# Patient Record
Sex: FEMALE | Race: OTHER | Employment: FULL TIME | URBAN - METROPOLITAN AREA
[De-identification: names, ages, dates, MRNs, and addresses within clinical notes are randomized per-mention and may not be internally consistent; named-entity substitution may affect disease eponyms.]

---

## 2021-05-26 ENCOUNTER — HOSPITAL ENCOUNTER (EMERGENCY)
Facility: HOSPITAL | Age: 31
Discharge: HOME | End: 2021-05-27
Attending: EMERGENCY MEDICINE
Payer: COMMERCIAL

## 2021-05-26 DIAGNOSIS — F41.9 ANXIETY: ICD-10-CM

## 2021-05-26 DIAGNOSIS — D64.9 ANEMIA, UNSPECIFIED TYPE: Primary | ICD-10-CM

## 2021-05-26 PROCEDURE — 93005 ELECTROCARDIOGRAM TRACING: CPT

## 2021-05-26 PROCEDURE — 99284 EMERGENCY DEPT VISIT MOD MDM: CPT

## 2021-05-26 PROCEDURE — 93010 ELECTROCARDIOGRAM REPORT: CPT | Performed by: EMERGENCY MEDICINE

## 2021-05-26 PROCEDURE — 36415 COLL VENOUS BLD VENIPUNCTURE: CPT

## 2021-05-26 NOTE — ED PROVIDER NOTES
Patient Seen in: [facility]      History   Patient presents with:   Anxiety/Panic attack    Stated Complaint: chest pain/dizzy/shaky/arm and leg tingling    HPI/Subjective:   HPI    33 yo female with PMH of bronchial tumor here for evaluation Pulmonary effort is normal.      Breath sounds: No wheezing. Abdominal:      General: Abdomen is flat. Musculoskeletal:         General: Normal range of motion. Cervical back: Normal range of motion. Skin:     General: Skin is warm.    Neurologic total) by mouth daily.   Qty: 30 tablet Refills: 0

## 2021-05-26 NOTE — ED INITIAL ASSESSMENT (HPI)
Felt like her heart was racing and was having chest pain while at work this afternoon. Hx of anxiety. Has not been taking her medications.

## 2021-05-27 ENCOUNTER — APPOINTMENT (OUTPATIENT)
Dept: GENERAL RADIOLOGY | Facility: HOSPITAL | Age: 31
End: 2021-05-27
Attending: EMERGENCY MEDICINE
Payer: COMMERCIAL

## 2021-05-27 VITALS
DIASTOLIC BLOOD PRESSURE: 75 MMHG | RESPIRATION RATE: 21 BRPM | HEART RATE: 94 BPM | OXYGEN SATURATION: 99 % | WEIGHT: 275 LBS | TEMPERATURE: 99 F | BODY MASS INDEX: 47 KG/M2 | SYSTOLIC BLOOD PRESSURE: 111 MMHG

## 2021-05-27 PROCEDURE — 85379 FIBRIN DEGRADATION QUANT: CPT | Performed by: EMERGENCY MEDICINE

## 2021-05-27 PROCEDURE — 85025 COMPLETE CBC W/AUTO DIFF WBC: CPT | Performed by: EMERGENCY MEDICINE

## 2021-05-27 PROCEDURE — 71045 X-RAY EXAM CHEST 1 VIEW: CPT | Performed by: EMERGENCY MEDICINE

## 2021-05-27 PROCEDURE — 80048 BASIC METABOLIC PNL TOTAL CA: CPT | Performed by: EMERGENCY MEDICINE

## 2021-05-27 PROCEDURE — 84484 ASSAY OF TROPONIN QUANT: CPT | Performed by: EMERGENCY MEDICINE

## 2021-05-27 PROCEDURE — 81025 URINE PREGNANCY TEST: CPT

## 2021-05-27 PROCEDURE — 84443 ASSAY THYROID STIM HORMONE: CPT | Performed by: EMERGENCY MEDICINE

## 2021-05-27 RX ORDER — LORAZEPAM 1 MG/1
0.5 TABLET ORAL ONCE
Status: COMPLETED | OUTPATIENT
Start: 2021-05-27 | End: 2021-05-27

## 2021-05-27 RX ORDER — LORAZEPAM 0.5 MG/1
0.5 TABLET ORAL 2 TIMES DAILY PRN
Qty: 7 TABLET | Refills: 0 | Status: SHIPPED | OUTPATIENT
Start: 2021-05-27 | End: 2021-06-03

## 2021-05-27 RX ORDER — MELATONIN
325
Qty: 30 TABLET | Refills: 0 | Status: SHIPPED | OUTPATIENT
Start: 2021-05-27 | End: 2021-06-26

## 2021-05-27 NOTE — ED PROVIDER NOTES
Patient Seen in: [facility] AND [facility]      History   Patient presents with:  Chest Pain Angina  Anxiety/Panic attack    Stated Complaint:     HPI/Subjective:   HPI  Patient is a 77-year-old female history of anxiety presenting with ches moist.   Eyes:      Extraocular Movements: Extraocular movements intact. Conjunctiva/sclera: Conjunctivae normal.      Pupils: Pupils are equal, round, and reactive to light. Cardiovascular:      Rate and Rhythm: Regular rhythm. Tachycardia present. results for these tests on the individual orders. RAINBOW DRAW LAVENDER   RAINBOW DRAW LIGHT GREEN   RAINBOW DRAW GOLD       AP chest x-ray at 0255    IMPRESSION:    No acute pulmonary process. Asymmetric elevation of the right hemidiaphragm.         EKG Tab EC  Take 1 tablet (325 mg total) by mouth daily with breakfast.  Qty: 30 tablet Refills: 0    LORazepam 0.5 MG Oral Tab  Take 1 tablet (0.5 mg total) by mouth 2 (two) times daily as needed for Anxiety.   Qty: 7 tablet Refills: 0

## 2021-05-27 NOTE — ED INITIAL ASSESSMENT (HPI)
Pt states she was seen at [address] [place] [address], she thought she was having a heart attack, was told it was a panic attack. States she had the same feeling again tonight, she felt chest tightness, shortness of breath, her fingers were tingling, and her heart was racing.  Hx o

## 2023-04-26 ENCOUNTER — APPOINTMENT (OUTPATIENT)
Dept: GENERAL RADIOLOGY | Age: 33
End: 2023-04-26
Attending: NURSE PRACTITIONER
Payer: COMMERCIAL

## 2023-04-26 ENCOUNTER — HOSPITAL ENCOUNTER (OUTPATIENT)
Age: 33
Discharge: HOME | End: 2023-04-26
Payer: COMMERCIAL

## 2023-04-26 VITALS
OXYGEN SATURATION: 98 % | SYSTOLIC BLOOD PRESSURE: 101 MMHG | HEART RATE: 116 BPM | TEMPERATURE: 97 F | RESPIRATION RATE: 18 BRPM | DIASTOLIC BLOOD PRESSURE: 89 MMHG

## 2023-04-26 DIAGNOSIS — J18.9 COMMUNITY ACQUIRED PNEUMONIA OF RIGHT UPPER LOBE OF LUNG: ICD-10-CM

## 2023-04-26 DIAGNOSIS — R05.1 ACUTE COUGH: Primary | ICD-10-CM

## 2023-04-26 DIAGNOSIS — D63.8 ANEMIA, CHRONIC DISEASE: ICD-10-CM

## 2023-04-26 LAB
#MXD IC: 0.5 X10ˆ3/UL (ref 0.1–1)
ATRIAL RATE: 113 BPM
BUN BLD-MCNC: <5 MG/DL (ref 7–18)
CHLORIDE BLD-SCNC: 100 MMOL/L (ref 98–112)
CO2 BLD-SCNC: 23 MMOL/L (ref 21–32)
CREAT BLD-MCNC: 0.5 MG/DL
DDIMER WHOLE BLOOD: <200 NG/ML DDU (ref ?–400)
GFR SERPLBLD BASED ON 1.73 SQ M-ARVRAT: 128 ML/MIN/1.73M2 (ref 60–?)
GLUCOSE BLD-MCNC: 169 MG/DL (ref 70–99)
HCT VFR BLD AUTO: 35.8 %
HCT VFR BLD CALC: 39 %
HGB BLD-MCNC: 10.5 G/DL
ISTAT IONIZED CALCIUM FOR CHEM 8: 1.13 MMOL/L (ref 1.12–1.32)
LYMPHOCYTES # BLD AUTO: 1.8 X10ˆ3/UL (ref 1–4)
LYMPHOCYTES NFR BLD AUTO: 43 %
MCH RBC QN AUTO: 23.6 PG (ref 26–34)
MCHC RBC AUTO-ENTMCNC: 29.3 G/DL (ref 31–37)
MCV RBC AUTO: 80.4 FL (ref 80–100)
MIXED CELL %: 12.4 %
NEUTROPHILS # BLD AUTO: 1.9 X10ˆ3/UL (ref 1.5–7.7)
NEUTROPHILS NFR BLD AUTO: 44.6 %
P AXIS: 20 DEGREES
P-R INTERVAL: 168 MS
PLATELET # BLD AUTO: 153 X10ˆ3/UL (ref 150–450)
POTASSIUM BLD-SCNC: 4 MMOL/L (ref 3.6–5.1)
Q-T INTERVAL: 334 MS
QRS DURATION: 68 MS
QTC CALCULATION (BEZET): 458 MS
R AXIS: 18 DEGREES
RBC # BLD AUTO: 4.45 X10ˆ6/UL
SARS-COV-2 RNA RESP QL NAA+PROBE: NOT DETECTED
SODIUM BLD-SCNC: 140 MMOL/L (ref 136–145)
T AXIS: 51 DEGREES
TROPONIN I BLD-MCNC: <0.02 NG/ML
VENTRICULAR RATE: 113 BPM
WBC # BLD AUTO: 4.2 X10ˆ3/UL (ref 4–11)

## 2023-04-26 PROCEDURE — 85025 COMPLETE CBC W/AUTO DIFF WBC: CPT | Performed by: NURSE PRACTITIONER

## 2023-04-26 PROCEDURE — 99214 OFFICE O/P EST MOD 30 MIN: CPT | Performed by: NURSE PRACTITIONER

## 2023-04-26 PROCEDURE — 94640 AIRWAY INHALATION TREATMENT: CPT | Performed by: NURSE PRACTITIONER

## 2023-04-26 PROCEDURE — 36415 COLL VENOUS BLD VENIPUNCTURE: CPT | Performed by: NURSE PRACTITIONER

## 2023-04-26 PROCEDURE — 71046 X-RAY EXAM CHEST 2 VIEWS: CPT | Performed by: NURSE PRACTITIONER

## 2023-04-26 PROCEDURE — 93000 ELECTROCARDIOGRAM COMPLETE: CPT | Performed by: NURSE PRACTITIONER

## 2023-04-26 PROCEDURE — 80047 BASIC METABLC PNL IONIZED CA: CPT | Performed by: NURSE PRACTITIONER

## 2023-04-26 PROCEDURE — 84484 ASSAY OF TROPONIN QUANT: CPT | Performed by: NURSE PRACTITIONER

## 2023-04-26 PROCEDURE — U0002 COVID-19 LAB TEST NON-CDC: HCPCS | Performed by: NURSE PRACTITIONER

## 2023-04-26 RX ORDER — AZITHROMYCIN 250 MG/1
TABLET, FILM COATED ORAL
Qty: 6 TABLET | Refills: 0 | Status: SHIPPED | OUTPATIENT
Start: 2023-04-26 | End: 2023-05-01

## 2023-04-26 RX ORDER — AMOXICILLIN 875 MG/1
875 TABLET, COATED ORAL 2 TIMES DAILY
Qty: 20 TABLET | Refills: 0 | Status: SHIPPED | OUTPATIENT
Start: 2023-04-26 | End: 2023-05-06

## 2023-04-26 RX ORDER — IPRATROPIUM BROMIDE AND ALBUTEROL SULFATE 2.5; .5 MG/3ML; MG/3ML
3 SOLUTION RESPIRATORY (INHALATION) ONCE
Status: COMPLETED | OUTPATIENT
Start: 2023-04-26 | End: 2023-04-26

## 2023-04-26 RX ORDER — ALBUTEROL SULFATE 90 UG/1
2 AEROSOL, METERED RESPIRATORY (INHALATION) EVERY 4 HOURS PRN
Qty: 1 EACH | Refills: 0 | Status: SHIPPED | OUTPATIENT
Start: 2023-04-26 | End: 2023-05-26

## 2023-04-26 RX ORDER — INHALER, ASSIST DEVICES
SPACER (EA) MISCELLANEOUS
Qty: 1 EACH | Refills: 0 | Status: SHIPPED | OUTPATIENT
Start: 2023-04-26

## 2023-04-26 NOTE — DISCHARGE INSTRUCTIONS
X-ray showing a right-sided pneumonia. Blood work is showing a chronic anemia. Take both antibiotics as directed. Use the inhaler as needed for your wheezing. Make sure to use right before bed. Call your primary doctor for follow-up later this week or early next week.   Go to the emergency room if worsening symptoms

## 2023-06-18 ENCOUNTER — APPOINTMENT (OUTPATIENT)
Dept: CT IMAGING | Facility: HOSPITAL | Age: 33
End: 2023-06-18
Attending: EMERGENCY MEDICINE
Payer: COMMERCIAL

## 2023-06-18 ENCOUNTER — HOSPITAL ENCOUNTER (EMERGENCY)
Facility: HOSPITAL | Age: 33
Discharge: HOME | End: 2023-06-18
Attending: EMERGENCY MEDICINE
Payer: COMMERCIAL

## 2023-06-18 VITALS
DIASTOLIC BLOOD PRESSURE: 53 MMHG | OXYGEN SATURATION: 99 % | BODY MASS INDEX: 42.68 KG/M2 | WEIGHT: 250 LBS | HEART RATE: 100 BPM | TEMPERATURE: 97 F | HEIGHT: 64 IN | SYSTOLIC BLOOD PRESSURE: 108 MMHG | RESPIRATION RATE: 18 BRPM

## 2023-06-18 DIAGNOSIS — K57.92 ACUTE DIVERTICULITIS: Primary | ICD-10-CM

## 2023-06-18 LAB
ALBUMIN SERPL-MCNC: 3 G/DL (ref 3.4–5)
ALP LIVER SERPL-CCNC: 93 U/L
ALT SERPL-CCNC: 16 U/L
ANION GAP SERPL CALC-SCNC: 9 MMOL/L (ref 0–18)
AST SERPL-CCNC: 31 U/L (ref 15–37)
B-HCG UR QL: NEGATIVE
BASOPHILS # BLD AUTO: 0.03 X10(3) UL (ref 0–0.2)
BASOPHILS NFR BLD AUTO: 0.3 %
BILIRUB DIRECT SERPL-MCNC: 0.6 MG/DL (ref 0–0.2)
BILIRUB SERPL-MCNC: 1.7 MG/DL (ref 0.1–2)
BILIRUB UR QL: NEGATIVE
BUN BLD-MCNC: 6 MG/DL (ref 7–18)
BUN/CREAT SERPL: 8.5 (ref 10–20)
CALCIUM BLD-MCNC: 8.2 MG/DL (ref 8.5–10.1)
CHLORIDE SERPL-SCNC: 104 MMOL/L (ref 98–112)
CLARITY UR: CLEAR
CO2 SERPL-SCNC: 25 MMOL/L (ref 21–32)
CREAT BLD-MCNC: 0.71 MG/DL
DEPRECATED RDW RBC AUTO: 46.7 FL (ref 35.1–46.3)
EOSINOPHIL # BLD AUTO: 0.02 X10(3) UL (ref 0–0.7)
EOSINOPHIL NFR BLD AUTO: 0.2 %
ERYTHROCYTE [DISTWIDTH] IN BLOOD BY AUTOMATED COUNT: 16.9 % (ref 11–15)
GFR SERPLBLD BASED ON 1.73 SQ M-ARVRAT: 115 ML/MIN/1.73M2 (ref 60–?)
GLUCOSE BLD-MCNC: 159 MG/DL (ref 70–99)
GLUCOSE UR-MCNC: NORMAL MG/DL
HCT VFR BLD AUTO: 29.8 %
HGB BLD-MCNC: 8.6 G/DL
HGB UR QL STRIP.AUTO: NEGATIVE
IMM GRANULOCYTES # BLD AUTO: 0.08 X10(3) UL (ref 0–1)
IMM GRANULOCYTES NFR BLD: 0.8 %
KETONES UR-MCNC: NEGATIVE MG/DL
LEUKOCYTE ESTERASE UR QL STRIP.AUTO: NEGATIVE
LIPASE SERPL-CCNC: 33 U/L (ref 13–75)
LYMPHOCYTES # BLD AUTO: 1.33 X10(3) UL (ref 1–4)
LYMPHOCYTES NFR BLD AUTO: 13.1 %
MCH RBC QN AUTO: 21.9 PG (ref 26–34)
MCHC RBC AUTO-ENTMCNC: 28.9 G/DL (ref 31–37)
MCV RBC AUTO: 75.8 FL
MONOCYTES # BLD AUTO: 0.63 X10(3) UL (ref 0.1–1)
MONOCYTES NFR BLD AUTO: 6.2 %
NEUTROPHILS # BLD AUTO: 8.05 X10 (3) UL (ref 1.5–7.7)
NEUTROPHILS # BLD AUTO: 8.05 X10(3) UL (ref 1.5–7.7)
NEUTROPHILS NFR BLD AUTO: 79.4 %
NITRITE UR QL STRIP.AUTO: NEGATIVE
OSMOLALITY SERPL CALC.SUM OF ELEC: 287 MOSM/KG (ref 275–295)
PH UR: 7 [PH] (ref 5–8)
PLATELET # BLD AUTO: 179 10(3)UL (ref 150–450)
POTASSIUM SERPL-SCNC: 4 MMOL/L (ref 3.5–5.1)
PROT SERPL-MCNC: 7.1 G/DL (ref 6.4–8.2)
PROT UR-MCNC: 20 MG/DL
RBC # BLD AUTO: 3.93 X10(6)UL
SODIUM SERPL-SCNC: 138 MMOL/L (ref 136–145)
SP GR UR STRIP: >1.03 (ref 1–1.03)
UROBILINOGEN UR STRIP-ACNC: NORMAL
WBC # BLD AUTO: 10.1 X10(3) UL (ref 4–11)

## 2023-06-18 PROCEDURE — 96376 TX/PRO/DX INJ SAME DRUG ADON: CPT

## 2023-06-18 PROCEDURE — 96375 TX/PRO/DX INJ NEW DRUG ADDON: CPT

## 2023-06-18 PROCEDURE — 96361 HYDRATE IV INFUSION ADD-ON: CPT

## 2023-06-18 PROCEDURE — 99285 EMERGENCY DEPT VISIT HI MDM: CPT

## 2023-06-18 PROCEDURE — 80076 HEPATIC FUNCTION PANEL: CPT | Performed by: EMERGENCY MEDICINE

## 2023-06-18 PROCEDURE — 80048 BASIC METABOLIC PNL TOTAL CA: CPT | Performed by: EMERGENCY MEDICINE

## 2023-06-18 PROCEDURE — 83690 ASSAY OF LIPASE: CPT | Performed by: EMERGENCY MEDICINE

## 2023-06-18 PROCEDURE — 85025 COMPLETE CBC W/AUTO DIFF WBC: CPT | Performed by: EMERGENCY MEDICINE

## 2023-06-18 PROCEDURE — 81025 URINE PREGNANCY TEST: CPT

## 2023-06-18 PROCEDURE — 74177 CT ABD & PELVIS W/CONTRAST: CPT | Performed by: EMERGENCY MEDICINE

## 2023-06-18 PROCEDURE — 96374 THER/PROPH/DIAG INJ IV PUSH: CPT

## 2023-06-18 PROCEDURE — 81001 URINALYSIS AUTO W/SCOPE: CPT | Performed by: EMERGENCY MEDICINE

## 2023-06-18 RX ORDER — ONDANSETRON 4 MG/1
4 TABLET, ORALLY DISINTEGRATING ORAL EVERY 4 HOURS PRN
Qty: 10 TABLET | Refills: 0 | Status: SHIPPED | OUTPATIENT
Start: 2023-06-18 | End: 2023-06-27

## 2023-06-18 RX ORDER — LORAZEPAM 2 MG/ML
0.5 INJECTION INTRAMUSCULAR ONCE
Status: COMPLETED | OUTPATIENT
Start: 2023-06-18 | End: 2023-06-18

## 2023-06-18 RX ORDER — MORPHINE SULFATE 4 MG/ML
4 INJECTION, SOLUTION INTRAMUSCULAR; INTRAVENOUS ONCE
Status: COMPLETED | OUTPATIENT
Start: 2023-06-18 | End: 2023-06-18

## 2023-06-18 RX ORDER — CIPROFLOXACIN 500 MG/1
500 TABLET, FILM COATED ORAL 2 TIMES DAILY
Qty: 20 TABLET | Refills: 0 | Status: SHIPPED | OUTPATIENT
Start: 2023-06-18 | End: 2023-06-27

## 2023-06-18 RX ORDER — HYDROCODONE BITARTRATE AND ACETAMINOPHEN 5; 325 MG/1; MG/1
1 TABLET ORAL EVERY 6 HOURS PRN
Qty: 10 TABLET | Refills: 0 | Status: SHIPPED | OUTPATIENT
Start: 2023-06-18 | End: 2023-06-27

## 2023-06-18 RX ORDER — METRONIDAZOLE 500 MG/1
500 TABLET ORAL 3 TIMES DAILY
Qty: 30 TABLET | Refills: 0 | Status: SHIPPED | OUTPATIENT
Start: 2023-06-18 | End: 2023-06-27

## 2023-06-18 NOTE — ED INITIAL ASSESSMENT (HPI)
PT c/o lower abd pain, fever, vomiting for the last day. Last ibuprofen at 0000. PT reports mild dysuria and decreased output.

## 2023-06-19 ENCOUNTER — HOSPITAL ENCOUNTER (INPATIENT)
Facility: HOSPITAL | Age: 33
LOS: 7 days | Discharge: HOME | End: 2023-06-27
Attending: EMERGENCY MEDICINE | Admitting: HOSPITALIST
Payer: COMMERCIAL

## 2023-06-19 DIAGNOSIS — E66.01 MORBID OBESITY (HCC): ICD-10-CM

## 2023-06-19 DIAGNOSIS — R10.9 INTRACTABLE ABDOMINAL PAIN: Primary | ICD-10-CM

## 2023-06-19 DIAGNOSIS — K57.92 ACUTE DIVERTICULITIS: ICD-10-CM

## 2023-06-19 LAB
ALBUMIN SERPL-MCNC: 2.8 G/DL (ref 3.4–5)
ALBUMIN SERPL-MCNC: 2.9 G/DL (ref 3.4–5)
ALBUMIN/GLOB SERPL: 0.7 {RATIO} (ref 1–2)
ALP LIVER SERPL-CCNC: 78 U/L
ALP LIVER SERPL-CCNC: 91 U/L
ALT SERPL-CCNC: 15 U/L
ALT SERPL-CCNC: 15 U/L
ANION GAP SERPL CALC-SCNC: 3 MMOL/L (ref 0–18)
AST SERPL-CCNC: 27 U/L (ref 15–37)
AST SERPL-CCNC: 27 U/L (ref 15–37)
BASOPHILS # BLD AUTO: 0.04 X10(3) UL (ref 0–0.2)
BASOPHILS NFR BLD AUTO: 0.4 %
BILIRUB DIRECT SERPL-MCNC: 0.8 MG/DL (ref 0–0.2)
BILIRUB SERPL-MCNC: 2.1 MG/DL (ref 0.1–2)
BILIRUB SERPL-MCNC: 2.1 MG/DL (ref 0.1–2)
BILIRUB UR QL CFM: NEGATIVE
BUN BLD-MCNC: 6 MG/DL (ref 7–18)
BUN/CREAT SERPL: 9.2 (ref 10–20)
CALCIUM BLD-MCNC: 8.6 MG/DL (ref 8.5–10.1)
CHLORIDE SERPL-SCNC: 107 MMOL/L (ref 98–112)
CO2 SERPL-SCNC: 29 MMOL/L (ref 21–32)
CREAT BLD-MCNC: 0.65 MG/DL
DEPRECATED RDW RBC AUTO: 49.6 FL (ref 35.1–46.3)
EOSINOPHIL # BLD AUTO: 0.08 X10(3) UL (ref 0–0.7)
EOSINOPHIL NFR BLD AUTO: 0.8 %
ERYTHROCYTE [DISTWIDTH] IN BLOOD BY AUTOMATED COUNT: 17.4 % (ref 11–15)
GFR SERPLBLD BASED ON 1.73 SQ M-ARVRAT: 119 ML/MIN/1.73M2 (ref 60–?)
GLOBULIN PLAS-MCNC: 4.3 G/DL (ref 2.8–4.4)
GLUCOSE BLD-MCNC: 122 MG/DL (ref 70–99)
GLUCOSE UR-MCNC: NORMAL MG/DL
HCT VFR BLD AUTO: 30.1 %
HGB BLD-MCNC: 8.3 G/DL
HGB UR QL STRIP.AUTO: NEGATIVE
IMM GRANULOCYTES # BLD AUTO: 0.07 X10(3) UL (ref 0–1)
IMM GRANULOCYTES NFR BLD: 0.7 %
KETONES UR-MCNC: 20 MG/DL
LACTATE SERPL-SCNC: 1.1 MMOL/L (ref 0.4–2)
LACTATE SERPL-SCNC: 2.3 MMOL/L (ref 0.4–2)
LEUKOCYTE ESTERASE UR QL STRIP.AUTO: 75
LYMPHOCYTES # BLD AUTO: 1.71 X10(3) UL (ref 1–4)
LYMPHOCYTES NFR BLD AUTO: 17 %
MCH RBC QN AUTO: 21.9 PG (ref 26–34)
MCHC RBC AUTO-ENTMCNC: 27.6 G/DL (ref 31–37)
MCV RBC AUTO: 79.4 FL
MONOCYTES # BLD AUTO: 0.55 X10(3) UL (ref 0.1–1)
MONOCYTES NFR BLD AUTO: 5.5 %
NEUTROPHILS # BLD AUTO: 7.63 X10 (3) UL (ref 1.5–7.7)
NEUTROPHILS # BLD AUTO: 7.63 X10(3) UL (ref 1.5–7.7)
NEUTROPHILS NFR BLD AUTO: 75.6 %
NITRITE UR QL STRIP.AUTO: NEGATIVE
OSMOLALITY SERPL CALC.SUM OF ELEC: 287 MOSM/KG (ref 275–295)
PH UR: 6.5 [PH] (ref 5–8)
PLATELET # BLD AUTO: 181 10(3)UL (ref 150–450)
POTASSIUM SERPL-SCNC: 4.4 MMOL/L (ref 3.5–5.1)
PROCALCITONIN SERPL-MCNC: 0.81 NG/ML (ref ?–0.16)
PROT SERPL-MCNC: 7.1 G/DL (ref 6.4–8.2)
PROT SERPL-MCNC: 7.1 G/DL (ref 6.4–8.2)
PROT UR-MCNC: 50 MG/DL
RBC # BLD AUTO: 3.79 X10(6)UL
SODIUM SERPL-SCNC: 139 MMOL/L (ref 136–145)
SP GR UR STRIP: >1.03 (ref 1–1.03)
UROBILINOGEN UR STRIP-ACNC: 4
WBC # BLD AUTO: 10.1 X10(3) UL (ref 4–11)

## 2023-06-19 PROCEDURE — 99245 OFF/OP CONSLTJ NEW/EST HI 55: CPT | Performed by: INTERNAL MEDICINE

## 2023-06-19 PROCEDURE — 99223 1ST HOSP IP/OBS HIGH 75: CPT | Performed by: HOSPITALIST

## 2023-06-19 RX ORDER — MORPHINE SULFATE 2 MG/ML
2 INJECTION, SOLUTION INTRAMUSCULAR; INTRAVENOUS EVERY 2 HOUR PRN
Status: DISCONTINUED | OUTPATIENT
Start: 2023-06-19 | End: 2023-06-27

## 2023-06-19 RX ORDER — ONDANSETRON 2 MG/ML
4 INJECTION INTRAMUSCULAR; INTRAVENOUS EVERY 6 HOURS PRN
Status: DISCONTINUED | OUTPATIENT
Start: 2023-06-19 | End: 2023-06-27

## 2023-06-19 RX ORDER — ALPRAZOLAM 0.5 MG/1
0.5 TABLET ORAL 3 TIMES DAILY PRN
COMMUNITY

## 2023-06-19 RX ORDER — SODIUM CHLORIDE 9 MG/ML
INJECTION, SOLUTION INTRAVENOUS CONTINUOUS
Status: ACTIVE | OUTPATIENT
Start: 2023-06-19 | End: 2023-06-19

## 2023-06-19 RX ORDER — MORPHINE SULFATE 4 MG/ML
4 INJECTION, SOLUTION INTRAMUSCULAR; INTRAVENOUS EVERY 2 HOUR PRN
Status: DISCONTINUED | OUTPATIENT
Start: 2023-06-19 | End: 2023-06-27

## 2023-06-19 RX ORDER — HEPARIN SODIUM 5000 [USP'U]/ML
5000 INJECTION, SOLUTION INTRAVENOUS; SUBCUTANEOUS EVERY 12 HOURS SCHEDULED
Status: DISCONTINUED | OUTPATIENT
Start: 2023-06-19 | End: 2023-06-21

## 2023-06-19 RX ORDER — HYDROCODONE BITARTRATE AND ACETAMINOPHEN 5; 325 MG/1; MG/1
1 TABLET ORAL EVERY 6 HOURS PRN
Status: DISCONTINUED | OUTPATIENT
Start: 2023-06-19 | End: 2023-06-24

## 2023-06-19 RX ORDER — SODIUM CHLORIDE 9 MG/ML
INJECTION, SOLUTION INTRAVENOUS CONTINUOUS
Status: DISCONTINUED | OUTPATIENT
Start: 2023-06-19 | End: 2023-06-19

## 2023-06-19 RX ORDER — PANTOPRAZOLE SODIUM 40 MG/1
40 TABLET, DELAYED RELEASE ORAL
Status: DISCONTINUED | OUTPATIENT
Start: 2023-06-19 | End: 2023-06-27

## 2023-06-19 RX ORDER — MAGNESIUM HYDROXIDE/ALUMINUM HYDROXICE/SIMETHICONE 120; 1200; 1200 MG/30ML; MG/30ML; MG/30ML
30 SUSPENSION ORAL 4 TIMES DAILY PRN
Status: DISCONTINUED | OUTPATIENT
Start: 2023-06-19 | End: 2023-06-27

## 2023-06-19 RX ORDER — LORAZEPAM 2 MG/ML
1 INJECTION INTRAMUSCULAR EVERY 6 HOURS PRN
Status: DISCONTINUED | OUTPATIENT
Start: 2023-06-19 | End: 2023-06-27

## 2023-06-19 RX ORDER — SODIUM CHLORIDE 9 MG/ML
INJECTION, SOLUTION INTRAVENOUS CONTINUOUS
Status: DISCONTINUED | OUTPATIENT
Start: 2023-06-19 | End: 2023-06-27

## 2023-06-19 RX ORDER — ZOLPIDEM TARTRATE 5 MG/1
5 TABLET ORAL NIGHTLY PRN
Status: DISCONTINUED | OUTPATIENT
Start: 2023-06-19 | End: 2023-06-22

## 2023-06-19 RX ORDER — ALPRAZOLAM 0.5 MG/1
0.5 TABLET ORAL 3 TIMES DAILY PRN
Status: DISCONTINUED | OUTPATIENT
Start: 2023-06-19 | End: 2023-06-27

## 2023-06-19 RX ORDER — ACETAMINOPHEN 325 MG/1
650 TABLET ORAL EVERY 6 HOURS PRN
Status: DISCONTINUED | OUTPATIENT
Start: 2023-06-19 | End: 2023-06-27

## 2023-06-20 LAB
ALBUMIN SERPL-MCNC: 2.3 G/DL (ref 3.4–5)
ALBUMIN/GLOB SERPL: 0.6 {RATIO} (ref 1–2)
ALP LIVER SERPL-CCNC: 74 U/L
ALT SERPL-CCNC: 12 U/L
ANION GAP SERPL CALC-SCNC: 4 MMOL/L (ref 0–18)
AST SERPL-CCNC: 23 U/L (ref 15–37)
BASOPHILS # BLD AUTO: 0.03 X10(3) UL (ref 0–0.2)
BASOPHILS NFR BLD AUTO: 0.3 %
BILIRUB SERPL-MCNC: 2.2 MG/DL (ref 0.1–2)
BUN BLD-MCNC: 3 MG/DL (ref 7–18)
BUN/CREAT SERPL: 5 (ref 10–20)
CALCIUM BLD-MCNC: 7.8 MG/DL (ref 8.5–10.1)
CHLORIDE SERPL-SCNC: 109 MMOL/L (ref 98–112)
CO2 SERPL-SCNC: 25 MMOL/L (ref 21–32)
CREAT BLD-MCNC: 0.6 MG/DL
DEPRECATED RDW RBC AUTO: 50.7 FL (ref 35.1–46.3)
EOSINOPHIL # BLD AUTO: 0.09 X10(3) UL (ref 0–0.7)
EOSINOPHIL NFR BLD AUTO: 0.9 %
ERYTHROCYTE [DISTWIDTH] IN BLOOD BY AUTOMATED COUNT: 17.8 % (ref 11–15)
GFR SERPLBLD BASED ON 1.73 SQ M-ARVRAT: 121 ML/MIN/1.73M2 (ref 60–?)
GLOBULIN PLAS-MCNC: 3.9 G/DL (ref 2.8–4.4)
GLUCOSE BLD-MCNC: 107 MG/DL (ref 70–99)
HCT VFR BLD AUTO: 26.7 %
HGB BLD-MCNC: 7.3 G/DL
IMM GRANULOCYTES # BLD AUTO: 0.11 X10(3) UL (ref 0–1)
IMM GRANULOCYTES NFR BLD: 1.1 %
LYMPHOCYTES # BLD AUTO: 1.25 X10(3) UL (ref 1–4)
LYMPHOCYTES NFR BLD AUTO: 12.7 %
MCH RBC QN AUTO: 21.9 PG (ref 26–34)
MCHC RBC AUTO-ENTMCNC: 27.3 G/DL (ref 31–37)
MCV RBC AUTO: 80.2 FL
MONOCYTES # BLD AUTO: 0.6 X10(3) UL (ref 0.1–1)
MONOCYTES NFR BLD AUTO: 6.1 %
NEUTROPHILS # BLD AUTO: 7.78 X10 (3) UL (ref 1.5–7.7)
NEUTROPHILS # BLD AUTO: 7.78 X10(3) UL (ref 1.5–7.7)
NEUTROPHILS NFR BLD AUTO: 78.9 %
OSMOLALITY SERPL CALC.SUM OF ELEC: 283 MOSM/KG (ref 275–295)
PLATELET # BLD AUTO: 174 10(3)UL (ref 150–450)
POTASSIUM SERPL-SCNC: 3.7 MMOL/L (ref 3.5–5.1)
PROT SERPL-MCNC: 6.2 G/DL (ref 6.4–8.2)
RBC # BLD AUTO: 3.33 X10(6)UL
SODIUM SERPL-SCNC: 138 MMOL/L (ref 136–145)
WBC # BLD AUTO: 9.9 X10(3) UL (ref 4–11)

## 2023-06-20 PROCEDURE — 99233 SBSQ HOSP IP/OBS HIGH 50: CPT | Performed by: INTERNAL MEDICINE

## 2023-06-20 PROCEDURE — 99232 SBSQ HOSP IP/OBS MODERATE 35: CPT | Performed by: REGISTERED NURSE

## 2023-06-21 ENCOUNTER — APPOINTMENT (OUTPATIENT)
Dept: GENERAL RADIOLOGY | Facility: HOSPITAL | Age: 33
End: 2023-06-21
Attending: HOSPITALIST
Payer: COMMERCIAL

## 2023-06-21 LAB
ALBUMIN SERPL-MCNC: 2.3 G/DL (ref 3.4–5)
ALBUMIN/GLOB SERPL: 0.6 {RATIO} (ref 1–2)
ALP LIVER SERPL-CCNC: 70 U/L
ALT SERPL-CCNC: 11 U/L
ANION GAP SERPL CALC-SCNC: 6 MMOL/L (ref 0–18)
AST SERPL-CCNC: 21 U/L (ref 15–37)
BASOPHILS # BLD AUTO: 0.03 X10(3) UL (ref 0–0.2)
BASOPHILS NFR BLD AUTO: 0.4 %
BILIRUB SERPL-MCNC: 1.4 MG/DL (ref 0.1–2)
BILIRUB UR QL CFM: POSITIVE
BUN BLD-MCNC: 2 MG/DL (ref 7–18)
BUN/CREAT SERPL: 4.1 (ref 10–20)
CALCIUM BLD-MCNC: 7.7 MG/DL (ref 8.5–10.1)
CHLORIDE SERPL-SCNC: 110 MMOL/L (ref 98–112)
CO2 SERPL-SCNC: 24 MMOL/L (ref 21–32)
COLOR UR: YELLOW
CREAT BLD-MCNC: 0.49 MG/DL
DEPRECATED RDW RBC AUTO: 51 FL (ref 35.1–46.3)
EOSINOPHIL # BLD AUTO: 0.14 X10(3) UL (ref 0–0.7)
EOSINOPHIL NFR BLD AUTO: 1.8 %
ERYTHROCYTE [DISTWIDTH] IN BLOOD BY AUTOMATED COUNT: 17.9 % (ref 11–15)
GFR SERPLBLD BASED ON 1.73 SQ M-ARVRAT: 128 ML/MIN/1.73M2 (ref 60–?)
GLOBULIN PLAS-MCNC: 3.7 G/DL (ref 2.8–4.4)
GLUCOSE BLD-MCNC: 110 MG/DL (ref 70–99)
GLUCOSE UR-MCNC: NORMAL MG/DL
HCT VFR BLD AUTO: 26.2 %
HGB BLD-MCNC: 7.2 G/DL
IMM GRANULOCYTES # BLD AUTO: 0.21 X10(3) UL (ref 0–1)
IMM GRANULOCYTES NFR BLD: 2.7 %
KETONES UR-MCNC: 40 MG/DL
LEUKOCYTE ESTERASE UR QL STRIP.AUTO: 25
LYMPHOCYTES # BLD AUTO: 1.17 X10(3) UL (ref 1–4)
LYMPHOCYTES NFR BLD AUTO: 15.3 %
MCH RBC QN AUTO: 22.3 PG (ref 26–34)
MCHC RBC AUTO-ENTMCNC: 27.5 G/DL (ref 31–37)
MCV RBC AUTO: 81.1 FL
MONOCYTES # BLD AUTO: 0.47 X10(3) UL (ref 0.1–1)
MONOCYTES NFR BLD AUTO: 6.1 %
NEUTROPHILS # BLD AUTO: 5.65 X10 (3) UL (ref 1.5–7.7)
NEUTROPHILS # BLD AUTO: 5.65 X10(3) UL (ref 1.5–7.7)
NEUTROPHILS NFR BLD AUTO: 73.7 %
NITRITE UR QL STRIP.AUTO: NEGATIVE
NT-PROBNP SERPL-MCNC: 387 PG/ML (ref ?–125)
OSMOLALITY SERPL CALC.SUM OF ELEC: 287 MOSM/KG (ref 275–295)
PH UR: 6 [PH] (ref 5–8)
PLATELET # BLD AUTO: 161 10(3)UL (ref 150–450)
POTASSIUM SERPL-SCNC: 3.2 MMOL/L (ref 3.5–5.1)
PROT SERPL-MCNC: 6 G/DL (ref 6.4–8.2)
PROT UR-MCNC: 30 MG/DL
RBC # BLD AUTO: 3.23 X10(6)UL
RBC #/AREA URNS AUTO: >10 /HPF
SODIUM SERPL-SCNC: 140 MMOL/L (ref 136–145)
SP GR UR STRIP: >1.03 (ref 1–1.03)
UROBILINOGEN UR STRIP-ACNC: 3
WBC # BLD AUTO: 7.7 X10(3) UL (ref 4–11)

## 2023-06-21 PROCEDURE — 99232 SBSQ HOSP IP/OBS MODERATE 35: CPT | Performed by: REGISTERED NURSE

## 2023-06-21 PROCEDURE — 71045 X-RAY EXAM CHEST 1 VIEW: CPT | Performed by: HOSPITALIST

## 2023-06-21 PROCEDURE — 99233 SBSQ HOSP IP/OBS HIGH 50: CPT | Performed by: HOSPITALIST

## 2023-06-21 RX ORDER — IPRATROPIUM BROMIDE AND ALBUTEROL SULFATE 2.5; .5 MG/3ML; MG/3ML
3 SOLUTION RESPIRATORY (INHALATION) EVERY 6 HOURS PRN
Status: DISCONTINUED | OUTPATIENT
Start: 2023-06-21 | End: 2023-06-27

## 2023-06-21 RX ORDER — HEPARIN SODIUM 5000 [USP'U]/ML
7500 INJECTION, SOLUTION INTRAVENOUS; SUBCUTANEOUS EVERY 12 HOURS SCHEDULED
Status: DISCONTINUED | OUTPATIENT
Start: 2023-06-21 | End: 2023-06-27

## 2023-06-21 RX ORDER — FUROSEMIDE 10 MG/ML
40 INJECTION INTRAMUSCULAR; INTRAVENOUS ONCE
Status: COMPLETED | OUTPATIENT
Start: 2023-06-21 | End: 2023-06-21

## 2023-06-21 RX ORDER — POTASSIUM CHLORIDE 20 MEQ/1
40 TABLET, EXTENDED RELEASE ORAL ONCE
Status: COMPLETED | OUTPATIENT
Start: 2023-06-21 | End: 2023-06-21

## 2023-06-22 ENCOUNTER — APPOINTMENT (OUTPATIENT)
Dept: CV DIAGNOSTICS | Facility: HOSPITAL | Age: 33
End: 2023-06-22
Attending: HOSPITALIST
Payer: COMMERCIAL

## 2023-06-22 ENCOUNTER — APPOINTMENT (OUTPATIENT)
Dept: CT IMAGING | Facility: HOSPITAL | Age: 33
End: 2023-06-22
Attending: NURSE PRACTITIONER
Payer: COMMERCIAL

## 2023-06-22 LAB
ANION GAP SERPL CALC-SCNC: 6 MMOL/L (ref 0–18)
BASOPHILS # BLD: 0.14 X10(3) UL (ref 0–0.2)
BASOPHILS NFR BLD: 2 %
BUN BLD-MCNC: 1 MG/DL (ref 7–18)
BUN/CREAT SERPL: 2.2 (ref 10–20)
CALCIUM BLD-MCNC: 7.8 MG/DL (ref 8.5–10.1)
CHLORIDE SERPL-SCNC: 107 MMOL/L (ref 98–112)
CO2 SERPL-SCNC: 28 MMOL/L (ref 21–32)
CREAT BLD-MCNC: 0.46 MG/DL
DEPRECATED RDW RBC AUTO: 50.8 FL (ref 35.1–46.3)
EOSINOPHIL # BLD: 0.21 X10(3) UL (ref 0–0.7)
EOSINOPHIL NFR BLD: 3 %
ERYTHROCYTE [DISTWIDTH] IN BLOOD BY AUTOMATED COUNT: 19 % (ref 11–15)
GFR SERPLBLD BASED ON 1.73 SQ M-ARVRAT: 130 ML/MIN/1.73M2 (ref 60–?)
GLUCOSE BLD-MCNC: 98 MG/DL (ref 70–99)
HCT VFR BLD AUTO: 26.4 %
HGB BLD-MCNC: 7.3 G/DL
LYMPHOCYTES NFR BLD: 1.61 X10(3) UL (ref 1–4)
LYMPHOCYTES NFR BLD: 23 %
MAGNESIUM SERPL-MCNC: 1.7 MG/DL (ref 1.6–2.6)
MCH RBC QN AUTO: 22.6 PG (ref 26–34)
MCHC RBC AUTO-ENTMCNC: 27.7 G/DL (ref 31–37)
MCV RBC AUTO: 81.7 FL
MONOCYTES # BLD: 0.49 X10(3) UL (ref 0.1–1)
MONOCYTES NFR BLD: 7 %
NEUTROPHILS # BLD AUTO: 4.34 X10 (3) UL (ref 1.5–7.7)
NEUTROPHILS NFR BLD: 64 %
NEUTS BAND NFR BLD: 1 %
NEUTS HYPERSEG # BLD: 4.55 X10(3) UL (ref 1.5–7.7)
NRBC BLD MANUAL-RTO: 2 %
NT-PROBNP SERPL-MCNC: 424 PG/ML (ref ?–125)
OSMOLALITY SERPL CALC.SUM OF ELEC: 288 MOSM/KG (ref 275–295)
PHOSPHATE SERPL-MCNC: 0.9 MG/DL (ref 2.5–4.9)
PLATELET # BLD AUTO: 186 10(3)UL (ref 150–450)
PLATELET MORPHOLOGY: NORMAL
POTASSIUM SERPL-SCNC: 3.2 MMOL/L (ref 3.5–5.1)
RBC # BLD AUTO: 3.23 X10(6)UL
SODIUM SERPL-SCNC: 141 MMOL/L (ref 136–145)
TOTAL CELLS COUNTED BLD: 100
WBC # BLD AUTO: 7 X10(3) UL (ref 4–11)

## 2023-06-22 PROCEDURE — 74176 CT ABD & PELVIS W/O CONTRAST: CPT | Performed by: NURSE PRACTITIONER

## 2023-06-22 PROCEDURE — 99233 SBSQ HOSP IP/OBS HIGH 50: CPT | Performed by: HOSPITALIST

## 2023-06-22 PROCEDURE — 99233 SBSQ HOSP IP/OBS HIGH 50: CPT | Performed by: NURSE PRACTITIONER

## 2023-06-22 PROCEDURE — 93306 TTE W/DOPPLER COMPLETE: CPT | Performed by: HOSPITALIST

## 2023-06-22 RX ORDER — MAGNESIUM OXIDE 400 MG/1
400 TABLET ORAL ONCE
Status: COMPLETED | OUTPATIENT
Start: 2023-06-22 | End: 2023-06-22

## 2023-06-22 RX ORDER — FUROSEMIDE 10 MG/ML
20 INJECTION INTRAMUSCULAR; INTRAVENOUS ONCE
Status: DISCONTINUED | OUTPATIENT
Start: 2023-06-22 | End: 2023-06-27

## 2023-06-22 RX ORDER — ZOLPIDEM TARTRATE 5 MG/1
10 TABLET ORAL NIGHTLY PRN
Status: DISCONTINUED | OUTPATIENT
Start: 2023-06-22 | End: 2023-06-27

## 2023-06-23 ENCOUNTER — APPOINTMENT (OUTPATIENT)
Dept: CT IMAGING | Facility: HOSPITAL | Age: 33
End: 2023-06-23
Attending: NURSE PRACTITIONER
Payer: COMMERCIAL

## 2023-06-23 ENCOUNTER — TELEPHONE (OUTPATIENT)
Facility: CLINIC | Age: 33
End: 2023-06-23

## 2023-06-23 LAB
ANION GAP SERPL CALC-SCNC: 7 MMOL/L (ref 0–18)
BASOPHILS # BLD: 0.09 X10(3) UL (ref 0–0.2)
BASOPHILS NFR BLD: 1 %
BUN BLD-MCNC: 1 MG/DL (ref 7–18)
BUN/CREAT SERPL: 2.2 (ref 10–20)
CALCIUM BLD-MCNC: 7.9 MG/DL (ref 8.5–10.1)
CHLORIDE SERPL-SCNC: 107 MMOL/L (ref 98–112)
CO2 SERPL-SCNC: 26 MMOL/L (ref 21–32)
CREAT BLD-MCNC: 0.45 MG/DL
DEPRECATED RDW RBC AUTO: 50.3 FL (ref 35.1–46.3)
EOSINOPHIL # BLD: 0.18 X10(3) UL (ref 0–0.7)
EOSINOPHIL NFR BLD: 2 %
ERYTHROCYTE [DISTWIDTH] IN BLOOD BY AUTOMATED COUNT: 20.7 % (ref 11–15)
GFR SERPLBLD BASED ON 1.73 SQ M-ARVRAT: 130 ML/MIN/1.73M2 (ref 60–?)
GLUCOSE BLD-MCNC: 95 MG/DL (ref 70–99)
HCT VFR BLD AUTO: 28 %
HGB BLD-MCNC: 8 G/DL
LYMPHOCYTES NFR BLD: 1.1 X10(3) UL (ref 1–4)
LYMPHOCYTES NFR BLD: 12 %
MAGNESIUM SERPL-MCNC: 1.7 MG/DL (ref 1.6–2.6)
MCH RBC QN AUTO: 23.5 PG (ref 26–34)
MCHC RBC AUTO-ENTMCNC: 28.6 G/DL (ref 31–37)
MCV RBC AUTO: 82.1 FL
MONOCYTES # BLD: 0.37 X10(3) UL (ref 0.1–1)
MONOCYTES NFR BLD: 4 %
NEUTROPHILS # BLD AUTO: 6.46 X10 (3) UL (ref 1.5–7.7)
NEUTROPHILS NFR BLD: 77 %
NEUTS BAND NFR BLD: 4 %
NEUTS HYPERSEG # BLD: 7.45 X10(3) UL (ref 1.5–7.7)
NRBC BLD MANUAL-RTO: 1 %
NT-PROBNP SERPL-MCNC: 515 PG/ML (ref ?–125)
OSMOLALITY SERPL CALC.SUM OF ELEC: 286 MOSM/KG (ref 275–295)
PHOSPHATE SERPL-MCNC: 1.6 MG/DL (ref 2.5–4.9)
PLATELET # BLD AUTO: 199 10(3)UL (ref 150–450)
PLATELET MORPHOLOGY: NORMAL
POTASSIUM SERPL-SCNC: 3.7 MMOL/L (ref 3.5–5.1)
POTASSIUM SERPL-SCNC: 3.7 MMOL/L (ref 3.5–5.1)
RBC # BLD AUTO: 3.41 X10(6)UL
SODIUM SERPL-SCNC: 140 MMOL/L (ref 136–145)
TOTAL CELLS COUNTED BLD: 100
WBC # BLD AUTO: 9.2 X10(3) UL (ref 4–11)

## 2023-06-23 PROCEDURE — 99152 MOD SED SAME PHYS/QHP 5/>YRS: CPT | Performed by: NURSE PRACTITIONER

## 2023-06-23 PROCEDURE — 99254 IP/OBS CNSLTJ NEW/EST MOD 60: CPT | Performed by: SURGERY

## 2023-06-23 PROCEDURE — 99233 SBSQ HOSP IP/OBS HIGH 50: CPT | Performed by: NURSE PRACTITIONER

## 2023-06-23 PROCEDURE — 0W9G3ZZ DRAINAGE OF PERITONEAL CAVITY, PERCUTANEOUS APPROACH: ICD-10-PCS | Performed by: RADIOLOGY

## 2023-06-23 PROCEDURE — 77012 CT SCAN FOR NEEDLE BIOPSY: CPT | Performed by: NURSE PRACTITIONER

## 2023-06-23 PROCEDURE — 10160 PNXR ASPIR ABSC HMTMA BULLA: CPT | Performed by: NURSE PRACTITIONER

## 2023-06-23 PROCEDURE — 99233 SBSQ HOSP IP/OBS HIGH 50: CPT | Performed by: HOSPITALIST

## 2023-06-23 RX ORDER — MIDAZOLAM HYDROCHLORIDE 1 MG/ML
1 INJECTION INTRAMUSCULAR; INTRAVENOUS EVERY 5 MIN PRN
Status: DISCONTINUED | OUTPATIENT
Start: 2023-06-23 | End: 2023-06-27

## 2023-06-23 RX ORDER — FLUMAZENIL 0.1 MG/ML
0.2 INJECTION INTRAVENOUS AS NEEDED
Status: DISCONTINUED | OUTPATIENT
Start: 2023-06-23 | End: 2023-06-27

## 2023-06-23 RX ORDER — NALOXONE HYDROCHLORIDE 0.4 MG/ML
80 INJECTION, SOLUTION INTRAMUSCULAR; INTRAVENOUS; SUBCUTANEOUS AS NEEDED
Status: DISCONTINUED | OUTPATIENT
Start: 2023-06-23 | End: 2023-06-27

## 2023-06-23 RX ORDER — MAGNESIUM SULFATE HEPTAHYDRATE 40 MG/ML
2 INJECTION, SOLUTION INTRAVENOUS ONCE
Status: COMPLETED | OUTPATIENT
Start: 2023-06-23 | End: 2023-06-23

## 2023-06-23 RX ORDER — SODIUM CHLORIDE 9 MG/ML
INJECTION, SOLUTION INTRAVENOUS CONTINUOUS
Status: DISCONTINUED | OUTPATIENT
Start: 2023-06-23 | End: 2023-06-27

## 2023-06-23 RX ORDER — FLUCONAZOLE 2 MG/ML
200 INJECTION, SOLUTION INTRAVENOUS EVERY 24 HOURS
Status: DISCONTINUED | OUTPATIENT
Start: 2023-06-23 | End: 2023-06-26

## 2023-06-23 RX ORDER — MIDAZOLAM HYDROCHLORIDE 1 MG/ML
INJECTION INTRAMUSCULAR; INTRAVENOUS
Status: COMPLETED
Start: 2023-06-23 | End: 2023-06-23

## 2023-06-24 LAB
ANION GAP SERPL CALC-SCNC: 5 MMOL/L (ref 0–18)
BASOPHILS # BLD AUTO: 0.03 X10(3) UL (ref 0–0.2)
BASOPHILS NFR BLD AUTO: 0.4 %
BUN BLD-MCNC: 2 MG/DL (ref 7–18)
BUN/CREAT SERPL: 4.9 (ref 10–20)
CALCIUM BLD-MCNC: 7.9 MG/DL (ref 8.5–10.1)
CHLORIDE SERPL-SCNC: 108 MMOL/L (ref 98–112)
CO2 SERPL-SCNC: 28 MMOL/L (ref 21–32)
CREAT BLD-MCNC: 0.41 MG/DL
DEPRECATED RDW RBC AUTO: 52.4 FL (ref 35.1–46.3)
EOSINOPHIL # BLD AUTO: 0.11 X10(3) UL (ref 0–0.7)
EOSINOPHIL NFR BLD AUTO: 1.5 %
ERYTHROCYTE [DISTWIDTH] IN BLOOD BY AUTOMATED COUNT: 21.8 % (ref 11–15)
GFR SERPLBLD BASED ON 1.73 SQ M-ARVRAT: 133 ML/MIN/1.73M2 (ref 60–?)
GLUCOSE BLD-MCNC: 89 MG/DL (ref 70–99)
HCT VFR BLD AUTO: 27.3 %
HGB BLD-MCNC: 7.5 G/DL
IMM GRANULOCYTES # BLD AUTO: 0.36 X10(3) UL (ref 0–1)
IMM GRANULOCYTES NFR BLD: 5 %
LYMPHOCYTES # BLD AUTO: 1.52 X10(3) UL (ref 1–4)
LYMPHOCYTES NFR BLD AUTO: 20.9 %
MAGNESIUM SERPL-MCNC: 2.3 MG/DL (ref 1.6–2.6)
MCH RBC QN AUTO: 22.9 PG (ref 26–34)
MCHC RBC AUTO-ENTMCNC: 27.5 G/DL (ref 31–37)
MCV RBC AUTO: 83.2 FL
MONOCYTES # BLD AUTO: 0.37 X10(3) UL (ref 0.1–1)
MONOCYTES NFR BLD AUTO: 5.1 %
NEUTROPHILS # BLD AUTO: 4.88 X10 (3) UL (ref 1.5–7.7)
NEUTROPHILS # BLD AUTO: 4.88 X10(3) UL (ref 1.5–7.7)
NEUTROPHILS NFR BLD AUTO: 67.1 %
NT-PROBNP SERPL-MCNC: 353 PG/ML (ref ?–125)
OSMOLALITY SERPL CALC.SUM OF ELEC: 288 MOSM/KG (ref 275–295)
PHOSPHATE SERPL-MCNC: 1.9 MG/DL (ref 2.5–4.9)
PLATELET # BLD AUTO: 192 10(3)UL (ref 150–450)
POTASSIUM SERPL-SCNC: 3.6 MMOL/L (ref 3.5–5.1)
RBC # BLD AUTO: 3.28 X10(6)UL
SODIUM SERPL-SCNC: 141 MMOL/L (ref 136–145)
WBC # BLD AUTO: 7.3 X10(3) UL (ref 4–11)

## 2023-06-24 PROCEDURE — 99232 SBSQ HOSP IP/OBS MODERATE 35: CPT | Performed by: SURGERY

## 2023-06-24 PROCEDURE — 99233 SBSQ HOSP IP/OBS HIGH 50: CPT | Performed by: HOSPITALIST

## 2023-06-24 RX ORDER — HYDROCODONE BITARTRATE AND ACETAMINOPHEN 5; 325 MG/1; MG/1
2 TABLET ORAL EVERY 4 HOURS PRN
Status: DISCONTINUED | OUTPATIENT
Start: 2023-06-24 | End: 2023-06-27

## 2023-06-25 LAB
BASOPHILS # BLD AUTO: 0.02 X10(3) UL (ref 0–0.2)
BASOPHILS NFR BLD AUTO: 0.4 %
DEPRECATED RDW RBC AUTO: 53.8 FL (ref 35.1–46.3)
EOSINOPHIL # BLD AUTO: 0.17 X10(3) UL (ref 0–0.7)
EOSINOPHIL NFR BLD AUTO: 3.1 %
ERYTHROCYTE [DISTWIDTH] IN BLOOD BY AUTOMATED COUNT: 22.9 % (ref 11–15)
HCT VFR BLD AUTO: 27.9 %
HGB BLD-MCNC: 7.6 G/DL
IMM GRANULOCYTES # BLD AUTO: 0.25 X10(3) UL (ref 0–1)
IMM GRANULOCYTES NFR BLD: 4.6 %
LYMPHOCYTES # BLD AUTO: 1.36 X10(3) UL (ref 1–4)
LYMPHOCYTES NFR BLD AUTO: 25.1 %
MAGNESIUM SERPL-MCNC: 2.1 MG/DL (ref 1.6–2.6)
MCH RBC QN AUTO: 22.9 PG (ref 26–34)
MCHC RBC AUTO-ENTMCNC: 27.2 G/DL (ref 31–37)
MCV RBC AUTO: 84 FL
MONOCYTES # BLD AUTO: 0.23 X10(3) UL (ref 0.1–1)
MONOCYTES NFR BLD AUTO: 4.2 %
NEUTROPHILS # BLD AUTO: 3.39 X10 (3) UL (ref 1.5–7.7)
NEUTROPHILS # BLD AUTO: 3.39 X10(3) UL (ref 1.5–7.7)
NEUTROPHILS NFR BLD AUTO: 62.6 %
PHOSPHATE SERPL-MCNC: 2.1 MG/DL (ref 2.5–4.9)
PHOSPHATE SERPL-MCNC: 2.1 MG/DL (ref 2.5–4.9)
PLATELET # BLD AUTO: 226 10(3)UL (ref 150–450)
RBC # BLD AUTO: 3.32 X10(6)UL
WBC # BLD AUTO: 5.4 X10(3) UL (ref 4–11)

## 2023-06-25 PROCEDURE — 99233 SBSQ HOSP IP/OBS HIGH 50: CPT | Performed by: HOSPITALIST

## 2023-06-25 PROCEDURE — 99232 SBSQ HOSP IP/OBS MODERATE 35: CPT | Performed by: SURGERY

## 2023-06-26 ENCOUNTER — APPOINTMENT (OUTPATIENT)
Dept: PICC SERVICES | Facility: HOSPITAL | Age: 33
End: 2023-06-26
Attending: PHYSICIAN ASSISTANT
Payer: COMMERCIAL

## 2023-06-26 LAB
ANION GAP SERPL CALC-SCNC: 4 MMOL/L (ref 0–18)
BASOPHILS # BLD AUTO: 0.02 X10(3) UL (ref 0–0.2)
BASOPHILS NFR BLD AUTO: 0.5 %
BUN BLD-MCNC: 3 MG/DL (ref 7–18)
BUN/CREAT SERPL: 7.9 (ref 10–20)
CALCIUM BLD-MCNC: 8 MG/DL (ref 8.5–10.1)
CHLORIDE SERPL-SCNC: 105 MMOL/L (ref 98–112)
CO2 SERPL-SCNC: 31 MMOL/L (ref 21–32)
CREAT BLD-MCNC: 0.38 MG/DL
DEPRECATED RDW RBC AUTO: 56 FL (ref 35.1–46.3)
EOSINOPHIL # BLD AUTO: 0.15 X10(3) UL (ref 0–0.7)
EOSINOPHIL NFR BLD AUTO: 3.6 %
ERYTHROCYTE [DISTWIDTH] IN BLOOD BY AUTOMATED COUNT: 23.6 % (ref 11–15)
GFR SERPLBLD BASED ON 1.73 SQ M-ARVRAT: 136 ML/MIN/1.73M2 (ref 60–?)
GLUCOSE BLD-MCNC: 84 MG/DL (ref 70–99)
HCT VFR BLD AUTO: 27.1 %
HGB BLD-MCNC: 7.5 G/DL
IMM GRANULOCYTES # BLD AUTO: 0.07 X10(3) UL (ref 0–1)
IMM GRANULOCYTES NFR BLD: 1.7 %
LYMPHOCYTES # BLD AUTO: 1.36 X10(3) UL (ref 1–4)
LYMPHOCYTES NFR BLD AUTO: 32.9 %
MAGNESIUM SERPL-MCNC: 1.9 MG/DL (ref 1.6–2.6)
MCH RBC QN AUTO: 22.9 PG (ref 26–34)
MCHC RBC AUTO-ENTMCNC: 27.7 G/DL (ref 31–37)
MCV RBC AUTO: 82.9 FL
MONOCYTES # BLD AUTO: 0.19 X10(3) UL (ref 0.1–1)
MONOCYTES NFR BLD AUTO: 4.6 %
NEUTROPHILS # BLD AUTO: 2.34 X10 (3) UL (ref 1.5–7.7)
NEUTROPHILS # BLD AUTO: 2.34 X10(3) UL (ref 1.5–7.7)
NEUTROPHILS NFR BLD AUTO: 56.7 %
OSMOLALITY SERPL CALC.SUM OF ELEC: 286 MOSM/KG (ref 275–295)
PHOSPHATE SERPL-MCNC: 2.5 MG/DL (ref 2.5–4.9)
PLATELET # BLD AUTO: 231 10(3)UL (ref 150–450)
POTASSIUM SERPL-SCNC: 3.2 MMOL/L (ref 3.5–5.1)
RBC # BLD AUTO: 3.27 X10(6)UL
SODIUM SERPL-SCNC: 140 MMOL/L (ref 136–145)
WBC # BLD AUTO: 4.1 X10(3) UL (ref 4–11)

## 2023-06-26 PROCEDURE — 99233 SBSQ HOSP IP/OBS HIGH 50: CPT | Performed by: HOSPITALIST

## 2023-06-26 PROCEDURE — 02HV33Z INSERTION OF INFUSION DEVICE INTO SUPERIOR VENA CAVA, PERCUTANEOUS APPROACH: ICD-10-PCS | Performed by: INTERNAL MEDICINE

## 2023-06-26 RX ORDER — LIDOCAINE HYDROCHLORIDE 10 MG/ML
5 INJECTION, SOLUTION EPIDURAL; INFILTRATION; INTRACAUDAL; PERINEURAL
Status: COMPLETED | OUTPATIENT
Start: 2023-06-26 | End: 2023-06-26

## 2023-06-27 VITALS
HEIGHT: 64 IN | BODY MASS INDEX: 50.02 KG/M2 | WEIGHT: 293 LBS | TEMPERATURE: 97 F | SYSTOLIC BLOOD PRESSURE: 107 MMHG | DIASTOLIC BLOOD PRESSURE: 85 MMHG | OXYGEN SATURATION: 93 % | RESPIRATION RATE: 18 BRPM | HEART RATE: 74 BPM

## 2023-06-27 LAB
ANION GAP SERPL CALC-SCNC: 6 MMOL/L (ref 0–18)
BASOPHILS # BLD AUTO: 0.02 X10(3) UL (ref 0–0.2)
BASOPHILS NFR BLD AUTO: 0.5 %
BUN BLD-MCNC: 2 MG/DL (ref 7–18)
BUN/CREAT SERPL: 5.6 (ref 10–20)
CALCIUM BLD-MCNC: 7.9 MG/DL (ref 8.5–10.1)
CHLORIDE SERPL-SCNC: 109 MMOL/L (ref 98–112)
CO2 SERPL-SCNC: 29 MMOL/L (ref 21–32)
CREAT BLD-MCNC: 0.36 MG/DL
DEPRECATED RDW RBC AUTO: 61.7 FL (ref 35.1–46.3)
EOSINOPHIL # BLD AUTO: 0.14 X10(3) UL (ref 0–0.7)
EOSINOPHIL NFR BLD AUTO: 3.7 %
ERYTHROCYTE [DISTWIDTH] IN BLOOD BY AUTOMATED COUNT: 24.1 % (ref 11–15)
GFR SERPLBLD BASED ON 1.73 SQ M-ARVRAT: 137 ML/MIN/1.73M2 (ref 60–?)
GLUCOSE BLD-MCNC: 88 MG/DL (ref 70–99)
HCT VFR BLD AUTO: 26.2 %
HGB BLD-MCNC: 7.4 G/DL
IMM GRANULOCYTES # BLD AUTO: 0.04 X10(3) UL (ref 0–1)
IMM GRANULOCYTES NFR BLD: 1.1 %
LYMPHOCYTES # BLD AUTO: 1.25 X10(3) UL (ref 1–4)
LYMPHOCYTES NFR BLD AUTO: 32.9 %
MAGNESIUM SERPL-MCNC: 1.8 MG/DL (ref 1.6–2.6)
MCH RBC QN AUTO: 23.3 PG (ref 26–34)
MCHC RBC AUTO-ENTMCNC: 28.2 G/DL (ref 31–37)
MCV RBC AUTO: 82.4 FL
MONOCYTES # BLD AUTO: 0.16 X10(3) UL (ref 0.1–1)
MONOCYTES NFR BLD AUTO: 4.2 %
NEUTROPHILS # BLD AUTO: 2.19 X10 (3) UL (ref 1.5–7.7)
NEUTROPHILS # BLD AUTO: 2.19 X10(3) UL (ref 1.5–7.7)
NEUTROPHILS NFR BLD AUTO: 57.6 %
OSMOLALITY SERPL CALC.SUM OF ELEC: 294 MOSM/KG (ref 275–295)
PHOSPHATE SERPL-MCNC: 2.5 MG/DL (ref 2.5–4.9)
PLATELET # BLD AUTO: 210 10(3)UL (ref 150–450)
POTASSIUM SERPL-SCNC: 3.1 MMOL/L (ref 3.5–5.1)
RBC # BLD AUTO: 3.18 X10(6)UL
SODIUM SERPL-SCNC: 144 MMOL/L (ref 136–145)
WBC # BLD AUTO: 3.8 X10(3) UL (ref 4–11)

## 2023-06-27 PROCEDURE — 99239 HOSP IP/OBS DSCHRG MGMT >30: CPT | Performed by: HOSPITALIST

## 2023-06-27 RX ORDER — HYDROCODONE BITARTRATE AND ACETAMINOPHEN 5; 325 MG/1; MG/1
2 TABLET ORAL EVERY 4 HOURS PRN
Qty: 24 TABLET | Refills: 0 | Status: SHIPPED | OUTPATIENT
Start: 2023-06-27

## 2023-06-27 RX ORDER — ALBUTEROL SULFATE 90 UG/1
2 AEROSOL, METERED RESPIRATORY (INHALATION) EVERY 4 HOURS PRN
Qty: 1 EACH | Refills: 0 | Status: SHIPPED | OUTPATIENT
Start: 2023-06-27 | End: 2023-07-27

## 2023-07-05 ENCOUNTER — PATIENT OUTREACH (OUTPATIENT)
Dept: CASE MANAGEMENT | Age: 33
End: 2023-07-05

## 2023-07-05 NOTE — PROGRESS NOTES
VM received; pt requesting assistance w/scheduling apt & CT scan (dc 06/27) - CT Scan can go through [facility] [phone]    [facility]  [address] [place] [address]  [phone]  Follow up 2 weeks

## 2023-07-05 NOTE — PROGRESS NOTES
Responding to patient's voicemail. (Dc 6/27)    Dr [facility]  [address] [place] [address]  [phone]  Follow up 2 weeks    No answer, left a voicemail with callback information. Closing encounter.

## 2023-07-10 ENCOUNTER — TELEPHONE (OUTPATIENT)
Dept: SURGERY | Facility: CLINIC | Age: 33
End: 2023-07-10

## 2023-07-10 NOTE — TELEPHONE ENCOUNTER
Per Sindy He from [facility] pt needs hospital f/u appt the week of July 24, [facility] pt was seen at hospital by Dr. Marimar Garza, will be having CT done on 7/21, did not see openings. Please call pt thank you.

## 2023-07-10 NOTE — PROGRESS NOTES
Responding to patient's voicemail. (Dc 6/27)     Stan Kathleen M.D. General Surgery  [address] [place] [address] [phone]  Office to call    Confirmed with patient's spouse. Closing encounter.

## 2023-07-13 ENCOUNTER — HOSPITAL ENCOUNTER (INPATIENT)
Facility: HOSPITAL | Age: 33
LOS: 11 days | Discharge: HOME | DRG: 356 | End: 2023-07-25
Attending: EMERGENCY MEDICINE | Admitting: HOSPITALIST
Payer: COMMERCIAL

## 2023-07-13 ENCOUNTER — APPOINTMENT (OUTPATIENT)
Dept: CT IMAGING | Facility: HOSPITAL | Age: 33
DRG: 356 | End: 2023-07-13
Attending: EMERGENCY MEDICINE
Payer: COMMERCIAL

## 2023-07-13 DIAGNOSIS — K57.92 ACUTE DIVERTICULITIS: Primary | ICD-10-CM

## 2023-07-13 LAB
ALBUMIN SERPL-MCNC: 3.2 G/DL (ref 3.4–5)
ALP LIVER SERPL-CCNC: 68 U/L
ALT SERPL-CCNC: 12 U/L
ANION GAP SERPL CALC-SCNC: 8 MMOL/L (ref 0–18)
AST SERPL-CCNC: 21 U/L (ref 15–37)
BASOPHILS # BLD AUTO: 0.03 X10(3) UL (ref 0–0.2)
BASOPHILS NFR BLD AUTO: 0.4 %
BILIRUB DIRECT SERPL-MCNC: 0.4 MG/DL (ref 0–0.2)
BILIRUB SERPL-MCNC: 0.9 MG/DL (ref 0.1–2)
BUN BLD-MCNC: 6 MG/DL (ref 7–18)
BUN/CREAT SERPL: 8.2 (ref 10–20)
CALCIUM BLD-MCNC: 9.1 MG/DL (ref 8.5–10.1)
CHLORIDE SERPL-SCNC: 105 MMOL/L (ref 98–112)
CO2 SERPL-SCNC: 24 MMOL/L (ref 21–32)
CREAT BLD-MCNC: 0.73 MG/DL
DEPRECATED RDW RBC AUTO: 57.5 FL (ref 35.1–46.3)
EOSINOPHIL # BLD AUTO: 0.08 X10(3) UL (ref 0–0.7)
EOSINOPHIL NFR BLD AUTO: 1 %
ERYTHROCYTE [DISTWIDTH] IN BLOOD BY AUTOMATED COUNT: 19.4 % (ref 11–15)
GFR SERPLBLD BASED ON 1.73 SQ M-ARVRAT: 111 ML/MIN/1.73M2 (ref 60–?)
GLUCOSE BLD-MCNC: 134 MG/DL (ref 70–99)
HCT VFR BLD AUTO: 35.4 %
HGB BLD-MCNC: 10.2 G/DL
IMM GRANULOCYTES # BLD AUTO: 0.03 X10(3) UL (ref 0–1)
IMM GRANULOCYTES NFR BLD: 0.4 %
LYMPHOCYTES # BLD AUTO: 0.95 X10(3) UL (ref 1–4)
LYMPHOCYTES NFR BLD AUTO: 12 %
MCH RBC QN AUTO: 23.5 PG (ref 26–34)
MCHC RBC AUTO-ENTMCNC: 28.8 G/DL (ref 31–37)
MCV RBC AUTO: 81.6 FL
MONOCYTES # BLD AUTO: 0.45 X10(3) UL (ref 0.1–1)
MONOCYTES NFR BLD AUTO: 5.7 %
NEUTROPHILS # BLD AUTO: 6.35 X10 (3) UL (ref 1.5–7.7)
NEUTROPHILS # BLD AUTO: 6.35 X10(3) UL (ref 1.5–7.7)
NEUTROPHILS NFR BLD AUTO: 80.5 %
OSMOLALITY SERPL CALC.SUM OF ELEC: 284 MOSM/KG (ref 275–295)
PLATELET # BLD AUTO: 212 10(3)UL (ref 150–450)
POTASSIUM SERPL-SCNC: 3.9 MMOL/L (ref 3.5–5.1)
PROT SERPL-MCNC: 8.1 G/DL (ref 6.4–8.2)
RBC # BLD AUTO: 4.34 X10(6)UL
SODIUM SERPL-SCNC: 137 MMOL/L (ref 136–145)
WBC # BLD AUTO: 7.9 X10(3) UL (ref 4–11)

## 2023-07-13 PROCEDURE — 74177 CT ABD & PELVIS W/CONTRAST: CPT | Performed by: EMERGENCY MEDICINE

## 2023-07-13 RX ORDER — LORAZEPAM 2 MG/ML
1 INJECTION INTRAMUSCULAR ONCE
Status: COMPLETED | OUTPATIENT
Start: 2023-07-13 | End: 2023-07-13

## 2023-07-13 RX ORDER — MORPHINE SULFATE 4 MG/ML
4 INJECTION, SOLUTION INTRAMUSCULAR; INTRAVENOUS ONCE
Status: COMPLETED | OUTPATIENT
Start: 2023-07-13 | End: 2023-07-13

## 2023-07-13 RX ORDER — ONDANSETRON 2 MG/ML
4 INJECTION INTRAMUSCULAR; INTRAVENOUS ONCE
Status: COMPLETED | OUTPATIENT
Start: 2023-07-13 | End: 2023-07-13

## 2023-07-13 NOTE — ED INITIAL ASSESSMENT (HPI)
Patient was dx with diverticulitis receiving IV abx at home, PICC in place, was told to come to ER because of her increased pain and fevers.

## 2023-07-14 ENCOUNTER — APPOINTMENT (OUTPATIENT)
Dept: CT IMAGING | Facility: HOSPITAL | Age: 33
DRG: 356 | End: 2023-07-14
Attending: STUDENT IN AN ORGANIZED HEALTH CARE EDUCATION/TRAINING PROGRAM
Payer: COMMERCIAL

## 2023-07-14 LAB
EST. AVERAGE GLUCOSE BLD GHB EST-MCNC: 88 MG/DL (ref 68–126)
HBA1C MFR BLD: 4.7 % (ref ?–5.7)

## 2023-07-14 PROCEDURE — 99254 IP/OBS CNSLTJ NEW/EST MOD 60: CPT | Performed by: SURGERY

## 2023-07-14 PROCEDURE — 99152 MOD SED SAME PHYS/QHP 5/>YRS: CPT | Performed by: STUDENT IN AN ORGANIZED HEALTH CARE EDUCATION/TRAINING PROGRAM

## 2023-07-14 PROCEDURE — 99222 1ST HOSP IP/OBS MODERATE 55: CPT | Performed by: HOSPITALIST

## 2023-07-14 PROCEDURE — 49406 IMAGE CATH FLUID PERI/RETRO: CPT | Performed by: STUDENT IN AN ORGANIZED HEALTH CARE EDUCATION/TRAINING PROGRAM

## 2023-07-14 PROCEDURE — 0W9J30Z DRAINAGE OF PELVIC CAVITY WITH DRAINAGE DEVICE, PERCUTANEOUS APPROACH: ICD-10-PCS | Performed by: RADIOLOGY

## 2023-07-14 RX ORDER — MIDAZOLAM HYDROCHLORIDE 1 MG/ML
INJECTION INTRAMUSCULAR; INTRAVENOUS
Status: COMPLETED
Start: 2023-07-14 | End: 2023-07-14

## 2023-07-14 RX ORDER — MIDAZOLAM HYDROCHLORIDE 1 MG/ML
1 INJECTION INTRAMUSCULAR; INTRAVENOUS EVERY 5 MIN PRN
Status: DISCONTINUED | OUTPATIENT
Start: 2023-07-14 | End: 2023-07-14

## 2023-07-14 RX ORDER — ONDANSETRON 2 MG/ML
4 INJECTION INTRAMUSCULAR; INTRAVENOUS EVERY 6 HOURS PRN
Status: DISCONTINUED | OUTPATIENT
Start: 2023-07-14 | End: 2023-07-25

## 2023-07-14 RX ORDER — FLUCONAZOLE 2 MG/ML
200 INJECTION, SOLUTION INTRAVENOUS EVERY 24 HOURS
Status: DISCONTINUED | OUTPATIENT
Start: 2023-07-14 | End: 2023-07-15

## 2023-07-14 RX ORDER — ZOLPIDEM TARTRATE 10 MG/1
10 TABLET ORAL NIGHTLY PRN
COMMUNITY

## 2023-07-14 RX ORDER — PROCHLORPERAZINE EDISYLATE 5 MG/ML
5 INJECTION INTRAMUSCULAR; INTRAVENOUS EVERY 8 HOURS PRN
Status: DISCONTINUED | OUTPATIENT
Start: 2023-07-14 | End: 2023-07-25

## 2023-07-14 RX ORDER — ZOLPIDEM TARTRATE 5 MG/1
10 TABLET ORAL NIGHTLY PRN
Status: DISCONTINUED | OUTPATIENT
Start: 2023-07-14 | End: 2023-07-25

## 2023-07-14 RX ORDER — ALPRAZOLAM 0.5 MG/1
0.5 TABLET ORAL 3 TIMES DAILY PRN
Status: DISCONTINUED | OUTPATIENT
Start: 2023-07-14 | End: 2023-07-25

## 2023-07-14 RX ORDER — PANTOPRAZOLE SODIUM 20 MG/1
20 TABLET, DELAYED RELEASE ORAL
Status: DISCONTINUED | OUTPATIENT
Start: 2023-07-14 | End: 2023-07-25

## 2023-07-14 RX ORDER — SODIUM CHLORIDE 9 MG/ML
INJECTION, SOLUTION INTRAVENOUS CONTINUOUS
Status: DISCONTINUED | OUTPATIENT
Start: 2023-07-14 | End: 2023-07-14

## 2023-07-14 RX ORDER — FLUMAZENIL 0.1 MG/ML
0.2 INJECTION INTRAVENOUS AS NEEDED
Status: DISCONTINUED | OUTPATIENT
Start: 2023-07-14 | End: 2023-07-14

## 2023-07-14 RX ORDER — SODIUM CHLORIDE 9 MG/ML
INJECTION, SOLUTION INTRAVENOUS CONTINUOUS
Status: DISCONTINUED | OUTPATIENT
Start: 2023-07-14 | End: 2023-07-19

## 2023-07-14 RX ORDER — HYDROMORPHONE HYDROCHLORIDE 1 MG/ML
0.3 INJECTION, SOLUTION INTRAMUSCULAR; INTRAVENOUS; SUBCUTANEOUS EVERY 4 HOURS PRN
Status: DISCONTINUED | OUTPATIENT
Start: 2023-07-14 | End: 2023-07-22

## 2023-07-14 RX ORDER — LORAZEPAM 1 MG/1
1 TABLET ORAL AS NEEDED
COMMUNITY
End: 2023-07-25

## 2023-07-14 RX ORDER — ALBUTEROL SULFATE 90 UG/1
2 AEROSOL, METERED RESPIRATORY (INHALATION) EVERY 4 HOURS PRN
Status: DISCONTINUED | OUTPATIENT
Start: 2023-07-14 | End: 2023-07-25

## 2023-07-14 RX ORDER — HYDROMORPHONE HYDROCHLORIDE 1 MG/ML
0.5 INJECTION, SOLUTION INTRAMUSCULAR; INTRAVENOUS; SUBCUTANEOUS EVERY 4 HOURS PRN
Status: DISCONTINUED | OUTPATIENT
Start: 2023-07-14 | End: 2023-07-18

## 2023-07-14 RX ORDER — ACETAMINOPHEN 325 MG/1
650 TABLET ORAL EVERY 6 HOURS PRN
Status: DISCONTINUED | OUTPATIENT
Start: 2023-07-14 | End: 2023-07-25

## 2023-07-14 RX ORDER — NALOXONE HYDROCHLORIDE 0.4 MG/ML
80 INJECTION, SOLUTION INTRAMUSCULAR; INTRAVENOUS; SUBCUTANEOUS AS NEEDED
Status: DISCONTINUED | OUTPATIENT
Start: 2023-07-14 | End: 2023-07-14

## 2023-07-14 RX ORDER — HYDROCODONE BITARTRATE AND ACETAMINOPHEN 7.5; 325 MG/1; MG/1
1 TABLET ORAL EVERY 6 HOURS PRN
Status: ON HOLD | COMMUNITY
End: 2023-07-25

## 2023-07-14 NOTE — PLAN OF CARE
Patient new admit from ED, lives at home with ,  at bedside overnight. Dilaudid given for LLQ abd pain. Temp 102.9, MD notified, Tylenol given. On IV fluconazole and Zosyn. Patient has hx of anxiety with panic attacks. Xanax given for anxiety. On IV fluids, NPO except sips w/meds. General sx on consult. Continue to monitor. Problem: Patient Centered Care  Goal: Patient preferences are identified and integrated in the patient's plan of care  Description: Interventions:  - What would you like us to know as we care for you?   - Provide timely, complete, and accurate information to patient/family  - Incorporate patient and family knowledge, values, beliefs, and cultural backgrounds into the planning and delivery of care  - Encourage patient/family to participate in care and decision-making at the level they choose  - Honor patient and family perspectives and choices  Outcome: Progressing     Problem: SAFETY ADULT - FALL  Goal: Free from fall injury  Description: INTERVENTIONS:  - Assess pt frequently for physical needs  - Identify cognitive and physical deficits and behaviors that affect risk of falls.   - [place] fall precautions as indicated by assessment.  - Educate pt/family on patient safety including physical limitations  - Instruct pt to call for assistance with activity based on assessment  - Modify environment to reduce risk of injury  - Provide assistive devices as appropriate  - Consider OT/PT consult to assist with strengthening/mobility  - Encourage toileting schedule  Outcome: Progressing     Problem: METABOLIC/FLUID AND ELECTROLYTES - ADULT  Goal: Electrolytes maintained within normal limits  Description: INTERVENTIONS:  - Monitor labs and rhythm and assess patient for signs and symptoms of electrolyte imbalances  - Administer electrolyte replacement as ordered  - Monitor response to electrolyte replacements, including rhythm and repeat lab results as appropriate  - Fluid restriction as ordered  - Instruct patient on fluid and nutrition restrictions as appropriate  Outcome: Progressing     Problem: SKIN/TISSUE INTEGRITY - ADULT  Goal: Skin integrity remains intact  Description: INTERVENTIONS  - Assess and document risk factors for pressure ulcer development  - Assess and document skin integrity  - Monitor for areas of redness and/or skin breakdown  - Initiate interventions, skin care algorithm/standards of care as needed  Outcome: Progressing     Problem: PAIN - ADULT  Goal: Verbalizes/displays adequate comfort level or patient's stated pain goal  Description: INTERVENTIONS:  - Encourage pt to monitor pain and request assistance  - Assess pain using appropriate pain scale  - Administer analgesics based on type and severity of pain and evaluate response  - Implement non-pharmacological measures as appropriate and evaluate response  - Consider cultural and social influences on pain and pain management  - Manage/alleviate anxiety  - Utilize distraction and/or relaxation techniques  - Monitor for opioid side effects  - Notify MD/LIP if interventions unsuccessful or patient reports new pain  - Anticipate increased pain with activity and pre-medicate as appropriate  Outcome: Not Progressing     Problem: RISK FOR INFECTION - ADULT  Goal: Absence of fever/infection during anticipated neutropenic period  Description: INTERVENTIONS  - Monitor WBC  - Administer growth factors as ordered  - Implement neutropenic guidelines  Outcome: Not Progressing     Problem: GASTROINTESTINAL - ADULT  Goal: Minimal or absence of nausea and vomiting  Description: INTERVENTIONS:  - Maintain adequate hydration with IV or PO as ordered and tolerated  - Nasogastric tube to low intermittent suction as ordered  - Evaluate effectiveness of ordered antiemetic medications  - Provide nonpharmacologic comfort measures as appropriate  - Advance diet as tolerated, if ordered  - Obtain nutritional consult as needed  - Evaluate fluid balance  Outcome: Not Progressing  Goal: Maintains or returns to baseline bowel function  Description: INTERVENTIONS:  - Assess bowel function  - Maintain adequate hydration with IV or PO as ordered and tolerated  - Evaluate effectiveness of GI medications  - Encourage mobilization and activity  - Obtain nutritional consult as needed  - Establish a toileting routine/schedule  - Consider collaborating with pharmacy to review patient's medication profile  Outcome: Not Progressing     Problem: ANXIETY  Goal: Will report anxiety at manageable levels  Description: INTERVENTIONS:  - Administer medication as ordered  - Teach and rehearse alternative coping skills  - Provide emotional support with 1:1 interaction with staff  Outcome: Not Progressing     Problem: COPING  Goal: Pt/Family able to verbalize concerns and demonstrate effective coping strategies  Description: INTERVENTIONS:  - Assist patient/family to identify coping skills, available support systems and cultural and spiritual values  - Provide emotional support, including active listening and acknowledgement of concerns of patient and caregivers  - Reduce environmental stimuli, as able  - Instruct patient/family in relaxation techniques, as appropriate  - Assess for spiritual and psychosocial needs and initiate Spiritual Care or Behavioral Health consult as needed  Outcome: Not Progressing

## 2023-07-14 NOTE — PROGRESS NOTES
General surgery note    Chart reviewed, full consult to follow    Acute on chronic diverticulitis now with 3 cm abscess  -IR to see and drain, IV antibiotics, okay for liquid diet

## 2023-07-14 NOTE — ED QUICK NOTES
Orders for admission, patient is aware of plan and ready to go upstairs. Any questions, please call ED MARY Sky at extension [phone].      Patient Covid vaccination status: Unvaccinated     COVID Test Ordered in ED: None    COVID Suspicion at Admission: N/A    Running Infusions:  None    Mental Status/LOC at time of transport: A&Ox4    Other pertinent information:   CIWA score: N/A   NIH score:  N/A

## 2023-07-14 NOTE — PLAN OF CARE
Problem: Patient Centered Care  Goal: Patient preferences are identified and integrated in the patient's plan of care  Description: Interventions:  - What would you like us to know as we care for you?   - Provide timely, complete, and accurate information to patient/family  - Incorporate patient and family knowledge, values, beliefs, and cultural backgrounds into the planning and delivery of care  - Encourage patient/family to participate in care and decision-making at the level they choose  - Honor patient and family perspectives and choices  Outcome: Progressing     Problem: PAIN - ADULT  Goal: Verbalizes/displays adequate comfort level or patient's stated pain goal  Description: INTERVENTIONS:  - Encourage pt to monitor pain and request assistance  - Assess pain using appropriate pain scale  - Administer analgesics based on type and severity of pain and evaluate response  - Implement non-pharmacological measures as appropriate and evaluate response  - Consider cultural and social influences on pain and pain management  - Manage/alleviate anxiety  - Utilize distraction and/or relaxation techniques  - Monitor for opioid side effects  - Notify MD/LIP if interventions unsuccessful or patient reports new pain  - Anticipate increased pain with activity and pre-medicate as appropriate  Outcome: Progressing     Problem: RISK FOR INFECTION - ADULT  Goal: Absence of fever/infection during anticipated neutropenic period  Description: INTERVENTIONS  - Monitor WBC  - Administer growth factors as ordered  - Implement neutropenic guidelines  Outcome: Progressing     Problem: SAFETY ADULT - FALL  Goal: Free from fall injury  Description: INTERVENTIONS:  - Assess pt frequently for physical needs  - Identify cognitive and physical deficits and behaviors that affect risk of falls.   - [place] fall precautions as indicated by assessment.  - Educate pt/family on patient safety including physical limitations  - Instruct pt to call for assistance with activity based on assessment  - Modify environment to reduce risk of injury  - Provide assistive devices as appropriate  - Consider OT/PT consult to assist with strengthening/mobility  - Encourage toileting schedule  Outcome: Progressing     Problem: GASTROINTESTINAL - ADULT  Goal: Minimal or absence of nausea and vomiting  Description: INTERVENTIONS:  - Maintain adequate hydration with IV or PO as ordered and tolerated  - Nasogastric tube to low intermittent suction as ordered  - Evaluate effectiveness of ordered antiemetic medications  - Provide nonpharmacologic comfort measures as appropriate  - Advance diet as tolerated, if ordered  - Obtain nutritional consult as needed  - Evaluate fluid balance  Outcome: Progressing  Goal: Maintains or returns to baseline bowel function  Description: INTERVENTIONS:  - Assess bowel function  - Maintain adequate hydration with IV or PO as ordered and tolerated  - Evaluate effectiveness of GI medications  - Encourage mobilization and activity  - Obtain nutritional consult as needed  - Establish a toileting routine/schedule  - Consider collaborating with pharmacy to review patient's medication profile  Outcome: Progressing     Problem: METABOLIC/FLUID AND ELECTROLYTES - ADULT  Goal: Electrolytes maintained within normal limits  Description: INTERVENTIONS:  - Monitor labs and rhythm and assess patient for signs and symptoms of electrolyte imbalances  - Administer electrolyte replacement as ordered  - Monitor response to electrolyte replacements, including rhythm and repeat lab results as appropriate  - Fluid restriction as ordered  - Instruct patient on fluid and nutrition restrictions as appropriate  Outcome: Progressing     Problem: SKIN/TISSUE INTEGRITY - ADULT  Goal: Skin integrity remains intact  Description: INTERVENTIONS  - Assess and document risk factors for pressure ulcer development  - Assess and document skin integrity  - Monitor for areas of redness and/or skin breakdown  - Initiate interventions, skin care algorithm/standards of care as needed  Outcome: Progressing     Problem: ANXIETY  Goal: Will report anxiety at manageable levels  Description: INTERVENTIONS:  - Administer medication as ordered  - Teach and rehearse alternative coping skills  - Provide emotional support with 1:1 interaction with staff  Outcome: Progressing     Problem: COPING  Goal: Pt/Family able to verbalize concerns and demonstrate effective coping strategies  Description: INTERVENTIONS:  - Assist patient/family to identify coping skills, available support systems and cultural and spiritual values  - Provide emotional support, including active listening and acknowledgement of concerns of patient and caregivers  - Reduce environmental stimuli, as able  - Instruct patient/family in relaxation techniques, as appropriate  - Assess for spiritual and psychosocial needs and initiate Spiritual Care or Behavioral Health consult as needed  Outcome: Progressing     Vital signs are stable. PRN dilaudid given for c/o LLQ pain. On room air. JUAN drain placed to LLQ by IR. PICC line in place with good blood return. Remains NPO. Safety precautions in place.

## 2023-07-14 NOTE — PROGRESS NOTES
ADMISSION NOTE    35year old female  with recent admission for diverticulitis with perforation managed by IR drainage and IV antibiotics. Pt DC on 6/27. Since then has had persisent abdominal pain which has gotten progressively worse and nightly elevate temps between 100 and 101.8   She presents because the pain became intractable. CT shows worsening of her diverticulitis with increasing phelgman vs abscess. Available medical records partially reviewed. Dictation to follow.     Jada Wylie M.D.  7/14/2023

## 2023-07-14 NOTE — PROGRESS NOTES
Postmidnight admission patient examined and evaluated independently. Patient is hemodynamically stable at this time family is in room. All questions and concerns were addressed. Her questions and concerns were mainly around anesthesia and her high tolerance to conscious sedation during prior attempt for drain placement.  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 I have relayed these concerns to surgical and interventional radiology services. -ID has also been consulted since they have been following her prior. Charity Woo MD

## 2023-07-14 NOTE — H&P
Christiano Bowman    PATIENT'S NAME: Miroslava Sotelo   ATTENDING PHYSICIAN: Shelley Zamarripa MD   PATIENT ACCOUNT#:   [id number]    LOCATION:  [address] [place] [address]  MEDICAL RECORD #:   [id number]       YOB: 1990  ADMISSION DATE:       07/13/2023    HISTORY AND PHYSICAL EXAMINATION    DATE OF EXAMINATION:  07/14/2023    CHIEF COMPLAINT:  Abdominal pain, fevers. HISTORY OF PRESENT ILLNESS:  This is an unfortunate 77-year-old woman who was admitted to our institution from June 19 through June 27 of this year. At that time she was admitted with intractable abdominal pain and was found to have acute sigmoid diverticulitis with phlegmon and sepsis. She had a drainage procedure performed and was placed on broad-spectrum IV antibiotics. She subsequently was discharged home on June 27 on IV ertapenem through a left PICC line. She says that since her day of discharge she has had elevated temperatures at night ranging between 100 to 101.8. She said that initially after discharge her abdominal pain was very tolerable, but within the past 2 weeks it has gradually increased. She feels it between the shoulder blades and the back, and initially in the suprapubic area, but now mostly in the left lower quadrant of the abdomen. She said that she was eating very little. She reported that the pain in her abdomen is worse at night, she rates it as a 12/10, and states that it becomes so severe that she cries out at night and screams so that other family members have even been awakened and come to check on her. Her appetite has been very poor and she has lost 30 pounds. She states the pain is cramping in nature. She said she was having to take Norco 5/325 every 6 hours for the pain, but it did not relieve it. She also has noted some burning with urination over the last couple of days.   For these reasons, she came to the emergency room for evaluation, where a repeat CT scan of the abdomen and pelvis was performed that revealed perforated sigmoid diverticulitis with an approximately 3.7 cm abscess extending into the left adnexa and a poorly-defined more anterior extraluminal gas collection with trace fluid and surrounding inflammatory changes. Her white blood cell count was 7.9 with a hemoglobin of 10.2 and a platelet count of 750,269. The patient's hemoglobin on discharge  was 7.4. There were 80% neutrophils. Glucose was 134, sodium 137, potassium 3.9, chloride 105, CO2 of 24, BUN of 6 with a creatinine of 0.73, calcium 9.1. Anion gap of 8. AST was 21, ALT was 12. Total bilirubin 0.9, direct 0.4. The patient received IV Zosyn in the emergency room and General Surgery, Dr. Kiran Arredondo, was consulted. Patient was admitted for further evaluation and treatment. PAST MEDICAL HISTORY:  Significant for sigmoid diverticulitis with phlegmon and sepsis in , for which the patient underwent percutaneous drainage and IV antibiotics, she was receiving ertapenem at home; hepatic steatosis; chronic microcytic anemia secondary to iron deficiency from chronic menstrual losses; generalized anxiety disorder with occasional severe panic attacks; morbid obesity; probable obstructive sleep apnea based on body habitus; sepsis during last admission. MEDICATIONS:  Prior to admission:  Hydrocodone 7.5 mg every 6 hours as needed for pain; lorazepam 1 mg daily as needed for anxiety; zolpidem 10 mg nightly as needed for sleep; ertapenem 1 g twice a day; subcutaneous heparin for DVT prophylaxis; albuterol inhaler; sertraline 50 mg daily; alprazolam 0.5 mg 3 times a day as needed for anxiety; esomeprazole 20 mg every morning before breakfast.    ALLERGIES:  Lactose intolerance. FAMILY HISTORY:  The patient's mother  at 48 of pulmonary fibrosis; she had rheumatoid arthritis as well. Her father is still living; he has diabetes, has had a kidney transplant and is a colon cancer survivor.     SOCIAL HISTORY:  Patient denies any tobacco use. Drinks alcohol occasionally. No history of drug use except for edibles occasionally. REVIEW OF SYSTEMS:  Twelve systems were reviewed. The patient has had some pain with urination over the last couple of days and has been feeling very fatigued as well. There are otherwise no additional pertinent positives or negatives on the 12-point review of systems except as listed in the History of Present Illness. PHYSICAL EXAMINATION:    GENERAL:  She was an extremely anxious but pleasant woman who was in no significant distress. VITAL SIGNS:  Temperature 102.9, pulse 97, respiratory rate 18, blood pressure 124/70, O2 saturation 94% on room air. HEENT:  Normocephalic, atraumatic. Pupils equal, reactive. Sclerae anicteric. There was no sinus tenderness. NECK:  Supple. LUNGS:  Decreased breath sounds bilaterally, but easy respiratory excursions. HEART:  Regular rate and rhythm. Normal S1 and S2.  ABDOMEN:  Soft, mildly distended. Bowel sounds were hypoactive. There was tenderness to palpation in the lower abdomen suprapubically, but also to the left lower quadrant, without guarding or rebound. EXTREMITIES:  No clubbing, cyanosis, calf tenderness, palpable cords, or edema. SKIN:  Warm and dry without any significant rashes. NEUROLOGIC:  The patient was awake, alert, oriented x3 with no focal motor or sensory deficits. PSYCHIATRIC:  The patient was obviously very anxious, but pleasant and cooperative with the interview and exam.    BACK:  There was no costovertebral angle tenderness noted. LABORATORY DATA:  Previously mentioned. ASSESSMENT AND PLAN:    1. Sigmoid diverticulitis with abscess. Await evaluation by General Surgery. Perhaps, placement of an actual drainage catheter at this time may be the answer, along with continued IV Zosyn and Diflucan. Follow temperature curve. Continue IV hydration. Maintain n.p.o. status except for ice chips at this point.   2. Obesity with probable obstructive sleep apnea. Will place on continuous pulse oximetry monitoring and be judicious with narcotics. 3.   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 4.   Anxiety/panic attacks. Continue home medications. 5.   Nonfasting hyperglycemia, likely stress-related. Will check A1c level. 6.   Chronic microcytic anemia. Hemoglobin actually improved over previous. May be related to dehydration. Will hydrate and recheck in the morning. 7. DVT prophylaxis. SCDs, subcutaneous heparin. 8.   The patient's current clinical status and proposed treatment plan were discussed with her. All of her questions were answered and she agreed with the plan of care as outlined above.      Dictated By Aiyana Cueto MD  d: 07/14/2023 08:00:41  t: 07/14/2023 08:26:55  [facility] [id number]  [facility]/

## 2023-07-14 NOTE — CM/SW NOTE
07/14/23 1000   CM/CRISTIANO Referral Data   Referral Source    Reason for Referral Discharge planning;Readmission   Informant Patient;Spouse/Significant Other   Readmission Assessment   Factors that patient feels contributed to this readmission Acute/Chronic Clinical Presentation   Pt's living situation prior to admission? Home with family   Pt's level of independence at discharge? No assist/independent (minimal)   Pt. received education on diagnoses at time of discharge? Yes   Did you know who and how to call someone if you felt worse? Yes   Did any new symptoms or issues develop after you were discharged? Yes   ----Post D/C symptoms: Symptoms/issue related to previous hospitalization Yes   Did you understand your discharge instructions? Yes   Were medications taken as indicated on discharge instructions? Yes   Did you have a follow-up appointment scheduled at time of discharge? Yes   ----F/U appt: Did you go to that appointment? Yes   ---- F/U appt: How many days after discharge was follow-up appointment? 0-14 days   Was full assessment completed by CRISTIANO/MAMIE on prior admission? Yes   Was the recommended discharge plan achieved? Yes   Was pt. discharged w/out services? No   Medical Hx   Does patient have an established PCP? Yes  Ricardo Nash   Patient Info   Patient's Current Mental Status at Time of Assessment Alert;Oriented   Patient's [address]   Patient lives with Spouse/Significant other   Patient Status Prior to Admission   Independent with ADLs and Mobility Yes   Discharge Needs   Anticipated D/C needs Infusion care     Pt discussed during nursing rounds. Dx acute diverticulitis. From home w/spouse, independent w/o device at baseline. Current w/[facility] for home infusion services. Pt has weekly visits to [facility] office for dressing changes and labs. Final ID recommendations needed for dc planning.     Plan: Home w/spouse with [facility] for home infusion services pending final ID recommendation and medical clearance. / to remain available for support and/or discharge planning.      MADI VallesN    [phone]

## 2023-07-14 NOTE — BRIEF PROCEDURE NOTE
[facility]  Procedure Note    [address] Patient Status:  Inpatient    1990 MRN [id number]   Location [facility]5W Attending Sugey Powers MD   Hosp Day # 0 PCP Stanley Thomas     Procedure: CT-guided percutaneous drainage of pelvic diverticular abscess    Pre-Procedure Diagnosis: Pelvic diverticular abscess    Post-Procedure Diagnosis: Pelvic diverticular abscess    Anesthesia:  Sedation    Findings: 8 [place] drain placed in left lower quadrant abscess, 2 cc serosanguineous fluid aspirated    Specimens: Fluid for CNS    Blood Loss:  0      Complications:  None    Drains:  8F    Plan:   Recommend follow-up CT at the  Imaging in 3 to 5 days    Keith Pitt MD  2023

## 2023-07-15 LAB
ALBUMIN SERPL-MCNC: 2.5 G/DL (ref 3.4–5)
ALBUMIN/GLOB SERPL: 0.6 {RATIO} (ref 1–2)
ALP LIVER SERPL-CCNC: 55 U/L
ALT SERPL-CCNC: 9 U/L
ANION GAP SERPL CALC-SCNC: 5 MMOL/L (ref 0–18)
AST SERPL-CCNC: 18 U/L (ref 15–37)
BASOPHILS # BLD AUTO: 0.03 X10(3) UL (ref 0–0.2)
BASOPHILS NFR BLD AUTO: 0.8 %
BILIRUB SERPL-MCNC: 0.9 MG/DL (ref 0.1–2)
BUN BLD-MCNC: 4 MG/DL (ref 7–18)
BUN/CREAT SERPL: 6.8 (ref 10–20)
CALCIUM BLD-MCNC: 8.5 MG/DL (ref 8.5–10.1)
CHLORIDE SERPL-SCNC: 108 MMOL/L (ref 98–112)
CO2 SERPL-SCNC: 27 MMOL/L (ref 21–32)
CREAT BLD-MCNC: 0.59 MG/DL
DEPRECATED RDW RBC AUTO: 56.7 FL (ref 35.1–46.3)
EOSINOPHIL # BLD AUTO: 0.14 X10(3) UL (ref 0–0.7)
EOSINOPHIL NFR BLD AUTO: 3.8 %
ERYTHROCYTE [DISTWIDTH] IN BLOOD BY AUTOMATED COUNT: 19.3 % (ref 11–15)
GFR SERPLBLD BASED ON 1.73 SQ M-ARVRAT: 122 ML/MIN/1.73M2 (ref 60–?)
GLOBULIN PLAS-MCNC: 4.1 G/DL (ref 2.8–4.4)
GLUCOSE BLD-MCNC: 90 MG/DL (ref 70–99)
HCT VFR BLD AUTO: 32.5 %
HGB BLD-MCNC: 9.3 G/DL
IMM GRANULOCYTES # BLD AUTO: 0.02 X10(3) UL (ref 0–1)
IMM GRANULOCYTES NFR BLD: 0.5 %
LYMPHOCYTES # BLD AUTO: 1.11 X10(3) UL (ref 1–4)
LYMPHOCYTES NFR BLD AUTO: 30.4 %
MAGNESIUM SERPL-MCNC: 1.7 MG/DL (ref 1.6–2.6)
MCH RBC QN AUTO: 23.1 PG (ref 26–34)
MCHC RBC AUTO-ENTMCNC: 28.6 G/DL (ref 31–37)
MCV RBC AUTO: 80.8 FL
MONOCYTES # BLD AUTO: 0.3 X10(3) UL (ref 0.1–1)
MONOCYTES NFR BLD AUTO: 8.2 %
NEUTROPHILS # BLD AUTO: 2.05 X10 (3) UL (ref 1.5–7.7)
NEUTROPHILS # BLD AUTO: 2.05 X10(3) UL (ref 1.5–7.7)
NEUTROPHILS NFR BLD AUTO: 56.3 %
OSMOLALITY SERPL CALC.SUM OF ELEC: 286 MOSM/KG (ref 275–295)
PHOSPHATE SERPL-MCNC: 2.9 MG/DL (ref 2.5–4.9)
PLATELET # BLD AUTO: 108 10(3)UL (ref 150–450)
POTASSIUM SERPL-SCNC: 3.9 MMOL/L (ref 3.5–5.1)
PROT SERPL-MCNC: 6.6 G/DL (ref 6.4–8.2)
RBC # BLD AUTO: 4.02 X10(6)UL
SODIUM SERPL-SCNC: 140 MMOL/L (ref 136–145)
WBC # BLD AUTO: 3.7 X10(3) UL (ref 4–11)

## 2023-07-15 PROCEDURE — 99233 SBSQ HOSP IP/OBS HIGH 50: CPT | Performed by: INTERNAL MEDICINE

## 2023-07-15 RX ORDER — FLUCONAZOLE 200 MG/1
200 TABLET ORAL EVERY 24 HOURS
Status: DISCONTINUED | OUTPATIENT
Start: 2023-07-15 | End: 2023-07-17

## 2023-07-15 RX ORDER — MAGNESIUM SULFATE HEPTAHYDRATE 40 MG/ML
2 INJECTION, SOLUTION INTRAVENOUS ONCE
Status: DISCONTINUED | OUTPATIENT
Start: 2023-07-15 | End: 2023-07-25

## 2023-07-15 RX ORDER — DIAZEPAM 5 MG/ML
5 INJECTION, SOLUTION INTRAMUSCULAR; INTRAVENOUS EVERY 6 HOURS PRN
Status: DISCONTINUED | OUTPATIENT
Start: 2023-07-15 | End: 2023-07-17

## 2023-07-15 NOTE — PLAN OF CARE
Patient is alert and oriented x 4 and on room air. Vital signs stable. No acute changes noted throughout shift. Pain and sleeping medication provided as needed. Fall precautions maintained- bed alarm on, bed locked in lowest position, call light and personal belongings within reach, non-skid socks in place to bilateral feet. Frequent rounding by nursing staff.      Problem: Patient Centered Care  Goal: Patient preferences are identified and integrated in the patient's plan of care  Description: Interventions:  - What would you like us to know as we care for you?   - Provide timely, complete, and accurate information to patient/family  - Incorporate patient and family knowledge, values, beliefs, and cultural backgrounds into the planning and delivery of care  - Encourage patient/family to participate in care and decision-making at the level they choose  - Honor patient and family perspectives and choices  Outcome: Progressing     Problem: PAIN - ADULT  Goal: Verbalizes/displays adequate comfort level or patient's stated pain goal  Description: INTERVENTIONS:  - Encourage pt to monitor pain and request assistance  - Assess pain using appropriate pain scale  - Administer analgesics based on type and severity of pain and evaluate response  - Implement non-pharmacological measures as appropriate and evaluate response  - Consider cultural and social influences on pain and pain management  - Manage/alleviate anxiety  - Utilize distraction and/or relaxation techniques  - Monitor for opioid side effects  - Notify MD/LIP if interventions unsuccessful or patient reports new pain  - Anticipate increased pain with activity and pre-medicate as appropriate  Outcome: Progressing     Problem: RISK FOR INFECTION - ADULT  Goal: Absence of fever/infection during anticipated neutropenic period  Description: INTERVENTIONS  - Monitor WBC  - Administer growth factors as ordered  - Implement neutropenic guidelines  Outcome: Progressing Problem: SAFETY ADULT - FALL  Goal: Free from fall injury  Description: INTERVENTIONS:  - Assess pt frequently for physical needs  - Identify cognitive and physical deficits and behaviors that affect risk of falls.   - [place] fall precautions as indicated by assessment.  - Educate pt/family on patient safety including physical limitations  - Instruct pt to call for assistance with activity based on assessment  - Modify environment to reduce risk of injury  - Provide assistive devices as appropriate  - Consider OT/PT consult to assist with strengthening/mobility  - Encourage toileting schedule  Outcome: Progressing     Problem: GASTROINTESTINAL - ADULT  Goal: Minimal or absence of nausea and vomiting  Description: INTERVENTIONS:  - Maintain adequate hydration with IV or PO as ordered and tolerated  - Nasogastric tube to low intermittent suction as ordered  - Evaluate effectiveness of ordered antiemetic medications  - Provide nonpharmacologic comfort measures as appropriate  - Advance diet as tolerated, if ordered  - Obtain nutritional consult as needed  - Evaluate fluid balance  Outcome: Progressing  Goal: Maintains or returns to baseline bowel function  Description: INTERVENTIONS:  - Assess bowel function  - Maintain adequate hydration with IV or PO as ordered and tolerated  - Evaluate effectiveness of GI medications  - Encourage mobilization and activity  - Obtain nutritional consult as needed  - Establish a toileting routine/schedule  - Consider collaborating with pharmacy to review patient's medication profile  Outcome: Progressing     Problem: METABOLIC/FLUID AND ELECTROLYTES - ADULT  Goal: Electrolytes maintained within normal limits  Description: INTERVENTIONS:  - Monitor labs and rhythm and assess patient for signs and symptoms of electrolyte imbalances  - Administer electrolyte replacement as ordered  - Monitor response to electrolyte replacements, including rhythm and repeat lab results as appropriate  - Fluid restriction as ordered  - Instruct patient on fluid and nutrition restrictions as appropriate  Outcome: Progressing     Problem: SKIN/TISSUE INTEGRITY - ADULT  Goal: Skin integrity remains intact  Description: INTERVENTIONS  - Assess and document risk factors for pressure ulcer development  - Assess and document skin integrity  - Monitor for areas of redness and/or skin breakdown  - Initiate interventions, skin care algorithm/standards of care as needed  Outcome: Progressing     Problem: ANXIETY  Goal: Will report anxiety at manageable levels  Description: INTERVENTIONS:  - Administer medication as ordered  - Teach and rehearse alternative coping skills  - Provide emotional support with 1:1 interaction with staff  Outcome: Progressing     Problem: COPING  Goal: Pt/Family able to verbalize concerns and demonstrate effective coping strategies  Description: INTERVENTIONS:  - Assist patient/family to identify coping skills, available support systems and cultural and spiritual values  - Provide emotional support, including active listening and acknowledgement of concerns of patient and caregivers  - Reduce environmental stimuli, as able  - Instruct patient/family in relaxation techniques, as appropriate  - Assess for spiritual and psychosocial needs and initiate Spiritual Care or Behavioral Health consult as needed  Outcome: Progressing

## 2023-07-15 NOTE — PLAN OF CARE
Problem: Patient Centered Care  Goal: Patient preferences are identified and integrated in the patient's plan of care  Description: Interventions:  - What would you like us to know as we care for you?   - Provide timely, complete, and accurate information to patient/family  - Incorporate patient and family knowledge, values, beliefs, and cultural backgrounds into the planning and delivery of care  - Encourage patient/family to participate in care and decision-making at the level they choose  - Honor patient and family perspectives and choices  Outcome: Progressing     Problem: PAIN - ADULT  Goal: Verbalizes/displays adequate comfort level or patient's stated pain goal  Description: INTERVENTIONS:  - Encourage pt to monitor pain and request assistance  - Assess pain using appropriate pain scale  - Administer analgesics based on type and severity of pain and evaluate response  - Implement non-pharmacological measures as appropriate and evaluate response  - Consider cultural and social influences on pain and pain management  - Manage/alleviate anxiety  - Utilize distraction and/or relaxation techniques  - Monitor for opioid side effects  - Notify MD/LIP if interventions unsuccessful or patient reports new pain  - Anticipate increased pain with activity and pre-medicate as appropriate  Outcome: Progressing     Problem: RISK FOR INFECTION - ADULT  Goal: Absence of fever/infection during anticipated neutropenic period  Description: INTERVENTIONS  - Monitor WBC  - Administer growth factors as ordered  - Implement neutropenic guidelines  Outcome: Progressing     Problem: SAFETY ADULT - FALL  Goal: Free from fall injury  Description: INTERVENTIONS:  - Assess pt frequently for physical needs  - Identify cognitive and physical deficits and behaviors that affect risk of falls.   - [place] fall precautions as indicated by assessment.  - Educate pt/family on patient safety including physical limitations  - Instruct pt to call for assistance with activity based on assessment  - Modify environment to reduce risk of injury  - Provide assistive devices as appropriate  - Consider OT/PT consult to assist with strengthening/mobility  - Encourage toileting schedule  Outcome: Progressing     Problem: GASTROINTESTINAL - ADULT  Goal: Minimal or absence of nausea and vomiting  Description: INTERVENTIONS:  - Maintain adequate hydration with IV or PO as ordered and tolerated  - Nasogastric tube to low intermittent suction as ordered  - Evaluate effectiveness of ordered antiemetic medications  - Provide nonpharmacologic comfort measures as appropriate  - Advance diet as tolerated, if ordered  - Obtain nutritional consult as needed  - Evaluate fluid balance  Outcome: Progressing  Goal: Maintains or returns to baseline bowel function  Description: INTERVENTIONS:  - Assess bowel function  - Maintain adequate hydration with IV or PO as ordered and tolerated  - Evaluate effectiveness of GI medications  - Encourage mobilization and activity  - Obtain nutritional consult as needed  - Establish a toileting routine/schedule  - Consider collaborating with pharmacy to review patient's medication profile  Outcome: Progressing     Problem: METABOLIC/FLUID AND ELECTROLYTES - ADULT  Goal: Electrolytes maintained within normal limits  Description: INTERVENTIONS:  - Monitor labs and rhythm and assess patient for signs and symptoms of electrolyte imbalances  - Administer electrolyte replacement as ordered  - Monitor response to electrolyte replacements, including rhythm and repeat lab results as appropriate  - Fluid restriction as ordered  - Instruct patient on fluid and nutrition restrictions as appropriate  Outcome: Progressing     Problem: SKIN/TISSUE INTEGRITY - ADULT  Goal: Skin integrity remains intact  Description: INTERVENTIONS  - Assess and document risk factors for pressure ulcer development  - Assess and document skin integrity  - Monitor for areas of redness and/or skin breakdown  - Initiate interventions, skin care algorithm/standards of care as needed  Outcome: Progressing     Problem: ANXIETY  Goal: Will report anxiety at manageable levels  Description: INTERVENTIONS:  - Administer medication as ordered  - Teach and rehearse alternative coping skills  - Provide emotional support with 1:1 interaction with staff  Outcome: Progressing     Problem: COPING  Goal: Pt/Family able to verbalize concerns and demonstrate effective coping strategies  Description: INTERVENTIONS:  - Assist patient/family to identify coping skills, available support systems and cultural and spiritual values  - Provide emotional support, including active listening and acknowledgement of concerns of patient and caregivers  - Reduce environmental stimuli, as able  - Instruct patient/family in relaxation techniques, as appropriate  - Assess for spiritual and psychosocial needs and initiate Spiritual Care or Behavioral Health consult as needed  Outcome: Progressing

## 2023-07-16 LAB
BASOPHILS # BLD AUTO: 0.02 X10(3) UL (ref 0–0.2)
BASOPHILS NFR BLD AUTO: 0.5 %
DEPRECATED RDW RBC AUTO: 55.1 FL (ref 35.1–46.3)
EOSINOPHIL # BLD AUTO: 0.18 X10(3) UL (ref 0–0.7)
EOSINOPHIL NFR BLD AUTO: 4.6 %
ERYTHROCYTE [DISTWIDTH] IN BLOOD BY AUTOMATED COUNT: 19.1 % (ref 11–15)
HCT VFR BLD AUTO: 30.1 %
HGB BLD-MCNC: 8.9 G/DL
IMM GRANULOCYTES # BLD AUTO: 0.01 X10(3) UL (ref 0–1)
IMM GRANULOCYTES NFR BLD: 0.3 %
LYMPHOCYTES # BLD AUTO: 1.33 X10(3) UL (ref 1–4)
LYMPHOCYTES NFR BLD AUTO: 34.2 %
MAGNESIUM SERPL-MCNC: 1.8 MG/DL (ref 1.6–2.6)
MAGNESIUM SERPL-MCNC: 1.8 MG/DL (ref 1.6–2.6)
MCH RBC QN AUTO: 23.5 PG (ref 26–34)
MCHC RBC AUTO-ENTMCNC: 29.6 G/DL (ref 31–37)
MCV RBC AUTO: 79.4 FL
MONOCYTES # BLD AUTO: 0.22 X10(3) UL (ref 0.1–1)
MONOCYTES NFR BLD AUTO: 5.7 %
NEUTROPHILS # BLD AUTO: 2.13 X10 (3) UL (ref 1.5–7.7)
NEUTROPHILS # BLD AUTO: 2.13 X10(3) UL (ref 1.5–7.7)
NEUTROPHILS NFR BLD AUTO: 54.7 %
PLATELET # BLD AUTO: 132 10(3)UL (ref 150–450)
RBC # BLD AUTO: 3.79 X10(6)UL
WBC # BLD AUTO: 3.9 X10(3) UL (ref 4–11)

## 2023-07-16 PROCEDURE — 99233 SBSQ HOSP IP/OBS HIGH 50: CPT | Performed by: INTERNAL MEDICINE

## 2023-07-16 NOTE — PLAN OF CARE
Patient resting overnight. Prn dilaudid given for pain management. Drain irrigated per order. Maintaining clear liquid diet. Safety precautions in place.   Problem: Patient Centered Care  Goal: Patient preferences are identified and integrated in the patient's plan of care  Description: Interventions:  - What would you like us to know as we care for you?   - Provide timely, complete, and accurate information to patient/family  - Incorporate patient and family knowledge, values, beliefs, and cultural backgrounds into the planning and delivery of care  - Encourage patient/family to participate in care and decision-making at the level they choose  - Honor patient and family perspectives and choices  Outcome: Progressing     Problem: PAIN - ADULT  Goal: Verbalizes/displays adequate comfort level or patient's stated pain goal  Description: INTERVENTIONS:  - Encourage pt to monitor pain and request assistance  - Assess pain using appropriate pain scale  - Administer analgesics based on type and severity of pain and evaluate response  - Implement non-pharmacological measures as appropriate and evaluate response  - Consider cultural and social influences on pain and pain management  - Manage/alleviate anxiety  - Utilize distraction and/or relaxation techniques  - Monitor for opioid side effects  - Notify MD/LIP if interventions unsuccessful or patient reports new pain  - Anticipate increased pain with activity and pre-medicate as appropriate  Outcome: Progressing     Problem: RISK FOR INFECTION - ADULT  Goal: Absence of fever/infection during anticipated neutropenic period  Description: INTERVENTIONS  - Monitor WBC  - Administer growth factors as ordered  - Implement neutropenic guidelines  Outcome: Progressing     Problem: SAFETY ADULT - FALL  Goal: Free from fall injury  Description: INTERVENTIONS:  - Assess pt frequently for physical needs  - Identify cognitive and physical deficits and behaviors that affect risk of falls.  - [place] fall precautions as indicated by assessment.  - Educate pt/family on patient safety including physical limitations  - Instruct pt to call for assistance with activity based on assessment  - Modify environment to reduce risk of injury  - Provide assistive devices as appropriate  - Consider OT/PT consult to assist with strengthening/mobility  - Encourage toileting schedule  Outcome: Progressing     Problem: GASTROINTESTINAL - ADULT  Goal: Minimal or absence of nausea and vomiting  Description: INTERVENTIONS:  - Maintain adequate hydration with IV or PO as ordered and tolerated  - Nasogastric tube to low intermittent suction as ordered  - Evaluate effectiveness of ordered antiemetic medications  - Provide nonpharmacologic comfort measures as appropriate  - Advance diet as tolerated, if ordered  - Obtain nutritional consult as needed  - Evaluate fluid balance  Outcome: Progressing  Goal: Maintains or returns to baseline bowel function  Description: INTERVENTIONS:  - Assess bowel function  - Maintain adequate hydration with IV or PO as ordered and tolerated  - Evaluate effectiveness of GI medications  - Encourage mobilization and activity  - Obtain nutritional consult as needed  - Establish a toileting routine/schedule  - Consider collaborating with pharmacy to review patient's medication profile  Outcome: Progressing     Problem: METABOLIC/FLUID AND ELECTROLYTES - ADULT  Goal: Electrolytes maintained within normal limits  Description: INTERVENTIONS:  - Monitor labs and rhythm and assess patient for signs and symptoms of electrolyte imbalances  - Administer electrolyte replacement as ordered  - Monitor response to electrolyte replacements, including rhythm and repeat lab results as appropriate  - Fluid restriction as ordered  - Instruct patient on fluid and nutrition restrictions as appropriate  Outcome: Progressing     Problem: SKIN/TISSUE INTEGRITY - ADULT  Goal: Skin integrity remains intact  Description: INTERVENTIONS  - Assess and document risk factors for pressure ulcer development  - Assess and document skin integrity  - Monitor for areas of redness and/or skin breakdown  - Initiate interventions, skin care algorithm/standards of care as needed  Outcome: Progressing     Problem: ANXIETY  Goal: Will report anxiety at manageable levels  Description: INTERVENTIONS:  - Administer medication as ordered  - Teach and rehearse alternative coping skills  - Provide emotional support with 1:1 interaction with staff  Outcome: Progressing     Problem: COPING  Goal: Pt/Family able to verbalize concerns and demonstrate effective coping strategies  Description: INTERVENTIONS:  - Assist patient/family to identify coping skills, available support systems and cultural and spiritual values  - Provide emotional support, including active listening and acknowledgement of concerns of patient and caregivers  - Reduce environmental stimuli, as able  - Instruct patient/family in relaxation techniques, as appropriate  - Assess for spiritual and psychosocial needs and initiate Spiritual Care or Behavioral Health consult as needed  Outcome: Progressing

## 2023-07-17 ENCOUNTER — APPOINTMENT (OUTPATIENT)
Dept: CT IMAGING | Facility: HOSPITAL | Age: 33
DRG: 356 | End: 2023-07-17
Attending: SPECIALIST
Payer: COMMERCIAL

## 2023-07-17 LAB
ALBUMIN SERPL-MCNC: 2.6 G/DL (ref 3.4–5)
ALBUMIN/GLOB SERPL: 0.6 {RATIO} (ref 1–2)
ALP LIVER SERPL-CCNC: 51 U/L
ALT SERPL-CCNC: 8 U/L
ANION GAP SERPL CALC-SCNC: 5 MMOL/L (ref 0–18)
AST SERPL-CCNC: 15 U/L (ref 15–37)
B-HCG UR QL: NEGATIVE
BASOPHILS # BLD AUTO: 0.02 X10(3) UL (ref 0–0.2)
BASOPHILS NFR BLD AUTO: 0.4 %
BILIRUB SERPL-MCNC: 0.8 MG/DL (ref 0.1–2)
BUN BLD-MCNC: 2 MG/DL (ref 7–18)
BUN/CREAT SERPL: 3.2 (ref 10–20)
CALCIUM BLD-MCNC: 8.4 MG/DL (ref 8.5–10.1)
CHLORIDE SERPL-SCNC: 108 MMOL/L (ref 98–112)
CO2 SERPL-SCNC: 28 MMOL/L (ref 21–32)
CREAT BLD-MCNC: 0.62 MG/DL
DEPRECATED RDW RBC AUTO: 55.3 FL (ref 35.1–46.3)
EOSINOPHIL # BLD AUTO: 0.12 X10(3) UL (ref 0–0.7)
EOSINOPHIL NFR BLD AUTO: 2.6 %
ERYTHROCYTE [DISTWIDTH] IN BLOOD BY AUTOMATED COUNT: 19.2 % (ref 11–15)
GFR SERPLBLD BASED ON 1.73 SQ M-ARVRAT: 121 ML/MIN/1.73M2 (ref 60–?)
GLOBULIN PLAS-MCNC: 4.1 G/DL (ref 2.8–4.4)
GLUCOSE BLD-MCNC: 95 MG/DL (ref 70–99)
HCT VFR BLD AUTO: 30.3 %
HGB BLD-MCNC: 9 G/DL
IMM GRANULOCYTES # BLD AUTO: 0.01 X10(3) UL (ref 0–1)
IMM GRANULOCYTES NFR BLD: 0.2 %
LYMPHOCYTES # BLD AUTO: 1.49 X10(3) UL (ref 1–4)
LYMPHOCYTES NFR BLD AUTO: 31.8 %
MAGNESIUM SERPL-MCNC: 1.7 MG/DL (ref 1.6–2.6)
MCH RBC QN AUTO: 23.9 PG (ref 26–34)
MCHC RBC AUTO-ENTMCNC: 29.7 G/DL (ref 31–37)
MCV RBC AUTO: 80.6 FL
MONOCYTES # BLD AUTO: 0.24 X10(3) UL (ref 0.1–1)
MONOCYTES NFR BLD AUTO: 5.1 %
NEUTROPHILS # BLD AUTO: 2.8 X10 (3) UL (ref 1.5–7.7)
NEUTROPHILS # BLD AUTO: 2.8 X10(3) UL (ref 1.5–7.7)
NEUTROPHILS NFR BLD AUTO: 59.9 %
OSMOLALITY SERPL CALC.SUM OF ELEC: 288 MOSM/KG (ref 275–295)
PLATELET # BLD AUTO: 151 10(3)UL (ref 150–450)
POTASSIUM SERPL-SCNC: 3.5 MMOL/L (ref 3.5–5.1)
PROT SERPL-MCNC: 6.7 G/DL (ref 6.4–8.2)
RBC # BLD AUTO: 3.76 X10(6)UL
SODIUM SERPL-SCNC: 141 MMOL/L (ref 136–145)
WBC # BLD AUTO: 4.7 X10(3) UL (ref 4–11)

## 2023-07-17 PROCEDURE — 99233 SBSQ HOSP IP/OBS HIGH 50: CPT | Performed by: SURGERY

## 2023-07-17 PROCEDURE — 99233 SBSQ HOSP IP/OBS HIGH 50: CPT | Performed by: INTERNAL MEDICINE

## 2023-07-17 PROCEDURE — 74177 CT ABD & PELVIS W/CONTRAST: CPT | Performed by: SPECIALIST

## 2023-07-17 RX ORDER — HYDROCODONE BITARTRATE AND ACETAMINOPHEN 10; 325 MG/1; MG/1
1 TABLET ORAL EVERY 4 HOURS PRN
Status: DISCONTINUED | OUTPATIENT
Start: 2023-07-17 | End: 2023-07-19

## 2023-07-17 RX ORDER — LOPERAMIDE HYDROCHLORIDE 2 MG/1
2 CAPSULE ORAL 4 TIMES DAILY PRN
Status: DISCONTINUED | OUTPATIENT
Start: 2023-07-17 | End: 2023-07-25

## 2023-07-17 RX ORDER — DIAPER,BRIEF,INFANT-TODD,DISP
EACH MISCELLANEOUS 3 TIMES DAILY
Status: DISCONTINUED | OUTPATIENT
Start: 2023-07-17 | End: 2023-07-25

## 2023-07-17 RX ORDER — DIAZEPAM 10 MG/1
5 TABLET ORAL EVERY 6 HOURS PRN
Status: DISCONTINUED | OUTPATIENT
Start: 2023-07-17 | End: 2023-07-19

## 2023-07-17 NOTE — PLAN OF CARE
Patient resting overnight. Prn dilaudid given for pain management. JUAN drain was irrigated and aspirated per MD order. Safety precautions in place. Problem: Patient Centered Care  Goal: Patient preferences are identified and integrated in the patient's plan of care  Description: Interventions:  - What would you like us to know as we care for you?   - Provide timely, complete, and accurate information to patient/family  - Incorporate patient and family knowledge, values, beliefs, and cultural backgrounds into the planning and delivery of care  - Encourage patient/family to participate in care and decision-making at the level they choose  - Honor patient and family perspectives and choices  Outcome: Progressing     Problem: PAIN - ADULT  Goal: Verbalizes/displays adequate comfort level or patient's stated pain goal  Description: INTERVENTIONS:  - Encourage pt to monitor pain and request assistance  - Assess pain using appropriate pain scale  - Administer analgesics based on type and severity of pain and evaluate response  - Implement non-pharmacological measures as appropriate and evaluate response  - Consider cultural and social influences on pain and pain management  - Manage/alleviate anxiety  - Utilize distraction and/or relaxation techniques  - Monitor for opioid side effects  - Notify MD/LIP if interventions unsuccessful or patient reports new pain  - Anticipate increased pain with activity and pre-medicate as appropriate  Outcome: Progressing     Problem: RISK FOR INFECTION - ADULT  Goal: Absence of fever/infection during anticipated neutropenic period  Description: INTERVENTIONS  - Monitor WBC  - Administer growth factors as ordered  - Implement neutropenic guidelines  Outcome: Progressing     Problem: SAFETY ADULT - FALL  Goal: Free from fall injury  Description: INTERVENTIONS:  - Assess pt frequently for physical needs  - Identify cognitive and physical deficits and behaviors that affect risk of falls.   - [place] fall precautions as indicated by assessment.  - Educate pt/family on patient safety including physical limitations  - Instruct pt to call for assistance with activity based on assessment  - Modify environment to reduce risk of injury  - Provide assistive devices as appropriate  - Consider OT/PT consult to assist with strengthening/mobility  - Encourage toileting schedule  Outcome: Progressing     Problem: GASTROINTESTINAL - ADULT  Goal: Minimal or absence of nausea and vomiting  Description: INTERVENTIONS:  - Maintain adequate hydration with IV or PO as ordered and tolerated  - Nasogastric tube to low intermittent suction as ordered  - Evaluate effectiveness of ordered antiemetic medications  - Provide nonpharmacologic comfort measures as appropriate  - Advance diet as tolerated, if ordered  - Obtain nutritional consult as needed  - Evaluate fluid balance  Outcome: Progressing  Goal: Maintains or returns to baseline bowel function  Description: INTERVENTIONS:  - Assess bowel function  - Maintain adequate hydration with IV or PO as ordered and tolerated  - Evaluate effectiveness of GI medications  - Encourage mobilization and activity  - Obtain nutritional consult as needed  - Establish a toileting routine/schedule  - Consider collaborating with pharmacy to review patient's medication profile  Outcome: Progressing     Problem: METABOLIC/FLUID AND ELECTROLYTES - ADULT  Goal: Electrolytes maintained within normal limits  Description: INTERVENTIONS:  - Monitor labs and rhythm and assess patient for signs and symptoms of electrolyte imbalances  - Administer electrolyte replacement as ordered  - Monitor response to electrolyte replacements, including rhythm and repeat lab results as appropriate  - Fluid restriction as ordered  - Instruct patient on fluid and nutrition restrictions as appropriate  Outcome: Progressing     Problem: SKIN/TISSUE INTEGRITY - ADULT  Goal: Skin integrity remains intact  Description: INTERVENTIONS  - Assess and document risk factors for pressure ulcer development  - Assess and document skin integrity  - Monitor for areas of redness and/or skin breakdown  - Initiate interventions, skin care algorithm/standards of care as needed  Outcome: Progressing     Problem: ANXIETY  Goal: Will report anxiety at manageable levels  Description: INTERVENTIONS:  - Administer medication as ordered  - Teach and rehearse alternative coping skills  - Provide emotional support with 1:1 interaction with staff  Outcome: Progressing     Problem: COPING  Goal: Pt/Family able to verbalize concerns and demonstrate effective coping strategies  Description: INTERVENTIONS:  - Assist patient/family to identify coping skills, available support systems and cultural and spiritual values  - Provide emotional support, including active listening and acknowledgement of concerns of patient and caregivers  - Reduce environmental stimuli, as able  - Instruct patient/family in relaxation techniques, as appropriate  - Assess for spiritual and psychosocial needs and initiate Spiritual Care or Behavioral Health consult as needed  Outcome: Progressing

## 2023-07-18 ENCOUNTER — APPOINTMENT (OUTPATIENT)
Dept: INTERVENTIONAL RADIOLOGY/VASCULAR | Facility: HOSPITAL | Age: 33
DRG: 356 | End: 2023-07-18
Attending: NURSE PRACTITIONER
Payer: COMMERCIAL

## 2023-07-18 PROBLEM — F41.1 GENERALIZED ANXIETY DISORDER: Status: ACTIVE | Noted: 2023-07-18

## 2023-07-18 LAB
ALBUMIN SERPL-MCNC: 2.4 G/DL (ref 3.4–5)
ALBUMIN/GLOB SERPL: 0.6 {RATIO} (ref 1–2)
ALP LIVER SERPL-CCNC: 58 U/L
ALT SERPL-CCNC: 8 U/L
ANION GAP SERPL CALC-SCNC: 5 MMOL/L (ref 0–18)
AST SERPL-CCNC: 15 U/L (ref 15–37)
BASOPHILS # BLD AUTO: 0.03 X10(3) UL (ref 0–0.2)
BASOPHILS NFR BLD AUTO: 0.8 %
BILIRUB SERPL-MCNC: 0.7 MG/DL (ref 0.1–2)
BUN BLD-MCNC: 2 MG/DL (ref 7–18)
BUN/CREAT SERPL: 3.5 (ref 10–20)
CALCIUM BLD-MCNC: 8.4 MG/DL (ref 8.5–10.1)
CHLORIDE SERPL-SCNC: 111 MMOL/L (ref 98–112)
CO2 SERPL-SCNC: 28 MMOL/L (ref 21–32)
CREAT BLD-MCNC: 0.57 MG/DL
DEPRECATED HBV CORE AB SER IA-ACNC: 16.3 NG/ML
DEPRECATED RDW RBC AUTO: 57.6 FL (ref 35.1–46.3)
EOSINOPHIL # BLD AUTO: 0.12 X10(3) UL (ref 0–0.7)
EOSINOPHIL NFR BLD AUTO: 3.2 %
ERYTHROCYTE [DISTWIDTH] IN BLOOD BY AUTOMATED COUNT: 19.3 % (ref 11–15)
GFR SERPLBLD BASED ON 1.73 SQ M-ARVRAT: 123 ML/MIN/1.73M2 (ref 60–?)
GLOBULIN PLAS-MCNC: 4.1 G/DL (ref 2.8–4.4)
GLUCOSE BLD-MCNC: 93 MG/DL (ref 70–99)
HCT VFR BLD AUTO: 29.8 %
HGB BLD-MCNC: 8.7 G/DL
IMM GRANULOCYTES # BLD AUTO: 0.01 X10(3) UL (ref 0–1)
IMM GRANULOCYTES NFR BLD: 0.3 %
IRON SATN MFR SERPL: 7 %
IRON SERPL-MCNC: 23 UG/DL
LYMPHOCYTES # BLD AUTO: 1.43 X10(3) UL (ref 1–4)
LYMPHOCYTES NFR BLD AUTO: 38 %
MAGNESIUM SERPL-MCNC: 1.8 MG/DL (ref 1.6–2.6)
MCH RBC QN AUTO: 23.5 PG (ref 26–34)
MCHC RBC AUTO-ENTMCNC: 29.2 G/DL (ref 31–37)
MCV RBC AUTO: 80.5 FL
MONOCYTES # BLD AUTO: 0.26 X10(3) UL (ref 0.1–1)
MONOCYTES NFR BLD AUTO: 6.9 %
NEUTROPHILS # BLD AUTO: 1.91 X10 (3) UL (ref 1.5–7.7)
NEUTROPHILS # BLD AUTO: 1.91 X10(3) UL (ref 1.5–7.7)
NEUTROPHILS NFR BLD AUTO: 50.8 %
OSMOLALITY SERPL CALC.SUM OF ELEC: 294 MOSM/KG (ref 275–295)
PLATELET # BLD AUTO: 155 10(3)UL (ref 150–450)
POTASSIUM SERPL-SCNC: 3.4 MMOL/L (ref 3.5–5.1)
PROT SERPL-MCNC: 6.5 G/DL (ref 6.4–8.2)
RBC # BLD AUTO: 3.7 X10(6)UL
SODIUM SERPL-SCNC: 144 MMOL/L (ref 136–145)
TIBC SERPL-MCNC: 334 UG/DL (ref 240–450)
TRANSFERRIN SERPL-MCNC: 224 MG/DL (ref 200–360)
WBC # BLD AUTO: 3.8 X10(3) UL (ref 4–11)

## 2023-07-18 PROCEDURE — 0W9J30Z DRAINAGE OF PELVIC CAVITY WITH DRAINAGE DEVICE, PERCUTANEOUS APPROACH: ICD-10-PCS | Performed by: STUDENT IN AN ORGANIZED HEALTH CARE EDUCATION/TRAINING PROGRAM

## 2023-07-18 PROCEDURE — 0WWJ30Z REVISION OF DRAINAGE DEVICE IN PELVIC CAVITY, PERCUTANEOUS APPROACH: ICD-10-PCS | Performed by: STUDENT IN AN ORGANIZED HEALTH CARE EDUCATION/TRAINING PROGRAM

## 2023-07-18 PROCEDURE — 90792 PSYCH DIAG EVAL W/MED SRVCS: CPT | Performed by: OTHER

## 2023-07-18 RX ORDER — POTASSIUM CHLORIDE 20 MEQ/1
40 TABLET, EXTENDED RELEASE ORAL ONCE
Status: COMPLETED | OUTPATIENT
Start: 2023-07-18 | End: 2023-07-18

## 2023-07-18 RX ORDER — OLANZAPINE 2.5 MG/1
2.5 TABLET ORAL NIGHTLY
Status: DISCONTINUED | OUTPATIENT
Start: 2023-07-18 | End: 2023-07-19

## 2023-07-18 RX ORDER — HYDROMORPHONE HYDROCHLORIDE 1 MG/ML
0.5 INJECTION, SOLUTION INTRAMUSCULAR; INTRAVENOUS; SUBCUTANEOUS EVERY 2 HOUR PRN
Status: DISCONTINUED | OUTPATIENT
Start: 2023-07-18 | End: 2023-07-22

## 2023-07-18 RX ORDER — LIDOCAINE HYDROCHLORIDE 20 MG/ML
INJECTION, SOLUTION INFILTRATION; PERINEURAL
Status: COMPLETED
Start: 2023-07-18 | End: 2023-07-18

## 2023-07-18 RX ORDER — HYDROMORPHONE HYDROCHLORIDE 1 MG/ML
1 INJECTION, SOLUTION INTRAMUSCULAR; INTRAVENOUS; SUBCUTANEOUS EVERY 2 HOUR PRN
Status: DISCONTINUED | OUTPATIENT
Start: 2023-07-18 | End: 2023-07-22

## 2023-07-18 RX ORDER — MIDAZOLAM HYDROCHLORIDE 1 MG/ML
INJECTION INTRAMUSCULAR; INTRAVENOUS
Status: COMPLETED
Start: 2023-07-18 | End: 2023-07-18

## 2023-07-18 NOTE — PLAN OF CARE
Problem: Patient Centered Care  Goal: Patient preferences are identified and integrated in the patient's plan of care  Description: Interventions:  - What would you like us to know as we care for you?   - Provide timely, complete, and accurate information to patient/family  - Incorporate patient and family knowledge, values, beliefs, and cultural backgrounds into the planning and delivery of care  - Encourage patient/family to participate in care and decision-making at the level they choose  - Honor patient and family perspectives and choices  Outcome: Progressing     Problem: PAIN - ADULT  Goal: Verbalizes/displays adequate comfort level or patient's stated pain goal  Description: INTERVENTIONS:  - Encourage pt to monitor pain and request assistance  - Assess pain using appropriate pain scale  - Administer analgesics based on type and severity of pain and evaluate response  - Implement non-pharmacological measures as appropriate and evaluate response  - Consider cultural and social influences on pain and pain management  - Manage/alleviate anxiety  - Utilize distraction and/or relaxation techniques  - Monitor for opioid side effects  - Notify MD/LIP if interventions unsuccessful or patient reports new pain  - Anticipate increased pain with activity and pre-medicate as appropriate  Outcome: Progressing     Problem: RISK FOR INFECTION - ADULT  Goal: Absence of fever/infection during anticipated neutropenic period  Description: INTERVENTIONS  - Monitor WBC  - Administer growth factors as ordered  - Implement neutropenic guidelines  Outcome: Progressing     Problem: SAFETY ADULT - FALL  Goal: Free from fall injury  Description: INTERVENTIONS:  - Assess pt frequently for physical needs  - Identify cognitive and physical deficits and behaviors that affect risk of falls.   - [place] fall precautions as indicated by assessment.  - Educate pt/family on patient safety including physical limitations  - Instruct pt to call for assistance with activity based on assessment  - Modify environment to reduce risk of injury  - Provide assistive devices as appropriate  - Consider OT/PT consult to assist with strengthening/mobility  - Encourage toileting schedule  Outcome: Progressing     Problem: GASTROINTESTINAL - ADULT  Goal: Minimal or absence of nausea and vomiting  Description: INTERVENTIONS:  - Maintain adequate hydration with IV or PO as ordered and tolerated  - Nasogastric tube to low intermittent suction as ordered  - Evaluate effectiveness of ordered antiemetic medications  - Provide nonpharmacologic comfort measures as appropriate  - Advance diet as tolerated, if ordered  - Obtain nutritional consult as needed  - Evaluate fluid balance  Outcome: Progressing  Goal: Maintains or returns to baseline bowel function  Description: INTERVENTIONS:  - Assess bowel function  - Maintain adequate hydration with IV or PO as ordered and tolerated  - Evaluate effectiveness of GI medications  - Encourage mobilization and activity  - Obtain nutritional consult as needed  - Establish a toileting routine/schedule  - Consider collaborating with pharmacy to review patient's medication profile  Outcome: Progressing     Problem: METABOLIC/FLUID AND ELECTROLYTES - ADULT  Goal: Electrolytes maintained within normal limits  Description: INTERVENTIONS:  - Monitor labs and rhythm and assess patient for signs and symptoms of electrolyte imbalances  - Administer electrolyte replacement as ordered  - Monitor response to electrolyte replacements, including rhythm and repeat lab results as appropriate  - Fluid restriction as ordered  - Instruct patient on fluid and nutrition restrictions as appropriate  Outcome: Progressing     Problem: SKIN/TISSUE INTEGRITY - ADULT  Goal: Skin integrity remains intact  Description: INTERVENTIONS  - Assess and document risk factors for pressure ulcer development  - Assess and document skin integrity  - Monitor for areas of redness and/or skin breakdown  - Initiate interventions, skin care algorithm/standards of care as needed  Outcome: Progressing     Problem: ANXIETY  Goal: Will report anxiety at manageable levels  Description: INTERVENTIONS:  - Administer medication as ordered  - Teach and rehearse alternative coping skills  - Provide emotional support with 1:1 interaction with staff  Outcome: Progressing     Problem: COPING  Goal: Pt/Family able to verbalize concerns and demonstrate effective coping strategies  Description: INTERVENTIONS:  - Assist patient/family to identify coping skills, available support systems and cultural and spiritual values  - Provide emotional support, including active listening and acknowledgement of concerns of patient and caregivers  - Reduce environmental stimuli, as able  - Instruct patient/family in relaxation techniques, as appropriate  - Assess for spiritual and psychosocial needs and initiate Spiritual Care or Behavioral Health consult as needed  Outcome: Progressing   Pt c/o lower abdominal pain and pain when flushing JUAN drain. Medicated with norco. No output from JUAN drain. Pt is scheduled for drain check tomorrow in IR. NPO after midnight. IV abx continued. Updated pt and family about plan of care.

## 2023-07-18 NOTE — PRE-SEDATION ASSESSMENT
Interventional Radiology  Pre-Procedure Sedation Assessment    History of snoring or sleep or apnea:  Yes    History of previous problems with anesthesia or sedation:  Yes    Physical exam:  Neck: normal range of motion  Cardiovascular: well-perfused  Pulmonary: normal respiratory rate/effort    Mallampati score:  II (hard and soft palate, upper portion of tonsils anduvula visible)    ASA classification:   2. Patient with mild systemic disease    Plan:   Moderate sedation    Heather Peoples MD  7/18/2023  [address] [place] [address]

## 2023-07-18 NOTE — CM/SW NOTE
Patient discussed during nursing round. Patient will be going for drain check this afternoon. CM updated Any with [facility]. Final antibiotic plan pending. Plan: Return home with [facility] pending final ID recommendation and medical clearance.      Pam Bowen RN, BSN

## 2023-07-18 NOTE — PROCEDURES
Interventional Radiology  Brief Post-Procedure Note    Procedure(s):   Drain exchange/repositioning  Drain placement    Indication: diverticulitis    Anesthesia: Sedation    Findings:    -existing left lateral drain adjacent to ovary repositioned more medially, exchanged for 10 [place], attached to suction bulb  -placement of 8 [place] drain into anterior diverticular collection yielding cloudy bloody fluid, attached to suction bulb    Blood loss: <5 mL      Complications: None    Plan:   -flush drains with 10 mL saline daily  -continue to trend outputs  -IR will follow while inpatient    Lilly Casas MD  7/18/2023  Interventional Radiology  [facility]

## 2023-07-18 NOTE — PLAN OF CARE
Patient resting overnight. Prn norco given for pain management. Drain irrigated per order. Maintaining NPO status since midnight. Safety precautions in place.   Problem: Patient Centered Care  Goal: Patient preferences are identified and integrated in the patient's plan of care  Description: Interventions:  - What would you like us to know as we care for you?   - Provide timely, complete, and accurate information to patient/family  - Incorporate patient and family knowledge, values, beliefs, and cultural backgrounds into the planning and delivery of care  - Encourage patient/family to participate in care and decision-making at the level they choose  - Honor patient and family perspectives and choices  Outcome: Progressing     Problem: PAIN - ADULT  Goal: Verbalizes/displays adequate comfort level or patient's stated pain goal  Description: INTERVENTIONS:  - Encourage pt to monitor pain and request assistance  - Assess pain using appropriate pain scale  - Administer analgesics based on type and severity of pain and evaluate response  - Implement non-pharmacological measures as appropriate and evaluate response  - Consider cultural and social influences on pain and pain management  - Manage/alleviate anxiety  - Utilize distraction and/or relaxation techniques  - Monitor for opioid side effects  - Notify MD/LIP if interventions unsuccessful or patient reports new pain  - Anticipate increased pain with activity and pre-medicate as appropriate  Outcome: Progressing     Problem: RISK FOR INFECTION - ADULT  Goal: Absence of fever/infection during anticipated neutropenic period  Description: INTERVENTIONS  - Monitor WBC  - Administer growth factors as ordered  - Implement neutropenic guidelines  Outcome: Progressing     Problem: SAFETY ADULT - FALL  Goal: Free from fall injury  Description: INTERVENTIONS:  - Assess pt frequently for physical needs  - Identify cognitive and physical deficits and behaviors that affect risk of falls.  - [place] fall precautions as indicated by assessment.  - Educate pt/family on patient safety including physical limitations  - Instruct pt to call for assistance with activity based on assessment  - Modify environment to reduce risk of injury  - Provide assistive devices as appropriate  - Consider OT/PT consult to assist with strengthening/mobility  - Encourage toileting schedule  Outcome: Progressing     Problem: GASTROINTESTINAL - ADULT  Goal: Minimal or absence of nausea and vomiting  Description: INTERVENTIONS:  - Maintain adequate hydration with IV or PO as ordered and tolerated  - Nasogastric tube to low intermittent suction as ordered  - Evaluate effectiveness of ordered antiemetic medications  - Provide nonpharmacologic comfort measures as appropriate  - Advance diet as tolerated, if ordered  - Obtain nutritional consult as needed  - Evaluate fluid balance  Outcome: Progressing  Goal: Maintains or returns to baseline bowel function  Description: INTERVENTIONS:  - Assess bowel function  - Maintain adequate hydration with IV or PO as ordered and tolerated  - Evaluate effectiveness of GI medications  - Encourage mobilization and activity  - Obtain nutritional consult as needed  - Establish a toileting routine/schedule  - Consider collaborating with pharmacy to review patient's medication profile  Outcome: Progressing     Problem: METABOLIC/FLUID AND ELECTROLYTES - ADULT  Goal: Electrolytes maintained within normal limits  Description: INTERVENTIONS:  - Monitor labs and rhythm and assess patient for signs and symptoms of electrolyte imbalances  - Administer electrolyte replacement as ordered  - Monitor response to electrolyte replacements, including rhythm and repeat lab results as appropriate  - Fluid restriction as ordered  - Instruct patient on fluid and nutrition restrictions as appropriate  Outcome: Progressing     Problem: SKIN/TISSUE INTEGRITY - ADULT  Goal: Skin integrity remains intact  Description: INTERVENTIONS  - Assess and document risk factors for pressure ulcer development  - Assess and document skin integrity  - Monitor for areas of redness and/or skin breakdown  - Initiate interventions, skin care algorithm/standards of care as needed  Outcome: Progressing     Problem: ANXIETY  Goal: Will report anxiety at manageable levels  Description: INTERVENTIONS:  - Administer medication as ordered  - Teach and rehearse alternative coping skills  - Provide emotional support with 1:1 interaction with staff  Outcome: Progressing     Problem: COPING  Goal: Pt/Family able to verbalize concerns and demonstrate effective coping strategies  Description: INTERVENTIONS:  - Assist patient/family to identify coping skills, available support systems and cultural and spiritual values  - Provide emotional support, including active listening and acknowledgement of concerns of patient and caregivers  - Reduce environmental stimuli, as able  - Instruct patient/family in relaxation techniques, as appropriate  - Assess for spiritual and psychosocial needs and initiate Spiritual Care or Behavioral Health consult as needed  Outcome: Progressing

## 2023-07-18 NOTE — PLAN OF CARE
Patient stable, went for 2nd JUAN drain placement today. 2nd drain in mid lower groin area, with bloody drainage. Patient very adamant about wanting to wear underwear and pajama pants, this RN attempted to educate patient on how easily the drain can become dislodged with clothing, offered an alternative which was hospital mesh underwear, patient refused. Education reinforced again later in the evening, but patient declined wanting to wear mesh underwear. Pain relief provided. Needs met. Problem: Patient Centered Care  Goal: Patient preferences are identified and integrated in the patient's plan of care  Description: Interventions:  - What would you like us to know as we care for you? Problem: PAIN - ADULT  Goal: Verbalizes/displays adequate comfort level or patient's stated pain goal  Description: INTERVENTIONS:  - Encourage pt to monitor pain and request assistance  - Assess pain using appropriate pain scale  - Administer analgesics based on type and severity of pain and evaluate response  - Implement non-pharmacological measures as appropriate and evaluate response  - Consider cultural and social influences on pain and pain management  - Manage/alleviate anxiety  - Utilize distraction and/or relaxation techniques  - Monitor for opioid side effects  - Notify MD/LIP if interventions unsuccessful or patient reports new pain  - Anticipate increased pain with activity and pre-medicate as appropriate  Outcome: Progressing     Problem: RISK FOR INFECTION - ADULT  Goal: Absence of fever/infection during anticipated neutropenic period  Description: INTERVENTIONS  - Monitor WBC  - Administer growth factors as ordered  - Implement neutropenic guidelines  Outcome: Progressing     Problem: SAFETY ADULT - FALL  Goal: Free from fall injury  Description: INTERVENTIONS:  - Assess pt frequently for physical needs  - Identify cognitive and physical deficits and behaviors that affect risk of falls.   - [place] fall precautions as indicated by assessment.  - Educate pt/family on patient safety including physical limitations  - Instruct pt to call for assistance with activity based on assessment  - Modify environment to reduce risk of injury  - Provide assistive devices as appropriate  - Consider OT/PT consult to assist with strengthening/mobility  - Encourage toileting schedule  Outcome: Progressing     Problem: GASTROINTESTINAL - ADULT  Goal: Minimal or absence of nausea and vomiting  Description: INTERVENTIONS:  - Maintain adequate hydration with IV or PO as ordered and tolerated  - Nasogastric tube to low intermittent suction as ordered  - Evaluate effectiveness of ordered antiemetic medications  - Provide nonpharmacologic comfort measures as appropriate  - Advance diet as tolerated, if ordered  - Obtain nutritional consult as needed  - Evaluate fluid balance  Outcome: Progressing  Goal: Maintains or returns to baseline bowel function  Description: INTERVENTIONS:  - Assess bowel function  - Maintain adequate hydration with IV or PO as ordered and tolerated  - Evaluate effectiveness of GI medications  - Encourage mobilization and activity  - Obtain nutritional consult as needed  - Establish a toileting routine/schedule  - Consider collaborating with pharmacy to review patient's medication profile  Outcome: Progressing     Problem: METABOLIC/FLUID AND ELECTROLYTES - ADULT  Goal: Electrolytes maintained within normal limits  Description: INTERVENTIONS:  - Monitor labs and rhythm and assess patient for signs and symptoms of electrolyte imbalances  - Administer electrolyte replacement as ordered  - Monitor response to electrolyte replacements, including rhythm and repeat lab results as appropriate  - Fluid restriction as ordered  - Instruct patient on fluid and nutrition restrictions as appropriate  Outcome: Progressing     Problem: SKIN/TISSUE INTEGRITY - ADULT  Goal: Skin integrity remains intact  Description: INTERVENTIONS  - Assess and document risk factors for pressure ulcer development  - Assess and document skin integrity  - Monitor for areas of redness and/or skin breakdown  - Initiate interventions, skin care algorithm/standards of care as needed  Outcome: Progressing     Problem: ANXIETY  Goal: Will report anxiety at manageable levels  Description: INTERVENTIONS:  - Administer medication as ordered  - Teach and rehearse alternative coping skills  - Provide emotional support with 1:1 interaction with staff  Outcome: Progressing     Problem: COPING  Goal: Pt/Family able to verbalize concerns and demonstrate effective coping strategies  Description: INTERVENTIONS:  - Assist patient/family to identify coping skills, available support systems and cultural and spiritual values  - Provide emotional support, including active listening and acknowledgement of concerns of patient and caregivers  - Reduce environmental stimuli, as able  - Instruct patient/family in relaxation techniques, as appropriate  - Assess for spiritual and psychosocial needs and initiate Spiritual Care or Behavioral Health consult as needed  Outcome: Progressing     - Provide timely, complete, and accurate information to patient/family  - Incorporate patient and family knowledge, values, beliefs, and cultural backgrounds into the planning and delivery of care  - Encourage patient/family to participate in care and decision-making at the level they choose  - Honor patient and family perspectives and choices  Outcome: Progressing

## 2023-07-19 LAB
ALBUMIN SERPL-MCNC: 2.4 G/DL (ref 3.4–5)
ALBUMIN/GLOB SERPL: 0.6 {RATIO} (ref 1–2)
ALP LIVER SERPL-CCNC: 53 U/L
ALT SERPL-CCNC: 8 U/L
ANION GAP SERPL CALC-SCNC: 5 MMOL/L (ref 0–18)
AST SERPL-CCNC: 15 U/L (ref 15–37)
BASOPHILS # BLD AUTO: 0.02 X10(3) UL (ref 0–0.2)
BASOPHILS NFR BLD AUTO: 0.6 %
BILIRUB SERPL-MCNC: 0.6 MG/DL (ref 0.1–2)
BUN BLD-MCNC: 2 MG/DL (ref 7–18)
BUN/CREAT SERPL: 3.6 (ref 10–20)
CALCIUM BLD-MCNC: 8.6 MG/DL (ref 8.5–10.1)
CHLORIDE SERPL-SCNC: 111 MMOL/L (ref 98–112)
CO2 SERPL-SCNC: 28 MMOL/L (ref 21–32)
CREAT BLD-MCNC: 0.55 MG/DL
DEPRECATED RDW RBC AUTO: 55.6 FL (ref 35.1–46.3)
EOSINOPHIL # BLD AUTO: 0.18 X10(3) UL (ref 0–0.7)
EOSINOPHIL NFR BLD AUTO: 5.5 %
ERYTHROCYTE [DISTWIDTH] IN BLOOD BY AUTOMATED COUNT: 19 % (ref 11–15)
GFR SERPLBLD BASED ON 1.73 SQ M-ARVRAT: 124 ML/MIN/1.73M2 (ref 60–?)
GLOBULIN PLAS-MCNC: 4.1 G/DL (ref 2.8–4.4)
GLUCOSE BLD-MCNC: 86 MG/DL (ref 70–99)
HCT VFR BLD AUTO: 29.9 %
HGB BLD-MCNC: 8.8 G/DL
IMM GRANULOCYTES # BLD AUTO: 0.01 X10(3) UL (ref 0–1)
IMM GRANULOCYTES NFR BLD: 0.3 %
LYMPHOCYTES # BLD AUTO: 1.25 X10(3) UL (ref 1–4)
LYMPHOCYTES NFR BLD AUTO: 38.3 %
MAGNESIUM SERPL-MCNC: 1.8 MG/DL (ref 1.6–2.6)
MCH RBC QN AUTO: 23.6 PG (ref 26–34)
MCHC RBC AUTO-ENTMCNC: 29.4 G/DL (ref 31–37)
MCV RBC AUTO: 80.2 FL
MONOCYTES # BLD AUTO: 0.21 X10(3) UL (ref 0.1–1)
MONOCYTES NFR BLD AUTO: 6.4 %
NEUTROPHILS # BLD AUTO: 1.59 X10 (3) UL (ref 1.5–7.7)
NEUTROPHILS # BLD AUTO: 1.59 X10(3) UL (ref 1.5–7.7)
NEUTROPHILS NFR BLD AUTO: 48.9 %
OSMOLALITY SERPL CALC.SUM OF ELEC: 293 MOSM/KG (ref 275–295)
PLATELET # BLD AUTO: 138 10(3)UL (ref 150–450)
POTASSIUM SERPL-SCNC: 3.9 MMOL/L (ref 3.5–5.1)
POTASSIUM SERPL-SCNC: 3.9 MMOL/L (ref 3.5–5.1)
PROT SERPL-MCNC: 6.5 G/DL (ref 6.4–8.2)
RBC # BLD AUTO: 3.73 X10(6)UL
SODIUM SERPL-SCNC: 144 MMOL/L (ref 136–145)
WBC # BLD AUTO: 3.3 X10(3) UL (ref 4–11)

## 2023-07-19 PROCEDURE — 99233 SBSQ HOSP IP/OBS HIGH 50: CPT | Performed by: HOSPITALIST

## 2023-07-19 PROCEDURE — 99233 SBSQ HOSP IP/OBS HIGH 50: CPT | Performed by: SURGERY

## 2023-07-19 PROCEDURE — 99233 SBSQ HOSP IP/OBS HIGH 50: CPT | Performed by: NURSE PRACTITIONER

## 2023-07-19 RX ORDER — SERTRALINE HYDROCHLORIDE 25 MG/1
25 TABLET, FILM COATED ORAL NIGHTLY
Status: DISCONTINUED | OUTPATIENT
Start: 2023-07-19 | End: 2023-07-23

## 2023-07-19 RX ORDER — KETOROLAC TROMETHAMINE 30 MG/ML
30 INJECTION, SOLUTION INTRAMUSCULAR; INTRAVENOUS EVERY 6 HOURS PRN
Status: ACTIVE | OUTPATIENT
Start: 2023-07-19 | End: 2023-07-21

## 2023-07-19 RX ORDER — OLANZAPINE 5 MG/1
5 TABLET ORAL NIGHTLY
Status: DISCONTINUED | OUTPATIENT
Start: 2023-07-19 | End: 2023-07-23

## 2023-07-19 RX ORDER — HYDROCODONE BITARTRATE AND ACETAMINOPHEN 10; 325 MG/1; MG/1
2 TABLET ORAL EVERY 4 HOURS PRN
Status: DISCONTINUED | OUTPATIENT
Start: 2023-07-19 | End: 2023-07-21

## 2023-07-19 RX ORDER — CLONAZEPAM 0.5 MG/1
0.5 TABLET ORAL NIGHTLY
Status: DISCONTINUED | OUTPATIENT
Start: 2023-07-19 | End: 2023-07-25

## 2023-07-19 NOTE — PLAN OF CARE
Patient with severe pain at second drain sight when up to the bathroom overnight, MD notified. Prn dilaudid and norco given for pain management. Drains irrigated per order. Receiving IV fluids and abx. Safety precautions in place.   Problem: Patient Centered Care  Goal: Patient preferences are identified and integrated in the patient's plan of care  Description: Interventions:  - What would you like us to know as we care for you?   - Provide timely, complete, and accurate information to patient/family  - Incorporate patient and family knowledge, values, beliefs, and cultural backgrounds into the planning and delivery of care  - Encourage patient/family to participate in care and decision-making at the level they choose  - Honor patient and family perspectives and choices  Outcome: Progressing     Problem: PAIN - ADULT  Goal: Verbalizes/displays adequate comfort level or patient's stated pain goal  Description: INTERVENTIONS:  - Encourage pt to monitor pain and request assistance  - Assess pain using appropriate pain scale  - Administer analgesics based on type and severity of pain and evaluate response  - Implement non-pharmacological measures as appropriate and evaluate response  - Consider cultural and social influences on pain and pain management  - Manage/alleviate anxiety  - Utilize distraction and/or relaxation techniques  - Monitor for opioid side effects  - Notify MD/LIP if interventions unsuccessful or patient reports new pain  - Anticipate increased pain with activity and pre-medicate as appropriate  Outcome: Progressing     Problem: RISK FOR INFECTION - ADULT  Goal: Absence of fever/infection during anticipated neutropenic period  Description: INTERVENTIONS  - Monitor WBC  - Administer growth factors as ordered  - Implement neutropenic guidelines  Outcome: Progressing     Problem: SAFETY ADULT - FALL  Goal: Free from fall injury  Description: INTERVENTIONS:  - Assess pt frequently for physical needs  - Identify cognitive and physical deficits and behaviors that affect risk of falls.   - [place] fall precautions as indicated by assessment.  - Educate pt/family on patient safety including physical limitations  - Instruct pt to call for assistance with activity based on assessment  - Modify environment to reduce risk of injury  - Provide assistive devices as appropriate  - Consider OT/PT consult to assist with strengthening/mobility  - Encourage toileting schedule  Outcome: Progressing     Problem: GASTROINTESTINAL - ADULT  Goal: Minimal or absence of nausea and vomiting  Description: INTERVENTIONS:  - Maintain adequate hydration with IV or PO as ordered and tolerated  - Nasogastric tube to low intermittent suction as ordered  - Evaluate effectiveness of ordered antiemetic medications  - Provide nonpharmacologic comfort measures as appropriate  - Advance diet as tolerated, if ordered  - Obtain nutritional consult as needed  - Evaluate fluid balance  Outcome: Progressing  Goal: Maintains or returns to baseline bowel function  Description: INTERVENTIONS:  - Assess bowel function  - Maintain adequate hydration with IV or PO as ordered and tolerated  - Evaluate effectiveness of GI medications  - Encourage mobilization and activity  - Obtain nutritional consult as needed  - Establish a toileting routine/schedule  - Consider collaborating with pharmacy to review patient's medication profile  Outcome: Progressing     Problem: METABOLIC/FLUID AND ELECTROLYTES - ADULT  Goal: Electrolytes maintained within normal limits  Description: INTERVENTIONS:  - Monitor labs and rhythm and assess patient for signs and symptoms of electrolyte imbalances  - Administer electrolyte replacement as ordered  - Monitor response to electrolyte replacements, including rhythm and repeat lab results as appropriate  - Fluid restriction as ordered  - Instruct patient on fluid and nutrition restrictions as appropriate  Outcome: Progressing Problem: SKIN/TISSUE INTEGRITY - ADULT  Goal: Skin integrity remains intact  Description: INTERVENTIONS  - Assess and document risk factors for pressure ulcer development  - Assess and document skin integrity  - Monitor for areas of redness and/or skin breakdown  - Initiate interventions, skin care algorithm/standards of care as needed  Outcome: Progressing     Problem: ANXIETY  Goal: Will report anxiety at manageable levels  Description: INTERVENTIONS:  - Administer medication as ordered  - Teach and rehearse alternative coping skills  - Provide emotional support with 1:1 interaction with staff  Outcome: Progressing     Problem: COPING  Goal: Pt/Family able to verbalize concerns and demonstrate effective coping strategies  Description: INTERVENTIONS:  - Assist patient/family to identify coping skills, available support systems and cultural and spiritual values  - Provide emotional support, including active listening and acknowledgement of concerns of patient and caregivers  - Reduce environmental stimuli, as able  - Instruct patient/family in relaxation techniques, as appropriate  - Assess for spiritual and psychosocial needs and initiate Spiritual Care or Behavioral Health consult as needed  Outcome: Progressing

## 2023-07-19 NOTE — PLAN OF CARE
Patient alert and oriented. Drains irrigated, patient still complaining of pain. Vital signs stable. No acute changes. Call light within reach. All safety precautions in place. Frequent rounding by staff.      Problem: Patient Centered Care  Goal: Patient preferences are identified and integrated in the patient's plan of care  Description: Interventions:  - Provide timely, complete, and accurate information to patient/family  - Incorporate patient and family knowledge, values, beliefs, and cultural backgrounds into the planning and delivery of care  - Encourage patient/family to participate in care and decision-making at the level they choose  - Honor patient and family perspectives and choices  Outcome: Progressing     Problem: PAIN - ADULT  Goal: Verbalizes/displays adequate comfort level or patient's stated pain goal  Description: INTERVENTIONS:  - Encourage pt to monitor pain and request assistance  - Assess pain using appropriate pain scale  - Administer analgesics based on type and severity of pain and evaluate response  - Implement non-pharmacological measures as appropriate and evaluate response  - Consider cultural and social influences on pain and pain management  - Manage/alleviate anxiety  - Utilize distraction and/or relaxation techniques  - Monitor for opioid side effects  - Notify MD/LIP if interventions unsuccessful or patient reports new pain  - Anticipate increased pain with activity and pre-medicate as appropriate  Outcome: Progressing     Problem: RISK FOR INFECTION - ADULT  Goal: Absence of fever/infection during anticipated neutropenic period  Description: INTERVENTIONS  - Monitor WBC  - Administer growth factors as ordered  - Implement neutropenic guidelines  Outcome: Progressing     Problem: SAFETY ADULT - FALL  Goal: Free from fall injury  Description: INTERVENTIONS:  - Assess pt frequently for physical needs  - Identify cognitive and physical deficits and behaviors that affect risk of falls.  - [place] fall precautions as indicated by assessment.  - Educate pt/family on patient safety including physical limitations  - Instruct pt to call for assistance with activity based on assessment  - Modify environment to reduce risk of injury  - Provide assistive devices as appropriate  - Consider OT/PT consult to assist with strengthening/mobility  - Encourage toileting schedule  Outcome: Progressing     Problem: GASTROINTESTINAL - ADULT  Goal: Minimal or absence of nausea and vomiting  Description: INTERVENTIONS:  - Maintain adequate hydration with IV or PO as ordered and tolerated  - Nasogastric tube to low intermittent suction as ordered  - Evaluate effectiveness of ordered antiemetic medications  - Provide nonpharmacologic comfort measures as appropriate  - Advance diet as tolerated, if ordered  - Obtain nutritional consult as needed  - Evaluate fluid balance  Outcome: Progressing  Goal: Maintains or returns to baseline bowel function  Description: INTERVENTIONS:  - Assess bowel function  - Maintain adequate hydration with IV or PO as ordered and tolerated  - Evaluate effectiveness of GI medications  - Encourage mobilization and activity  - Obtain nutritional consult as needed  - Establish a toileting routine/schedule  - Consider collaborating with pharmacy to review patient's medication profile  Outcome: Progressing     Problem: METABOLIC/FLUID AND ELECTROLYTES - ADULT  Goal: Electrolytes maintained within normal limits  Description: INTERVENTIONS:  - Monitor labs and rhythm and assess patient for signs and symptoms of electrolyte imbalances  - Administer electrolyte replacement as ordered  - Monitor response to electrolyte replacements, including rhythm and repeat lab results as appropriate  - Fluid restriction as ordered  - Instruct patient on fluid and nutrition restrictions as appropriate  Outcome: Progressing     Problem: SKIN/TISSUE INTEGRITY - ADULT  Goal: Skin integrity remains intact  Description: INTERVENTIONS  - Assess and document risk factors for pressure ulcer development  - Assess and document skin integrity  - Monitor for areas of redness and/or skin breakdown  - Initiate interventions, skin care algorithm/standards of care as needed  Outcome: Progressing     Problem: ANXIETY  Goal: Will report anxiety at manageable levels  Description: INTERVENTIONS:  - Administer medication as ordered  - Teach and rehearse alternative coping skills  - Provide emotional support with 1:1 interaction with staff  Outcome: Progressing     Problem: COPING  Goal: Pt/Family able to verbalize concerns and demonstrate effective coping strategies  Description: INTERVENTIONS:  - Assist patient/family to identify coping skills, available support systems and cultural and spiritual values  - Provide emotional support, including active listening and acknowledgement of concerns of patient and caregivers  - Reduce environmental stimuli, as able  - Instruct patient/family in relaxation techniques, as appropriate  - Assess for spiritual and psychosocial needs and initiate Spiritual Care or Behavioral Health consult as needed  Outcome: Progressing

## 2023-07-20 LAB
ALBUMIN SERPL-MCNC: 2.5 G/DL (ref 3.4–5)
ALBUMIN/GLOB SERPL: 0.6 {RATIO} (ref 1–2)
ALP LIVER SERPL-CCNC: 53 U/L
ALT SERPL-CCNC: 8 U/L
ANION GAP SERPL CALC-SCNC: 6 MMOL/L (ref 0–18)
AST SERPL-CCNC: 19 U/L (ref 15–37)
BILIRUB SERPL-MCNC: 0.7 MG/DL (ref 0.1–2)
BUN BLD-MCNC: 2 MG/DL (ref 7–18)
BUN/CREAT SERPL: 3.4 (ref 10–20)
CALCIUM BLD-MCNC: 9 MG/DL (ref 8.5–10.1)
CHLORIDE SERPL-SCNC: 106 MMOL/L (ref 98–112)
CO2 SERPL-SCNC: 29 MMOL/L (ref 21–32)
CREAT BLD-MCNC: 0.58 MG/DL
GFR SERPLBLD BASED ON 1.73 SQ M-ARVRAT: 122 ML/MIN/1.73M2 (ref 60–?)
GLOBULIN PLAS-MCNC: 4.3 G/DL (ref 2.8–4.4)
GLUCOSE BLD-MCNC: 92 MG/DL (ref 70–99)
HEMOCCULT STL QL: NEGATIVE
OSMOLALITY SERPL CALC.SUM OF ELEC: 288 MOSM/KG (ref 275–295)
POTASSIUM SERPL-SCNC: 3.4 MMOL/L (ref 3.5–5.1)
PROT SERPL-MCNC: 6.8 G/DL (ref 6.4–8.2)
SODIUM SERPL-SCNC: 141 MMOL/L (ref 136–145)

## 2023-07-20 PROCEDURE — 99233 SBSQ HOSP IP/OBS HIGH 50: CPT | Performed by: SURGERY

## 2023-07-20 PROCEDURE — 99233 SBSQ HOSP IP/OBS HIGH 50: CPT | Performed by: HOSPITALIST

## 2023-07-20 PROCEDURE — 99233 SBSQ HOSP IP/OBS HIGH 50: CPT | Performed by: NURSE PRACTITIONER

## 2023-07-20 RX ORDER — POTASSIUM CHLORIDE 20 MEQ/1
40 TABLET, EXTENDED RELEASE ORAL ONCE
Status: COMPLETED | OUTPATIENT
Start: 2023-07-20 | End: 2023-07-20

## 2023-07-20 RX ORDER — VANCOMYCIN HYDROCHLORIDE 50 MG/ML
125 KIT ORAL DAILY
Status: DISCONTINUED | OUTPATIENT
Start: 2023-07-20 | End: 2023-07-25

## 2023-07-20 NOTE — PLAN OF CARE
Problem: PAIN - ADULT  Goal: Verbalizes/displays adequate comfort level or patient's stated pain goal  Description: INTERVENTIONS:  - Encourage pt to monitor pain and request assistance  - Assess pain using appropriate pain scale  - Administer analgesics based on type and severity of pain and evaluate response  - Implement non-pharmacological measures as appropriate and evaluate response  - Consider cultural and social influences on pain and pain management  - Manage/alleviate anxiety  - Utilize distraction and/or relaxation techniques  - Monitor for opioid side effects  - Notify MD/LIP if interventions unsuccessful or patient reports new pain  - Anticipate increased pain with activity and pre-medicate as appropriate  Outcome: Progressing     Problem: RISK FOR INFECTION - ADULT  Goal: Absence of fever/infection during anticipated neutropenic period  Description: INTERVENTIONS  - Monitor WBC  - Administer growth factors as ordered  - Implement neutropenic guidelines  Outcome: Progressing     Problem: SAFETY ADULT - FALL  Goal: Free from fall injury  Description: INTERVENTIONS:  - Assess pt frequently for physical needs  - Identify cognitive and physical deficits and behaviors that affect risk of falls.   - [place] fall precautions as indicated by assessment.  - Educate pt/family on patient safety including physical limitations  - Instruct pt to call for assistance with activity based on assessment  - Modify environment to reduce risk of injury  - Provide assistive devices as appropriate  - Consider OT/PT consult to assist with strengthening/mobility  - Encourage toileting schedule  Outcome: Progressing     Problem: GASTROINTESTINAL - ADULT  Goal: Minimal or absence of nausea and vomiting  Description: INTERVENTIONS:  - Maintain adequate hydration with IV or PO as ordered and tolerated  - Nasogastric tube to low intermittent suction as ordered  - Evaluate effectiveness of ordered antiemetic medications  - Provide nonpharmacologic comfort measures as appropriate  - Advance diet as tolerated, if ordered  - Obtain nutritional consult as needed  - Evaluate fluid balance  Outcome: Progressing     Problem: METABOLIC/FLUID AND ELECTROLYTES - ADULT  Goal: Electrolytes maintained within normal limits  Description: INTERVENTIONS:  - Monitor labs and rhythm and assess patient for signs and symptoms of electrolyte imbalances  - Administer electrolyte replacement as ordered  - Monitor response to electrolyte replacements, including rhythm and repeat lab results as appropriate  - Fluid restriction as ordered  - Instruct patient on fluid and nutrition restrictions as appropriate  Outcome: Progressing     Problem: ANXIETY  Goal: Will report anxiety at manageable levels  Description: INTERVENTIONS:  - Administer medication as ordered  - Teach and rehearse alternative coping skills  - Provide emotional support with 1:1 interaction with staff  Outcome: Progressing     Problem: COPING  Goal: Pt/Family able to verbalize concerns and demonstrate effective coping strategies  Description: INTERVENTIONS:  - Assist patient/family to identify coping skills, available support systems and cultural and spiritual values  - Provide emotional support, including active listening and acknowledgement of concerns of patient and caregivers  - Reduce environmental stimuli, as able  - Instruct patient/family in relaxation techniques, as appropriate  - Assess for spiritual and psychosocial needs and initiate Spiritual Care or Behavioral Health consult as needed  Outcome: Progressing     Vital signs stable on room air at this time. PRN medications given per [facility] for abdominal pain around drain sites; heat packs applied as needed for increased comfort. Tolerating soft diet without nausea. Able to ambulate with stand by assist.   Drains flushed and aspirated per orders, patient tolerated well.    Safety precautions in place, frequent nursing rounding completed, call light within reach. All questions answered and needs met.

## 2023-07-20 NOTE — PLAN OF CARE
A/O x4, PRN pain meds given as ordered - see MAR. No acute changes. No calls from tele. Drains irrigated and aspirated per MD orders. Call light within reach, frequent rounding done.     Problem: Patient Centered Care  Goal: Patient preferences are identified and integrated in the patient's plan of care  Description: Interventions:  - What would you like us to know as we care for you?   - Provide timely, complete, and accurate information to patient/family  - Incorporate patient and family knowledge, values, beliefs, and cultural backgrounds into the planning and delivery of care  - Encourage patient/family to participate in care and decision-making at the level they choose  - Honor patient and family perspectives and choices  Outcome: Progressing     Problem: PAIN - ADULT  Goal: Verbalizes/displays adequate comfort level or patient's stated pain goal  Description: INTERVENTIONS:  - Encourage pt to monitor pain and request assistance  - Assess pain using appropriate pain scale  - Administer analgesics based on type and severity of pain and evaluate response  - Implement non-pharmacological measures as appropriate and evaluate response  - Consider cultural and social influences on pain and pain management  - Manage/alleviate anxiety  - Utilize distraction and/or relaxation techniques  - Monitor for opioid side effects  - Notify MD/LIP if interventions unsuccessful or patient reports new pain  - Anticipate increased pain with activity and pre-medicate as appropriate  Outcome: Progressing     Problem: RISK FOR INFECTION - ADULT  Goal: Absence of fever/infection during anticipated neutropenic period  Description: INTERVENTIONS  - Monitor WBC  - Administer growth factors as ordered  - Implement neutropenic guidelines  Outcome: Progressing     Problem: SAFETY ADULT - FALL  Goal: Free from fall injury  Description: INTERVENTIONS:  - Assess pt frequently for physical needs  - Identify cognitive and physical deficits and behaviors that affect risk of falls.   - [place] fall precautions as indicated by assessment.  - Educate pt/family on patient safety including physical limitations  - Instruct pt to call for assistance with activity based on assessment  - Modify environment to reduce risk of injury  - Provide assistive devices as appropriate  - Consider OT/PT consult to assist with strengthening/mobility  - Encourage toileting schedule  Outcome: Progressing     Problem: GASTROINTESTINAL - ADULT  Goal: Minimal or absence of nausea and vomiting  Description: INTERVENTIONS:  - Maintain adequate hydration with IV or PO as ordered and tolerated  - Nasogastric tube to low intermittent suction as ordered  - Evaluate effectiveness of ordered antiemetic medications  - Provide nonpharmacologic comfort measures as appropriate  - Advance diet as tolerated, if ordered  - Obtain nutritional consult as needed  - Evaluate fluid balance  Outcome: Progressing  Goal: Maintains or returns to baseline bowel function  Description: INTERVENTIONS:  - Assess bowel function  - Maintain adequate hydration with IV or PO as ordered and tolerated  - Evaluate effectiveness of GI medications  - Encourage mobilization and activity  - Obtain nutritional consult as needed  - Establish a toileting routine/schedule  - Consider collaborating with pharmacy to review patient's medication profile  Outcome: Progressing     Problem: METABOLIC/FLUID AND ELECTROLYTES - ADULT  Goal: Electrolytes maintained within normal limits  Description: INTERVENTIONS:  - Monitor labs and rhythm and assess patient for signs and symptoms of electrolyte imbalances  - Administer electrolyte replacement as ordered  - Monitor response to electrolyte replacements, including rhythm and repeat lab results as appropriate  - Fluid restriction as ordered  - Instruct patient on fluid and nutrition restrictions as appropriate  Outcome: Progressing     Problem: SKIN/TISSUE INTEGRITY - ADULT  Goal: Skin integrity remains intact  Description: INTERVENTIONS  - Assess and document risk factors for pressure ulcer development  - Assess and document skin integrity  - Monitor for areas of redness and/or skin breakdown  - Initiate interventions, skin care algorithm/standards of care as needed  Outcome: Progressing     Problem: ANXIETY  Goal: Will report anxiety at manageable levels  Description: INTERVENTIONS:  - Administer medication as ordered  - Teach and rehearse alternative coping skills  - Provide emotional support with 1:1 interaction with staff  Outcome: Progressing     Problem: COPING  Goal: Pt/Family able to verbalize concerns and demonstrate effective coping strategies  Description: INTERVENTIONS:  - Assist patient/family to identify coping skills, available support systems and cultural and spiritual values  - Provide emotional support, including active listening and acknowledgement of concerns of patient and caregivers  - Reduce environmental stimuli, as able  - Instruct patient/family in relaxation techniques, as appropriate  - Assess for spiritual and psychosocial needs and initiate Spiritual Care or Behavioral Health consult as needed  Outcome: Progressing

## 2023-07-21 LAB
ALBUMIN SERPL-MCNC: 2.7 G/DL (ref 3.4–5)
ALBUMIN/GLOB SERPL: 0.6 {RATIO} (ref 1–2)
ALP LIVER SERPL-CCNC: 57 U/L
ALT SERPL-CCNC: 8 U/L
ANION GAP SERPL CALC-SCNC: 4 MMOL/L (ref 0–18)
AST SERPL-CCNC: 18 U/L (ref 15–37)
BASOPHILS # BLD AUTO: 0.03 X10(3) UL (ref 0–0.2)
BASOPHILS NFR BLD AUTO: 0.5 %
BILIRUB SERPL-MCNC: 0.7 MG/DL (ref 0.1–2)
BUN BLD-MCNC: 5 MG/DL (ref 7–18)
BUN/CREAT SERPL: 7.1 (ref 10–20)
CALCIUM BLD-MCNC: 9.1 MG/DL (ref 8.5–10.1)
CHLORIDE SERPL-SCNC: 106 MMOL/L (ref 98–112)
CO2 SERPL-SCNC: 28 MMOL/L (ref 21–32)
CREAT BLD-MCNC: 0.7 MG/DL
DEPRECATED RDW RBC AUTO: 55.8 FL (ref 35.1–46.3)
DEPRECATED RDW RBC AUTO: 55.8 FL (ref 35.1–46.3)
EGFRCR SERPLBLD CKD-EPI 2021: 117 ML/MIN/1.73M2 (ref 60–?)
EOSINOPHIL # BLD AUTO: 0.23 X10(3) UL (ref 0–0.7)
EOSINOPHIL NFR BLD AUTO: 4 %
ERYTHROCYTE [DISTWIDTH] IN BLOOD BY AUTOMATED COUNT: 19.2 % (ref 11–15)
ERYTHROCYTE [DISTWIDTH] IN BLOOD BY AUTOMATED COUNT: 19.2 % (ref 11–15)
GLOBULIN PLAS-MCNC: 4.3 G/DL (ref 2.8–4.4)
GLUCOSE BLD-MCNC: 86 MG/DL (ref 70–99)
HCT VFR BLD AUTO: 32.9 %
HCT VFR BLD AUTO: 32.9 %
HGB BLD-MCNC: 9.7 G/DL
HGB BLD-MCNC: 9.7 G/DL
IMM GRANULOCYTES # BLD AUTO: 0.02 X10(3) UL (ref 0–1)
IMM GRANULOCYTES NFR BLD: 0.3 %
LYMPHOCYTES # BLD AUTO: 1.45 X10(3) UL (ref 1–4)
LYMPHOCYTES NFR BLD AUTO: 25 %
MCH RBC QN AUTO: 23.4 PG (ref 26–34)
MCH RBC QN AUTO: 23.4 PG (ref 26–34)
MCHC RBC AUTO-ENTMCNC: 29.5 G/DL (ref 31–37)
MCHC RBC AUTO-ENTMCNC: 29.5 G/DL (ref 31–37)
MCV RBC AUTO: 79.5 FL
MCV RBC AUTO: 79.5 FL
MONOCYTES # BLD AUTO: 0.28 X10(3) UL (ref 0.1–1)
MONOCYTES NFR BLD AUTO: 4.8 %
NEUTROPHILS # BLD AUTO: 3.79 X10 (3) UL (ref 1.5–7.7)
NEUTROPHILS # BLD AUTO: 3.79 X10(3) UL (ref 1.5–7.7)
NEUTROPHILS NFR BLD AUTO: 65.4 %
OSMOLALITY SERPL CALC.SUM OF ELEC: 283 MOSM/KG (ref 275–295)
PLATELET # BLD AUTO: 183 10(3)UL (ref 150–450)
PLATELET # BLD AUTO: 183 10(3)UL (ref 150–450)
POTASSIUM SERPL-SCNC: 3.8 MMOL/L (ref 3.5–5.1)
POTASSIUM SERPL-SCNC: 3.8 MMOL/L (ref 3.5–5.1)
PROT SERPL-MCNC: 7 G/DL (ref 6.4–8.2)
RBC # BLD AUTO: 4.14 X10(6)UL
RBC # BLD AUTO: 4.14 X10(6)UL
SODIUM SERPL-SCNC: 138 MMOL/L (ref 136–145)
WBC # BLD AUTO: 5.8 X10(3) UL (ref 4–11)
WBC # BLD AUTO: 5.8 X10(3) UL (ref 4–11)

## 2023-07-21 PROCEDURE — 99233 SBSQ HOSP IP/OBS HIGH 50: CPT | Performed by: SURGERY

## 2023-07-21 PROCEDURE — 99233 SBSQ HOSP IP/OBS HIGH 50: CPT | Performed by: HOSPITALIST

## 2023-07-21 PROCEDURE — 99233 SBSQ HOSP IP/OBS HIGH 50: CPT | Performed by: NURSE PRACTITIONER

## 2023-07-21 RX ORDER — OLANZAPINE 5 MG/1
5 TABLET ORAL NIGHTLY
Qty: 30 TABLET | Refills: 1 | Status: SHIPPED | OUTPATIENT
Start: 2023-07-21 | End: 2023-07-25

## 2023-07-21 RX ORDER — CLONAZEPAM 0.5 MG/1
0.5 TABLET ORAL NIGHTLY
Qty: 30 TABLET | Refills: 1 | Status: SHIPPED | OUTPATIENT
Start: 2023-07-21

## 2023-07-21 RX ORDER — ACETAMINOPHEN 325 MG/1
650 TABLET ORAL EVERY 6 HOURS PRN
Refills: 0 | Status: SHIPPED | COMMUNITY
Start: 2023-07-21

## 2023-07-21 RX ORDER — HYDROCODONE BITARTRATE AND ACETAMINOPHEN 10; 325 MG/1; MG/1
1 TABLET ORAL EVERY 4 HOURS PRN
Status: DISCONTINUED | OUTPATIENT
Start: 2023-07-21 | End: 2023-07-25

## 2023-07-21 RX ORDER — KETOROLAC TROMETHAMINE 15 MG/ML
15 INJECTION, SOLUTION INTRAMUSCULAR; INTRAVENOUS ONCE
Status: COMPLETED | OUTPATIENT
Start: 2023-07-21 | End: 2023-07-22

## 2023-07-21 RX ORDER — SERTRALINE HYDROCHLORIDE 25 MG/1
25 TABLET, FILM COATED ORAL NIGHTLY
Qty: 30 TABLET | Refills: 1 | Status: SHIPPED | OUTPATIENT
Start: 2023-07-21 | End: 2023-07-25

## 2023-07-21 RX ORDER — DIAPER,BRIEF,INFANT-TODD,DISP
EACH MISCELLANEOUS 3 TIMES DAILY
Refills: 0 | Status: SHIPPED | COMMUNITY
Start: 2023-07-21

## 2023-07-21 NOTE — PLAN OF CARE
Problem: Patient Centered Care  Goal: Patient preferences are identified and integrated in the patient's plan of care  Description: Interventions:  - What would you like us to know as we care for you? From home with   - Provide timely, complete, and accurate information to patient/family  - Incorporate patient and family knowledge, values, beliefs, and cultural backgrounds into the planning and delivery of care  - Encourage patient/family to participate in care and decision-making at the level they choose  - Honor patient and family perspectives and choices  Outcome: Progressing     Problem: PAIN - ADULT  Goal: Verbalizes/displays adequate comfort level or patient's stated pain goal  Description: INTERVENTIONS:  - Encourage pt to monitor pain and request assistance  - Assess pain using appropriate pain scale  - Administer analgesics based on type and severity of pain and evaluate response  - Implement non-pharmacological measures as appropriate and evaluate response  - Consider cultural and social influences on pain and pain management  - Manage/alleviate anxiety  - Utilize distraction and/or relaxation techniques  - Monitor for opioid side effects  - Notify MD/LIP if interventions unsuccessful or patient reports new pain  - Anticipate increased pain with activity and pre-medicate as appropriate  Outcome: Progressing     Problem: RISK FOR INFECTION - ADULT  Goal: Absence of fever/infection during anticipated neutropenic period  Description: INTERVENTIONS  - Monitor WBC  - Administer growth factors as ordered  - Implement neutropenic guidelines  Outcome: Progressing     Problem: SAFETY ADULT - FALL  Goal: Free from fall injury  Description: INTERVENTIONS:  - Assess pt frequently for physical needs  - Identify cognitive and physical deficits and behaviors that affect risk of falls.   - [place] fall precautions as indicated by assessment.  - Educate pt/family on patient safety including physical limitations  - Instruct pt to call for assistance with activity based on assessment  - Modify environment to reduce risk of injury  - Provide assistive devices as appropriate  - Consider OT/PT consult to assist with strengthening/mobility  - Encourage toileting schedule  Outcome: Progressing     Problem: GASTROINTESTINAL - ADULT  Goal: Minimal or absence of nausea and vomiting  Description: INTERVENTIONS:  - Maintain adequate hydration with IV or PO as ordered and tolerated  - Nasogastric tube to low intermittent suction as ordered  - Evaluate effectiveness of ordered antiemetic medications  - Provide nonpharmacologic comfort measures as appropriate  - Advance diet as tolerated, if ordered  - Obtain nutritional consult as needed  - Evaluate fluid balance  Outcome: Progressing  Goal: Maintains or returns to baseline bowel function  Description: INTERVENTIONS:  - Assess bowel function  - Maintain adequate hydration with IV or PO as ordered and tolerated  - Evaluate effectiveness of GI medications  - Encourage mobilization and activity  - Obtain nutritional consult as needed  - Establish a toileting routine/schedule  - Consider collaborating with pharmacy to review patient's medication profile  Outcome: Progressing     Problem: METABOLIC/FLUID AND ELECTROLYTES - ADULT  Goal: Electrolytes maintained within normal limits  Description: INTERVENTIONS:  - Monitor labs and rhythm and assess patient for signs and symptoms of electrolyte imbalances  - Administer electrolyte replacement as ordered  - Monitor response to electrolyte replacements, including rhythm and repeat lab results as appropriate  - Fluid restriction as ordered  - Instruct patient on fluid and nutrition restrictions as appropriate  Outcome: Progressing     Problem: SKIN/TISSUE INTEGRITY - ADULT  Goal: Skin integrity remains intact  Description: INTERVENTIONS  - Assess and document risk factors for pressure ulcer development  - Assess and document skin integrity  - Monitor for areas of redness and/or skin breakdown  - Initiate interventions, skin care algorithm/standards of care as needed  Outcome: Progressing     Problem: ANXIETY  Goal: Will report anxiety at manageable levels  Description: INTERVENTIONS:  - Administer medication as ordered  - Teach and rehearse alternative coping skills  - Provide emotional support with 1:1 interaction with staff  Outcome: Progressing     Problem: COPING  Goal: Pt/Family able to verbalize concerns and demonstrate effective coping strategies  Description: INTERVENTIONS:  - Assist patient/family to identify coping skills, available support systems and cultural and spiritual values  - Provide emotional support, including active listening and acknowledgement of concerns of patient and caregivers  - Reduce environmental stimuli, as able  - Instruct patient/family in relaxation techniques, as appropriate  - Assess for spiritual and psychosocial needs and initiate Spiritual Care or Behavioral Health consult as needed  Outcome: Progressing     Monitoring vital signs, stable at this moment. Pain and sleep medication provided as needed. Call light within reach, bed locked in lowest position, all fall precautions in place. All needs addressed. Frequent rounding maintained by nursing staff. Patient completing IV antibiotics per MD order, JUAN drains irrigated and remain to bulb suction per order. PICC line dressing change completed, no acute changes at this time.

## 2023-07-21 NOTE — CM/SW NOTE
Pt will continue to require IV abx at [place]. Pt was already current w/[facility] for home infusion services prior to admission. [facility] was also providing weekly in-office appointments for PICC dressing changes and labs. There will likely be a new IV abx ordered at [place] per Nayeli at [facility]. Nayeli stated that she will coordinate services with patient for delivery of medication to home prior to dc. Plan: Home /spouse with [facility] for home infusion services including in-office appointments for dressing change and lab draws pending final ID recommendation and medical clearance.     Gemma Rowell, MADIN    [phone]

## 2023-07-21 NOTE — PLAN OF CARE
Problem: PAIN - ADULT  Goal: Verbalizes/displays adequate comfort level or patient's stated pain goal  Description: INTERVENTIONS:  - Encourage pt to monitor pain and request assistance  - Assess pain using appropriate pain scale  - Administer analgesics based on type and severity of pain and evaluate response  - Implement non-pharmacological measures as appropriate and evaluate response  - Consider cultural and social influences on pain and pain management  - Manage/alleviate anxiety  - Utilize distraction and/or relaxation techniques  - Monitor for opioid side effects  - Notify MD/LIP if interventions unsuccessful or patient reports new pain  - Anticipate increased pain with activity and pre-medicate as appropriate  Outcome: Progressing     Problem: RISK FOR INFECTION - ADULT  Goal: Absence of fever/infection during anticipated neutropenic period  Description: INTERVENTIONS  - Monitor WBC  - Administer growth factors as ordered  - Implement neutropenic guidelines  Outcome: Progressing     Problem: SAFETY ADULT - FALL  Goal: Free from fall injury  Description: INTERVENTIONS:  - Assess pt frequently for physical needs  - Identify cognitive and physical deficits and behaviors that affect risk of falls.   - [place] fall precautions as indicated by assessment.  - Educate pt/family on patient safety including physical limitations  - Instruct pt to call for assistance with activity based on assessment  - Modify environment to reduce risk of injury  - Provide assistive devices as appropriate  - Consider OT/PT consult to assist with strengthening/mobility  - Encourage toileting schedule  Outcome: Progressing     Problem: GASTROINTESTINAL - ADULT  Goal: Minimal or absence of nausea and vomiting  Description: INTERVENTIONS:  - Maintain adequate hydration with IV or PO as ordered and tolerated  - Nasogastric tube to low intermittent suction as ordered  - Evaluate effectiveness of ordered antiemetic medications  - Provide nonpharmacologic comfort measures as appropriate  - Advance diet as tolerated, if ordered  - Obtain nutritional consult as needed  - Evaluate fluid balance  Outcome: Progressing  Goal: Maintains or returns to baseline bowel function  Description: INTERVENTIONS:  - Assess bowel function  - Maintain adequate hydration with IV or PO as ordered and tolerated  - Evaluate effectiveness of GI medications  - Encourage mobilization and activity  - Obtain nutritional consult as needed  - Establish a toileting routine/schedule  - Consider collaborating with pharmacy to review patient's medication profile  Outcome: Progressing     Problem: METABOLIC/FLUID AND ELECTROLYTES - ADULT  Goal: Electrolytes maintained within normal limits  Description: INTERVENTIONS:  - Monitor labs and rhythm and assess patient for signs and symptoms of electrolyte imbalances  - Administer electrolyte replacement as ordered  - Monitor response to electrolyte replacements, including rhythm and repeat lab results as appropriate  - Fluid restriction as ordered  - Instruct patient on fluid and nutrition restrictions as appropriate  Outcome: Progressing     Problem: ANXIETY  Goal: Will report anxiety at manageable levels  Description: INTERVENTIONS:  - Administer medication as ordered  - Teach and rehearse alternative coping skills  - Provide emotional support with 1:1 interaction with staff  Outcome: Progressing     Problem: COPING  Goal: Pt/Family able to verbalize concerns and demonstrate effective coping strategies  Description: INTERVENTIONS:  - Assist patient/family to identify coping skills, available support systems and cultural and spiritual values  - Provide emotional support, including active listening and acknowledgement of concerns of patient and caregivers  - Reduce environmental stimuli, as able  - Instruct patient/family in relaxation techniques, as appropriate  - Assess for spiritual and psychosocial needs and initiate Spiritual Care or Behavioral Health consult as needed  Outcome: Progressing     Vital signs stable on room air at this time. PRN medications given per [facility] for abdominal pain; heat packs applied as needed for comfort. Up to chair for lunch. Patient encouraged to ambulate on hallway as tolerated. LLQ JUAN drain removed from abdomen by IR APN today. JUAN drain in mid-lower abdomen irrigated and aspirated per orders. Tolerating low fiber/soft diet. Safety precautions in place, frequent nursing rounding completed, call light within reach. All questions answered and needs met.

## 2023-07-21 NOTE — DISCHARGE INSTRUCTIONS
JUAN drain care    Flush with 10cc  saline daily  2. Drain every 8 hours and record output. Report to Dr. Sarah Santoro at next appointment    Additionally, it is recommended that you follow up with the following resources for therapy and psychiatry:    Sherrie Guzman [address] [facility], [address] [place] [address]  [id number] [place] [address] 2080 [facility], [address] [place] [address]  [phone]  [address] [place] [address].  [address] [place] [address], [address] [place] [address]  [phone]

## 2023-07-22 ENCOUNTER — APPOINTMENT (OUTPATIENT)
Dept: CT IMAGING | Facility: HOSPITAL | Age: 33
DRG: 356 | End: 2023-07-22
Attending: HOSPITALIST
Payer: COMMERCIAL

## 2023-07-22 LAB
BASOPHILS # BLD AUTO: 0.04 X10(3) UL (ref 0–0.2)
BASOPHILS NFR BLD AUTO: 0.8 %
DEPRECATED RDW RBC AUTO: 55.2 FL (ref 35.1–46.3)
DEPRECATED RDW RBC AUTO: 55.2 FL (ref 35.1–46.3)
EOSINOPHIL # BLD AUTO: 0.23 X10(3) UL (ref 0–0.7)
EOSINOPHIL NFR BLD AUTO: 4.5 %
ERYTHROCYTE [DISTWIDTH] IN BLOOD BY AUTOMATED COUNT: 19.6 % (ref 11–15)
ERYTHROCYTE [DISTWIDTH] IN BLOOD BY AUTOMATED COUNT: 19.6 % (ref 11–15)
HCT VFR BLD AUTO: 32.2 %
HCT VFR BLD AUTO: 32.2 %
HGB BLD-MCNC: 9.5 G/DL
HGB BLD-MCNC: 9.5 G/DL
IMM GRANULOCYTES # BLD AUTO: 0.04 X10(3) UL (ref 0–1)
IMM GRANULOCYTES NFR BLD: 0.8 %
LYMPHOCYTES # BLD AUTO: 1.79 X10(3) UL (ref 1–4)
LYMPHOCYTES NFR BLD AUTO: 34.8 %
MCH RBC QN AUTO: 23.2 PG (ref 26–34)
MCH RBC QN AUTO: 23.2 PG (ref 26–34)
MCHC RBC AUTO-ENTMCNC: 29.5 G/DL (ref 31–37)
MCHC RBC AUTO-ENTMCNC: 29.5 G/DL (ref 31–37)
MCV RBC AUTO: 78.7 FL
MCV RBC AUTO: 78.7 FL
MONOCYTES # BLD AUTO: 0.31 X10(3) UL (ref 0.1–1)
MONOCYTES NFR BLD AUTO: 6 %
NEUTROPHILS # BLD AUTO: 2.74 X10 (3) UL (ref 1.5–7.7)
NEUTROPHILS # BLD AUTO: 2.74 X10(3) UL (ref 1.5–7.7)
NEUTROPHILS NFR BLD AUTO: 53.1 %
PLATELET # BLD AUTO: 200 10(3)UL (ref 150–450)
PLATELET # BLD AUTO: 200 10(3)UL (ref 150–450)
RBC # BLD AUTO: 4.09 X10(6)UL
RBC # BLD AUTO: 4.09 X10(6)UL
WBC # BLD AUTO: 5.2 X10(3) UL (ref 4–11)
WBC # BLD AUTO: 5.2 X10(3) UL (ref 4–11)

## 2023-07-22 PROCEDURE — 74177 CT ABD & PELVIS W/CONTRAST: CPT | Performed by: HOSPITALIST

## 2023-07-22 PROCEDURE — 99233 SBSQ HOSP IP/OBS HIGH 50: CPT | Performed by: HOSPITALIST

## 2023-07-22 RX ORDER — MORPHINE SULFATE 2 MG/ML
2 INJECTION, SOLUTION INTRAMUSCULAR; INTRAVENOUS EVERY 2 HOUR PRN
Status: DISCONTINUED | OUTPATIENT
Start: 2023-07-22 | End: 2023-07-25

## 2023-07-22 RX ORDER — MORPHINE SULFATE 4 MG/ML
4 INJECTION, SOLUTION INTRAMUSCULAR; INTRAVENOUS EVERY 2 HOUR PRN
Status: DISCONTINUED | OUTPATIENT
Start: 2023-07-22 | End: 2023-07-25

## 2023-07-22 RX ORDER — TRAMADOL HYDROCHLORIDE 50 MG/1
100 TABLET ORAL EVERY 6 HOURS PRN
Status: DISCONTINUED | OUTPATIENT
Start: 2023-07-22 | End: 2023-07-23

## 2023-07-22 RX ORDER — MORPHINE SULFATE 2 MG/ML
1 INJECTION, SOLUTION INTRAMUSCULAR; INTRAVENOUS EVERY 2 HOUR PRN
Status: DISCONTINUED | OUTPATIENT
Start: 2023-07-22 | End: 2023-07-25

## 2023-07-22 RX ORDER — TRAMADOL HYDROCHLORIDE 50 MG/1
50 TABLET ORAL EVERY 6 HOURS PRN
Status: DISCONTINUED | OUTPATIENT
Start: 2023-07-22 | End: 2023-07-22

## 2023-07-22 NOTE — PLAN OF CARE
Frequent rounding maintained. Patient educated on all medications administered. Bed in lowest position, bed alarm and i bed awareness activated, fall precautions in place and call light within reach at all times. All patients questions answered and needs addressed promptly. Blood culture draw endorsed to day shift RN during report. Problem: Patient Centered Care  Goal: Patient preferences are identified and integrated in the patient's plan of care  Description: Interventions:  - What would you like us to know as we care for you?  From home with   - Provide timely, complete, and accurate information to patient/family  - Incorporate patient and family knowledge, values, beliefs, and cultural backgrounds into the planning and delivery of care  - Encourage patient/family to participate in care and decision-making at the level they choose  - Honor patient and family perspectives and choices  Outcome: Progressing     Problem: PAIN - ADULT  Goal: Verbalizes/displays adequate comfort level or patient's stated pain goal  Description: INTERVENTIONS:  - Encourage pt to monitor pain and request assistance  - Assess pain using appropriate pain scale  - Administer analgesics based on type and severity of pain and evaluate response  - Implement non-pharmacological measures as appropriate and evaluate response  - Consider cultural and social influences on pain and pain management  - Manage/alleviate anxiety  - Utilize distraction and/or relaxation techniques  - Monitor for opioid side effects  - Notify MD/LIP if interventions unsuccessful or patient reports new pain  - Anticipate increased pain with activity and pre-medicate as appropriate  Outcome: Progressing     Problem: RISK FOR INFECTION - ADULT  Goal: Absence of fever/infection during anticipated neutropenic period  Description: INTERVENTIONS  - Monitor WBC  - Administer growth factors as ordered  - Implement neutropenic guidelines  Outcome: Progressing Problem: SAFETY ADULT - FALL  Goal: Free from fall injury  Description: INTERVENTIONS:  - Assess pt frequently for physical needs  - Identify cognitive and physical deficits and behaviors that affect risk of falls.   - [place] fall precautions as indicated by assessment.  - Educate pt/family on patient safety including physical limitations  - Instruct pt to call for assistance with activity based on assessment  - Modify environment to reduce risk of injury  - Provide assistive devices as appropriate  - Consider OT/PT consult to assist with strengthening/mobility  - Encourage toileting schedule  Outcome: Progressing     Problem: GASTROINTESTINAL - ADULT  Goal: Minimal or absence of nausea and vomiting  Description: INTERVENTIONS:  - Maintain adequate hydration with IV or PO as ordered and tolerated  - Nasogastric tube to low intermittent suction as ordered  - Evaluate effectiveness of ordered antiemetic medications  - Provide nonpharmacologic comfort measures as appropriate  - Advance diet as tolerated, if ordered  - Obtain nutritional consult as needed  - Evaluate fluid balance  Outcome: Progressing  Goal: Maintains or returns to baseline bowel function  Description: INTERVENTIONS:  - Assess bowel function  - Maintain adequate hydration with IV or PO as ordered and tolerated  - Evaluate effectiveness of GI medications  - Encourage mobilization and activity  - Obtain nutritional consult as needed  - Establish a toileting routine/schedule  - Consider collaborating with pharmacy to review patient's medication profile  Outcome: Progressing     Problem: METABOLIC/FLUID AND ELECTROLYTES - ADULT  Goal: Electrolytes maintained within normal limits  Description: INTERVENTIONS:  - Monitor labs and rhythm and assess patient for signs and symptoms of electrolyte imbalances  - Administer electrolyte replacement as ordered  - Monitor response to electrolyte replacements, including rhythm and repeat lab results as appropriate  - Fluid restriction as ordered  - Instruct patient on fluid and nutrition restrictions as appropriate  Outcome: Progressing     Problem: SKIN/TISSUE INTEGRITY - ADULT  Goal: Skin integrity remains intact  Description: INTERVENTIONS  - Assess and document risk factors for pressure ulcer development  - Assess and document skin integrity  - Monitor for areas of redness and/or skin breakdown  - Initiate interventions, skin care algorithm/standards of care as needed  Outcome: Progressing     Problem: ANXIETY  Goal: Will report anxiety at manageable levels  Description: INTERVENTIONS:  - Administer medication as ordered  - Teach and rehearse alternative coping skills  - Provide emotional support with 1:1 interaction with staff  Outcome: Progressing     Problem: COPING  Goal: Pt/Family able to verbalize concerns and demonstrate effective coping strategies  Description: INTERVENTIONS:  - Assist patient/family to identify coping skills, available support systems and cultural and spiritual values  - Provide emotional support, including active listening and acknowledgement of concerns of patient and caregivers  - Reduce environmental stimuli, as able  - Instruct patient/family in relaxation techniques, as appropriate  - Assess for spiritual and psychosocial needs and initiate Spiritual Care or Behavioral Health consult as needed  Outcome: Progressing

## 2023-07-22 NOTE — PLAN OF CARE
Problem: Patient Centered Care  Goal: Patient preferences are identified and integrated in the patient's plan of care  Description: Interventions:  - What would you like us to know as we care for you? From home with   - Provide timely, complete, and accurate information to patient/family  - Incorporate patient and family knowledge, values, beliefs, and cultural backgrounds into the planning and delivery of care  - Encourage patient/family to participate in care and decision-making at the level they choose  - Honor patient and family perspectives and choices  Outcome: Progressing     Problem: PAIN - ADULT  Goal: Verbalizes/displays adequate comfort level or patient's stated pain goal  Description: INTERVENTIONS:  - Encourage pt to monitor pain and request assistance  - Assess pain using appropriate pain scale  - Administer analgesics based on type and severity of pain and evaluate response  - Implement non-pharmacological measures as appropriate and evaluate response  - Consider cultural and social influences on pain and pain management  - Manage/alleviate anxiety  - Utilize distraction and/or relaxation techniques  - Monitor for opioid side effects  - Notify MD/LIP if interventions unsuccessful or patient reports new pain  - Anticipate increased pain with activity and pre-medicate as appropriate  Outcome: Progressing     Problem: RISK FOR INFECTION - ADULT  Goal: Absence of fever/infection during anticipated neutropenic period  Description: INTERVENTIONS  - Monitor WBC  - Administer growth factors as ordered  - Implement neutropenic guidelines  Outcome: Progressing     Problem: SAFETY ADULT - FALL  Goal: Free from fall injury  Description: INTERVENTIONS:  - Assess pt frequently for physical needs  - Identify cognitive and physical deficits and behaviors that affect risk of falls.   - [place] fall precautions as indicated by assessment.  - Educate pt/family on patient safety including physical limitations  - Instruct pt to call for assistance with activity based on assessment  - Modify environment to reduce risk of injury  - Provide assistive devices as appropriate  - Consider OT/PT consult to assist with strengthening/mobility  - Encourage toileting schedule  Outcome: Progressing     Problem: GASTROINTESTINAL - ADULT  Goal: Minimal or absence of nausea and vomiting  Description: INTERVENTIONS:  - Maintain adequate hydration with IV or PO as ordered and tolerated  - Nasogastric tube to low intermittent suction as ordered  - Evaluate effectiveness of ordered antiemetic medications  - Provide nonpharmacologic comfort measures as appropriate  - Advance diet as tolerated, if ordered  - Obtain nutritional consult as needed  - Evaluate fluid balance  Outcome: Progressing  Goal: Maintains or returns to baseline bowel function  Description: INTERVENTIONS:  - Assess bowel function  - Maintain adequate hydration with IV or PO as ordered and tolerated  - Evaluate effectiveness of GI medications  - Encourage mobilization and activity  - Obtain nutritional consult as needed  - Establish a toileting routine/schedule  - Consider collaborating with pharmacy to review patient's medication profile  Outcome: Progressing     Problem: METABOLIC/FLUID AND ELECTROLYTES - ADULT  Goal: Electrolytes maintained within normal limits  Description: INTERVENTIONS:  - Monitor labs and rhythm and assess patient for signs and symptoms of electrolyte imbalances  - Administer electrolyte replacement as ordered  - Monitor response to electrolyte replacements, including rhythm and repeat lab results as appropriate  - Fluid restriction as ordered  - Instruct patient on fluid and nutrition restrictions as appropriate  Outcome: Progressing     Problem: SKIN/TISSUE INTEGRITY - ADULT  Goal: Skin integrity remains intact  Description: INTERVENTIONS  - Assess and document risk factors for pressure ulcer development  - Assess and document skin integrity  - Monitor for areas of redness and/or skin breakdown  - Initiate interventions, skin care algorithm/standards of care as needed  Outcome: Progressing     Problem: ANXIETY  Goal: Will report anxiety at manageable levels  Description: INTERVENTIONS:  - Administer medication as ordered  - Teach and rehearse alternative coping skills  - Provide emotional support with 1:1 interaction with staff  Outcome: Progressing     Problem: COPING  Goal: Pt/Family able to verbalize concerns and demonstrate effective coping strategies  Description: INTERVENTIONS:  - Assist patient/family to identify coping skills, available support systems and cultural and spiritual values  - Provide emotional support, including active listening and acknowledgement of concerns of patient and caregivers  - Reduce environmental stimuli, as able  - Instruct patient/family in relaxation techniques, as appropriate  - Assess for spiritual and psychosocial needs and initiate Spiritual Care or Behavioral Health consult as needed  Outcome: Progressing     Patient is presently resting in the bed. Alert x 4. Vital signs taken and stable. Patient is medicated as needed for pain or discomfort. Tolerates low fiber soft diet well. Self care and independent. Patient receives intravenous antibiotics per order. Mohan weller bulb x 1 present and draining. Call light within reach at all times.

## 2023-07-23 LAB
ALBUMIN SERPL-MCNC: 2.8 G/DL (ref 3.4–5)
ALBUMIN/GLOB SERPL: 0.6 {RATIO} (ref 1–2)
ALP LIVER SERPL-CCNC: 59 U/L
ALT SERPL-CCNC: 9 U/L
ANION GAP SERPL CALC-SCNC: 7 MMOL/L (ref 0–18)
AST SERPL-CCNC: 16 U/L (ref 15–37)
BASOPHILS # BLD AUTO: 0.03 X10(3) UL (ref 0–0.2)
BASOPHILS NFR BLD AUTO: 0.6 %
BILIRUB SERPL-MCNC: 0.8 MG/DL (ref 0.1–2)
BUN BLD-MCNC: 5 MG/DL (ref 7–18)
BUN/CREAT SERPL: 7.1 (ref 10–20)
CALCIUM BLD-MCNC: 9.3 MG/DL (ref 8.5–10.1)
CHLORIDE SERPL-SCNC: 106 MMOL/L (ref 98–112)
CO2 SERPL-SCNC: 27 MMOL/L (ref 21–32)
CREAT BLD-MCNC: 0.7 MG/DL
DEPRECATED RDW RBC AUTO: 57 FL (ref 35.1–46.3)
EGFRCR SERPLBLD CKD-EPI 2021: 117 ML/MIN/1.73M2 (ref 60–?)
EOSINOPHIL # BLD AUTO: 0.25 X10(3) UL (ref 0–0.7)
EOSINOPHIL NFR BLD AUTO: 5 %
ERYTHROCYTE [DISTWIDTH] IN BLOOD BY AUTOMATED COUNT: 20 % (ref 11–15)
GLOBULIN PLAS-MCNC: 4.5 G/DL (ref 2.8–4.4)
GLUCOSE BLD-MCNC: 90 MG/DL (ref 70–99)
HCT VFR BLD AUTO: 32.7 %
HGB BLD-MCNC: 9.7 G/DL
IMM GRANULOCYTES # BLD AUTO: 0.02 X10(3) UL (ref 0–1)
IMM GRANULOCYTES NFR BLD: 0.4 %
LYMPHOCYTES # BLD AUTO: 1.47 X10(3) UL (ref 1–4)
LYMPHOCYTES NFR BLD AUTO: 29.1 %
MAGNESIUM SERPL-MCNC: 2.1 MG/DL (ref 1.6–2.6)
MCH RBC QN AUTO: 23.9 PG (ref 26–34)
MCHC RBC AUTO-ENTMCNC: 29.7 G/DL (ref 31–37)
MCV RBC AUTO: 80.5 FL
MONOCYTES # BLD AUTO: 0.39 X10(3) UL (ref 0.1–1)
MONOCYTES NFR BLD AUTO: 7.7 %
NEUTROPHILS # BLD AUTO: 2.89 X10 (3) UL (ref 1.5–7.7)
NEUTROPHILS # BLD AUTO: 2.89 X10(3) UL (ref 1.5–7.7)
NEUTROPHILS NFR BLD AUTO: 57.2 %
OSMOLALITY SERPL CALC.SUM OF ELEC: 287 MOSM/KG (ref 275–295)
PLATELET # BLD AUTO: 173 10(3)UL (ref 150–450)
POTASSIUM SERPL-SCNC: 3.8 MMOL/L (ref 3.5–5.1)
PROT SERPL-MCNC: 7.3 G/DL (ref 6.4–8.2)
RBC # BLD AUTO: 4.06 X10(6)UL
SODIUM SERPL-SCNC: 140 MMOL/L (ref 136–145)
WBC # BLD AUTO: 5.1 X10(3) UL (ref 4–11)

## 2023-07-23 PROCEDURE — 99233 SBSQ HOSP IP/OBS HIGH 50: CPT | Performed by: OTHER

## 2023-07-23 PROCEDURE — 99233 SBSQ HOSP IP/OBS HIGH 50: CPT | Performed by: HOSPITALIST

## 2023-07-23 RX ORDER — SERTRALINE HYDROCHLORIDE 100 MG/1
100 TABLET, FILM COATED ORAL NIGHTLY
Status: DISCONTINUED | OUTPATIENT
Start: 2023-07-24 | End: 2023-07-25

## 2023-07-23 RX ORDER — QUETIAPINE FUMARATE 25 MG/1
50 TABLET, FILM COATED ORAL NIGHTLY
Status: DISCONTINUED | OUTPATIENT
Start: 2023-07-23 | End: 2023-07-25

## 2023-07-23 RX ORDER — MORPHINE SULFATE 15 MG/1
15 TABLET, FILM COATED, EXTENDED RELEASE ORAL EVERY 12 HOURS PRN
Status: DISCONTINUED | OUTPATIENT
Start: 2023-07-23 | End: 2023-07-25

## 2023-07-23 NOTE — PLAN OF CARE
Problem: Patient Centered Care  Goal: Patient preferences are identified and integrated in the patient's plan of care  Description: Interventions:  - What would you like us to know as we care for you? From home with   - Provide timely, complete, and accurate information to patient/family  - Incorporate patient and family knowledge, values, beliefs, and cultural backgrounds into the planning and delivery of care  - Encourage patient/family to participate in care and decision-making at the level they choose  - Honor patient and family perspectives and choices  Outcome: Progressing     Problem: PAIN - ADULT  Goal: Verbalizes/displays adequate comfort level or patient's stated pain goal  Description: INTERVENTIONS:  - Encourage pt to monitor pain and request assistance  - Assess pain using appropriate pain scale  - Administer analgesics based on type and severity of pain and evaluate response  - Implement non-pharmacological measures as appropriate and evaluate response  - Consider cultural and social influences on pain and pain management  - Manage/alleviate anxiety  - Utilize distraction and/or relaxation techniques  - Monitor for opioid side effects  - Notify MD/LIP if interventions unsuccessful or patient reports new pain  - Anticipate increased pain with activity and pre-medicate as appropriate  Outcome: Progressing     Problem: RISK FOR INFECTION - ADULT  Goal: Absence of fever/infection during anticipated neutropenic period  Description: INTERVENTIONS  - Monitor WBC  - Administer growth factors as ordered  - Implement neutropenic guidelines  Outcome: Progressing     Problem: SAFETY ADULT - FALL  Goal: Free from fall injury  Description: INTERVENTIONS:  - Assess pt frequently for physical needs  - Identify cognitive and physical deficits and behaviors that affect risk of falls.   - [place] fall precautions as indicated by assessment.  - Educate pt/family on patient safety including physical limitations  - Instruct pt to call for assistance with activity based on assessment  - Modify environment to reduce risk of injury  - Provide assistive devices as appropriate  - Consider OT/PT consult to assist with strengthening/mobility  - Encourage toileting schedule  Outcome: Progressing     Problem: GASTROINTESTINAL - ADULT  Goal: Minimal or absence of nausea and vomiting  Description: INTERVENTIONS:  - Maintain adequate hydration with IV or PO as ordered and tolerated  - Nasogastric tube to low intermittent suction as ordered  - Evaluate effectiveness of ordered antiemetic medications  - Provide nonpharmacologic comfort measures as appropriate  - Advance diet as tolerated, if ordered  - Obtain nutritional consult as needed  - Evaluate fluid balance  Outcome: Progressing  Goal: Maintains or returns to baseline bowel function  Description: INTERVENTIONS:  - Assess bowel function  - Maintain adequate hydration with IV or PO as ordered and tolerated  - Evaluate effectiveness of GI medications  - Encourage mobilization and activity  - Obtain nutritional consult as needed  - Establish a toileting routine/schedule  - Consider collaborating with pharmacy to review patient's medication profile  Outcome: Progressing     Problem: METABOLIC/FLUID AND ELECTROLYTES - ADULT  Goal: Electrolytes maintained within normal limits  Description: INTERVENTIONS:  - Monitor labs and rhythm and assess patient for signs and symptoms of electrolyte imbalances  - Administer electrolyte replacement as ordered  - Monitor response to electrolyte replacements, including rhythm and repeat lab results as appropriate  - Fluid restriction as ordered  - Instruct patient on fluid and nutrition restrictions as appropriate  Outcome: Progressing     Problem: SKIN/TISSUE INTEGRITY - ADULT  Goal: Skin integrity remains intact  Description: INTERVENTIONS  - Assess and document risk factors for pressure ulcer development  - Assess and document skin integrity  - Monitor for areas of redness and/or skin breakdown  - Initiate interventions, skin care algorithm/standards of care as needed  Outcome: Progressing     Problem: ANXIETY  Goal: Will report anxiety at manageable levels  Description: INTERVENTIONS:  - Administer medication as ordered  - Teach and rehearse alternative coping skills  - Provide emotional support with 1:1 interaction with staff  Outcome: Progressing     Problem: COPING  Goal: Pt/Family able to verbalize concerns and demonstrate effective coping strategies  Description: INTERVENTIONS:  - Assist patient/family to identify coping skills, available support systems and cultural and spiritual values  - Provide emotional support, including active listening and acknowledgement of concerns of patient and caregivers  - Reduce environmental stimuli, as able  - Instruct patient/family in relaxation techniques, as appropriate  - Assess for spiritual and psychosocial needs and initiate Spiritual Care or Behavioral Health consult as needed  Outcome: Progressing     Problem: HEMATOLOGIC - ADULT  Goal: Free from bleeding injury  Description: (Example usage: patient with low platelets)  INTERVENTIONS:  - Avoid intramuscular injections, enemas and rectal medication administration  - Ensure safe mobilization of patient  - Hold pressure on venipuncture sites to achieve adequate hemostasis  - Assess for signs and symptoms of internal bleeding  - Monitor lab trends  - Patient is to report abnormal signs of bleeding to staff  - Avoid use of toothpicks and dental floss  - Use electric shaver for shaving  - Use soft bristle tooth brush  - Limit straining and forceful nose blowing  Outcome: Progressing     Patient is presently resting in the bed. Alert x 4. Vital signs taken and stable. Patient is medicated as needed for pain or discomfort. Self care and independent. Patient receives intravenous antibiotics per order.  Patient has picc line present to left arm and patent. Tolerates diet well. Patient was seen by Dr. Sarah Santoro on this morning. Labs in am. Call light within reach at all times. Mohan weller bulb x 1 present.

## 2023-07-23 NOTE — PLAN OF CARE
Frequent rounding maintained. Patient educated on all medications administered. Bed in lowest position, bed alarm and i bed awareness activated, fall precautions in place and call light within reach at all times. All patients questions answered and needs addressed promptly. Problem: Patient Centered Care  Goal: Patient preferences are identified and integrated in the patient's plan of care  Description: Interventions:  - What would you like us to know as we care for you?  From home with   - Provide timely, complete, and accurate information to patient/family  - Incorporate patient and family knowledge, values, beliefs, and cultural backgrounds into the planning and delivery of care  - Encourage patient/family to participate in care and decision-making at the level they choose  - Honor patient and family perspectives and choices  Outcome: Progressing     Problem: PAIN - ADULT  Goal: Verbalizes/displays adequate comfort level or patient's stated pain goal  Description: INTERVENTIONS:  - Encourage pt to monitor pain and request assistance  - Assess pain using appropriate pain scale  - Administer analgesics based on type and severity of pain and evaluate response  - Implement non-pharmacological measures as appropriate and evaluate response  - Consider cultural and social influences on pain and pain management  - Manage/alleviate anxiety  - Utilize distraction and/or relaxation techniques  - Monitor for opioid side effects  - Notify MD/LIP if interventions unsuccessful or patient reports new pain  - Anticipate increased pain with activity and pre-medicate as appropriate  Outcome: Progressing     Problem: RISK FOR INFECTION - ADULT  Goal: Absence of fever/infection during anticipated neutropenic period  Description: INTERVENTIONS  - Monitor WBC  - Administer growth factors as ordered  - Implement neutropenic guidelines  Outcome: Progressing     Problem: SAFETY ADULT - FALL  Goal: Free from fall injury  Description: INTERVENTIONS:  - Assess pt frequently for physical needs  - Identify cognitive and physical deficits and behaviors that affect risk of falls.   - [place] fall precautions as indicated by assessment.  - Educate pt/family on patient safety including physical limitations  - Instruct pt to call for assistance with activity based on assessment  - Modify environment to reduce risk of injury  - Provide assistive devices as appropriate  - Consider OT/PT consult to assist with strengthening/mobility  - Encourage toileting schedule  Outcome: Progressing     Problem: GASTROINTESTINAL - ADULT  Goal: Minimal or absence of nausea and vomiting  Description: INTERVENTIONS:  - Maintain adequate hydration with IV or PO as ordered and tolerated  - Nasogastric tube to low intermittent suction as ordered  - Evaluate effectiveness of ordered antiemetic medications  - Provide nonpharmacologic comfort measures as appropriate  - Advance diet as tolerated, if ordered  - Obtain nutritional consult as needed  - Evaluate fluid balance  Outcome: Progressing  Goal: Maintains or returns to baseline bowel function  Description: INTERVENTIONS:  - Assess bowel function  - Maintain adequate hydration with IV or PO as ordered and tolerated  - Evaluate effectiveness of GI medications  - Encourage mobilization and activity  - Obtain nutritional consult as needed  - Establish a toileting routine/schedule  - Consider collaborating with pharmacy to review patient's medication profile  Outcome: Progressing     Problem: METABOLIC/FLUID AND ELECTROLYTES - ADULT  Goal: Electrolytes maintained within normal limits  Description: INTERVENTIONS:  - Monitor labs and rhythm and assess patient for signs and symptoms of electrolyte imbalances  - Administer electrolyte replacement as ordered  - Monitor response to electrolyte replacements, including rhythm and repeat lab results as appropriate  - Fluid restriction as ordered  - Instruct patient on fluid and nutrition restrictions as appropriate  Outcome: Progressing     Problem: SKIN/TISSUE INTEGRITY - ADULT  Goal: Skin integrity remains intact  Description: INTERVENTIONS  - Assess and document risk factors for pressure ulcer development  - Assess and document skin integrity  - Monitor for areas of redness and/or skin breakdown  - Initiate interventions, skin care algorithm/standards of care as needed  Outcome: Progressing     Problem: ANXIETY  Goal: Will report anxiety at manageable levels  Description: INTERVENTIONS:  - Administer medication as ordered  - Teach and rehearse alternative coping skills  - Provide emotional support with 1:1 interaction with staff  Outcome: Progressing     Problem: COPING  Goal: Pt/Family able to verbalize concerns and demonstrate effective coping strategies  Description: INTERVENTIONS:  - Assist patient/family to identify coping skills, available support systems and cultural and spiritual values  - Provide emotional support, including active listening and acknowledgement of concerns of patient and caregivers  - Reduce environmental stimuli, as able  - Instruct patient/family in relaxation techniques, as appropriate  - Assess for spiritual and psychosocial needs and initiate Spiritual Care or Behavioral Health consult as needed  Outcome: Progressing

## 2023-07-24 ENCOUNTER — APPOINTMENT (OUTPATIENT)
Dept: INTERVENTIONAL RADIOLOGY/VASCULAR | Facility: HOSPITAL | Age: 33
DRG: 356 | End: 2023-07-24
Attending: CLINICAL NURSE SPECIALIST
Payer: COMMERCIAL

## 2023-07-24 LAB
ALBUMIN SERPL-MCNC: 2.7 G/DL (ref 3.4–5)
ALBUMIN/GLOB SERPL: 0.6 {RATIO} (ref 1–2)
ALP LIVER SERPL-CCNC: 62 U/L
ALT SERPL-CCNC: 9 U/L
ANION GAP SERPL CALC-SCNC: 6 MMOL/L (ref 0–18)
AST SERPL-CCNC: 18 U/L (ref 15–37)
BASOPHILS # BLD AUTO: 0.03 X10(3) UL (ref 0–0.2)
BASOPHILS NFR BLD AUTO: 0.6 %
BILIRUB SERPL-MCNC: 0.6 MG/DL (ref 0.1–2)
BUN BLD-MCNC: 4 MG/DL (ref 7–18)
BUN/CREAT SERPL: 5.4 (ref 10–20)
CALCIUM BLD-MCNC: 8.9 MG/DL (ref 8.5–10.1)
CHLORIDE SERPL-SCNC: 107 MMOL/L (ref 98–112)
CO2 SERPL-SCNC: 27 MMOL/L (ref 21–32)
CREAT BLD-MCNC: 0.74 MG/DL
DEPRECATED RDW RBC AUTO: 59.9 FL (ref 35.1–46.3)
EGFRCR SERPLBLD CKD-EPI 2021: 109 ML/MIN/1.73M2 (ref 60–?)
EOSINOPHIL # BLD AUTO: 0.31 X10(3) UL (ref 0–0.7)
EOSINOPHIL NFR BLD AUTO: 6.1 %
ERYTHROCYTE [DISTWIDTH] IN BLOOD BY AUTOMATED COUNT: 20.2 % (ref 11–15)
GLOBULIN PLAS-MCNC: 4.4 G/DL (ref 2.8–4.4)
GLUCOSE BLD-MCNC: 101 MG/DL (ref 70–99)
HCT VFR BLD AUTO: 32.9 %
HGB BLD-MCNC: 9.5 G/DL
IMM GRANULOCYTES # BLD AUTO: 0.03 X10(3) UL (ref 0–1)
IMM GRANULOCYTES NFR BLD: 0.6 %
LYMPHOCYTES # BLD AUTO: 1.56 X10(3) UL (ref 1–4)
LYMPHOCYTES NFR BLD AUTO: 30.6 %
MAGNESIUM SERPL-MCNC: 2.1 MG/DL (ref 1.6–2.6)
MCH RBC QN AUTO: 24.1 PG (ref 26–34)
MCHC RBC AUTO-ENTMCNC: 28.9 G/DL (ref 31–37)
MCV RBC AUTO: 83.5 FL
MONOCYTES # BLD AUTO: 0.34 X10(3) UL (ref 0.1–1)
MONOCYTES NFR BLD AUTO: 6.7 %
NEUTROPHILS # BLD AUTO: 2.82 X10 (3) UL (ref 1.5–7.7)
NEUTROPHILS # BLD AUTO: 2.82 X10(3) UL (ref 1.5–7.7)
NEUTROPHILS NFR BLD AUTO: 55.4 %
OSMOLALITY SERPL CALC.SUM OF ELEC: 287 MOSM/KG (ref 275–295)
PLATELET # BLD AUTO: 193 10(3)UL (ref 150–450)
POTASSIUM SERPL-SCNC: 4 MMOL/L (ref 3.5–5.1)
PROT SERPL-MCNC: 7.1 G/DL (ref 6.4–8.2)
RBC # BLD AUTO: 3.94 X10(6)UL
SODIUM SERPL-SCNC: 140 MMOL/L (ref 136–145)
WBC # BLD AUTO: 5.1 X10(3) UL (ref 4–11)

## 2023-07-24 PROCEDURE — BW111ZZ FLUOROSCOPY OF ABDOMEN AND PELVIS USING LOW OSMOLAR CONTRAST: ICD-10-PCS | Performed by: RADIOLOGY

## 2023-07-24 PROCEDURE — 99233 SBSQ HOSP IP/OBS HIGH 50: CPT | Performed by: HOSPITALIST

## 2023-07-24 PROCEDURE — 99233 SBSQ HOSP IP/OBS HIGH 50: CPT | Performed by: NURSE PRACTITIONER

## 2023-07-24 RX ORDER — LORAZEPAM 1 MG/1
1 TABLET ORAL EVERY 6 HOURS PRN
Status: DISCONTINUED | OUTPATIENT
Start: 2023-07-24 | End: 2023-07-25

## 2023-07-24 NOTE — PLAN OF CARE
Pt a&o x4, room air, no tele, self care in room. Patient went down for abscess tube check this afternoon. Plan is monitor fevers and irrigation of JUAN drain output with irrigation and aspiration every 8 hours. Frequent nurse rounding down post IR. Safety precautions maintained. Call light in reach. Problem: Patient Centered Care  Goal: Patient preferences are identified and integrated in the patient's plan of care  Description: Interventions:  - What would you like us to know as we care for you?  From home with   - Provide timely, complete, and accurate information to patient/family  - Incorporate patient and family knowledge, values, beliefs, and cultural backgrounds into the planning and delivery of care  - Encourage patient/family to participate in care and decision-making at the level they choose  - Honor patient and family perspectives and choices  Outcome: Progressing     Problem: PAIN - ADULT  Goal: Verbalizes/displays adequate comfort level or patient's stated pain goal  Description: INTERVENTIONS:  - Encourage pt to monitor pain and request assistance  - Assess pain using appropriate pain scale  - Administer analgesics based on type and severity of pain and evaluate response  - Implement non-pharmacological measures as appropriate and evaluate response  - Consider cultural and social influences on pain and pain management  - Manage/alleviate anxiety  - Utilize distraction and/or relaxation techniques  - Monitor for opioid side effects  - Notify MD/LIP if interventions unsuccessful or patient reports new pain  - Anticipate increased pain with activity and pre-medicate as appropriate  Outcome: Progressing     Problem: RISK FOR INFECTION - ADULT  Goal: Absence of fever/infection during anticipated neutropenic period  Description: INTERVENTIONS  - Monitor WBC  - Administer growth factors as ordered  - Implement neutropenic guidelines  Outcome: Progressing     Problem: SAFETY ADULT - FALL  Goal: Free from fall injury  Description: INTERVENTIONS:  - Assess pt frequently for physical needs  - Identify cognitive and physical deficits and behaviors that affect risk of falls.   - [place] fall precautions as indicated by assessment.  - Educate pt/family on patient safety including physical limitations  - Instruct pt to call for assistance with activity based on assessment  - Modify environment to reduce risk of injury  - Provide assistive devices as appropriate  - Consider OT/PT consult to assist with strengthening/mobility  - Encourage toileting schedule  Outcome: Progressing     Problem: GASTROINTESTINAL - ADULT  Goal: Minimal or absence of nausea and vomiting  Description: INTERVENTIONS:  - Maintain adequate hydration with IV or PO as ordered and tolerated  - Nasogastric tube to low intermittent suction as ordered  - Evaluate effectiveness of ordered antiemetic medications  - Provide nonpharmacologic comfort measures as appropriate  - Advance diet as tolerated, if ordered  - Obtain nutritional consult as needed  - Evaluate fluid balance  Outcome: Progressing  Goal: Maintains or returns to baseline bowel function  Description: INTERVENTIONS:  - Assess bowel function  - Maintain adequate hydration with IV or PO as ordered and tolerated  - Evaluate effectiveness of GI medications  - Encourage mobilization and activity  - Obtain nutritional consult as needed  - Establish a toileting routine/schedule  - Consider collaborating with pharmacy to review patient's medication profile  Outcome: Progressing     Problem: METABOLIC/FLUID AND ELECTROLYTES - ADULT  Goal: Electrolytes maintained within normal limits  Description: INTERVENTIONS:  - Monitor labs and rhythm and assess patient for signs and symptoms of electrolyte imbalances  - Administer electrolyte replacement as ordered  - Monitor response to electrolyte replacements, including rhythm and repeat lab results as appropriate  - Fluid restriction as ordered  - Instruct patient on fluid and nutrition restrictions as appropriate  Outcome: Progressing     Problem: SKIN/TISSUE INTEGRITY - ADULT  Goal: Skin integrity remains intact  Description: INTERVENTIONS  - Assess and document risk factors for pressure ulcer development  - Assess and document skin integrity  - Monitor for areas of redness and/or skin breakdown  - Initiate interventions, skin care algorithm/standards of care as needed  Outcome: Progressing     Problem: ANXIETY  Goal: Will report anxiety at manageable levels  Description: INTERVENTIONS:  - Administer medication as ordered  - Teach and rehearse alternative coping skills  - Provide emotional support with 1:1 interaction with staff  Outcome: Progressing     Problem: COPING  Goal: Pt/Family able to verbalize concerns and demonstrate effective coping strategies  Description: INTERVENTIONS:  - Assist patient/family to identify coping skills, available support systems and cultural and spiritual values  - Provide emotional support, including active listening and acknowledgement of concerns of patient and caregivers  - Reduce environmental stimuli, as able  - Instruct patient/family in relaxation techniques, as appropriate  - Assess for spiritual and psychosocial needs and initiate Spiritual Care or Behavioral Health consult as needed  Outcome: Progressing     Problem: HEMATOLOGIC - ADULT  Goal: Free from bleeding injury  Description: (Example usage: patient with low platelets)  INTERVENTIONS:  - Avoid intramuscular injections, enemas and rectal medication administration  - Ensure safe mobilization of patient  - Hold pressure on venipuncture sites to achieve adequate hemostasis  - Assess for signs and symptoms of internal bleeding  - Monitor lab trends  - Patient is to report abnormal signs of bleeding to staff  - Avoid use of toothpicks and dental floss  - Use electric shaver for shaving  - Use soft bristle tooth brush  - Limit straining and forceful nose blowing  Outcome: Progressing

## 2023-07-24 NOTE — BRIEF PROCEDURE NOTE
[facility] HOSP [facility]  Procedure Note    Mariely Bernabe Patient Status:  Inpatient    1990 MRN [id number]   Location [facility]5W Attending Jef Arcos MD   Hosp Day # 10 PCP Keara Barragan     Procedure: Yohan Host    Pre-Procedure Diagnosis: Diverticular abscess    Post-Procedure Diagnosis: Same    Findings: No significant residual abscess cavity.   Contrast injection with immediate fistulization to adjacent colon    Blood Loss:  0      Complications:  None        Claudia King MD  2023

## 2023-07-24 NOTE — PLAN OF CARE
Max temp 100.0 F this shift, pt noted with one time episode of chills this shift, temp reading 99.4; As per pt, her own thermometer noted temp 100.7F but has noted it reads high. Morphine given for pain with fair effect. JUAN drain flush q 8 hrs. Safety precautions in place. Problem: Patient Centered Care  Goal: Patient preferences are identified and integrated in the patient's plan of care  Description: Interventions:  - What would you like us to know as we care for you?  From home with   - Provide timely, complete, and accurate information to patient/family  - Incorporate patient and family knowledge, values, beliefs, and cultural backgrounds into the planning and delivery of care  - Encourage patient/family to participate in care and decision-making at the level they choose  - Honor patient and family perspectives and choices  Outcome: Progressing     Problem: PAIN - ADULT  Goal: Verbalizes/displays adequate comfort level or patient's stated pain goal  Description: INTERVENTIONS:  - Encourage pt to monitor pain and request assistance  - Assess pain using appropriate pain scale  - Administer analgesics based on type and severity of pain and evaluate response  - Implement non-pharmacological measures as appropriate and evaluate response  - Consider cultural and social influences on pain and pain management  - Manage/alleviate anxiety  - Utilize distraction and/or relaxation techniques  - Monitor for opioid side effects  - Notify MD/LIP if interventions unsuccessful or patient reports new pain  - Anticipate increased pain with activity and pre-medicate as appropriate  Outcome: Progressing     Problem: RISK FOR INFECTION - ADULT  Goal: Absence of fever/infection during anticipated neutropenic period  Description: INTERVENTIONS  - Monitor WBC  - Administer growth factors as ordered  - Implement neutropenic guidelines  Outcome: Progressing     Problem: SAFETY ADULT - FALL  Goal: Free from fall injury  Description: INTERVENTIONS:  - Assess pt frequently for physical needs  - Identify cognitive and physical deficits and behaviors that affect risk of falls.   - [place] fall precautions as indicated by assessment.  - Educate pt/family on patient safety including physical limitations  - Instruct pt to call for assistance with activity based on assessment  - Modify environment to reduce risk of injury  - Provide assistive devices as appropriate  - Consider OT/PT consult to assist with strengthening/mobility  - Encourage toileting schedule  Outcome: Progressing     Problem: GASTROINTESTINAL - ADULT  Goal: Minimal or absence of nausea and vomiting  Description: INTERVENTIONS:  - Maintain adequate hydration with IV or PO as ordered and tolerated  - Nasogastric tube to low intermittent suction as ordered  - Evaluate effectiveness of ordered antiemetic medications  - Provide nonpharmacologic comfort measures as appropriate  - Advance diet as tolerated, if ordered  - Obtain nutritional consult as needed  - Evaluate fluid balance  Outcome: Progressing  Goal: Maintains or returns to baseline bowel function  Description: INTERVENTIONS:  - Assess bowel function  - Maintain adequate hydration with IV or PO as ordered and tolerated  - Evaluate effectiveness of GI medications  - Encourage mobilization and activity  - Obtain nutritional consult as needed  - Establish a toileting routine/schedule  - Consider collaborating with pharmacy to review patient's medication profile  Outcome: Progressing     Problem: METABOLIC/FLUID AND ELECTROLYTES - ADULT  Goal: Electrolytes maintained within normal limits  Description: INTERVENTIONS:  - Monitor labs and rhythm and assess patient for signs and symptoms of electrolyte imbalances  - Administer electrolyte replacement as ordered  - Monitor response to electrolyte replacements, including rhythm and repeat lab results as appropriate  - Fluid restriction as ordered  - Instruct patient on fluid and nutrition restrictions as appropriate  Outcome: Progressing     Problem: SKIN/TISSUE INTEGRITY - ADULT  Goal: Skin integrity remains intact  Description: INTERVENTIONS  - Assess and document risk factors for pressure ulcer development  - Assess and document skin integrity  - Monitor for areas of redness and/or skin breakdown  - Initiate interventions, skin care algorithm/standards of care as needed  Outcome: Progressing     Problem: ANXIETY  Goal: Will report anxiety at manageable levels  Description: INTERVENTIONS:  - Administer medication as ordered  - Teach and rehearse alternative coping skills  - Provide emotional support with 1:1 interaction with staff  Outcome: Progressing     Problem: COPING  Goal: Pt/Family able to verbalize concerns and demonstrate effective coping strategies  Description: INTERVENTIONS:  - Assist patient/family to identify coping skills, available support systems and cultural and spiritual values  - Provide emotional support, including active listening and acknowledgement of concerns of patient and caregivers  - Reduce environmental stimuli, as able  - Instruct patient/family in relaxation techniques, as appropriate  - Assess for spiritual and psychosocial needs and initiate Spiritual Care or Behavioral Health consult as needed  Outcome: Progressing     Problem: HEMATOLOGIC - ADULT  Goal: Free from bleeding injury  Description: (Example usage: patient with low platelets)  INTERVENTIONS:  - Avoid intramuscular injections, enemas and rectal medication administration  - Ensure safe mobilization of patient  - Hold pressure on venipuncture sites to achieve adequate hemostasis  - Assess for signs and symptoms of internal bleeding  - Monitor lab trends  - Patient is to report abnormal signs of bleeding to staff  - Avoid use of toothpicks and dental floss  - Use electric shaver for shaving  - Use soft bristle tooth brush  - Limit straining and forceful nose blowing  Outcome: Progressing

## 2023-07-25 VITALS
WEIGHT: 268.69 LBS | TEMPERATURE: 98 F | OXYGEN SATURATION: 95 % | BODY MASS INDEX: 45.87 KG/M2 | DIASTOLIC BLOOD PRESSURE: 85 MMHG | RESPIRATION RATE: 18 BRPM | SYSTOLIC BLOOD PRESSURE: 121 MMHG | HEART RATE: 83 BPM | HEIGHT: 64 IN

## 2023-07-25 PROCEDURE — 99239 HOSP IP/OBS DSCHRG MGMT >30: CPT | Performed by: HOSPITALIST

## 2023-07-25 PROCEDURE — 99233 SBSQ HOSP IP/OBS HIGH 50: CPT | Performed by: NURSE PRACTITIONER

## 2023-07-25 RX ORDER — HYDROCODONE BITARTRATE AND ACETAMINOPHEN 7.5; 325 MG/1; MG/1
1 TABLET ORAL EVERY 6 HOURS PRN
Qty: 45 TABLET | Refills: 0 | Status: SHIPPED | OUTPATIENT
Start: 2023-07-25

## 2023-07-25 RX ORDER — VANCOMYCIN HYDROCHLORIDE 125 MG/1
125 CAPSULE ORAL DAILY
Qty: 30 CAPSULE | Refills: 0 | Status: SHIPPED | OUTPATIENT
Start: 2023-07-25 | End: 2023-08-24

## 2023-07-25 RX ORDER — QUETIAPINE FUMARATE 50 MG/1
50 TABLET, FILM COATED ORAL NIGHTLY
Qty: 30 TABLET | Refills: 0 | Status: SHIPPED | OUTPATIENT
Start: 2023-07-25

## 2023-07-25 NOTE — PROGRESS NOTES
07/25/23 1401   Clinical Encounter Type   Visited With Patient;Patient and family together   Routine Visit Follow-up   Continue Visiting No   [facility] Encounters   Spiritual Requests During Visit / Hospitalization Declines  Visit   Trigger for Consult   Trigger for Spiritual Care Consult No        was following up on a length of stay visit. Pt declined the visit because she was being discharged home.     Ana Espinal, Chaplain Resident

## 2023-07-25 NOTE — PLAN OF CARE
Pt a&o x4, room air, no tele, self care. Patient still having abdominal pain, PRN given. JUAN drain irrigated and aspirated every 8 hours. No fevers this morning or overnight. Plan is discharge home with . Discharge instruction given by Dariel Garcia RN. Patient and family verbalized understanding. Safety precautions maintained. Call light in reach. Problem: Patient Centered Care  Goal: Patient preferences are identified and integrated in the patient's plan of care  Description: Interventions:  - What would you like us to know as we care for you?  From home with   - Provide timely, complete, and accurate information to patient/family  - Incorporate patient and family knowledge, values, beliefs, and cultural backgrounds into the planning and delivery of care  - Encourage patient/family to participate in care and decision-making at the level they choose  - Honor patient and family perspectives and choices  Outcome: Progressing     Problem: PAIN - ADULT  Goal: Verbalizes/displays adequate comfort level or patient's stated pain goal  Description: INTERVENTIONS:  - Encourage pt to monitor pain and request assistance  - Assess pain using appropriate pain scale  - Administer analgesics based on type and severity of pain and evaluate response  - Implement non-pharmacological measures as appropriate and evaluate response  - Consider cultural and social influences on pain and pain management  - Manage/alleviate anxiety  - Utilize distraction and/or relaxation techniques  - Monitor for opioid side effects  - Notify MD/LIP if interventions unsuccessful or patient reports new pain  - Anticipate increased pain with activity and pre-medicate as appropriate  Outcome: Progressing     Problem: RISK FOR INFECTION - ADULT  Goal: Absence of fever/infection during anticipated neutropenic period  Description: INTERVENTIONS  - Monitor WBC  - Administer growth factors as ordered  - Implement neutropenic guidelines  Outcome: Progressing     Problem: SAFETY ADULT - FALL  Goal: Free from fall injury  Description: INTERVENTIONS:  - Assess pt frequently for physical needs  - Identify cognitive and physical deficits and behaviors that affect risk of falls.   - [place] fall precautions as indicated by assessment.  - Educate pt/family on patient safety including physical limitations  - Instruct pt to call for assistance with activity based on assessment  - Modify environment to reduce risk of injury  - Provide assistive devices as appropriate  - Consider OT/PT consult to assist with strengthening/mobility  - Encourage toileting schedule  Outcome: Progressing     Problem: GASTROINTESTINAL - ADULT  Goal: Minimal or absence of nausea and vomiting  Description: INTERVENTIONS:  - Maintain adequate hydration with IV or PO as ordered and tolerated  - Nasogastric tube to low intermittent suction as ordered  - Evaluate effectiveness of ordered antiemetic medications  - Provide nonpharmacologic comfort measures as appropriate  - Advance diet as tolerated, if ordered  - Obtain nutritional consult as needed  - Evaluate fluid balance  Outcome: Progressing  Goal: Maintains or returns to baseline bowel function  Description: INTERVENTIONS:  - Assess bowel function  - Maintain adequate hydration with IV or PO as ordered and tolerated  - Evaluate effectiveness of GI medications  - Encourage mobilization and activity  - Obtain nutritional consult as needed  - Establish a toileting routine/schedule  - Consider collaborating with pharmacy to review patient's medication profile  Outcome: Progressing     Problem: METABOLIC/FLUID AND ELECTROLYTES - ADULT  Goal: Electrolytes maintained within normal limits  Description: INTERVENTIONS:  - Monitor labs and rhythm and assess patient for signs and symptoms of electrolyte imbalances  - Administer electrolyte replacement as ordered  - Monitor response to electrolyte replacements, including rhythm and repeat lab results as appropriate  - Fluid restriction as ordered  - Instruct patient on fluid and nutrition restrictions as appropriate  Outcome: Progressing     Problem: SKIN/TISSUE INTEGRITY - ADULT  Goal: Skin integrity remains intact  Description: INTERVENTIONS  - Assess and document risk factors for pressure ulcer development  - Assess and document skin integrity  - Monitor for areas of redness and/or skin breakdown  - Initiate interventions, skin care algorithm/standards of care as needed  Outcome: Progressing     Problem: ANXIETY  Goal: Will report anxiety at manageable levels  Description: INTERVENTIONS:  - Administer medication as ordered  - Teach and rehearse alternative coping skills  - Provide emotional support with 1:1 interaction with staff  Outcome: Progressing     Problem: COPING  Goal: Pt/Family able to verbalize concerns and demonstrate effective coping strategies  Description: INTERVENTIONS:  - Assist patient/family to identify coping skills, available support systems and cultural and spiritual values  - Provide emotional support, including active listening and acknowledgement of concerns of patient and caregivers  - Reduce environmental stimuli, as able  - Instruct patient/family in relaxation techniques, as appropriate  - Assess for spiritual and psychosocial needs and initiate Spiritual Care or Behavioral Health consult as needed  Outcome: Progressing     Problem: HEMATOLOGIC - ADULT  Goal: Free from bleeding injury  Description: (Example usage: patient with low platelets)  INTERVENTIONS:  - Avoid intramuscular injections, enemas and rectal medication administration  - Ensure safe mobilization of patient  - Hold pressure on venipuncture sites to achieve adequate hemostasis  - Assess for signs and symptoms of internal bleeding  - Monitor lab trends  - Patient is to report abnormal signs of bleeding to staff  - Avoid use of toothpicks and dental floss  - Use electric shaver for shaving  - Use soft bristle tooth brush  - Limit straining and forceful nose blowing  Outcome: Progressing

## 2023-07-25 NOTE — PLAN OF CARE
Problem: Patient Centered Care  Goal: Patient preferences are identified and integrated in the patient's plan of care  Description: Interventions:  - What would you like us to know as we care for you? From home with   - Provide timely, complete, and accurate information to patient/family  - Incorporate patient and family knowledge, values, beliefs, and cultural backgrounds into the planning and delivery of care  - Encourage patient/family to participate in care and decision-making at the level they choose  - Honor patient and family perspectives and choices  7/25/2023 0637 by Moira Barth RN  Outcome: Progressing     Problem: PAIN - ADULT  Goal: Verbalizes/displays adequate comfort level or patient's stated pain goal  Description: INTERVENTIONS:  - Encourage pt to monitor pain and request assistance  - Assess pain using appropriate pain scale  - Administer analgesics based on type and severity of pain and evaluate response  - Implement non-pharmacological measures as appropriate and evaluate response  - Consider cultural and social influences on pain and pain management  - Manage/alleviate anxiety  - Utilize distraction and/or relaxation techniques  - Monitor for opioid side effects  - Notify MD/LIP if interventions unsuccessful or patient reports new pain  - Anticipate increased pain with activity and pre-medicate as appropriate  7/25/2023 0637 by Moira Barth RN  Outcome: Progressing     Problem: RISK FOR INFECTION - ADULT  Goal: Absence of fever/infection during anticipated neutropenic period  Description: INTERVENTIONS  - Monitor WBC  - Administer growth factors as ordered  - Implement neutropenic guidelines  7/25/2023 0637 by Moira Barth RN  Outcome: Progressing     Problem: SAFETY ADULT - FALL  Goal: Free from fall injury  Description: INTERVENTIONS:  - Assess pt frequently for physical needs  - Identify cognitive and physical deficits and behaviors that affect risk of falls.   - [place] fall precautions as indicated by assessment.  - Educate pt/family on patient safety including physical limitations  - Instruct pt to call for assistance with activity based on assessment  - Modify environment to reduce risk of injury  - Provide assistive devices as appropriate  - Consider OT/PT consult to assist with strengthening/mobility  - Encourage toileting schedule  7/25/2023 0637 by Enmanuel Cash RN  Outcome: Progressing     Problem: GASTROINTESTINAL - ADULT  Goal: Minimal or absence of nausea and vomiting  Description: INTERVENTIONS:  - Maintain adequate hydration with IV or PO as ordered and tolerated  - Nasogastric tube to low intermittent suction as ordered  - Evaluate effectiveness of ordered antiemetic medications  - Provide nonpharmacologic comfort measures as appropriate  - Advance diet as tolerated, if ordered  - Obtain nutritional consult as needed  - Evaluate fluid balance  7/25/2023 0637 by Enmanuel Cash RN  Outcome: Progressing  Goal: Maintains or returns to baseline bowel function  Description: INTERVENTIONS:  - Assess bowel function  - Maintain adequate hydration with IV or PO as ordered and tolerated  - Evaluate effectiveness of GI medications  - Encourage mobilization and activity  - Obtain nutritional consult as needed  - Establish a toileting routine/schedule  - Consider collaborating with pharmacy to review patient's medication profile  7/25/2023 0637 by Enmanuel Cash RN  Outcome: Progressing     Problem: METABOLIC/FLUID AND ELECTROLYTES - ADULT  Goal: Electrolytes maintained within normal limits  Description: INTERVENTIONS:  - Monitor labs and rhythm and assess patient for signs and symptoms of electrolyte imbalances  - Administer electrolyte replacement as ordered  - Monitor response to electrolyte replacements, including rhythm and repeat lab results as appropriate  - Fluid restriction as ordered  - Instruct patient on fluid and nutrition restrictions as appropriate  7/25/2023 0637 by Ej Encarnacion Yemi Cruz RN  Outcome: Progressing     Problem: SKIN/TISSUE INTEGRITY - ADULT  Goal: Skin integrity remains intact  Description: INTERVENTIONS  - Assess and document risk factors for pressure ulcer development  - Assess and document skin integrity  - Monitor for areas of redness and/or skin breakdown  - Initiate interventions, skin care algorithm/standards of care as needed  7/25/2023 0637 by Cody Mathew RN  Outcome: Progressing  7/25/2023 0636 by Cody Mathew RN  Outcome: Progressing     Problem: ANXIETY  Goal: Will report anxiety at manageable levels  Description: INTERVENTIONS:  - Administer medication as ordered  - Teach and rehearse alternative coping skills  - Provide emotional support with 1:1 interaction with staff  7/25/2023 0637 by Cody Mathew RN  Outcome: Progressing    Problem: COPING  Goal: Pt/Family able to verbalize concerns and demonstrate effective coping strategies  Description: INTERVENTIONS:  - Assist patient/family to identify coping skills, available support systems and cultural and spiritual values  - Provide emotional support, including active listening and acknowledgement of concerns of patient and caregivers  - Reduce environmental stimuli, as able  - Instruct patient/family in relaxation techniques, as appropriate  - Assess for spiritual and psychosocial needs and initiate Spiritual Care or Behavioral Health consult as needed  7/25/2023 0001 by Cody Mathew RN  Outcome: Progressing     Problem: HEMATOLOGIC - ADULT  Goal: Free from bleeding injury  Description: (Example usage: patient with low platelets)  INTERVENTIONS:  - Avoid intramuscular injections, enemas and rectal medication administration  - Ensure safe mobilization of patient  - Hold pressure on venipuncture sites to achieve adequate hemostasis  - Assess for signs and symptoms of internal bleeding  - Monitor lab trends  - Patient is to report abnormal signs of bleeding to staff  - Avoid use of toothpicks and dental floss  - Use electric shaver for shaving  - Use soft bristle tooth brush  - Limit straining and forceful nose blowing  7/25/2023 0637 by Fredi Rosenberg RN  Outcome: Progressing

## 2023-07-25 NOTE — DISCHARGE SUMMARY
[facility] HOSP [facility]    Discharge Summary    Zoe Bernabe Patient Status:  Inpatient    1990 MRN [id number]   Location [address] [facility] Attending Shon Marmolejo MD   Hosp Day # 6 PCP Ramandeep Szymanski     Date of Admission: 2023 Disposition: Home or Self Care     Date of Discharge: 23    Admitting Diagnosis: Acute diverticulitis [facility] Discharge Diagnoses: ACUTE PERFORATED DIVERTICULITIS S/P DRAIN PLACEMENT, ANXIETY    Lace+ Score: 61  59-90 High Risk  29-58 Medium Risk  0-28   Low Risk. TCM Follow-Up Recommendation:  LACE > 58: High Risk of readmission after discharge from the hospital.    Problem List: Patient Active Problem List:     Intractable abdominal pain     Morbid obesity ([address]     Acute diverticulitis     Generalized anxiety disorder      Reason for Admission: ABD PAIN    Physical Exam:    23  0819   BP: 121/85   Pulse:    Resp:    Temp:      General: NAD  Neck: No JVD  Lungs: CTA bilat  Heart: RRR, S1, S2  Abdomen: Soft, drain present, dressing CDI, no rebound or guarding  Extremities: Warm, dry, no LE edema bilat  Pulses: 2+ bilat DP  Skin: no rashes or legions noted  Neurological:  AAOx3, MAEW     History of Present Illness:   Per Dr. Stacey Cruz  This is an unfortunate 29-year-old woman who was admitted to our institution from  through  of this year. At that time she was admitted with intractable abdominal pain and was found to have acute sigmoid diverticulitis with phlegmon and sepsis. She had a drainage procedure performed and was placed on broad-spectrum IV antibiotics. She subsequently was discharged home on  on IV ertapenem through a left PICC line. She says that since her day of discharge she has had elevated temperatures at night ranging between 100 to 101.8. She said that initially after discharge her abdominal pain was very tolerable, but within the past 2 weeks it has gradually increased.  She feels it between the shoulder blades and the back, and initially in the suprapubic area, but now mostly in the left lower quadrant of the abdomen. She said that she was eating very little. She reported that the pain in her abdomen is worse at night, she rates it as a 12/10, and states that it becomes so severe that she cries out at night and screams so that other family members have even been awakened and come to check on her. Her appetite has been very poor and she has lost 30 pounds. She states the pain is cramping in nature. She said she was having to take Norco 5/325 every 6 hours for the pain, but it did not relieve it. She also has noted some burning with urination over the last couple of days. For these reasons, she came to the emergency room for evaluation, where a repeat CT scan of the abdomen and pelvis was performed that revealed perforated sigmoid diverticulitis with an approximately 3.7 cm abscess extending into the left adnexa and a poorly-defined more anterior extraluminal gas collection with trace fluid and surrounding inflammatory changes. Her white blood cell count was 7.9 with a hemoglobin of 10.2 and a platelet count of 482,048. The patient's hemoglobin on discharge June 27 was 7.4. There were 80% neutrophils. Glucose was 134, sodium 137, potassium 3.9, chloride 105, CO2 of 24, BUN of 6 with a creatinine of 0.73, calcium 9.1. Anion gap of 8. AST was 21, ALT was 12. Total bilirubin 0.9, direct 0.4. The patient received IV Zosyn in the emergency room and General Surgery, Dr. Manny Flores, was consulted. Patient was admitted for further evaluation and treatment. Hospital Course:   36 y/o female admitted 7/13/23 with acute sigmoid diverticulitis and recurrent abdominal pain. Recently discharged 6/27/23. #Perforated Sigmoid Diverticulitis with 3.7cm abscess  -recent hospitalization from 6/19 - 6/27 for sigmoid diverticulitis, phlegmon, sepsis. S/p CT guided drainage 6/23. sent home on IV Ertapenem via L PICC.  Returned to hospital for persistent and worsening pain. -7/13: CT a/p showed perforated sigmoid diverticulitis with 3.7cm abscess into L adnexa, periportal and retroperitoneal lymph node enlargement.   -7/14: R following: s/p IR drain placement  to LLQ   -Repeat CT a/p 7/17: fluid collection anterior sigmoid colon mildly increased in size. -General surgery consulted: no surgery at this time  -ID following  -Blood cx 6/19 and 6/23: negative  -Abdominal drain cx 7/14: no growth  -pain control: norco prn: dose increased with improvement,  IV dilaudid prn  -s/p V Diflucan 7/14 - 7/17  -continue IV Zosyn started 7/14  -diet resumed  -7/18: s/p LLQ drain repositioned and exchanged and new 8F drain placed to anterior diverticular collection.  -fluid cx: enterocuccus avium and haemophilus parainfluenzae  -JUAN #1 removed by IR, JUAN# 2 remains in place: home instructions include flush with 10ml daily, drain every 8-12 hours and record output and bring to appointment with Dr. Silvio De León.  -Dr. Silvio De León saw patient: appreciate recommendations. Will need instructions for timing if  f/u   -ID : d/w Dr. Lopez and will order IV antibx for home. F/u Dr. Santhosh Thurman 2 weeks. Will evaluate for repeat CT a/p  -needs f/u GI: Cindi Guzman for colonoscopy in 6-8 weeks.   -7/22: pt found to have fever last night, will repeat ct scan of abd and monitor overnight, blood cultures x 2 pending   - 7/23: pt found to have a fever again, CT abd not showing any worsening signs, blood cultures pending, cont IV abx for now, await ID and surgery eval   - 7/24 - tmax 37.8, will go for IR tube check today, cont IV abx and pain control   - 7/25 - stable for dc on iv abx with drain in place     #Fever  As above     #Hypokalemia  Replaced per protocol     #Anxiety with panic attacks  -xanax prn  -psychiatry was consulted  -continue sertraline  -seroquel nightly  -Clonazepam nightly     #Chronic microcytic anemia  Stable  Rec'd venofer x 5     #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 Refills: 0     clonazePAM 0.5 MG Tabs  Commonly known as: KlonoPIN      Take 1 tablet (0.5 mg total) by mouth nightly. Quantity: 30 tablet  Refills: 1     hydrocortisone 1 % Oint      Apply topically 3 (three) times daily. Refills: 0     QUEtiapine 50 MG Tabs  Commonly known as: SEROquel      Take 1 tablet (50 mg total) by mouth nightly. Quantity: 30 tablet  Refills: 0     sodium chloride 0.9% SOLN 100 mL with meropenem 1 g SOLR 1 g      Inject 1 g into the vein every 8 (eight) hours. Quantity: 1 Bag  Refills: 0     vancomycin 125 MG Caps  Commonly known as: Vancocin      Take 1 capsule (125 mg total) by mouth daily. Stop taking on: August 24, 2023  Quantity: 30 capsule  Refills: 0            CHANGE how you take these medications        Instructions Prescription details   sertraline 50 MG Tabs  Commonly known as: Zoloft  What changed: how much to take      Take 2 tablets (100 mg total) by mouth daily. Quantity: 30 tablet  Refills: 0            CONTINUE taking these medications        Instructions Prescription details   albuterol 108 (90 Base) MCG/ACT Aers  Commonly known as: Ventolin HFA      Inhale 2 puffs into the lungs every 4 (four) hours as needed for Wheezing.    Stop taking on: July 27, 2023  Quantity: 1 each  Refills: 0     BreatheRite Flori Spacer Adult Misc      Use with albuterol   Quantity: 1 each  Refills: 0     Esomeprazole Magnesium 20 MG Cpdr  Commonly known as: NEXIUM      Take 1 capsule (20 mg total) by mouth every morning before breakfast.   Refills: 0     HYDROcodone-acetaminophen 7.5-325 MG Tabs  Commonly known as: Norco      Take 1 tablet by mouth every 6 (six) hours as needed for Pain. Quantity: 45 tablet  Refills: 0     zolpidem 10 MG Tabs  Commonly known as: Ambien      Take 1 tablet (10 mg total) by mouth nightly as needed for Sleep. Refills: 0            STOP taking these medications      ALPRAZolam 0.5 MG Tabs  Commonly known as: Xanax        HEPARIN & NACL LOCK FLUSH IV        LORazepam 1 MG Tabs  Commonly known as: Ativan        sodium chloride 0.9% SOLN 100 mL with ertapenem 1 g SOLR 1 g                  Where to Get Your Medications        These medications were sent to [facility], [address] [place] [address] [address] AT [address] [place] [address], [phone] [address]      Phone: [phone]   clonazePAM 0.5 MG Tabs  HYDROcodone-acetaminophen 7.5-325 MG Tabs  QUEtiapine 50 MG Tabs  sertraline 50 MG Tabs  vancomycin 125 MG Caps       Greater than 35 minutes spent, >50% spent counseling re: treatment plan and workup     Bill Panchal MD  7/25/2023

## 2023-07-25 NOTE — PLAN OF CARE
Frequent rounding maintained. Patient educated on all medications administered. Bed in lowest position, bed alarm and i bed awareness activated, fall precautions in place and call light within reach at all times. All patients questions answered and needs addressed promptly.  at bedside throughout the night.

## 2023-07-26 ENCOUNTER — TELEPHONE (OUTPATIENT)
Facility: CLINIC | Age: 33
End: 2023-07-26

## 2023-07-26 NOTE — TELEPHONE ENCOUNTER
Patient had to cancel her previous consult on 7/19 due to being in the hospital. Patient still has a drain and have abdominal pain, patient requesting consult appointment, informed no openings. Please call at [phone],[email or web address].

## 2023-07-27 NOTE — TELEPHONE ENCOUNTER
Called patient, name/ verified. Offered available appointment, hospital f/u    Patient confirmed and accepted appointment. Date, time, office location and contact number verified. Instructed patient to arrive 15 minutes before appt time and bring insurance card. Patient verbalized understanding. Your Appointments      2023  4:30 PM  [facility], [address] [place] [address],[address], [facility] ([facility]) [address],[address]oors  [phone]           No further action required, TE closed.

## 2023-07-28 ENCOUNTER — TELEPHONE (OUTPATIENT)
Dept: INTERVENTIONAL RADIOLOGY/VASCULAR | Facility: HOSPITAL | Age: 33
End: 2023-07-28

## 2023-07-31 ENCOUNTER — TELEPHONE (OUTPATIENT)
Dept: INTERVENTIONAL RADIOLOGY/VASCULAR | Facility: HOSPITAL | Age: 33
End: 2023-07-31

## 2023-08-02 ENCOUNTER — OFFICE VISIT (OUTPATIENT)
Facility: CLINIC | Age: 33
End: 2023-08-02

## 2023-08-02 VITALS — BODY MASS INDEX: 45.75 KG/M2 | HEIGHT: 64 IN | WEIGHT: 268 LBS

## 2023-08-02 DIAGNOSIS — K57.92 ACUTE DIVERTICULITIS: ICD-10-CM

## 2023-08-02 DIAGNOSIS — Z09 HOSPITAL DISCHARGE FOLLOW-UP: Primary | ICD-10-CM

## 2023-08-02 PROCEDURE — 99213 OFFICE O/P EST LOW 20 MIN: CPT | Performed by: NURSE PRACTITIONER

## 2023-08-02 PROCEDURE — 3008F BODY MASS INDEX DOCD: CPT | Performed by: NURSE PRACTITIONER

## 2023-08-08 ENCOUNTER — HOSPITAL ENCOUNTER (OUTPATIENT)
Dept: CT IMAGING | Facility: HOSPITAL | Age: 33
Discharge: HOME | End: 2023-08-08
Attending: INTERNAL MEDICINE
Payer: COMMERCIAL

## 2023-08-08 DIAGNOSIS — K57.92 ACUTE DIVERTICULITIS: ICD-10-CM

## 2023-08-08 PROCEDURE — 74177 CT ABD & PELVIS W/CONTRAST: CPT | Performed by: INTERNAL MEDICINE

## 2023-08-09 VITALS — WEIGHT: 268 LBS | BODY MASS INDEX: 45.75 KG/M2 | HEIGHT: 64 IN

## 2023-08-11 ENCOUNTER — APPOINTMENT (OUTPATIENT)
Dept: CT IMAGING | Facility: HOSPITAL | Age: 33
End: 2023-08-11
Attending: NURSE PRACTITIONER
Payer: COMMERCIAL

## 2023-08-11 ENCOUNTER — HOSPITAL ENCOUNTER (INPATIENT)
Facility: HOSPITAL | Age: 33
LOS: 5 days | Discharge: HOME | End: 2023-08-17
Attending: EMERGENCY MEDICINE | Admitting: HOSPITALIST
Payer: COMMERCIAL

## 2023-08-11 DIAGNOSIS — K57.92 ACUTE DIVERTICULITIS: Primary | ICD-10-CM

## 2023-08-11 DIAGNOSIS — K57.20 COLONIC DIVERTICULAR ABSCESS: ICD-10-CM

## 2023-08-11 PROBLEM — D64.9 ANEMIA: Status: ACTIVE | Noted: 2023-08-11

## 2023-08-11 LAB
ALBUMIN SERPL-MCNC: 3 G/DL (ref 3.4–5)
ALP LIVER SERPL-CCNC: 86 U/L
ALT SERPL-CCNC: 14 U/L
ANION GAP SERPL CALC-SCNC: 7 MMOL/L (ref 0–18)
AST SERPL-CCNC: 26 U/L (ref 15–37)
B-HCG UR QL: NEGATIVE
BASOPHILS # BLD AUTO: 0.02 X10(3) UL (ref 0–0.2)
BASOPHILS NFR BLD AUTO: 0.3 %
BILIRUB DIRECT SERPL-MCNC: 0.2 MG/DL (ref 0–0.2)
BILIRUB SERPL-MCNC: 0.4 MG/DL (ref 0.1–2)
BILIRUB UR QL: NEGATIVE
BUN BLD-MCNC: 5 MG/DL (ref 7–18)
BUN/CREAT SERPL: 11.1 (ref 10–20)
CALCIUM BLD-MCNC: 8.9 MG/DL (ref 8.5–10.1)
CHLORIDE SERPL-SCNC: 109 MMOL/L (ref 98–112)
CLARITY UR: CLEAR
CO2 SERPL-SCNC: 25 MMOL/L (ref 21–32)
COLOR UR: YELLOW
CREAT BLD-MCNC: 0.45 MG/DL
DEPRECATED RDW RBC AUTO: 67.1 FL (ref 35.1–46.3)
EGFRCR SERPLBLD CKD-EPI 2021: 130 ML/MIN/1.73M2 (ref 60–?)
EOSINOPHIL # BLD AUTO: 0.38 X10(3) UL (ref 0–0.7)
EOSINOPHIL NFR BLD AUTO: 6.1 %
ERYTHROCYTE [DISTWIDTH] IN BLOOD BY AUTOMATED COUNT: 21.3 % (ref 11–15)
GLUCOSE BLD-MCNC: 103 MG/DL (ref 70–99)
GLUCOSE UR-MCNC: NORMAL MG/DL
HCT VFR BLD AUTO: 35.8 %
HGB BLD-MCNC: 10.9 G/DL
HGB UR QL STRIP.AUTO: NEGATIVE
IMM GRANULOCYTES # BLD AUTO: 0.04 X10(3) UL (ref 0–1)
IMM GRANULOCYTES NFR BLD: 0.6 %
KETONES UR-MCNC: 10 MG/DL
LEUKOCYTE ESTERASE UR QL STRIP.AUTO: 75
LIPASE SERPL-CCNC: 53 U/L (ref 13–75)
LYMPHOCYTES # BLD AUTO: 1.98 X10(3) UL (ref 1–4)
LYMPHOCYTES NFR BLD AUTO: 31.7 %
MCH RBC QN AUTO: 26.2 PG (ref 26–34)
MCHC RBC AUTO-ENTMCNC: 30.4 G/DL (ref 31–37)
MCV RBC AUTO: 86.1 FL
MONOCYTES # BLD AUTO: 0.36 X10(3) UL (ref 0.1–1)
MONOCYTES NFR BLD AUTO: 5.8 %
NEUTROPHILS # BLD AUTO: 3.46 X10 (3) UL (ref 1.5–7.7)
NEUTROPHILS # BLD AUTO: 3.46 X10(3) UL (ref 1.5–7.7)
NEUTROPHILS NFR BLD AUTO: 55.5 %
NITRITE UR QL STRIP.AUTO: NEGATIVE
OSMOLALITY SERPL CALC.SUM OF ELEC: 290 MOSM/KG (ref 275–295)
PH UR: 6 [PH] (ref 5–8)
PLATELET # BLD AUTO: 166 10(3)UL (ref 150–450)
PLATELET MORPHOLOGY: NORMAL
POTASSIUM SERPL-SCNC: 3.9 MMOL/L (ref 3.5–5.1)
PROT SERPL-MCNC: 7.6 G/DL (ref 6.4–8.2)
PROT UR-MCNC: 20 MG/DL
RBC # BLD AUTO: 4.16 X10(6)UL
SODIUM SERPL-SCNC: 141 MMOL/L (ref 136–145)
SP GR UR STRIP: 1.02 (ref 1–1.03)
UROBILINOGEN UR STRIP-ACNC: NORMAL
WBC # BLD AUTO: 6.2 X10(3) UL (ref 4–11)

## 2023-08-11 PROCEDURE — 99222 1ST HOSP IP/OBS MODERATE 55: CPT | Performed by: HOSPITALIST

## 2023-08-11 PROCEDURE — 99153 MOD SED SAME PHYS/QHP EA: CPT | Performed by: NURSE PRACTITIONER

## 2023-08-11 PROCEDURE — 49406 IMAGE CATH FLUID PERI/RETRO: CPT | Performed by: NURSE PRACTITIONER

## 2023-08-11 PROCEDURE — 0W9H30Z DRAINAGE OF RETROPERITONEUM WITH DRAINAGE DEVICE, PERCUTANEOUS APPROACH: ICD-10-PCS | Performed by: RADIOLOGY

## 2023-08-11 PROCEDURE — 99152 MOD SED SAME PHYS/QHP 5/>YRS: CPT | Performed by: NURSE PRACTITIONER

## 2023-08-11 RX ORDER — MORPHINE SULFATE 2 MG/ML
4 INJECTION, SOLUTION INTRAMUSCULAR; INTRAVENOUS EVERY 4 HOURS PRN
Status: DISCONTINUED | OUTPATIENT
Start: 2023-08-11 | End: 2023-08-17

## 2023-08-11 RX ORDER — MIDAZOLAM HYDROCHLORIDE 1 MG/ML
INJECTION INTRAMUSCULAR; INTRAVENOUS
Status: COMPLETED
Start: 2023-08-11 | End: 2023-08-11

## 2023-08-11 RX ORDER — VANCOMYCIN HYDROCHLORIDE 125 MG/1
125 CAPSULE ORAL DAILY
Status: DISCONTINUED | OUTPATIENT
Start: 2023-08-11 | End: 2023-08-17

## 2023-08-11 RX ORDER — FLUMAZENIL 0.1 MG/ML
0.2 INJECTION INTRAVENOUS AS NEEDED
Status: DISCONTINUED | OUTPATIENT
Start: 2023-08-11 | End: 2023-08-11

## 2023-08-11 RX ORDER — ALBUTEROL SULFATE 90 UG/1
2 AEROSOL, METERED RESPIRATORY (INHALATION) EVERY 4 HOURS PRN
Status: DISCONTINUED | OUTPATIENT
Start: 2023-08-11 | End: 2023-08-17

## 2023-08-11 RX ORDER — FLUCONAZOLE 2 MG/ML
400 INJECTION, SOLUTION INTRAVENOUS EVERY 24 HOURS
Status: DISCONTINUED | OUTPATIENT
Start: 2023-08-11 | End: 2023-08-11

## 2023-08-11 RX ORDER — PANTOPRAZOLE SODIUM 20 MG/1
20 TABLET, DELAYED RELEASE ORAL
Status: DISCONTINUED | OUTPATIENT
Start: 2023-08-11 | End: 2023-08-15

## 2023-08-11 RX ORDER — CLONAZEPAM 0.5 MG/1
0.5 TABLET ORAL NIGHTLY
Status: DISCONTINUED | OUTPATIENT
Start: 2023-08-11 | End: 2023-08-17

## 2023-08-11 RX ORDER — HYDROMORPHONE HYDROCHLORIDE 1 MG/ML
0.8 INJECTION, SOLUTION INTRAMUSCULAR; INTRAVENOUS; SUBCUTANEOUS EVERY 2 HOUR PRN
Status: DISCONTINUED | OUTPATIENT
Start: 2023-08-11 | End: 2023-08-17

## 2023-08-11 RX ORDER — LORAZEPAM 2 MG/ML
0.5 INJECTION INTRAMUSCULAR EVERY 6 HOURS PRN
Status: DISCONTINUED | OUTPATIENT
Start: 2023-08-11 | End: 2023-08-17

## 2023-08-11 RX ORDER — SODIUM CHLORIDE 9 MG/ML
INJECTION, SOLUTION INTRAVENOUS CONTINUOUS
Status: DISCONTINUED | OUTPATIENT
Start: 2023-08-11 | End: 2023-08-12

## 2023-08-11 RX ORDER — ZOLPIDEM TARTRATE 5 MG/1
10 TABLET ORAL NIGHTLY PRN
Status: DISCONTINUED | OUTPATIENT
Start: 2023-08-11 | End: 2023-08-17

## 2023-08-11 RX ORDER — SODIUM CHLORIDE 9 MG/ML
INJECTION, SOLUTION INTRAVENOUS CONTINUOUS
Status: DISCONTINUED | OUTPATIENT
Start: 2023-08-11 | End: 2023-08-11

## 2023-08-11 RX ORDER — PROCHLORPERAZINE EDISYLATE 5 MG/ML
5 INJECTION INTRAMUSCULAR; INTRAVENOUS EVERY 8 HOURS PRN
Status: DISCONTINUED | OUTPATIENT
Start: 2023-08-11 | End: 2023-08-17

## 2023-08-11 RX ORDER — MORPHINE SULFATE 2 MG/ML
2 INJECTION, SOLUTION INTRAMUSCULAR; INTRAVENOUS EVERY 4 HOURS PRN
Status: DISCONTINUED | OUTPATIENT
Start: 2023-08-11 | End: 2023-08-17

## 2023-08-11 RX ORDER — QUETIAPINE FUMARATE 25 MG/1
50 TABLET, FILM COATED ORAL NIGHTLY
Status: DISCONTINUED | OUTPATIENT
Start: 2023-08-11 | End: 2023-08-17

## 2023-08-11 RX ORDER — HYDROMORPHONE HYDROCHLORIDE 1 MG/ML
0.2 INJECTION, SOLUTION INTRAMUSCULAR; INTRAVENOUS; SUBCUTANEOUS EVERY 2 HOUR PRN
Status: DISCONTINUED | OUTPATIENT
Start: 2023-08-11 | End: 2023-08-17

## 2023-08-11 RX ORDER — ONDANSETRON 2 MG/ML
4 INJECTION INTRAMUSCULAR; INTRAVENOUS EVERY 6 HOURS PRN
Status: DISCONTINUED | OUTPATIENT
Start: 2023-08-11 | End: 2023-08-17

## 2023-08-11 RX ORDER — FLUCONAZOLE 150 MG/1
150 TABLET ORAL WEEKLY
Status: ON HOLD | COMMUNITY

## 2023-08-11 RX ORDER — MIDAZOLAM HYDROCHLORIDE 1 MG/ML
1 INJECTION INTRAMUSCULAR; INTRAVENOUS EVERY 5 MIN PRN
Status: DISCONTINUED | OUTPATIENT
Start: 2023-08-11 | End: 2023-08-11

## 2023-08-11 RX ORDER — NALOXONE HYDROCHLORIDE 0.4 MG/ML
80 INJECTION, SOLUTION INTRAMUSCULAR; INTRAVENOUS; SUBCUTANEOUS AS NEEDED
Status: DISCONTINUED | OUTPATIENT
Start: 2023-08-11 | End: 2023-08-11

## 2023-08-11 RX ORDER — HYDROMORPHONE HYDROCHLORIDE 1 MG/ML
0.4 INJECTION, SOLUTION INTRAMUSCULAR; INTRAVENOUS; SUBCUTANEOUS EVERY 2 HOUR PRN
Status: DISCONTINUED | OUTPATIENT
Start: 2023-08-11 | End: 2023-08-17

## 2023-08-11 RX ORDER — SERTRALINE HYDROCHLORIDE 100 MG/1
100 TABLET, FILM COATED ORAL DAILY
Status: DISCONTINUED | OUTPATIENT
Start: 2023-08-11 | End: 2023-08-17

## 2023-08-11 NOTE — ED QUICK NOTES
Orders for admission, patient is aware of plan and ready to go upstairs. Any questions, please call ED RN Rachel Tapia at extension [phone].      Patient Covid vaccination status: Unvaccinated     COVID Test Ordered in ED: None    COVID Suspicion at Admission: N/A    Running Infusions:  None    Mental Status/LOC at time of transport: ao4    Other pertinent information:   CIWA score: N/A   NIH score:  N/A

## 2023-08-11 NOTE — H&P
[facility]    PATIENT'S NAME: FERNANDEZ OLSON   ATTENDING PHYSICIAN: Nikki Sandoval MD   PATIENT ACCOUNT#:   [id number]    LOCATION:  [address] [place] [address] RECORD #:   [id number]       YOB: 1990  ADMISSION DATE:       08/11/2023    HISTORY AND PHYSICAL EXAMINATION    DATE OF EXAMINATION:  08/11/2023    CHIEF COMPLAINT:  Abdominal pain, increased fluid drainage through JUAN drain, and fever. HISTORY OF PRESENT ILLNESS:  This is an unfortunate 43-year-old woman who initially presented to our institution from June 19 through June 27 of this year. At that time she was admitted with intractable abdominal pain, was found to have acute sigmoid diverticulitis with phlegmon and sepsis. She had a drainage procedure performed through IR and was placed on broad-spectrum IV antibiotics. She was discharged home on IV ertapenem through a PICC line. Her appetite has been poor since that time. She was readmitted on 07/14, again with abdominal pain and fevers. A CT scan at that time showed perforated sigmoid diverticulitis with a 3.7 cm abscess into the left adnexa, periportal and retroperitoneal lymph node enlargement. IR placed a drain on July 14. Patient was seen by General Surgery and surgery was not recommended at this time. Fluid culture revealed Enterococcus avium and Haemophilus parainfluenzae. She was discharged on IV meropenem and was actually supposed to have a stress test done today to evaluate her cardiac status in anticipation of an elective surgical procedure. Patient states that for a week she was having fevers at home, the highest of which was 101.8. Two days ago the fevers stopped. She had no drainage from the catheters for about a week as well and 2 days ago the drainage returned. At times it was pinkish and at other times it was milky in color and they thought it looked purulent. She has also had abdominal spasms over the last 2 days.   At some points these can be extremely painful and severe. For these reasons, she came to the emergency room for evaluation and treatment. Workup in the ER included glucose of 103, sodium 141, potassium 3.9, chloride 109, CO2 of 25, BUN of 5 with a creatinine of 0.45, calcium 8.9, anion gap of 7. Liver function tests were essentially normal except for an albumin of 3.0. Hemoglobin was 10.9 with a white count of 6.2 and a platelet count of 310,163; there were 55% neutrophils. Urinalysis revealed no signs of infection. Aerobic culture was obtained from some of the fluid that the patient brought into the emergency room. She was placed back on meropenem, Diflucan was added, IV fluids and pain medications with evaluation by Interventional Radiology and Infectious Disease. Surgeon will be notified of the admission as well. PAST MEDICAL HISTORY:  Significant for sigmoid diverticulitis with phlegmon and sepsis in June, for which the patient underwent percutaneous drainage and IV antibiotics. She returned last month with increasing size of fluid collection during the last admission, but currently CT scan done on August 8 reveals largely collapsed fluid collection between the bladder and sigmoid colon; a heterogeneously enhancing left adnexal, left ovarian fluid collection that is suggestive of resolution of the previously seen internal gas; ongoing long-segment sigmoid colonic diverticulitis, progressively improved; a right adnexal cystic lesion that is likely functional; borderline retroperitoneal and pelvic lymphadenopathy; hepatomegaly and underlying hepatic steatosis.       MEDICATIONS:  Prior to admission:  Albuterol inhaler 2 puffs every 4 hours as needed for wheezing; Klonopin 0.5 mg nightly; Protonix 20 mg every morning; Seroquel 50 mg nightly; Zoloft 100 mg daily; zolpidem 10 mg as needed for sleep at night; Tylenol 650 mg every 6 hours as needed; Norco 7.5/325 one tablet every 6 hours as needed; meropenem 1 g every 8 hours; vancomycin 125 mg daily. ALLERGIES:  Lactose intolerance. FAMILY HISTORY:  The patient's mother  at 48 of pulmonary fibrosis. She had rheumatoid arthritis as well. Her father is still living, has diabetes and has had a kidney transplant. He is also colon cancer survivor. SOCIAL HISTORY:  The patient denies any tobacco use. Drinks alcohol occasionally. No history of drug use except for edibles occasionally. REVIEW OF SYSTEMS:  Twelve systems were reviewed. Patient says her appetite has been poor. She has been eating only Ensure shakes, 3 to 4 a day, and has been losing weight. Bowel movements have been relatively regular, but soft, no increased frequency. No URI symptoms. There are otherwise no additional pertinent positives or negatives on the 12-point review of systems except as listed in the History of Present Illness. PHYSICAL EXAMINATION:    VITAL SIGNS:  Temperature was 96.7, pulse 115, respiratory rate 18, blood pressure 109/76, O2 saturation 95% on room air. HEENT:  Normocephalic, atraumatic. Pupils equal, reactive. Sclerae anicteric. There was no sinus tenderness. Mucous membranes were moist.  Oropharynx was crowded. NECK:  Supple. LUNGS:  Decreased breath sounds bilaterally with prolonged expiratory phase, but no current wheezes or rhonchi. HEART:  Regular rhythm, slightly tachycardic. Normal S1, S2.  ABDOMEN:  Obese. There was tenderness to palpation generally, but particularly in the lower abdomen. JUAN drain was present. Bulb was empty at this time. EXTREMITIES:  No clubbing, cyanosis, or calf tenderness. There was trace edema of both lower extremities. SKIN:  Warm and dry without any significant rashes. NEUROLOGIC:  The patient was awake, alert, oriented x3 with no focal motor or sensory deficits. PSYCHIATRIC:  Mood and affect were pleasant and cooperative. BACK:  There was no costovertebral angle tenderness noted.     LABORATORY DATA:  Previously mentioned. ASSESSMENT AND PLAN:    1. Recurrent persistent diverticulitis with recurring fevers and purulent drainage from her JUAN drain. Drainage was sent in the emergency room for Gram stain and culture. These results are pending at the time of this dictation. Will reinstitute meropenem and fluconazole pending ID evaluation and recommendations and maintain n.p.o. status for now. Patient is very reluctant to have a colostomy, would like to avoid that if at all possible. IR will be consulted to see if any further adjustment of drain is necessary and Infectious Disease consulted as well. Will discuss with daytime hospitalist and recommend surgical reevaluation also. 2.   Asthma. Continue inhalers. 3.   Anxiety. Continue Klonopin and sertraline as well as Seroquel. 4.   GI prophylaxis. Protonix. 5.   Clostridium difficile prophylaxis. Oral vancomycin. 6.   DVT prophylaxis. SCDs. Will hold on anticoagulation pending evaluation by Surgery and IR. If no invasive procedures are planned, would place on IV anticoagulation given her body habitus. 7.   The patient's current clinical status and proposed treatment plan were discussed with her. All of her questions were answered and she agreed with the plan of care as outlined above. Dictated By Neville Sorensen MD  d: 08/11/2023 06:58:55  t: 08/11/2023 07:42:00  Job [id number]  OAF/    cc: Nikki Callahan MD

## 2023-08-11 NOTE — PLAN OF CARE
Patient alert and oriented, spouse at bedside. To IR for drain check & drain placement. Additional JUAN drain placed to LLQ, above existing JUAN. IVF, merrem. Increase in anxiety prior to procedure, given ativan. Increased pain after procedure, given dilaudid & morphine, heat pack in use. Up ad mariya, spouse assisting. Advanced to LFS, declining dinner at this time. Call light within reach, using appropriately. Problem: SKIN/TISSUE INTEGRITY - ADULT  Goal: Incision(s), wounds(s) or drain site(s) healing without S/S of infection  Description: INTERVENTIONS:  - Assess and document risk factors for pressure ulcer development  - Assess and document skin integrity  - Assess and document dressing/incision, wound bed, drain sites and surrounding tissue  - Implement wound care per orders  - Initiate isolation precautions as appropriate  - Initiate Pressure Ulcer prevention bundle as indicated  Outcome: Progressing     Problem: PAIN - ADULT  Goal: Verbalizes/displays adequate comfort level or patient's stated pain goal  Description: INTERVENTIONS:  - Encourage pt to monitor pain and request assistance  - Assess pain using appropriate pain scale  - Administer analgesics based on type and severity of pain and evaluate response  - Implement non-pharmacological measures as appropriate and evaluate response  - Consider cultural and social influences on pain and pain management  - Manage/alleviate anxiety  - Utilize distraction and/or relaxation techniques  - Monitor for opioid side effects  - Notify MD/LIP if interventions unsuccessful or patient reports new pain  - Anticipate increased pain with activity and pre-medicate as appropriate  Outcome: Progressing     Problem: SAFETY ADULT - FALL  Goal: Free from fall injury  Description: INTERVENTIONS:  - Assess pt frequently for physical needs  - Identify cognitive and physical deficits and behaviors that affect risk of falls.   - [place] fall precautions as indicated by assessment.  - Educate pt/family on patient safety including physical limitations  - Instruct pt to call for assistance with activity based on assessment  - Modify environment to reduce risk of injury  - Provide assistive devices as appropriate  - Consider OT/PT consult to assist with strengthening/mobility  - Encourage toileting schedule  Outcome: Progressing

## 2023-08-11 NOTE — PROGRESS NOTES
Case discussed with IR Dr Cisco Deshpande and Dr Shahbaz Fisher for drain check of existing drain and percutaneous drainage of left adnexal/left ovarian fluid collection today   Discussed procedures, risks, benefits with patient and her  who agree to proceed

## 2023-08-11 NOTE — ED INITIAL ASSESSMENT (HPI)
Pt sent by IR c/o bloody drainage coming from JUAN drain placed 3 wks ago also c/o abd pain since Monday.

## 2023-08-11 NOTE — CM/SW NOTE
08/11/23 0800   CM/SW Screening   Referral Source    [facility]; Chart review   Patient's Current Mental Status at Time of Assessment Alert;Oriented   Patient's [address]   Patient lives with Spouse/Significant other   Patient Status Prior to Admission   Independent with ADLs and Mobility Yes   Services in place prior to admission Infusion     CM self ref for dc planning. Per chart review this is pts 3rd admission with similar presentation. Pt wesley has a JUAN drain and pta was receiving IV abx via PICC. At last dc pt went home /spouse with [facility] for home infusion services including in-office appointments at [facility] for dressing change and lab draws. CM met with pt to confirm above. Pt sts plan is working well and she anticipates dc with same plan. Plan  Home with [facility] plan as above    / to remain available for support and/or discharge planning.      Elfego Tang, RN    Ext [phone]

## 2023-08-11 NOTE — PLAN OF CARE
Patient admitted from ED this AM. Pain being managed with PRN morphine. JUAN drain x1, no output noted upon arrival to the unit. NPO maintained. IVF running. Diflucan given. Up ad mariya. Plan for IR and ID consult today. Will continue to monitor.      Problem: Patient Centered Care  Goal: Patient preferences are identified and integrated in the patient's plan of care  Description: Interventions:  - What would you like us to know as we care for you?   - Provide timely, complete, and accurate information to patient/family  - Incorporate patient and family knowledge, values, beliefs, and cultural backgrounds into the planning and delivery of care  - Encourage patient/family to participate in care and decision-making at the level they choose  - Honor patient and family perspectives and choices  Outcome: Progressing     Problem: SKIN/TISSUE INTEGRITY - ADULT  Goal: Incision(s), wounds(s) or drain site(s) healing without S/S of infection  Description: INTERVENTIONS:  - Assess and document risk factors for pressure ulcer development  - Assess and document skin integrity  - Assess and document dressing/incision, wound bed, drain sites and surrounding tissue  - Implement wound care per orders  - Initiate isolation precautions as appropriate  - Initiate Pressure Ulcer prevention bundle as indicated  Outcome: Progressing     Problem: PAIN - ADULT  Goal: Verbalizes/displays adequate comfort level or patient's stated pain goal  Description: INTERVENTIONS:  - Encourage pt to monitor pain and request assistance  - Assess pain using appropriate pain scale  - Administer analgesics based on type and severity of pain and evaluate response  - Implement non-pharmacological measures as appropriate and evaluate response  - Consider cultural and social influences on pain and pain management  - Manage/alleviate anxiety  - Utilize distraction and/or relaxation techniques  - Monitor for opioid side effects  - Notify MD/LIP if interventions unsuccessful or patient reports new pain  - Anticipate increased pain with activity and pre-medicate as appropriate  Outcome: Progressing     Problem: SAFETY ADULT - FALL  Goal: Free from fall injury  Description: INTERVENTIONS:  - Assess pt frequently for physical needs  - Identify cognitive and physical deficits and behaviors that affect risk of falls.   - [place] fall precautions as indicated by assessment.  - Educate pt/family on patient safety including physical limitations  - Instruct pt to call for assistance with activity based on assessment  - Modify environment to reduce risk of injury  - Provide assistive devices as appropriate  - Consider OT/PT consult to assist with strengthening/mobility  - Encourage toileting schedule  Outcome: Progressing

## 2023-08-12 LAB
ALBUMIN SERPL-MCNC: 2.7 G/DL (ref 3.4–5)
ALBUMIN/GLOB SERPL: 0.7 {RATIO} (ref 1–2)
ALP LIVER SERPL-CCNC: 64 U/L
ALT SERPL-CCNC: 12 U/L
ANION GAP SERPL CALC-SCNC: 5 MMOL/L (ref 0–18)
AST SERPL-CCNC: 27 U/L (ref 15–37)
BASOPHILS # BLD AUTO: 0.02 X10(3) UL (ref 0–0.2)
BASOPHILS NFR BLD AUTO: 0.4 %
BILIRUB SERPL-MCNC: 0.5 MG/DL (ref 0.1–2)
BUN BLD-MCNC: 5 MG/DL (ref 7–18)
BUN/CREAT SERPL: 11.4 (ref 10–20)
CALCIUM BLD-MCNC: 8.2 MG/DL (ref 8.5–10.1)
CHLORIDE SERPL-SCNC: 109 MMOL/L (ref 98–112)
CO2 SERPL-SCNC: 27 MMOL/L (ref 21–32)
CREAT BLD-MCNC: 0.44 MG/DL
DEPRECATED RDW RBC AUTO: 68 FL (ref 35.1–46.3)
EGFRCR SERPLBLD CKD-EPI 2021: 131 ML/MIN/1.73M2 (ref 60–?)
EOSINOPHIL # BLD AUTO: 0.39 X10(3) UL (ref 0–0.7)
EOSINOPHIL NFR BLD AUTO: 8.1 %
ERYTHROCYTE [DISTWIDTH] IN BLOOD BY AUTOMATED COUNT: 21.2 % (ref 11–15)
GLOBULIN PLAS-MCNC: 4 G/DL (ref 2.8–4.4)
GLUCOSE BLD-MCNC: 96 MG/DL (ref 70–99)
HCT VFR BLD AUTO: 34.5 %
HGB BLD-MCNC: 10.6 G/DL
IMM GRANULOCYTES # BLD AUTO: 0.03 X10(3) UL (ref 0–1)
IMM GRANULOCYTES NFR BLD: 0.6 %
LYMPHOCYTES # BLD AUTO: 1.71 X10(3) UL (ref 1–4)
LYMPHOCYTES NFR BLD AUTO: 35.5 %
MAGNESIUM SERPL-MCNC: 1.9 MG/DL (ref 1.6–2.6)
MCH RBC QN AUTO: 26.8 PG (ref 26–34)
MCHC RBC AUTO-ENTMCNC: 30.7 G/DL (ref 31–37)
MCV RBC AUTO: 87.1 FL
MONOCYTES # BLD AUTO: 0.21 X10(3) UL (ref 0.1–1)
MONOCYTES NFR BLD AUTO: 4.4 %
NEUTROPHILS # BLD AUTO: 2.46 X10 (3) UL (ref 1.5–7.7)
NEUTROPHILS # BLD AUTO: 2.46 X10(3) UL (ref 1.5–7.7)
NEUTROPHILS NFR BLD AUTO: 51 %
OSMOLALITY SERPL CALC.SUM OF ELEC: 289 MOSM/KG (ref 275–295)
PHOSPHATE SERPL-MCNC: 3.2 MG/DL (ref 2.5–4.9)
PLATELET # BLD AUTO: 151 10(3)UL (ref 150–450)
POTASSIUM SERPL-SCNC: 4 MMOL/L (ref 3.5–5.1)
PROT SERPL-MCNC: 6.7 G/DL (ref 6.4–8.2)
RBC # BLD AUTO: 3.96 X10(6)UL
SODIUM SERPL-SCNC: 141 MMOL/L (ref 136–145)
WBC # BLD AUTO: 4.8 X10(3) UL (ref 4–11)

## 2023-08-12 PROCEDURE — 99233 SBSQ HOSP IP/OBS HIGH 50: CPT | Performed by: INTERNAL MEDICINE

## 2023-08-12 RX ORDER — ALPRAZOLAM 0.5 MG/1
0.5 TABLET ORAL 3 TIMES DAILY PRN
Status: ON HOLD | COMMUNITY

## 2023-08-12 RX ORDER — FLUCONAZOLE 2 MG/ML
400 INJECTION, SOLUTION INTRAVENOUS EVERY 24 HOURS
Status: DISCONTINUED | OUTPATIENT
Start: 2023-08-12 | End: 2023-08-15

## 2023-08-12 RX ORDER — ALPRAZOLAM 0.5 MG/1
0.5 TABLET ORAL 3 TIMES DAILY PRN
Status: DISCONTINUED | OUTPATIENT
Start: 2023-08-12 | End: 2023-08-17

## 2023-08-12 RX ORDER — HEPARIN SODIUM 5000 [USP'U]/ML
5000 INJECTION, SOLUTION INTRAVENOUS; SUBCUTANEOUS EVERY 8 HOURS SCHEDULED
Status: DISCONTINUED | OUTPATIENT
Start: 2023-08-12 | End: 2023-08-13

## 2023-08-12 NOTE — PLAN OF CARE
Pt c/o abdominal pain, dilaudid prn. Prn xanax given for anxiety. Tolerating low fiber/soft diet. IV abx maintained. IVF stopped. Afebrile, VSS. Drains irrigated [id number] as ordered. Leakage noted from vagina after flushing of newest placed drain - MD aware. Voiding freely. Ambulating in room independently. Plan to DC home once pain is better controlled.      Problem: Patient Centered Care  Goal: Patient preferences are identified and integrated in the patient's plan of care  Description: Interventions:  - What would you like us to know as we care for you?   - Provide timely, complete, and accurate information to patient/family  - Incorporate patient and family knowledge, values, beliefs, and cultural backgrounds into the planning and delivery of care  - Encourage patient/family to participate in care and decision-making at the level they choose  - Honor patient and family perspectives and choices  Outcome: Progressing     Problem: SKIN/TISSUE INTEGRITY - ADULT  Goal: Incision(s), wounds(s) or drain site(s) healing without S/S of infection  Description: INTERVENTIONS:  - Assess and document risk factors for pressure ulcer development  - Assess and document skin integrity  - Assess and document dressing/incision, wound bed, drain sites and surrounding tissue  - Implement wound care per orders  - Initiate isolation precautions as appropriate  - Initiate Pressure Ulcer prevention bundle as indicated  Outcome: Progressing     Problem: PAIN - ADULT  Goal: Verbalizes/displays adequate comfort level or patient's stated pain goal  Description: INTERVENTIONS:  - Encourage pt to monitor pain and request assistance  - Assess pain using appropriate pain scale  - Administer analgesics based on type and severity of pain and evaluate response  - Implement non-pharmacological measures as appropriate and evaluate response  - Consider cultural and social influences on pain and pain management  - Manage/alleviate anxiety  - Utilize distraction and/or relaxation techniques  - Monitor for opioid side effects  - Notify MD/LIP if interventions unsuccessful or patient reports new pain  - Anticipate increased pain with activity and pre-medicate as appropriate  Outcome: Progressing     Problem: SAFETY ADULT - FALL  Goal: Free from fall injury  Description: INTERVENTIONS:  - Assess pt frequently for physical needs  - Identify cognitive and physical deficits and behaviors that affect risk of falls.   - [place] fall precautions as indicated by assessment.  - Educate pt/family on patient safety including physical limitations  - Instruct pt to call for assistance with activity based on assessment  - Modify environment to reduce risk of injury  - Provide assistive devices as appropriate  - Consider OT/PT consult to assist with strengthening/mobility  - Encourage toileting schedule  Outcome: Progressing

## 2023-08-12 NOTE — PLAN OF CARE
No acute changes overnight. Pain being managed with PRN dilaudid. JUAN drains x2, flushed per order and output monitored. No complaints of nausea/vomiting. IVF running. Up ad mariya. Voiding freely.  at bedside overnight. Will continue to monitor.      Problem: Patient Centered Care  Goal: Patient preferences are identified and integrated in the patient's plan of care  Description: Interventions:  - What would you like us to know as we care for you?   - Provide timely, complete, and accurate information to patient/family  - Incorporate patient and family knowledge, values, beliefs, and cultural backgrounds into the planning and delivery of care  - Encourage patient/family to participate in care and decision-making at the level they choose  - Honor patient and family perspectives and choices  Outcome: Progressing     Problem: SKIN/TISSUE INTEGRITY - ADULT  Goal: Incision(s), wounds(s) or drain site(s) healing without S/S of infection  Description: INTERVENTIONS:  - Assess and document risk factors for pressure ulcer development  - Assess and document skin integrity  - Assess and document dressing/incision, wound bed, drain sites and surrounding tissue  - Implement wound care per orders  - Initiate isolation precautions as appropriate  - Initiate Pressure Ulcer prevention bundle as indicated  Outcome: Progressing     Problem: PAIN - ADULT  Goal: Verbalizes/displays adequate comfort level or patient's stated pain goal  Description: INTERVENTIONS:  - Encourage pt to monitor pain and request assistance  - Assess pain using appropriate pain scale  - Administer analgesics based on type and severity of pain and evaluate response  - Implement non-pharmacological measures as appropriate and evaluate response  - Consider cultural and social influences on pain and pain management  - Manage/alleviate anxiety  - Utilize distraction and/or relaxation techniques  - Monitor for opioid side effects  - Notify MD/LIP if interventions unsuccessful or patient reports new pain  - Anticipate increased pain with activity and pre-medicate as appropriate  Outcome: Progressing     Problem: SAFETY ADULT - FALL  Goal: Free from fall injury  Description: INTERVENTIONS:  - Assess pt frequently for physical needs  - Identify cognitive and physical deficits and behaviors that affect risk of falls.   - [place] fall precautions as indicated by assessment.  - Educate pt/family on patient safety including physical limitations  - Instruct pt to call for assistance with activity based on assessment  - Modify environment to reduce risk of injury  - Provide assistive devices as appropriate  - Consider OT/PT consult to assist with strengthening/mobility  - Encourage toileting schedule  Outcome: Progressing

## 2023-08-13 LAB
ALBUMIN SERPL-MCNC: 2.8 G/DL (ref 3.4–5)
ALBUMIN/GLOB SERPL: 0.7 {RATIO} (ref 1–2)
ALP LIVER SERPL-CCNC: 63 U/L
ALT SERPL-CCNC: 13 U/L
ANION GAP SERPL CALC-SCNC: 4 MMOL/L (ref 0–18)
AST SERPL-CCNC: 28 U/L (ref 15–37)
BASOPHILS # BLD AUTO: 0.02 X10(3) UL (ref 0–0.2)
BASOPHILS NFR BLD AUTO: 0.4 %
BILIRUB SERPL-MCNC: 0.6 MG/DL (ref 0.1–2)
BUN BLD-MCNC: 3 MG/DL (ref 7–18)
BUN/CREAT SERPL: 7.7 (ref 10–20)
CALCIUM BLD-MCNC: 8.6 MG/DL (ref 8.5–10.1)
CHLORIDE SERPL-SCNC: 106 MMOL/L (ref 98–112)
CO2 SERPL-SCNC: 30 MMOL/L (ref 21–32)
CREAT BLD-MCNC: 0.39 MG/DL
DEPRECATED RDW RBC AUTO: 66.4 FL (ref 35.1–46.3)
EGFRCR SERPLBLD CKD-EPI 2021: 135 ML/MIN/1.73M2 (ref 60–?)
EOSINOPHIL # BLD AUTO: 0.35 X10(3) UL (ref 0–0.7)
EOSINOPHIL NFR BLD AUTO: 6.5 %
ERYTHROCYTE [DISTWIDTH] IN BLOOD BY AUTOMATED COUNT: 20.9 % (ref 11–15)
GLOBULIN PLAS-MCNC: 4 G/DL (ref 2.8–4.4)
GLUCOSE BLD-MCNC: 94 MG/DL (ref 70–99)
HCT VFR BLD AUTO: 34 %
HGB BLD-MCNC: 10.7 G/DL
IMM GRANULOCYTES # BLD AUTO: 0.02 X10(3) UL (ref 0–1)
IMM GRANULOCYTES NFR BLD: 0.4 %
LYMPHOCYTES # BLD AUTO: 1.7 X10(3) UL (ref 1–4)
LYMPHOCYTES NFR BLD AUTO: 31.7 %
MCH RBC QN AUTO: 27.2 PG (ref 26–34)
MCHC RBC AUTO-ENTMCNC: 31.5 G/DL (ref 31–37)
MCV RBC AUTO: 86.3 FL
MONOCYTES # BLD AUTO: 0.23 X10(3) UL (ref 0.1–1)
MONOCYTES NFR BLD AUTO: 4.3 %
NEUTROPHILS # BLD AUTO: 3.04 X10 (3) UL (ref 1.5–7.7)
NEUTROPHILS # BLD AUTO: 3.04 X10(3) UL (ref 1.5–7.7)
NEUTROPHILS NFR BLD AUTO: 56.7 %
OSMOLALITY SERPL CALC.SUM OF ELEC: 286 MOSM/KG (ref 275–295)
PLATELET # BLD AUTO: 129 10(3)UL (ref 150–450)
POTASSIUM SERPL-SCNC: 4.1 MMOL/L (ref 3.5–5.1)
PROT SERPL-MCNC: 6.8 G/DL (ref 6.4–8.2)
RBC # BLD AUTO: 3.94 X10(6)UL
SODIUM SERPL-SCNC: 140 MMOL/L (ref 136–145)
WBC # BLD AUTO: 5.4 X10(3) UL (ref 4–11)

## 2023-08-13 PROCEDURE — 99232 SBSQ HOSP IP/OBS MODERATE 35: CPT | Performed by: INTERNAL MEDICINE

## 2023-08-13 PROCEDURE — 99223 1ST HOSP IP/OBS HIGH 75: CPT | Performed by: INTERNAL MEDICINE

## 2023-08-13 RX ORDER — HEPARIN SODIUM 5000 [USP'U]/ML
7500 INJECTION, SOLUTION INTRAVENOUS; SUBCUTANEOUS EVERY 8 HOURS SCHEDULED
Status: DISCONTINUED | OUTPATIENT
Start: 2023-08-13 | End: 2023-08-17

## 2023-08-13 RX ORDER — IPRATROPIUM BROMIDE AND ALBUTEROL SULFATE 2.5; .5 MG/3ML; MG/3ML
3 SOLUTION RESPIRATORY (INHALATION) EVERY 6 HOURS PRN
Status: DISCONTINUED | OUTPATIENT
Start: 2023-08-13 | End: 2023-08-17

## 2023-08-13 RX ORDER — VANCOMYCIN HYDROCHLORIDE
10 EVERY 8 HOURS
Status: DISCONTINUED | OUTPATIENT
Start: 2023-08-13 | End: 2023-08-14

## 2023-08-13 NOTE — PLAN OF CARE
Problem: Patient Centered Care  Goal: Patient preferences are identified and integrated in the patient's plan of care  Description: Interventions:  - What would you like us to know as we care for you?   - Provide timely, complete, and accurate information to patient/family  - Incorporate patient and family knowledge, values, beliefs, and cultural backgrounds into the planning and delivery of care  - Encourage patient/family to participate in care and decision-making at the level they choose  - Honor patient and family perspectives and choices  Outcome: Progressing     Problem: SKIN/TISSUE INTEGRITY - ADULT  Goal: Incision(s), wounds(s) or drain site(s) healing without S/S of infection  Description: INTERVENTIONS:  - Assess and document risk factors for pressure ulcer development  - Assess and document skin integrity  - Assess and document dressing/incision, wound bed, drain sites and surrounding tissue  - Implement wound care per orders  - Initiate isolation precautions as appropriate  - Initiate Pressure Ulcer prevention bundle as indicated  Outcome: Progressing     Problem: PAIN - ADULT  Goal: Verbalizes/displays adequate comfort level or patient's stated pain goal  Description: INTERVENTIONS:  - Encourage pt to monitor pain and request assistance  - Assess pain using appropriate pain scale  - Administer analgesics based on type and severity of pain and evaluate response  - Implement non-pharmacological measures as appropriate and evaluate response  - Consider cultural and social influences on pain and pain management  - Manage/alleviate anxiety  - Utilize distraction and/or relaxation techniques  - Monitor for opioid side effects  - Notify MD/LIP if interventions unsuccessful or patient reports new pain  - Anticipate increased pain with activity and pre-medicate as appropriate  Outcome: Progressing     Problem: SAFETY ADULT - FALL  Goal: Free from fall injury  Description: INTERVENTIONS:  - Assess pt frequently for physical needs  - Identify cognitive and physical deficits and behaviors that affect risk of falls. - [place] fall precautions as indicated by assessment.  - Educate pt/family on patient safety including physical limitations  - Instruct pt to call for assistance with activity based on assessment  - Modify environment to reduce risk of injury  - Provide assistive devices as appropriate  - Consider OT/PT consult to assist with strengthening/mobility  - Encourage toileting schedule  Outcome: Progressing   A/Ox4. Vital signs stable on room air. Tolerating low fiber soft diet. Voiding freely. IV abx continued. JUAN drains flushed q8 per orders. Pain managed with prn dilaudid. Ambulating independently. Safety precautions in place. Bed locked in the lowest position, call light within reach, frequent rounding done.

## 2023-08-13 NOTE — PLAN OF CARE
Dilaudid given for pain control. Drains flushed as ordered. Leakage continued to be noted from vagina after flushing of newest placed drain, MD aware, plan to follow up with IR tomorrow. Tolerating diet. IV abx maintained. Ambulating independently in room, voiding freely. Still having noted leakage after VSS. Problem: Patient Centered Care  Goal: Patient preferences are identified and integrated in the patient's plan of care  Description: Interventions:  - What would you like us to know as we care for you?   - Provide timely, complete, and accurate information to patient/family  - Incorporate patient and family knowledge, values, beliefs, and cultural backgrounds into the planning and delivery of care  - Encourage patient/family to participate in care and decision-making at the level they choose  - Honor patient and family perspectives and choices  Outcome: Progressing     Problem: SKIN/TISSUE INTEGRITY - ADULT  Goal: Incision(s), wounds(s) or drain site(s) healing without S/S of infection  Description: INTERVENTIONS:  - Assess and document risk factors for pressure ulcer development  - Assess and document skin integrity  - Assess and document dressing/incision, wound bed, drain sites and surrounding tissue  - Implement wound care per orders  - Initiate isolation precautions as appropriate  - Initiate Pressure Ulcer prevention bundle as indicated  Outcome: Progressing     Problem: SAFETY ADULT - FALL  Goal: Free from fall injury  Description: INTERVENTIONS:  - Assess pt frequently for physical needs  - Identify cognitive and physical deficits and behaviors that affect risk of falls.   - [place] fall precautions as indicated by assessment.  - Educate pt/family on patient safety including physical limitations  - Instruct pt to call for assistance with activity based on assessment  - Modify environment to reduce risk of injury  - Provide assistive devices as appropriate  - Consider OT/PT consult to assist with strengthening/mobility  - Encourage toileting schedule  Outcome: Progressing     Problem: PAIN - ADULT  Goal: Verbalizes/displays adequate comfort level or patient's stated pain goal  Description: INTERVENTIONS:  - Encourage pt to monitor pain and request assistance  - Assess pain using appropriate pain scale  - Administer analgesics based on type and severity of pain and evaluate response  - Implement non-pharmacological measures as appropriate and evaluate response  - Consider cultural and social influences on pain and pain management  - Manage/alleviate anxiety  - Utilize distraction and/or relaxation techniques  - Monitor for opioid side effects  - Notify MD/LIP if interventions unsuccessful or patient reports new pain  - Anticipate increased pain with activity and pre-medicate as appropriate  Outcome: Not Progressing

## 2023-08-14 DIAGNOSIS — K57.92 DIVERTICULITIS: Primary | ICD-10-CM

## 2023-08-14 LAB
ALBUMIN SERPL-MCNC: 2.8 G/DL (ref 3.4–5)
ALBUMIN/GLOB SERPL: 0.7 {RATIO} (ref 1–2)
ALP LIVER SERPL-CCNC: 64 U/L
ALT SERPL-CCNC: 15 U/L
ANION GAP SERPL CALC-SCNC: 4 MMOL/L (ref 0–18)
AST SERPL-CCNC: 23 U/L (ref 15–37)
BASOPHILS # BLD AUTO: 0.02 X10(3) UL (ref 0–0.2)
BASOPHILS NFR BLD AUTO: 0.4 %
BILIRUB SERPL-MCNC: 0.6 MG/DL (ref 0.1–2)
BUN BLD-MCNC: 3 MG/DL (ref 7–18)
BUN/CREAT SERPL: 6.8 (ref 10–20)
CALCIUM BLD-MCNC: 8.8 MG/DL (ref 8.5–10.1)
CHLORIDE SERPL-SCNC: 108 MMOL/L (ref 98–112)
CO2 SERPL-SCNC: 29 MMOL/L (ref 21–32)
CREAT BLD-MCNC: 0.44 MG/DL
DEPRECATED RDW RBC AUTO: 66.9 FL (ref 35.1–46.3)
EGFRCR SERPLBLD CKD-EPI 2021: 131 ML/MIN/1.73M2 (ref 60–?)
EOSINOPHIL # BLD AUTO: 0.27 X10(3) UL (ref 0–0.7)
EOSINOPHIL NFR BLD AUTO: 5.2 %
ERYTHROCYTE [DISTWIDTH] IN BLOOD BY AUTOMATED COUNT: 21.2 % (ref 11–15)
GLOBULIN PLAS-MCNC: 4.1 G/DL (ref 2.8–4.4)
GLUCOSE BLD-MCNC: 101 MG/DL (ref 70–99)
HCT VFR BLD AUTO: 33.2 %
HGB BLD-MCNC: 10.4 G/DL
IMM GRANULOCYTES # BLD AUTO: 0.03 X10(3) UL (ref 0–1)
IMM GRANULOCYTES NFR BLD: 0.6 %
LYMPHOCYTES # BLD AUTO: 1.56 X10(3) UL (ref 1–4)
LYMPHOCYTES NFR BLD AUTO: 29.8 %
MCH RBC QN AUTO: 27.2 PG (ref 26–34)
MCHC RBC AUTO-ENTMCNC: 31.3 G/DL (ref 31–37)
MCV RBC AUTO: 86.7 FL
MONOCYTES # BLD AUTO: 0.29 X10(3) UL (ref 0.1–1)
MONOCYTES NFR BLD AUTO: 5.5 %
NEUTROPHILS # BLD AUTO: 3.06 X10 (3) UL (ref 1.5–7.7)
NEUTROPHILS # BLD AUTO: 3.06 X10(3) UL (ref 1.5–7.7)
NEUTROPHILS NFR BLD AUTO: 58.5 %
OSMOLALITY SERPL CALC.SUM OF ELEC: 289 MOSM/KG (ref 275–295)
PLATELET # BLD AUTO: 149 10(3)UL (ref 150–450)
POTASSIUM SERPL-SCNC: 3.8 MMOL/L (ref 3.5–5.1)
PROT SERPL-MCNC: 6.9 G/DL (ref 6.4–8.2)
RBC # BLD AUTO: 3.83 X10(6)UL
SODIUM SERPL-SCNC: 141 MMOL/L (ref 136–145)
VANCOMYCIN TROUGH SERPL-MCNC: 19 UG/ML (ref 10–20)
WBC # BLD AUTO: 5.2 X10(3) UL (ref 4–11)

## 2023-08-14 PROCEDURE — 99232 SBSQ HOSP IP/OBS MODERATE 35: CPT | Performed by: INTERNAL MEDICINE

## 2023-08-14 PROCEDURE — 99233 SBSQ HOSP IP/OBS HIGH 50: CPT | Performed by: INTERNAL MEDICINE

## 2023-08-14 RX ORDER — HYDROCODONE BITARTRATE AND ACETAMINOPHEN 7.5; 325 MG/1; MG/1
1 TABLET ORAL EVERY 6 HOURS PRN
Status: DISCONTINUED | OUTPATIENT
Start: 2023-08-14 | End: 2023-08-16

## 2023-08-14 RX ORDER — HYDROCODONE BITARTRATE AND ACETAMINOPHEN 5; 325 MG/1; MG/1
1 TABLET ORAL EVERY 6 HOURS PRN
Status: DISCONTINUED | OUTPATIENT
Start: 2023-08-14 | End: 2023-08-14

## 2023-08-14 RX ORDER — WATER 1000 ML/1000ML
INJECTION, SOLUTION INTRAVENOUS
Status: COMPLETED
Start: 2023-08-14 | End: 2023-08-14

## 2023-08-14 RX ORDER — VANCOMYCIN HYDROCHLORIDE
10 EVERY 12 HOURS
Status: DISCONTINUED | OUTPATIENT
Start: 2023-08-15 | End: 2023-08-15

## 2023-08-14 NOTE — PLAN OF CARE
Problem: Patient Centered Care  Goal: Patient preferences are identified and integrated in the patient's plan of care  Description: Interventions:  - What would you like us to know as we care for you?   - Provide timely, complete, and accurate information to patient/family  - Incorporate patient and family knowledge, values, beliefs, and cultural backgrounds into the planning and delivery of care  - Encourage patient/family to participate in care and decision-making at the level they choose  - Honor patient and family perspectives and choices  Outcome: Progressing     Problem: SKIN/TISSUE INTEGRITY - ADULT  Goal: Incision(s), wounds(s) or drain site(s) healing without S/S of infection  Description: INTERVENTIONS:  - Assess and document risk factors for pressure ulcer development  - Assess and document skin integrity  - Assess and document dressing/incision, wound bed, drain sites and surrounding tissue  - Implement wound care per orders  - Initiate isolation precautions as appropriate  - Initiate Pressure Ulcer prevention bundle as indicated  Outcome: Progressing     Problem: PAIN - ADULT  Goal: Verbalizes/displays adequate comfort level or patient's stated pain goal  Description: INTERVENTIONS:  - Encourage pt to monitor pain and request assistance  - Assess pain using appropriate pain scale  - Administer analgesics based on type and severity of pain and evaluate response  - Implement non-pharmacological measures as appropriate and evaluate response  - Consider cultural and social influences on pain and pain management  - Manage/alleviate anxiety  - Utilize distraction and/or relaxation techniques  - Monitor for opioid side effects  - Notify MD/LIP if interventions unsuccessful or patient reports new pain  - Anticipate increased pain with activity and pre-medicate as appropriate  Outcome: Progressing     Problem: SAFETY ADULT - FALL  Goal: Free from fall injury  Description: INTERVENTIONS:  - Assess pt frequently for physical needs  - Identify cognitive and physical deficits and behaviors that affect risk of falls. - [place] fall precautions as indicated by assessment.  - Educate pt/family on patient safety including physical limitations  - Instruct pt to call for assistance with activity based on assessment  - Modify environment to reduce risk of injury  - Provide assistive devices as appropriate  - Consider OT/PT consult to assist with strengthening/mobility  - Encourage toileting schedule  Outcome: Progressing  A/Ox4. Vital signs stable on room air. Tolerating low fiber soft diet. JUAN drains flushed as ordered, leakage from vagina after flushing new drain. MD aware following up with IR in the morning. Voiding freely. IV abx continued. Ambulating independently. Safety precautions in place. Bed locked in the lowest position, call light within reach, frequent rounding done.

## 2023-08-14 NOTE — PLAN OF CARE
Patient is alert and oriented x4. On room air. Vital signs stable. Voiding freely. PRN Dilaudid for pain management. Denies nausea, tolerating diet. IV Ativan given for anxiety. x2 JUAN drains intact, irrigated per orders. JUAN to left groin with purulent output, JUNA to LLQ with serosanguineous output. Patient continues with clear, pink vaginal discharge immediately after flushing drain, MDs aware. IV Abx continued.  at bedside. No acute changes during shift.        Problem: Patient Centered Care  Goal: Patient preferences are identified and integrated in the patient's plan of care  Description: Interventions:  - What would you like us to know as we care for you?   - Provide timely, complete, and accurate information to patient/family  - Incorporate patient and family knowledge, values, beliefs, and cultural backgrounds into the planning and delivery of care  - Encourage patient/family to participate in care and decision-making at the level they choose  - Honor patient and family perspectives and choices  Outcome: Progressing     Problem: SKIN/TISSUE INTEGRITY - ADULT  Goal: Incision(s), wounds(s) or drain site(s) healing without S/S of infection  Description: INTERVENTIONS:  - Assess and document risk factors for pressure ulcer development  - Assess and document skin integrity  - Assess and document dressing/incision, wound bed, drain sites and surrounding tissue  - Implement wound care per orders  - Initiate isolation precautions as appropriate  - Initiate Pressure Ulcer prevention bundle as indicated  Outcome: Progressing     Problem: PAIN - ADULT  Goal: Verbalizes/displays adequate comfort level or patient's stated pain goal  Description: INTERVENTIONS:  - Encourage pt to monitor pain and request assistance  - Assess pain using appropriate pain scale  - Administer analgesics based on type and severity of pain and evaluate response  - Implement non-pharmacological measures as appropriate and evaluate response  - Consider cultural and social influences on pain and pain management  - Manage/alleviate anxiety  - Utilize distraction and/or relaxation techniques  - Monitor for opioid side effects  - Notify MD/LIP if interventions unsuccessful or patient reports new pain  - Anticipate increased pain with activity and pre-medicate as appropriate  Outcome: Progressing     Problem: SAFETY ADULT - FALL  Goal: Free from fall injury  Description: INTERVENTIONS:  - Assess pt frequently for physical needs  - Identify cognitive and physical deficits and behaviors that affect risk of falls.   - [place] fall precautions as indicated by assessment.  - Educate pt/family on patient safety including physical limitations  - Instruct pt to call for assistance with activity based on assessment  - Modify environment to reduce risk of injury  - Provide assistive devices as appropriate  - Consider OT/PT consult to assist with strengthening/mobility  - Encourage toileting schedule  Outcome: Progressing

## 2023-08-15 ENCOUNTER — HOSPITAL ENCOUNTER (OUTPATIENT)
Dept: INTERVENTIONAL RADIOLOGY/VASCULAR | Facility: HOSPITAL | Age: 33
Discharge: HOME | End: 2023-08-15
Attending: CLINICAL NURSE SPECIALIST
Payer: COMMERCIAL

## 2023-08-15 PROCEDURE — 99233 SBSQ HOSP IP/OBS HIGH 50: CPT | Performed by: INTERNAL MEDICINE

## 2023-08-15 RX ORDER — CALCIUM CARBONATE 500 MG/1
500 TABLET, CHEWABLE ORAL 2 TIMES DAILY PRN
Status: DISCONTINUED | OUTPATIENT
Start: 2023-08-15 | End: 2023-08-17

## 2023-08-15 RX ORDER — PANTOPRAZOLE SODIUM 40 MG/1
40 TABLET, DELAYED RELEASE ORAL
Status: DISCONTINUED | OUTPATIENT
Start: 2023-08-16 | End: 2023-08-17

## 2023-08-15 RX ORDER — PANTOPRAZOLE SODIUM 20 MG/1
20 TABLET, DELAYED RELEASE ORAL ONCE
Status: COMPLETED | OUTPATIENT
Start: 2023-08-15 | End: 2023-08-15

## 2023-08-15 NOTE — PLAN OF CARE
Discussed plan of care for day, including all given medications and their indications. IV meropenem, fluconazole, and vancomycin continued. 2 JUAN drains remain to left abdomen -- Frequency of flushing is now daily with normal saline. Pain managed with oral Norco and IV Dilaudid as needed -- Aim to decrease IV Dilaudid use as tolerated. Heartburn noted this afternoon -- Medications adjusted. Encouraging deep breathing exercises and increased mobility as tolerated. Comfort measures encouraged to promote restful environment. Problem: Patient Centered Care  Goal: Patient preferences are identified and integrated in the patient's plan of care  Description: Interventions:  - What would you like us to know as we care for you?  I live at home with my .  - Provide timely, complete, and accurate information to patient/family  - Incorporate patient and family knowledge, values, beliefs, and cultural backgrounds into the planning and delivery of care  - Encourage patient/family to participate in care and decision-making at the level they choose  - Honor patient and family perspectives and choices  Outcome: Progressing     Problem: SKIN/TISSUE INTEGRITY - ADULT  Goal: Incision(s), wounds(s) or drain site(s) healing without S/S of infection  Description: INTERVENTIONS:  - Assess and document risk factors for pressure ulcer development  - Assess and document skin integrity  - Assess and document dressing/incision, wound bed, drain sites and surrounding tissue  - Implement wound care per orders  - Initiate isolation precautions as appropriate  - Initiate Pressure Ulcer prevention bundle as indicated  Outcome: Progressing     Problem: PAIN - ADULT  Goal: Verbalizes/displays adequate comfort level or patient's stated pain goal  Description: INTERVENTIONS:  - Encourage pt to monitor pain and request assistance  - Assess pain using appropriate pain scale  - Administer analgesics based on type and severity of pain and evaluate response  - Implement non-pharmacological measures as appropriate and evaluate response  - Consider cultural and social influences on pain and pain management  - Manage/alleviate anxiety  - Utilize distraction and/or relaxation techniques  - Monitor for opioid side effects  - Notify MD/LIP if interventions unsuccessful or patient reports new pain  - Anticipate increased pain with activity and pre-medicate as appropriate  Outcome: Progressing     Problem: SAFETY ADULT - FALL  Goal: Free from fall injury  Description: INTERVENTIONS:  - Assess pt frequently for physical needs  - Identify cognitive and physical deficits and behaviors that affect risk of falls.   - [place] fall precautions as indicated by assessment.  - Educate pt/family on patient safety including physical limitations  - Instruct pt to call for assistance with activity based on assessment  - Modify environment to reduce risk of injury  - Provide assistive devices as appropriate  - Consider OT/PT consult to assist with strengthening/mobility  - Encourage toileting schedule  Outcome: Progressing     Problem: RISK FOR INFECTION - ADULT  Goal: Absence of fever/infection during anticipated neutropenic period  Description: INTERVENTIONS  - Monitor WBC  - Administer growth factors as ordered  - Implement neutropenic guidelines  Outcome: Progressing     Problem: Patient/Family Goals  Goal: Patient/Family Long Term Goal  Description: Patient's Long Term Goal: Discharge home    Interventions:  - Determine appropriate outpatient antibiotic plan  - Manage pain with oral medication regimen  - Demonstrate ability to care for and flush abdominal drains  - See additional Care Plan goals for specific interventions  Outcome: Progressing  Goal: Patient/Family Short Term Goal  Description: Patient's Short Term Goal: Resolve abdominal infection    Interventions:   - IV antibiotic management as indicated  - Maintain patency of abdominal drains  - Monitor for fever or increased signs of infection  - See additional Care Plan goals for specific interventions  Outcome: Progressing     All efforts maintained to promote infection prevention during my assessment and care for this patient. Stethoscope cleansed and hand hygiene strictly maintained.

## 2023-08-15 NOTE — PLAN OF CARE
Jojo Jorge is AxO4. Afebrile overnight. JPx2 drains in place to Lower left abdomen/pelvis. Irrigated per order. Continues to report vaginal discharge following drain flush. Complained of leakage from drain tubing during flush. Dilaudid and norco provided for pain. Ambien provided for anxiety. Tolerating diet. Denies nausea/vomiting. Up in the halls with SBA. Receiving IV abx via PICC line. C/o wheezing overnight. Declined albuterol. Denies chest pain/SOB. Up in the hallways last night independent/SBA. Plan is pending medical course. Appropriate safety measures in place, call light within reach, and frequent rounding by staff.      Problem: Patient Centered Care  Goal: Patient preferences are identified and integrated in the patient's plan of care  Description: Interventions:  - What would you like us to know as we care for you?   - Provide timely, complete, and accurate information to patient/family  - Incorporate patient and family knowledge, values, beliefs, and cultural backgrounds into the planning and delivery of care  - Encourage patient/family to participate in care and decision-making at the level they choose  - Honor patient and family perspectives and choices  Outcome: Progressing     Problem: SKIN/TISSUE INTEGRITY - ADULT  Goal: Incision(s), wounds(s) or drain site(s) healing without S/S of infection  Description: INTERVENTIONS:  - Assess and document risk factors for pressure ulcer development  - Assess and document skin integrity  - Assess and document dressing/incision, wound bed, drain sites and surrounding tissue  - Implement wound care per orders  - Initiate isolation precautions as appropriate  - Initiate Pressure Ulcer prevention bundle as indicated  Outcome: Progressing     Problem: PAIN - ADULT  Goal: Verbalizes/displays adequate comfort level or patient's stated pain goal  Description: INTERVENTIONS:  - Encourage pt to monitor pain and request assistance  - Assess pain using appropriate pain scale  - Administer analgesics based on type and severity of pain and evaluate response  - Implement non-pharmacological measures as appropriate and evaluate response  - Consider cultural and social influences on pain and pain management  - Manage/alleviate anxiety  - Utilize distraction and/or relaxation techniques  - Monitor for opioid side effects  - Notify MD/LIP if interventions unsuccessful or patient reports new pain  - Anticipate increased pain with activity and pre-medicate as appropriate  Outcome: Progressing     Problem: SAFETY ADULT - FALL  Goal: Free from fall injury  Description: INTERVENTIONS:  - Assess pt frequently for physical needs  - Identify cognitive and physical deficits and behaviors that affect risk of falls.   - [place] fall precautions as indicated by assessment.  - Educate pt/family on patient safety including physical limitations  - Instruct pt to call for assistance with activity based on assessment  - Modify environment to reduce risk of injury  - Provide assistive devices as appropriate  - Consider OT/PT consult to assist with strengthening/mobility  - Encourage toileting schedule  Outcome: Progressing

## 2023-08-15 NOTE — CM/SW NOTE
Care Progression Note:  Length of stay: 3  GMLOS: 2. Avoidable Delays:   Code Status: Not on file    Acute Medical Issue/Factors:   Acute diverticulitis   Perforated sigmoid diverticulitis w/ abscess with increased L adenexal fluid collection s/p drain insertion 8/11. JUAN's x's 3 intact. 1 daily drain flushes. PO pain meds encouraged, pt still taking IV dilaudid for pain. LFS diet with nutrition supplements TID  [facility] ID on consult. Continue on meropenem and OVP. Stop vancomycin and fluconazole. Anticipate continued IV meropenem on DC with repeat CT A/P in two weeks. PICC in place. Discharge Barriers:   Expected discharge date: 8/16  Expected next site of care: Home. Resume home IV abx infusions. / available for discharge planning. Jose Aden.  Gwyn Snyder RN, BSN  Nurse   [phone]

## 2023-08-16 LAB
ALBUMIN SERPL-MCNC: 2.8 G/DL (ref 3.4–5)
ALBUMIN/GLOB SERPL: 0.7 {RATIO} (ref 1–2)
ALP LIVER SERPL-CCNC: 66 U/L
ALT SERPL-CCNC: 10 U/L
ANION GAP SERPL CALC-SCNC: 3 MMOL/L (ref 0–18)
AST SERPL-CCNC: 23 U/L (ref 15–37)
BASOPHILS # BLD AUTO: 0.01 X10(3) UL (ref 0–0.2)
BASOPHILS NFR BLD AUTO: 0.3 %
BILIRUB SERPL-MCNC: 0.4 MG/DL (ref 0.1–2)
BUN BLD-MCNC: 4 MG/DL (ref 7–18)
BUN/CREAT SERPL: 9.3 (ref 10–20)
CALCIUM BLD-MCNC: 9.1 MG/DL (ref 8.5–10.1)
CHLORIDE SERPL-SCNC: 106 MMOL/L (ref 98–112)
CO2 SERPL-SCNC: 31 MMOL/L (ref 21–32)
CREAT BLD-MCNC: 0.43 MG/DL
DEPRECATED RDW RBC AUTO: 66.7 FL (ref 35.1–46.3)
EGFRCR SERPLBLD CKD-EPI 2021: 132 ML/MIN/1.73M2 (ref 60–?)
EOSINOPHIL # BLD AUTO: 0.44 X10(3) UL (ref 0–0.7)
EOSINOPHIL NFR BLD AUTO: 11.1 %
ERYTHROCYTE [DISTWIDTH] IN BLOOD BY AUTOMATED COUNT: 21.1 % (ref 11–15)
GLOBULIN PLAS-MCNC: 4 G/DL (ref 2.8–4.4)
GLUCOSE BLD-MCNC: 99 MG/DL (ref 70–99)
HCT VFR BLD AUTO: 33.4 %
HGB BLD-MCNC: 10.5 G/DL
IMM GRANULOCYTES # BLD AUTO: 0.02 X10(3) UL (ref 0–1)
IMM GRANULOCYTES NFR BLD: 0.5 %
LYMPHOCYTES # BLD AUTO: 1.38 X10(3) UL (ref 1–4)
LYMPHOCYTES NFR BLD AUTO: 34.7 %
MCH RBC QN AUTO: 27.2 PG (ref 26–34)
MCHC RBC AUTO-ENTMCNC: 31.4 G/DL (ref 31–37)
MCV RBC AUTO: 86.5 FL
MONOCYTES # BLD AUTO: 0.27 X10(3) UL (ref 0.1–1)
MONOCYTES NFR BLD AUTO: 6.8 %
NEUTROPHILS # BLD AUTO: 1.86 X10 (3) UL (ref 1.5–7.7)
NEUTROPHILS # BLD AUTO: 1.86 X10(3) UL (ref 1.5–7.7)
NEUTROPHILS NFR BLD AUTO: 46.6 %
OSMOLALITY SERPL CALC.SUM OF ELEC: 287 MOSM/KG (ref 275–295)
PLATELET # BLD AUTO: 147 10(3)UL (ref 150–450)
POTASSIUM SERPL-SCNC: 4.1 MMOL/L (ref 3.5–5.1)
PROT SERPL-MCNC: 6.8 G/DL (ref 6.4–8.2)
RBC # BLD AUTO: 3.86 X10(6)UL
SODIUM SERPL-SCNC: 140 MMOL/L (ref 136–145)
WBC # BLD AUTO: 4 X10(3) UL (ref 4–11)

## 2023-08-16 PROCEDURE — 99233 SBSQ HOSP IP/OBS HIGH 50: CPT | Performed by: HOSPITALIST

## 2023-08-16 RX ORDER — HYDROCODONE BITARTRATE AND ACETAMINOPHEN 7.5; 325 MG/1; MG/1
1 TABLET ORAL EVERY 4 HOURS PRN
Status: DISCONTINUED | OUTPATIENT
Start: 2023-08-16 | End: 2023-08-17

## 2023-08-16 NOTE — PLAN OF CARE
No acute changes overnight. Pain managed with PRN norco and dilaudid. JUAN x2, irrigates per order and output monitored. IV abx given. No complaints of nausea/vomiting. Voiding freely.  at bedside overnight. Will continue to monitor. Problem: Patient Centered Care  Goal: Patient preferences are identified and integrated in the patient's plan of care  Description: Interventions:  - What would you like us to know as we care for you?  I live at home with my .  - Provide timely, complete, and accurate information to patient/family  - Incorporate patient and family knowledge, values, beliefs, and cultural backgrounds into the planning and delivery of care  - Encourage patient/family to participate in care and decision-making at the level they choose  - Honor patient and family perspectives and choices  Outcome: Progressing     Problem: SKIN/TISSUE INTEGRITY - ADULT  Goal: Incision(s), wounds(s) or drain site(s) healing without S/S of infection  Description: INTERVENTIONS:  - Assess and document risk factors for pressure ulcer development  - Assess and document skin integrity  - Assess and document dressing/incision, wound bed, drain sites and surrounding tissue  - Implement wound care per orders  - Initiate isolation precautions as appropriate  - Initiate Pressure Ulcer prevention bundle as indicated  Outcome: Progressing     Problem: PAIN - ADULT  Goal: Verbalizes/displays adequate comfort level or patient's stated pain goal  Description: INTERVENTIONS:  - Encourage pt to monitor pain and request assistance  - Assess pain using appropriate pain scale  - Administer analgesics based on type and severity of pain and evaluate response  - Implement non-pharmacological measures as appropriate and evaluate response  - Consider cultural and social influences on pain and pain management  - Manage/alleviate anxiety  - Utilize distraction and/or relaxation techniques  - Monitor for opioid side effects  - Notify MD/LIP if interventions unsuccessful or patient reports new pain  - Anticipate increased pain with activity and pre-medicate as appropriate  Outcome: Progressing     Problem: SAFETY ADULT - FALL  Goal: Free from fall injury  Description: INTERVENTIONS:  - Assess pt frequently for physical needs  - Identify cognitive and physical deficits and behaviors that affect risk of falls.   - [place] fall precautions as indicated by assessment.  - Educate pt/family on patient safety including physical limitations  - Instruct pt to call for assistance with activity based on assessment  - Modify environment to reduce risk of injury  - Provide assistive devices as appropriate  - Consider OT/PT consult to assist with strengthening/mobility  - Encourage toileting schedule  Outcome: Progressing     Problem: RISK FOR INFECTION - ADULT  Goal: Absence of fever/infection during anticipated neutropenic period  Description: INTERVENTIONS  - Monitor WBC  - Administer growth factors as ordered  - Implement neutropenic guidelines  Outcome: Progressing     Problem: Patient/Family Goals  Goal: Patient/Family Long Term Goal  Description: Patient's Long Term Goal: Discharge home    Interventions:  - Determine appropriate outpatient antibiotic plan  - Manage pain with oral medication regimen  - Demonstrate ability to care for and flush abdominal drains  - See additional Care Plan goals for specific interventions  Outcome: Progressing  Goal: Patient/Family Short Term Goal  Description: Patient's Short Term Goal: Resolve abdominal infection    Interventions:   - IV antibiotic management as indicated  - Maintain patency of abdominal drains  - Monitor for fever or increased signs of infection  - See additional Care Plan goals for specific interventions  Outcome: Progressing

## 2023-08-16 NOTE — PLAN OF CARE
Discussed plan of care for day, including all given medications and their indications. IV meropenem maintained per schedule. Daily flushing of left abdomen and left lower abdomen JUAN drains continued daily with normal saline. Dressing change of JUAN drain reinforced with  at bedside (to be utilized at home as needed). Pain managed with oral Norco -- Dilaudid given x 1 for breakthrough early this morning. Norco frequency increased -- aim to trial only oral pain medications as needed. Cleared for discharge home with continuation of IV antibiotics -- Patient in need of more adequate pain control. Hopeful for discharge home tomorrow. Comfort measures encouraged to promote restful environment. Problem: Patient Centered Care  Goal: Patient preferences are identified and integrated in the patient's plan of care  Description: Interventions:  - What would you like us to know as we care for you?  I live at home with my .  - Provide timely, complete, and accurate information to patient/family  - Incorporate patient and family knowledge, values, beliefs, and cultural backgrounds into the planning and delivery of care  - Encourage patient/family to participate in care and decision-making at the level they choose  - Honor patient and family perspectives and choices  Outcome: Progressing     Problem: SKIN/TISSUE INTEGRITY - ADULT  Goal: Incision(s), wounds(s) or drain site(s) healing without S/S of infection  Description: INTERVENTIONS:  - Assess and document risk factors for pressure ulcer development  - Assess and document skin integrity  - Assess and document dressing/incision, wound bed, drain sites and surrounding tissue  - Implement wound care per orders  - Initiate isolation precautions as appropriate  - Initiate Pressure Ulcer prevention bundle as indicated  Outcome: Progressing     Problem: PAIN - ADULT  Goal: Verbalizes/displays adequate comfort level or patient's stated pain goal  Description: INTERVENTIONS:  - Encourage pt to monitor pain and request assistance  - Assess pain using appropriate pain scale  - Administer analgesics based on type and severity of pain and evaluate response  - Implement non-pharmacological measures as appropriate and evaluate response  - Consider cultural and social influences on pain and pain management  - Manage/alleviate anxiety  - Utilize distraction and/or relaxation techniques  - Monitor for opioid side effects  - Notify MD/LIP if interventions unsuccessful or patient reports new pain  - Anticipate increased pain with activity and pre-medicate as appropriate  Outcome: Progressing     Problem: SAFETY ADULT - FALL  Goal: Free from fall injury  Description: INTERVENTIONS:  - Assess pt frequently for physical needs  - Identify cognitive and physical deficits and behaviors that affect risk of falls.   - [place] fall precautions as indicated by assessment.  - Educate pt/family on patient safety including physical limitations  - Instruct pt to call for assistance with activity based on assessment  - Modify environment to reduce risk of injury  - Provide assistive devices as appropriate  - Consider OT/PT consult to assist with strengthening/mobility  - Encourage toileting schedule  Outcome: Progressing     Problem: RISK FOR INFECTION - ADULT  Goal: Absence of fever/infection during anticipated neutropenic period  Description: INTERVENTIONS  - Monitor WBC  - Administer growth factors as ordered  - Implement neutropenic guidelines  Outcome: Progressing     Problem: Patient/Family Goals  Goal: Patient/Family Long Term Goal  Description: Patient's Long Term Goal: Discharge home    Interventions:  - Determine appropriate outpatient antibiotic plan  - Manage pain with oral medication regimen  - Demonstrate ability to care for and flush abdominal drains  - See additional Care Plan goals for specific interventions  Outcome: Progressing  Goal: Patient/Family Short Term Goal  Description: Patient's Short Term Goal: Resolve abdominal infection    Interventions:   - IV antibiotic management as indicated  - Maintain patency of abdominal drains  - Monitor for fever or increased signs of infection  - See additional Care Plan goals for specific interventions  Outcome: Progressing     All efforts maintained to promote infection prevention during my assessment and care for this patient. Stethoscope cleansed and hand hygiene strictly maintained.

## 2023-08-16 NOTE — DISCHARGE INSTRUCTIONS
- Plan for outpatient drain check (lower abdomen drain) in 2 weeks with Interventional Radiology  - Plan for outpatient CT scan of abdomen in 2 weeks  - Continue to flush upper abdomen (ovary drain) with 5 mL normal saline daily. - Continue to flush lower abdomen (prior abdominal abscess drain) with 10 mL normal saline daily.  - Drain both drains in morning and evening.

## 2023-08-16 NOTE — CM/SW NOTE
Per RN in dc rounds pt may be dc ready if pain is controlled by po meds. CM notified Jose at [facility] of above, confirmed that pt was on Meropenem Q8 PTA. If dc today notify [facility] at [phone] option 2,     / to remain available for support and/or discharge planning.      Katie Clements RN    Ext [phone]

## 2023-08-17 VITALS
TEMPERATURE: 100 F | BODY MASS INDEX: 46.26 KG/M2 | RESPIRATION RATE: 18 BRPM | WEIGHT: 271 LBS | SYSTOLIC BLOOD PRESSURE: 124 MMHG | DIASTOLIC BLOOD PRESSURE: 89 MMHG | OXYGEN SATURATION: 95 % | HEIGHT: 64 IN | HEART RATE: 97 BPM

## 2023-08-17 PROCEDURE — 99239 HOSP IP/OBS DSCHRG MGMT >30: CPT | Performed by: HOSPITALIST

## 2023-08-17 RX ORDER — HYDROCODONE BITARTRATE AND ACETAMINOPHEN 7.5; 325 MG/1; MG/1
1 TABLET ORAL EVERY 6 HOURS PRN
Qty: 30 TABLET | Refills: 0 | Status: ON HOLD | OUTPATIENT
Start: 2023-08-17

## 2023-08-17 RX ORDER — ALBUTEROL SULFATE 90 UG/1
2 AEROSOL, METERED RESPIRATORY (INHALATION) EVERY 4 HOURS PRN
Qty: 1 EACH | Refills: 0 | Status: ON HOLD | OUTPATIENT
Start: 2023-08-17 | End: 2023-09-16

## 2023-08-17 NOTE — PLAN OF CARE
A/O x 4. Pain managed with norco. Merrem given per orders. Abdominal JUAN drains flushed per orders. Up ad mariya. Tolerating diet. Ok to discharge home today. Discharge instructions explained to patient. All medications reviewed including which medications to start, continue, or stop taking. All follow up appointments reviewed. All discharge eduation explained to pt. Patient verbalized understanding, all questions answered. All belongings sent with patient.

## 2023-08-17 NOTE — PLAN OF CARE
Problem: Patient Centered Care  Goal: Patient preferences are identified and integrated in the patient's plan of care  Description: Interventions:  - What would you like us to know as we care for you? I live at home with my .  - Provide timely, complete, and accurate information to patient/family  - Incorporate patient and family knowledge, values, beliefs, and cultural backgrounds into the planning and delivery of care  - Encourage patient/family to participate in care and decision-making at the level they choose  - Honor patient and family perspectives and choices  Outcome: Progressing     Problem: SAFETY ADULT - FALL  Goal: Free from fall injury  Description: INTERVENTIONS:  - Assess pt frequently for physical needs  - Identify cognitive and physical deficits and behaviors that affect risk of falls.   - [place] fall precautions as indicated by assessment.  - Educate pt/family on patient safety including physical limitations  - Instruct pt to call for assistance with activity based on assessment  - Modify environment to reduce risk of injury  - Provide assistive devices as appropriate  - Consider OT/PT consult to assist with strengthening/mobility  - Encourage toileting schedule  Outcome: Progressing     Problem: Patient/Family Goals  Goal: Patient/Family Long Term Goal  Description: Patient's Long Term Goal: Discharge home    Interventions:  - Determine appropriate outpatient antibiotic plan  - Manage pain with oral medication regimen  - Demonstrate ability to care for and flush abdominal drains  - See additional Care Plan goals for specific interventions  Outcome: Progressing  Goal: Patient/Family Short Term Goal  Description: Patient's Short Term Goal: Resolve abdominal infection    Interventions:   - IV antibiotic management as indicated  - Maintain patency of abdominal drains  - Monitor for fever or increased signs of infection  - See additional Care Plan goals for specific interventions  Outcome: Progressing     Pt resting in bed. Ambulated in hallway with . Drains to bulb suction-pt requested to have drains flushed later in AM. Tolerating diet. IV antibiotic given. Xanax and ambien given in evening to help sleep. Norco prn for pain. Pt tried heat and ice packs overnight for pain between drain sites. Safety measures in place. Frequent rounding being done.

## 2023-08-18 NOTE — PAYOR COMM NOTE
Discharge Notification    Patient Name: Jodee Abarca  Payor: MAGNO BARRERA  Subscriber #: [id number]  Authorization Number: [id number]  Admit Date/Time: 8/11/2023 2:39 AM  Discharge Date/Time: 8/17/2023 12:32 PM

## 2023-08-19 ENCOUNTER — APPOINTMENT (OUTPATIENT)
Dept: CT IMAGING | Facility: HOSPITAL | Age: 33
End: 2023-08-19
Attending: EMERGENCY MEDICINE
Payer: COMMERCIAL

## 2023-08-19 ENCOUNTER — HOSPITAL ENCOUNTER (INPATIENT)
Facility: HOSPITAL | Age: 33
LOS: 4 days | Discharge: HOME | End: 2023-08-23
Attending: EMERGENCY MEDICINE | Admitting: HOSPITALIST
Payer: COMMERCIAL

## 2023-08-19 DIAGNOSIS — R65.20 SEVERE SEPSIS (HCC): Primary | ICD-10-CM

## 2023-08-19 DIAGNOSIS — A41.9 SEVERE SEPSIS (HCC): Primary | ICD-10-CM

## 2023-08-19 LAB
ALBUMIN SERPL-MCNC: 3.4 G/DL (ref 3.4–5)
ALP LIVER SERPL-CCNC: 62 U/L
ALT SERPL-CCNC: 10 U/L
ANION GAP SERPL CALC-SCNC: 9 MMOL/L (ref 0–18)
AST SERPL-CCNC: 15 U/L (ref 15–37)
B-HCG UR QL: NEGATIVE
BASOPHILS # BLD AUTO: 0.03 X10(3) UL (ref 0–0.2)
BASOPHILS NFR BLD AUTO: 0.3 %
BILIRUB DIRECT SERPL-MCNC: 0.4 MG/DL (ref 0–0.2)
BILIRUB SERPL-MCNC: 1 MG/DL (ref 0.1–2)
BILIRUB UR QL: NEGATIVE
BUN BLD-MCNC: 9 MG/DL (ref 7–18)
BUN/CREAT SERPL: 8 (ref 10–20)
CALCIUM BLD-MCNC: 9.3 MG/DL (ref 8.5–10.1)
CHLORIDE SERPL-SCNC: 103 MMOL/L (ref 98–112)
CLARITY UR: CLEAR
CO2 SERPL-SCNC: 25 MMOL/L (ref 21–32)
CREAT BLD-MCNC: 1.12 MG/DL
DEPRECATED RDW RBC AUTO: 66.5 FL (ref 35.1–46.3)
EGFRCR SERPLBLD CKD-EPI 2021: 67 ML/MIN/1.73M2 (ref 60–?)
EOSINOPHIL # BLD AUTO: 0.32 X10(3) UL (ref 0–0.7)
EOSINOPHIL NFR BLD AUTO: 3.2 %
ERYTHROCYTE [DISTWIDTH] IN BLOOD BY AUTOMATED COUNT: 20.9 % (ref 11–15)
GLUCOSE BLD-MCNC: 100 MG/DL (ref 70–99)
GLUCOSE UR-MCNC: NORMAL MG/DL
HCT VFR BLD AUTO: 39.1 %
HGB BLD-MCNC: 12.2 G/DL
IMM GRANULOCYTES # BLD AUTO: 0.06 X10(3) UL (ref 0–1)
IMM GRANULOCYTES NFR BLD: 0.6 %
KETONES UR-MCNC: NEGATIVE MG/DL
LACTATE SERPL-SCNC: 1.6 MMOL/L (ref 0.4–2)
LEUKOCYTE ESTERASE UR QL STRIP.AUTO: NEGATIVE
LYMPHOCYTES # BLD AUTO: 2.1 X10(3) UL (ref 1–4)
LYMPHOCYTES NFR BLD AUTO: 20.9 %
MAGNESIUM SERPL-MCNC: 1.8 MG/DL (ref 1.6–2.6)
MCH RBC QN AUTO: 27.1 PG (ref 26–34)
MCHC RBC AUTO-ENTMCNC: 31.2 G/DL (ref 31–37)
MCV RBC AUTO: 86.9 FL
MONOCYTES # BLD AUTO: 0.76 X10(3) UL (ref 0.1–1)
MONOCYTES NFR BLD AUTO: 7.6 %
NEUTROPHILS # BLD AUTO: 6.79 X10 (3) UL (ref 1.5–7.7)
NEUTROPHILS # BLD AUTO: 6.79 X10(3) UL (ref 1.5–7.7)
NEUTROPHILS NFR BLD AUTO: 67.4 %
NITRITE UR QL STRIP.AUTO: NEGATIVE
OSMOLALITY SERPL CALC.SUM OF ELEC: 283 MOSM/KG (ref 275–295)
PH UR: 5.5 [PH] (ref 5–8)
PLATELET # BLD AUTO: 169 10(3)UL (ref 150–450)
PLATELET MORPHOLOGY: NORMAL
POTASSIUM SERPL-SCNC: 3.6 MMOL/L (ref 3.5–5.1)
PROCALCITONIN SERPL-MCNC: 0.44 NG/ML (ref ?–0.16)
PROT SERPL-MCNC: 8.3 G/DL (ref 6.4–8.2)
RBC # BLD AUTO: 4.5 X10(6)UL
RBC #/AREA URNS AUTO: >10 /HPF
SARS-COV-2 RNA RESP QL NAA+PROBE: NOT DETECTED
SODIUM SERPL-SCNC: 137 MMOL/L (ref 136–145)
SP GR UR STRIP: >1.03 (ref 1–1.03)
UROBILINOGEN UR STRIP-ACNC: NORMAL
WBC # BLD AUTO: 10.1 X10(3) UL (ref 4–11)

## 2023-08-19 PROCEDURE — 99223 1ST HOSP IP/OBS HIGH 75: CPT | Performed by: INTERNAL MEDICINE

## 2023-08-19 PROCEDURE — 74177 CT ABD & PELVIS W/CONTRAST: CPT | Performed by: EMERGENCY MEDICINE

## 2023-08-19 PROCEDURE — 3E033XZ INTRODUCTION OF VASOPRESSOR INTO PERIPHERAL VEIN, PERCUTANEOUS APPROACH: ICD-10-PCS | Performed by: EMERGENCY MEDICINE

## 2023-08-19 PROCEDURE — 71260 CT THORAX DX C+: CPT | Performed by: EMERGENCY MEDICINE

## 2023-08-19 PROCEDURE — 74176 CT ABD & PELVIS W/O CONTRAST: CPT | Performed by: EMERGENCY MEDICINE

## 2023-08-19 RX ORDER — FLUCONAZOLE 2 MG/ML
400 INJECTION, SOLUTION INTRAVENOUS ONCE
Status: COMPLETED | OUTPATIENT
Start: 2023-08-19 | End: 2023-08-19

## 2023-08-19 RX ORDER — SERTRALINE HYDROCHLORIDE 100 MG/1
100 TABLET, FILM COATED ORAL DAILY
Status: DISCONTINUED | OUTPATIENT
Start: 2023-08-19 | End: 2023-08-23

## 2023-08-19 RX ORDER — VANCOMYCIN 2 GRAM/500 ML IN 0.9 % SODIUM CHLORIDE INTRAVENOUS
2000 ONCE
Status: COMPLETED | OUTPATIENT
Start: 2023-08-19 | End: 2023-08-19

## 2023-08-19 RX ORDER — PANTOPRAZOLE SODIUM 20 MG/1
20 TABLET, DELAYED RELEASE ORAL
Status: DISCONTINUED | OUTPATIENT
Start: 2023-08-19 | End: 2023-08-23

## 2023-08-19 RX ORDER — CLONAZEPAM 0.5 MG/1
0.5 TABLET ORAL NIGHTLY
Status: DISCONTINUED | OUTPATIENT
Start: 2023-08-19 | End: 2023-08-23

## 2023-08-19 RX ORDER — MORPHINE SULFATE 4 MG/ML
4 INJECTION, SOLUTION INTRAMUSCULAR; INTRAVENOUS ONCE
Status: COMPLETED | OUTPATIENT
Start: 2023-08-19 | End: 2023-08-19

## 2023-08-19 RX ORDER — HYDROCODONE BITARTRATE AND ACETAMINOPHEN 7.5; 325 MG/1; MG/1
1 TABLET ORAL EVERY 6 HOURS PRN
Status: DISCONTINUED | OUTPATIENT
Start: 2023-08-19 | End: 2023-08-22

## 2023-08-19 RX ORDER — ZOLPIDEM TARTRATE 5 MG/1
10 TABLET ORAL NIGHTLY PRN
Status: DISCONTINUED | OUTPATIENT
Start: 2023-08-19 | End: 2023-08-23

## 2023-08-19 RX ORDER — QUETIAPINE FUMARATE 25 MG/1
50 TABLET, FILM COATED ORAL NIGHTLY
Status: DISCONTINUED | OUTPATIENT
Start: 2023-08-19 | End: 2023-08-23

## 2023-08-19 RX ORDER — SODIUM CHLORIDE 9 MG/ML
INJECTION, SOLUTION INTRAVENOUS CONTINUOUS
Status: DISCONTINUED | OUTPATIENT
Start: 2023-08-19 | End: 2023-08-21

## 2023-08-19 RX ORDER — HYDROMORPHONE HYDROCHLORIDE 1 MG/ML
0.2 INJECTION, SOLUTION INTRAMUSCULAR; INTRAVENOUS; SUBCUTANEOUS EVERY 2 HOUR PRN
Status: DISCONTINUED | OUTPATIENT
Start: 2023-08-19 | End: 2023-08-23

## 2023-08-19 RX ORDER — ALPRAZOLAM 0.5 MG/1
0.5 TABLET ORAL 3 TIMES DAILY PRN
Status: DISCONTINUED | OUTPATIENT
Start: 2023-08-19 | End: 2023-08-23

## 2023-08-19 NOTE — PROGRESS NOTES
[facility]  IR Brief Progress Note      Rachelle Bernabe Patient Status:  Emergency    1990 MRN [id number]   Location [address] Attending Mary Medina # 0 PCP Roberta Granados       Subjective:   Leo Donohue is 34 y/o F with history of sigmoid diverticulitis complicated by perforation and abscess formation s/p drain placement x2, most recently with left ovarian drain placement on 23. Patient presents to ER with fevers overnight and new dysuria, and decreased output from drainage catheters    Imaging Reviewed:   Briefly, imaging personally reviewed. 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 Left lower quadrant drain placed 23, collection appears significantly smaller, very closed to left ovary. Bladder appears to be adjacent to midline drain/ongoing collection/fisutla. Results:   Labs:  Lab Results   Component Value Date    WBC 10.1 2023    RBC 4.50 2023    HGB 12.2 2023    HCT 39.1 2023    MCV 86.9 2023    MCH 27.1 2023    MCHC 31.2 2023    RDW 20.9 2023    .0 2023           Assessment and Plan:   34 y/o f with perforated sigmoid diverticulitis s/p drain placement x2, now presenting with fevers, dysuria, possible decreased drain output. 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 Recommend drain flush TID 5-10 ml saline.  --repeat CT A/P with oral and IV contrast to better delineate any residual collections.     Elissa Noe MD  2023  4:55 AM

## 2023-08-19 NOTE — PLAN OF CARE
Abdominal pain controlled with PRN pain medications. CT scan completed today. JUAN drains times two. The lower JUAN drain appears to be clotted off, can not flush this drain. Lower JUAN dressing changed. Drainage around the tube is tan, thick, milky. The upper JUAN drain with bloody drainage noted is easily flushed with 10 ml NS. Site is clean and dry.    Problem: Patient Centered Care  Goal: Patient preferences are identified and integrated in the patient's plan of care  Description: Interventions:  - What would you like us to know as we care for you?   - Provide timely, complete, and accurate information to patient/family  - Incorporate patient and family knowledge, values, beliefs, and cultural backgrounds into the planning and delivery of care  - Encourage patient/family to participate in care and decision-making at the level they choose  - Honor patient and family perspectives and choices  Outcome: Progressing     Problem: Patient/Family Goals  Goal: Patient/Family Long Term Goal  Description: Patient's Long Term Goal:     Interventions:  -   - See additional Care Plan goals for specific interventions  Outcome: Progressing  Goal: Patient/Family Short Term Goal  Description: Patient's Short Term Goal:     Interventions:   -   - See additional Care Plan goals for specific interventions  Outcome: Progressing     Problem: PAIN - ADULT  Goal: Verbalizes/displays adequate comfort level or patient's stated pain goal  Description: INTERVENTIONS:  - Encourage pt to monitor pain and request assistance  - Assess pain using appropriate pain scale  - Administer analgesics based on type and severity of pain and evaluate response  - Implement non-pharmacological measures as appropriate and evaluate response  - Consider cultural and social influences on pain and pain management  - Manage/alleviate anxiety  - Utilize distraction and/or relaxation techniques  - Monitor for opioid side effects  - Notify MD/LIP if interventions unsuccessful or patient reports new pain  - Anticipate increased pain with activity and pre-medicate as appropriate  Outcome: Progressing     Problem: RISK FOR INFECTION - ADULT  Goal: Absence of fever/infection during anticipated neutropenic period  Description: INTERVENTIONS  - Monitor WBC  - Administer growth factors as ordered  - Implement neutropenic guidelines  Outcome: Progressing

## 2023-08-19 NOTE — ED INITIAL ASSESSMENT (HPI)
Patient from home, c/c fever at home, states discharged yesterday from this hospital, takes abx for UTI, dysuria worst during urination. Extreme burning. PICC to left upper arm. Last took Norco 2 hours ago. JOSÉ MIGUEL PRATHER referred pt to ED.

## 2023-08-19 NOTE — PLAN OF CARE
Lower JUAN drain unable to flush. Appears to be clotted off. Patient states that is the problem they had at home before coming to the ED. Upper JUAN flushes without issue.

## 2023-08-19 NOTE — PROGRESS NOTES
[facility] Pharmacy Note:  Renal Dose Adjustment for Meropenem (Lorri Narvaez)    Sana Quintana is a 35year old patient who has been prescribed meropenem 500 mg every 8 hrs for treatment of intra-abdominal infection. The patient is requiring vasopressor support. The estimated creatinine clearance is 61.7 mL/min (A) (based on SCr of 1.12 mg/dL (H)). .    The dose has been adjusted to meropenem 1g every 8 hrs per hospital renal dose adjustment protocol. Pharmacy will follow and adjust dose as warranted for additional indication and/or renal function changes.     Thank you,    Nat Trotter, PharmD, BCPS  Phone [phone]

## 2023-08-19 NOTE — PLAN OF CARE
Report at bedside is patient is refusing placement of fritz catheter. Patient able to ambulate to BRP. Remains on levophed drip.  Keep map>60

## 2023-08-19 NOTE — PLAN OF CARE
Received Pt from ER  Pt arrived for evaluation of drains that were placed here, complaints of pain in the abdomen and pus drainage around the drains   at bedside  Alert and oriented x4, was able to ambulate to the bathroom  VSS, patient was on levo gtt, titrated per MAR    Problem: PAIN - ADULT  Goal: Verbalizes/displays adequate comfort level or patient's stated pain goal  Description: INTERVENTIONS:  - Encourage pt to monitor pain and request assistance  - Assess pain using appropriate pain scale  - Administer analgesics based on type and severity of pain and evaluate response  - Implement non-pharmacological measures as appropriate and evaluate response  - Consider cultural and social influences on pain and pain management  - Manage/alleviate anxiety  - Utilize distraction and/or relaxation techniques  - Monitor for opioid side effects  - Notify MD/LIP if interventions unsuccessful or patient reports new pain  - Anticipate increased pain with activity and pre-medicate as appropriate  8/19/2023 0647 by Oxana Tran RN  Outcome: Not Progressing  8/19/2023 0647 by Oxana Tran RN  Outcome: Not Progressing     Problem: RISK FOR INFECTION - ADULT  Goal: Absence of fever/infection during anticipated neutropenic period  Description: INTERVENTIONS  - Monitor WBC  - Administer growth factors as ordered  - Implement neutropenic guidelines  8/19/2023 0647 by Oxana Tran RN  Outcome: Not Progressing  8/19/2023 0647 by Oxana Tran RN  Outcome: Not Progressing

## 2023-08-19 NOTE — ED QUICK NOTES
Pt does not want fritz cath at this time. Remains on cart in position of comfort, waiting for admission, call light within reach.

## 2023-08-19 NOTE — ED QUICK NOTES
Orders for admission, patient is aware of plan and ready to go upstairs. Any questions, please call ED RN aditi at extension [phone].      Patient Covid vaccination status: Unvaccinated     COVID Test Ordered in ED: Rapid SARS-CoV-2 by PCR    COVID Suspicion at Admission: N/A    Running Infusions:    norepinephrine 5 mcg/min (08/19/23 0325)        Mental Status/LOC at time of transport: A&OX2    Other pertinent information:   CIWA score: N/A   NIH score:  N/A

## 2023-08-20 LAB
ANION GAP SERPL CALC-SCNC: 5 MMOL/L (ref 0–18)
BASOPHILS # BLD AUTO: 0.02 X10(3) UL (ref 0–0.2)
BASOPHILS NFR BLD AUTO: 0.4 %
BUN BLD-MCNC: 3 MG/DL (ref 7–18)
BUN/CREAT SERPL: 6.7 (ref 10–20)
CALCIUM BLD-MCNC: 8.4 MG/DL (ref 8.5–10.1)
CHLORIDE SERPL-SCNC: 110 MMOL/L (ref 98–112)
CO2 SERPL-SCNC: 26 MMOL/L (ref 21–32)
CREAT BLD-MCNC: 0.45 MG/DL
DEPRECATED RDW RBC AUTO: 65.6 FL (ref 35.1–46.3)
EGFRCR SERPLBLD CKD-EPI 2021: 130 ML/MIN/1.73M2 (ref 60–?)
EOSINOPHIL # BLD AUTO: 0.41 X10(3) UL (ref 0–0.7)
EOSINOPHIL NFR BLD AUTO: 8 %
ERYTHROCYTE [DISTWIDTH] IN BLOOD BY AUTOMATED COUNT: 20.2 % (ref 11–15)
GLUCOSE BLD-MCNC: 93 MG/DL (ref 70–99)
HCT VFR BLD AUTO: 33.5 %
HGB BLD-MCNC: 10.4 G/DL
IMM GRANULOCYTES # BLD AUTO: 0.03 X10(3) UL (ref 0–1)
IMM GRANULOCYTES NFR BLD: 0.6 %
LYMPHOCYTES # BLD AUTO: 1.49 X10(3) UL (ref 1–4)
LYMPHOCYTES NFR BLD AUTO: 29 %
MCH RBC QN AUTO: 27.5 PG (ref 26–34)
MCHC RBC AUTO-ENTMCNC: 31 G/DL (ref 31–37)
MCV RBC AUTO: 88.6 FL
MONOCYTES # BLD AUTO: 0.4 X10(3) UL (ref 0.1–1)
MONOCYTES NFR BLD AUTO: 7.8 %
NEUTROPHILS # BLD AUTO: 2.78 X10 (3) UL (ref 1.5–7.7)
NEUTROPHILS # BLD AUTO: 2.78 X10(3) UL (ref 1.5–7.7)
NEUTROPHILS NFR BLD AUTO: 54.2 %
OSMOLALITY SERPL CALC.SUM OF ELEC: 288 MOSM/KG (ref 275–295)
PLATELET # BLD AUTO: 149 10(3)UL (ref 150–450)
POTASSIUM SERPL-SCNC: 4.1 MMOL/L (ref 3.5–5.1)
RBC # BLD AUTO: 3.78 X10(6)UL
SODIUM SERPL-SCNC: 141 MMOL/L (ref 136–145)
WBC # BLD AUTO: 5.1 X10(3) UL (ref 4–11)

## 2023-08-20 PROCEDURE — 99233 SBSQ HOSP IP/OBS HIGH 50: CPT | Performed by: HOSPITALIST

## 2023-08-20 PROCEDURE — 99232 SBSQ HOSP IP/OBS MODERATE 35: CPT | Performed by: INTERNAL MEDICINE

## 2023-08-20 RX ORDER — LORAZEPAM 2 MG/ML
1 INJECTION INTRAMUSCULAR EVERY 6 HOURS PRN
Status: DISCONTINUED | OUTPATIENT
Start: 2023-08-20 | End: 2023-08-23

## 2023-08-20 RX ORDER — FLUCONAZOLE 2 MG/ML
400 INJECTION, SOLUTION INTRAVENOUS EVERY 24 HOURS
Status: DISCONTINUED | OUTPATIENT
Start: 2023-08-20 | End: 2023-08-21

## 2023-08-20 RX ORDER — VANCOMYCIN HYDROCHLORIDE
10 EVERY 8 HOURS
Status: DISCONTINUED | OUTPATIENT
Start: 2023-08-20 | End: 2023-08-22

## 2023-08-20 RX ORDER — LORAZEPAM 2 MG/ML
1 INJECTION INTRAMUSCULAR 2 TIMES DAILY PRN
Status: DISCONTINUED | OUTPATIENT
Start: 2023-08-20 | End: 2023-08-20

## 2023-08-20 RX ORDER — MIDAZOLAM HYDROCHLORIDE 1 MG/ML
2 INJECTION INTRAMUSCULAR; INTRAVENOUS ONCE
Status: COMPLETED | OUTPATIENT
Start: 2023-08-20 | End: 2023-08-20

## 2023-08-20 RX ORDER — MIDAZOLAM HYDROCHLORIDE 1 MG/ML
INJECTION INTRAMUSCULAR; INTRAVENOUS
Status: DISCONTINUED
Start: 2023-08-20 | End: 2023-08-20

## 2023-08-20 NOTE — PLAN OF CARE
Transfer from ccu. Per RN PICC fell overnight. Plan for another PICC line before discharge. Needs Ativan prior to picc line insertion. Lower JUAN Dressing changed - leaking purulent drainage around site. PRN Dilaudid given. Patient also requested to have ativan prior to lab draw in the morning. She is ambulating independent.  at bedside.

## 2023-08-20 NOTE — PROGRESS NOTES
Double RN skin check done prior to transfer off Unit. Skin check performed by this RN and Delmis Ocampo are as follows: incisions at drain sites    Will remain available for any further questions or concerns.

## 2023-08-20 NOTE — PLAN OF CARE
Received Pt from AM RN  Alert and oriented, ambulatory to bathroom  Meds given per [facility], prn meds provided for pain management    at bedside   VSS  Handoff given to Enoch Neff RN     Problem: RISK FOR INFECTION - ADULT  Goal: Absence of fever/infection during anticipated neutropenic period  Description: INTERVENTIONS  - Monitor WBC  - Administer growth factors as ordered  - Implement neutropenic guidelines  Outcome: Progressing     Problem: PAIN - ADULT  Goal: Verbalizes/displays adequate comfort level or patient's stated pain goal  Description: INTERVENTIONS:  - Encourage pt to monitor pain and request assistance  - Assess pain using appropriate pain scale  - Administer analgesics based on type and severity of pain and evaluate response  - Implement non-pharmacological measures as appropriate and evaluate response  - Consider cultural and social influences on pain and pain management  - Manage/alleviate anxiety  - Utilize distraction and/or relaxation techniques  - Monitor for opioid side effects  - Notify MD/LIP if interventions unsuccessful or patient reports new pain  - Anticipate increased pain with activity and pre-medicate as appropriate  Outcome: Not Progressing

## 2023-08-21 ENCOUNTER — APPOINTMENT (OUTPATIENT)
Dept: INTERVENTIONAL RADIOLOGY/VASCULAR | Facility: HOSPITAL | Age: 33
End: 2023-08-21
Attending: NURSE PRACTITIONER
Payer: COMMERCIAL

## 2023-08-21 LAB
ALBUMIN SERPL-MCNC: 2.8 G/DL (ref 3.4–5)
ALBUMIN/GLOB SERPL: 0.7 {RATIO} (ref 1–2)
ALP LIVER SERPL-CCNC: 63 U/L
ALT SERPL-CCNC: 14 U/L
ANION GAP SERPL CALC-SCNC: 6 MMOL/L (ref 0–18)
AST SERPL-CCNC: 18 U/L (ref 15–37)
BASOPHILS # BLD AUTO: 0.02 X10(3) UL (ref 0–0.2)
BASOPHILS NFR BLD AUTO: 0.4 %
BILIRUB SERPL-MCNC: 0.5 MG/DL (ref 0.1–2)
BILIRUB UR QL: NEGATIVE
BUN BLD-MCNC: 3 MG/DL (ref 7–18)
BUN/CREAT SERPL: 6.5 (ref 10–20)
CALCIUM BLD-MCNC: 8.9 MG/DL (ref 8.5–10.1)
CHLORIDE SERPL-SCNC: 108 MMOL/L (ref 98–112)
CLARITY UR: CLEAR
CO2 SERPL-SCNC: 27 MMOL/L (ref 21–32)
CREAT BLD-MCNC: 0.46 MG/DL
DEPRECATED RDW RBC AUTO: 62.2 FL (ref 35.1–46.3)
EGFRCR SERPLBLD CKD-EPI 2021: 130 ML/MIN/1.73M2 (ref 60–?)
EOSINOPHIL # BLD AUTO: 0.36 X10(3) UL (ref 0–0.7)
EOSINOPHIL NFR BLD AUTO: 7.3 %
ERYTHROCYTE [DISTWIDTH] IN BLOOD BY AUTOMATED COUNT: 19.5 % (ref 11–15)
GLOBULIN PLAS-MCNC: 4.1 G/DL (ref 2.8–4.4)
GLUCOSE BLD-MCNC: 99 MG/DL (ref 70–99)
GLUCOSE UR-MCNC: NORMAL MG/DL
HCT VFR BLD AUTO: 32.4 %
HGB BLD-MCNC: 10.3 G/DL
IMM GRANULOCYTES # BLD AUTO: 0.03 X10(3) UL (ref 0–1)
IMM GRANULOCYTES NFR BLD: 0.6 %
KETONES UR-MCNC: NEGATIVE MG/DL
LEUKOCYTE ESTERASE UR QL STRIP.AUTO: NEGATIVE
LYMPHOCYTES # BLD AUTO: 1.36 X10(3) UL (ref 1–4)
LYMPHOCYTES NFR BLD AUTO: 27.5 %
MAGNESIUM SERPL-MCNC: 1.7 MG/DL (ref 1.6–2.6)
MCH RBC QN AUTO: 27.7 PG (ref 26–34)
MCHC RBC AUTO-ENTMCNC: 31.8 G/DL (ref 31–37)
MCV RBC AUTO: 87.1 FL
MONOCYTES # BLD AUTO: 0.41 X10(3) UL (ref 0.1–1)
MONOCYTES NFR BLD AUTO: 8.3 %
NEUTROPHILS # BLD AUTO: 2.76 X10 (3) UL (ref 1.5–7.7)
NEUTROPHILS # BLD AUTO: 2.76 X10(3) UL (ref 1.5–7.7)
NEUTROPHILS NFR BLD AUTO: 55.9 %
NITRITE UR QL STRIP.AUTO: NEGATIVE
OSMOLALITY SERPL CALC.SUM OF ELEC: 289 MOSM/KG (ref 275–295)
PH UR: 7 [PH] (ref 5–8)
PLATELET # BLD AUTO: 159 10(3)UL (ref 150–450)
POTASSIUM SERPL-SCNC: 4 MMOL/L (ref 3.5–5.1)
PROT SERPL-MCNC: 6.9 G/DL (ref 6.4–8.2)
PROT UR-MCNC: NEGATIVE MG/DL
RBC # BLD AUTO: 3.72 X10(6)UL
RBC #/AREA URNS AUTO: >10 /HPF
SODIUM SERPL-SCNC: 141 MMOL/L (ref 136–145)
SP GR UR STRIP: 1.01 (ref 1–1.03)
UROBILINOGEN UR STRIP-ACNC: NORMAL
WBC # BLD AUTO: 4.9 X10(3) UL (ref 4–11)

## 2023-08-21 PROCEDURE — 02HV33Z INSERTION OF INFUSION DEVICE INTO SUPERIOR VENA CAVA, PERCUTANEOUS APPROACH: ICD-10-PCS | Performed by: STUDENT IN AN ORGANIZED HEALTH CARE EDUCATION/TRAINING PROGRAM

## 2023-08-21 PROCEDURE — 99232 SBSQ HOSP IP/OBS MODERATE 35: CPT | Performed by: PHYSICIAN ASSISTANT

## 2023-08-21 PROCEDURE — 99233 SBSQ HOSP IP/OBS HIGH 50: CPT | Performed by: HOSPITALIST

## 2023-08-21 PROCEDURE — 0W2GX0Z CHANGE DRAINAGE DEVICE IN PERITONEAL CAVITY, EXTERNAL APPROACH: ICD-10-PCS | Performed by: STUDENT IN AN ORGANIZED HEALTH CARE EDUCATION/TRAINING PROGRAM

## 2023-08-21 RX ORDER — HEPARIN SODIUM 1000 [USP'U]/ML
INJECTION, SOLUTION INTRAVENOUS; SUBCUTANEOUS
Status: COMPLETED
Start: 2023-08-21 | End: 2023-08-21

## 2023-08-21 RX ORDER — MIDAZOLAM HYDROCHLORIDE 1 MG/ML
INJECTION INTRAMUSCULAR; INTRAVENOUS
Status: COMPLETED
Start: 2023-08-21 | End: 2023-08-21

## 2023-08-21 RX ORDER — LIDOCAINE HYDROCHLORIDE 20 MG/ML
INJECTION, SOLUTION EPIDURAL; INFILTRATION; INTRACAUDAL; PERINEURAL
Status: COMPLETED
Start: 2023-08-21 | End: 2023-08-21

## 2023-08-21 RX ORDER — MAGNESIUM OXIDE 400 MG/1
400 TABLET ORAL ONCE
Status: COMPLETED | OUTPATIENT
Start: 2023-08-21 | End: 2023-08-21

## 2023-08-21 NOTE — PRE-SEDATION ASSESSMENT
Interventional Radiology  Pre-Procedure Sedation Assessment    History of snoring or sleep or apnea:  Yes    History of previous problems with anesthesia or sedation:  No    Physical exam:  Neck: normal range of motion  Cardiovascular: well-perfused  Pulmonary: normal respiratory rate/effort    Mallampati score:  IV (only hard palate visible)    ASA classification:   2. Patient with mild systemic disease    Plan:   Moderate sedation    Kenia Guadarrama MD  8/21/2023  [address] [place] [address]

## 2023-08-21 NOTE — CM/SW NOTE
CM self ref to case. CM is familiar with pt hx from previous admission. Pt discharged to home 1 day prior to this hospitalization after treatment for perforated sigmoid diverticulitis with abscess collection requiring 2 drain placement. Pt reports fevers after discharge with Tmax 103 degrees. Some increased dysuria over the last several days and purulent drainage from drains    Discharged with antibiotic therapy on meropenem and oral vancomycin. Pt has a PICC line. Is current with [facility] home inf with weekly in office line care/labs. 8/22/1400  Per ID note-Will plan to DC on continued meropenem, micafungin and daptomycin on DC with repeat CT A/P in two weeks. Cm provided upd to [facility] with [facility] office. 8/23 1220  CM rec'd call from [facility] w/ [facility] confirming dc plan. CM informed marcel of dc order for today, 3 IV meds confirmed, RN aware to infuse doses for today prior to dc. Marcel secured in office T&T for Micafungin and daptomycin on 8/24/23 at 2:30pm. [employer] will inform pt of plan. Plan  Home with continued IV abx inf and weekly in office line and labs at [facility] worker/ to remain available for support and/or discharge planning.      Chirag Hair, MARY    Ext [phone]

## 2023-08-21 NOTE — IVS NOTE
Inpatient Throughput Communication:    Called inpatient RN Jennifer Corcoran and notified of scheduled procedure. Verified that appropriate consent is signed: Yes  Appropriate Consent Signed:  Yes  Access Site Hair Clipped and skin prepped: Not Applicable  Patient has functional IV site: Yes  Patient received all pre-treatment medications: Not Applicable  Family aware of approximate time of procedure: Not Applicable    Margart Crigler, RN, RN

## 2023-08-21 NOTE — PROCEDURES
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     Joie Arauz MD  8/21/2023  Interventional Radiology  [facility]

## 2023-08-22 LAB
ALBUMIN SERPL-MCNC: 2.9 G/DL (ref 3.4–5)
ALBUMIN/GLOB SERPL: 0.6 {RATIO} (ref 1–2)
ALP LIVER SERPL-CCNC: 70 U/L
ALT SERPL-CCNC: 14 U/L
ANION GAP SERPL CALC-SCNC: 5 MMOL/L (ref 0–18)
AST SERPL-CCNC: 21 U/L (ref 15–37)
BASOPHILS # BLD AUTO: 0.03 X10(3) UL (ref 0–0.2)
BASOPHILS NFR BLD AUTO: 0.8 %
BILIRUB SERPL-MCNC: 0.5 MG/DL (ref 0.1–2)
BUN BLD-MCNC: 4 MG/DL (ref 7–18)
BUN/CREAT SERPL: 8.5 (ref 10–20)
CALCIUM BLD-MCNC: 9.4 MG/DL (ref 8.5–10.1)
CHLORIDE SERPL-SCNC: 107 MMOL/L (ref 98–112)
CO2 SERPL-SCNC: 30 MMOL/L (ref 21–32)
CREAT BLD-MCNC: 0.47 MG/DL
DEPRECATED RDW RBC AUTO: 62.3 FL (ref 35.1–46.3)
EGFRCR SERPLBLD CKD-EPI 2021: 129 ML/MIN/1.73M2 (ref 60–?)
EOSINOPHIL # BLD AUTO: 0.4 X10(3) UL (ref 0–0.7)
EOSINOPHIL NFR BLD AUTO: 10.4 %
ERYTHROCYTE [DISTWIDTH] IN BLOOD BY AUTOMATED COUNT: 19.1 % (ref 11–15)
GLOBULIN PLAS-MCNC: 4.5 G/DL (ref 2.8–4.4)
GLUCOSE BLD-MCNC: 105 MG/DL (ref 70–99)
HCT VFR BLD AUTO: 34.2 %
HGB BLD-MCNC: 10.8 G/DL
IMM GRANULOCYTES # BLD AUTO: 0.03 X10(3) UL (ref 0–1)
IMM GRANULOCYTES NFR BLD: 0.8 %
LYMPHOCYTES # BLD AUTO: 1.28 X10(3) UL (ref 1–4)
LYMPHOCYTES NFR BLD AUTO: 33.3 %
MAGNESIUM SERPL-MCNC: 2 MG/DL (ref 1.6–2.6)
MCH RBC QN AUTO: 28 PG (ref 26–34)
MCHC RBC AUTO-ENTMCNC: 31.6 G/DL (ref 31–37)
MCV RBC AUTO: 88.6 FL
MONOCYTES # BLD AUTO: 0.28 X10(3) UL (ref 0.1–1)
MONOCYTES NFR BLD AUTO: 7.3 %
NEUTROPHILS # BLD AUTO: 1.82 X10 (3) UL (ref 1.5–7.7)
NEUTROPHILS # BLD AUTO: 1.82 X10(3) UL (ref 1.5–7.7)
NEUTROPHILS NFR BLD AUTO: 47.4 %
OSMOLALITY SERPL CALC.SUM OF ELEC: 291 MOSM/KG (ref 275–295)
PLATELET # BLD AUTO: 164 10(3)UL (ref 150–450)
POTASSIUM SERPL-SCNC: 4.3 MMOL/L (ref 3.5–5.1)
PROT SERPL-MCNC: 7.4 G/DL (ref 6.4–8.2)
RBC # BLD AUTO: 3.86 X10(6)UL
SODIUM SERPL-SCNC: 142 MMOL/L (ref 136–145)
VANCOMYCIN PEAK SERPL-MCNC: 36.6 UG/ML (ref 30–50)
VANCOMYCIN TROUGH SERPL-MCNC: 19.1 UG/ML (ref 10–20)
WBC # BLD AUTO: 3.8 X10(3) UL (ref 4–11)

## 2023-08-22 PROCEDURE — 99233 SBSQ HOSP IP/OBS HIGH 50: CPT | Performed by: HOSPITALIST

## 2023-08-22 RX ORDER — DAPTOMYCIN 50 MG/ML
4 INJECTION, POWDER, LYOPHILIZED, FOR SOLUTION INTRAVENOUS EVERY 24 HOURS
Refills: 0 | Status: SHIPPED | COMMUNITY
Start: 2023-08-22

## 2023-08-22 RX ORDER — VANCOMYCIN HYDROCHLORIDE
10 EVERY 12 HOURS
Status: DISCONTINUED | OUTPATIENT
Start: 2023-08-22 | End: 2023-08-23

## 2023-08-22 RX ORDER — HYDROCODONE BITARTRATE AND ACETAMINOPHEN 7.5; 325 MG/1; MG/1
1 TABLET ORAL EVERY 4 HOURS PRN
Status: DISCONTINUED | OUTPATIENT
Start: 2023-08-22 | End: 2023-08-23

## 2023-08-22 NOTE — PLAN OF CARE
A/O x 4. Abdominal drains to gravity. Meropenem, micafungin, and vanco given via PICC. Up ad mariya. Pain managed with dilaudid and norco. Afebrile this shift. Likely to discharge home tomorrow. Bed in lowest position, call light in reach, frequent rounding, nonskid footwear, fall precautions in place. All needs met at this time.

## 2023-08-22 NOTE — CM/SW NOTE
Department  notified of request for Infusion , ([facility] updts)  [facility] referrals started. Assigned CM/SW to follow up with pt/family on further discharge planning.      Mary Yi   August 22, 2023   10:32

## 2023-08-23 VITALS
TEMPERATURE: 98 F | HEART RATE: 77 BPM | HEIGHT: 64 IN | BODY MASS INDEX: 44.87 KG/M2 | DIASTOLIC BLOOD PRESSURE: 79 MMHG | RESPIRATION RATE: 18 BRPM | SYSTOLIC BLOOD PRESSURE: 113 MMHG | WEIGHT: 262.81 LBS | OXYGEN SATURATION: 95 %

## 2023-08-23 LAB
ALBUMIN SERPL-MCNC: 3 G/DL (ref 3.4–5)
ANION GAP SERPL CALC-SCNC: 4 MMOL/L (ref 0–18)
BASOPHILS # BLD AUTO: 0.03 X10(3) UL (ref 0–0.2)
BASOPHILS NFR BLD AUTO: 0.7 %
BUN BLD-MCNC: 7 MG/DL (ref 7–18)
BUN/CREAT SERPL: 13.5 (ref 10–20)
CALCIUM BLD-MCNC: 9.3 MG/DL (ref 8.5–10.1)
CHLORIDE SERPL-SCNC: 106 MMOL/L (ref 98–112)
CK SERPL-CCNC: 23 U/L
CO2 SERPL-SCNC: 31 MMOL/L (ref 21–32)
CREAT BLD-MCNC: 0.52 MG/DL
DEPRECATED RDW RBC AUTO: 60 FL (ref 35.1–46.3)
EGFRCR SERPLBLD CKD-EPI 2021: 126 ML/MIN/1.73M2 (ref 60–?)
EOSINOPHIL # BLD AUTO: 0.51 X10(3) UL (ref 0–0.7)
EOSINOPHIL NFR BLD AUTO: 11.9 %
ERYTHROCYTE [DISTWIDTH] IN BLOOD BY AUTOMATED COUNT: 18.8 % (ref 11–15)
GLUCOSE BLD-MCNC: 96 MG/DL (ref 70–99)
HCT VFR BLD AUTO: 35.9 %
HGB BLD-MCNC: 11.4 G/DL
IMM GRANULOCYTES # BLD AUTO: 0.02 X10(3) UL (ref 0–1)
IMM GRANULOCYTES NFR BLD: 0.5 %
LYMPHOCYTES # BLD AUTO: 1.53 X10(3) UL (ref 1–4)
LYMPHOCYTES NFR BLD AUTO: 35.7 %
MAGNESIUM SERPL-MCNC: 1.6 MG/DL (ref 1.6–2.6)
MCH RBC QN AUTO: 27.7 PG (ref 26–34)
MCHC RBC AUTO-ENTMCNC: 31.8 G/DL (ref 31–37)
MCV RBC AUTO: 87.1 FL
MONOCYTES # BLD AUTO: 0.21 X10(3) UL (ref 0.1–1)
MONOCYTES NFR BLD AUTO: 4.9 %
NEUTROPHILS # BLD AUTO: 1.99 X10 (3) UL (ref 1.5–7.7)
NEUTROPHILS # BLD AUTO: 1.99 X10(3) UL (ref 1.5–7.7)
NEUTROPHILS NFR BLD AUTO: 46.3 %
OSMOLALITY SERPL CALC.SUM OF ELEC: 290 MOSM/KG (ref 275–295)
PHOSPHATE SERPL-MCNC: 5.3 MG/DL (ref 2.5–4.9)
PLATELET # BLD AUTO: 165 10(3)UL (ref 150–450)
POTASSIUM SERPL-SCNC: 4.5 MMOL/L (ref 3.5–5.1)
RBC # BLD AUTO: 4.12 X10(6)UL
SODIUM SERPL-SCNC: 141 MMOL/L (ref 136–145)
WBC # BLD AUTO: 4.3 X10(3) UL (ref 4–11)

## 2023-08-23 PROCEDURE — 99239 HOSP IP/OBS DSCHRG MGMT >30: CPT | Performed by: INTERNAL MEDICINE

## 2023-08-23 RX ORDER — MAGNESIUM OXIDE 400 MG/1
400 TABLET ORAL ONCE
Status: COMPLETED | OUTPATIENT
Start: 2023-08-23 | End: 2023-08-23

## 2023-08-23 NOTE — PROGRESS NOTES
[facility] Creatine Kinase (CK) Monitoring    Subjective:  Mica Bernabe is currently receiving daptomycin. Per [facility] protocol, pharmacy will order a baseline and weekly CK and notify the provider who ordered daptomycin if CK >=5 x ULN (with or without symptoms of myopathy). Objective:  EEH Upper Limits of Normal (ULN):  Sex ULN (U/L) 5 x ULN (U/L) 10 x ULN (U/L)   Female 192 960 1,920   Male 308 1,540 3,080     Patient's Recent CK Results:  Lab Results   Component Value Date    CK 23 (L) 08/23/2023       Assessment:  The patient's most recent < 5 x ULN. The patient is not experiencing signs and symptoms of myopathy. Plan:  Provider not notified, CK <5 x ULN. 2.   Recommend to continue daptomycin and monitor CK weekly. If provider was notified, discussed the above recommendations and provider agreed with the plan.     Emely Mcfarland, PharmD  8/23/2023  10:37 AM

## 2023-08-23 NOTE — PLAN OF CARE
A/O x 4. Meropenem and micafungin given per orders. Daptomycin dose given, pt tolerated well. Abdominal drains to gravity. Pain managed with norco. Up ad mariya. Tolerating diet. Ok to discharge home today. Discharge instructions explained to patient. All medications reviewed including which medications to start, continue, or stop taking. All follow up appointments reviewed. All discharge eduation explained to pt. Patient verbalized understanding, all questions answered. All belongings sent with patient. Discharged in stable condition.

## 2023-08-23 NOTE — CDS QUERY
How to answer this Query:    1.) DON'T CLICK COSIGN BUTTON FIRST  2.) Click \"3 dots. Moises Puff Moises Puff \" to the right of cosign button and click EDIT on the toolbar.  2.) Type an \"X\" in the bracket for the diagnosis that applies. (You may also add additional clinical details as you feel necessary to substantiate your response). 3.) Finally click \"Sign\" to complete response. Thank Rachel Evans    Dear Doctor:LOUIS  Clinical information (provided below) includes a medical regimen with vasopressor administration. PLEASE (X)  DIAGNOSIS THAT APPLIES TO THE          ADMINISTRATION OF VASOPRESSOR MEDICATION. SELECTION BY PROVIDER ONLY      (X)  Septic Shock    ( )  Other (please specify):     Documentation includes administration of IV vasopressor(s): 249 DR MYERS=- Some hypotension noted during hospital course currently on low-dose norepinephrine. Vital Signs include serial blood pressure: 77/43,84/51,79/51,   HR >90 LEVO GTT STARTED    Increased Nursing Care: ICU    If you have any questions, please contact Clinical :  Selma Jose RN at [phone]     Thank You!     THIS FORM IS A PERMANENT PART OF THE MEDICAL RECORD

## 2023-08-23 NOTE — DISCHARGE INSTRUCTIONS
You have a scheduled appointment with [facility] for an in office teach and train for new antibiotics on 8/24/23 at 2:30pm    You should continue to infuse your Merropenem as prior to admission until you are seen in the office.

## 2023-09-01 ENCOUNTER — HOSPITAL ENCOUNTER (INPATIENT)
Facility: HOSPITAL | Age: 33
LOS: 1 days | Discharge: HOME | End: 2023-09-02
Attending: EMERGENCY MEDICINE | Admitting: HOSPITALIST
Payer: COMMERCIAL

## 2023-09-01 ENCOUNTER — APPOINTMENT (OUTPATIENT)
Dept: CT IMAGING | Facility: HOSPITAL | Age: 33
End: 2023-09-01
Attending: EMERGENCY MEDICINE
Payer: COMMERCIAL

## 2023-09-01 DIAGNOSIS — R10.9 ABDOMINAL PAIN, ACUTE: Primary | ICD-10-CM

## 2023-09-01 LAB
ALBUMIN SERPL-MCNC: 3.6 G/DL (ref 3.4–5)
ALP LIVER SERPL-CCNC: 92 U/L
ALT SERPL-CCNC: 25 U/L
ANION GAP SERPL CALC-SCNC: 7 MMOL/L (ref 0–18)
APTT PPP: 29.9 SECONDS (ref 23.3–35.6)
AST SERPL-CCNC: 34 U/L (ref 15–37)
B-HCG UR QL: NEGATIVE
BASOPHILS # BLD AUTO: 0.06 X10(3) UL (ref 0–0.2)
BASOPHILS NFR BLD AUTO: 1 %
BILIRUB DIRECT SERPL-MCNC: 0.2 MG/DL (ref 0–0.2)
BILIRUB SERPL-MCNC: 0.5 MG/DL (ref 0.1–2)
BILIRUB UR QL: NEGATIVE
BUN BLD-MCNC: 6 MG/DL (ref 7–18)
BUN/CREAT SERPL: 11.5 (ref 10–20)
CALCIUM BLD-MCNC: 9.2 MG/DL (ref 8.5–10.1)
CHLORIDE SERPL-SCNC: 109 MMOL/L (ref 98–112)
CLARITY UR: CLEAR
CO2 SERPL-SCNC: 23 MMOL/L (ref 21–32)
CREAT BLD-MCNC: 0.52 MG/DL
DEPRECATED RDW RBC AUTO: 57.1 FL (ref 35.1–46.3)
EGFRCR SERPLBLD CKD-EPI 2021: 126 ML/MIN/1.73M2 (ref 60–?)
EOSINOPHIL # BLD AUTO: 0.44 X10(3) UL (ref 0–0.7)
EOSINOPHIL NFR BLD AUTO: 7.4 %
ERYTHROCYTE [DISTWIDTH] IN BLOOD BY AUTOMATED COUNT: 17.7 % (ref 11–15)
GLUCOSE BLD-MCNC: 106 MG/DL (ref 70–99)
GLUCOSE UR-MCNC: NORMAL MG/DL
HCT VFR BLD AUTO: 37.9 %
HGB BLD-MCNC: 11.9 G/DL
HGB UR QL STRIP.AUTO: NEGATIVE
IMM GRANULOCYTES # BLD AUTO: 0.04 X10(3) UL (ref 0–1)
IMM GRANULOCYTES NFR BLD: 0.7 %
INR BLD: 1.32 (ref 0.85–1.16)
KETONES UR-MCNC: NEGATIVE MG/DL
LEUKOCYTE ESTERASE UR QL STRIP.AUTO: 250
LYMPHOCYTES # BLD AUTO: 2.27 X10(3) UL (ref 1–4)
LYMPHOCYTES NFR BLD AUTO: 38.2 %
MCH RBC QN AUTO: 27.7 PG (ref 26–34)
MCHC RBC AUTO-ENTMCNC: 31.4 G/DL (ref 31–37)
MCV RBC AUTO: 88.3 FL
MONOCYTES # BLD AUTO: 0.31 X10(3) UL (ref 0.1–1)
MONOCYTES NFR BLD AUTO: 5.2 %
NEUTROPHILS # BLD AUTO: 2.82 X10 (3) UL (ref 1.5–7.7)
NEUTROPHILS # BLD AUTO: 2.82 X10(3) UL (ref 1.5–7.7)
NEUTROPHILS NFR BLD AUTO: 47.5 %
NITRITE UR QL STRIP.AUTO: NEGATIVE
OSMOLALITY SERPL CALC.SUM OF ELEC: 286 MOSM/KG (ref 275–295)
PH UR: 5.5 [PH] (ref 5–8)
PLATELET # BLD AUTO: 174 10(3)UL (ref 150–450)
POTASSIUM SERPL-SCNC: 3.7 MMOL/L (ref 3.5–5.1)
PROT SERPL-MCNC: 8.2 G/DL (ref 6.4–8.2)
PROT UR-MCNC: 20 MG/DL
PROTHROMBIN TIME: 16.2 SECONDS (ref 11.6–14.8)
RBC # BLD AUTO: 4.29 X10(6)UL
SODIUM SERPL-SCNC: 139 MMOL/L (ref 136–145)
SP GR UR STRIP: >1.03 (ref 1–1.03)
UROBILINOGEN UR STRIP-ACNC: NORMAL
WBC # BLD AUTO: 5.9 X10(3) UL (ref 4–11)

## 2023-09-01 PROCEDURE — 74177 CT ABD & PELVIS W/CONTRAST: CPT | Performed by: EMERGENCY MEDICINE

## 2023-09-01 PROCEDURE — 99223 1ST HOSP IP/OBS HIGH 75: CPT | Performed by: HOSPITALIST

## 2023-09-01 RX ORDER — ONDANSETRON 2 MG/ML
4 INJECTION INTRAMUSCULAR; INTRAVENOUS EVERY 6 HOURS PRN
Status: DISCONTINUED | OUTPATIENT
Start: 2023-09-01 | End: 2023-09-01

## 2023-09-01 RX ORDER — ALPRAZOLAM 0.25 MG/1
0.5 TABLET ORAL 3 TIMES DAILY PRN
Status: DISCONTINUED | OUTPATIENT
Start: 2023-09-01 | End: 2023-09-02

## 2023-09-01 RX ORDER — ZOLPIDEM TARTRATE 5 MG/1
10 TABLET ORAL NIGHTLY PRN
Status: DISCONTINUED | OUTPATIENT
Start: 2023-09-01 | End: 2023-09-02

## 2023-09-01 RX ORDER — HEPARIN SODIUM 5000 [USP'U]/ML
5000 INJECTION, SOLUTION INTRAVENOUS; SUBCUTANEOUS EVERY 8 HOURS SCHEDULED
Status: DISCONTINUED | OUTPATIENT
Start: 2023-09-01 | End: 2023-09-01

## 2023-09-01 RX ORDER — PANTOPRAZOLE SODIUM 40 MG/1
40 TABLET, DELAYED RELEASE ORAL
Status: DISCONTINUED | OUTPATIENT
Start: 2023-09-02 | End: 2023-09-02

## 2023-09-01 RX ORDER — SODIUM CHLORIDE 9 MG/ML
INJECTION, SOLUTION INTRAVENOUS CONTINUOUS
Status: DISCONTINUED | OUTPATIENT
Start: 2023-09-01 | End: 2023-09-02

## 2023-09-01 RX ORDER — ONDANSETRON 2 MG/ML
8 INJECTION INTRAMUSCULAR; INTRAVENOUS EVERY 4 HOURS PRN
Status: DISCONTINUED | OUTPATIENT
Start: 2023-09-01 | End: 2023-09-02

## 2023-09-01 RX ORDER — MORPHINE SULFATE 4 MG/ML
4 INJECTION, SOLUTION INTRAMUSCULAR; INTRAVENOUS EVERY 2 HOUR PRN
Status: DISCONTINUED | OUTPATIENT
Start: 2023-09-01 | End: 2023-09-02

## 2023-09-01 RX ORDER — CLONAZEPAM 1 MG/1
1 TABLET ORAL NIGHTLY
Status: DISCONTINUED | OUTPATIENT
Start: 2023-09-01 | End: 2023-09-02

## 2023-09-01 RX ORDER — ACETAMINOPHEN 325 MG/1
650 TABLET ORAL EVERY 6 HOURS PRN
Status: DISCONTINUED | OUTPATIENT
Start: 2023-09-01 | End: 2023-09-02

## 2023-09-01 RX ORDER — LORAZEPAM 0.5 MG/1
0.5 TABLET ORAL AS NEEDED
COMMUNITY

## 2023-09-01 RX ORDER — SODIUM CHLORIDE 9 MG/ML
INJECTION, SOLUTION INTRAVENOUS CONTINUOUS
Status: DISCONTINUED | OUTPATIENT
Start: 2023-09-01 | End: 2023-09-01

## 2023-09-01 RX ORDER — ECHINACEA PURPUREA EXTRACT 125 MG
1 TABLET ORAL
Status: DISCONTINUED | OUTPATIENT
Start: 2023-09-01 | End: 2023-09-02

## 2023-09-01 RX ORDER — HEPARIN SODIUM 5000 [USP'U]/ML
7500 INJECTION, SOLUTION INTRAVENOUS; SUBCUTANEOUS EVERY 8 HOURS SCHEDULED
Status: DISCONTINUED | OUTPATIENT
Start: 2023-09-01 | End: 2023-09-02

## 2023-09-01 RX ORDER — CALCIUM CARBONATE 500 MG/1
500 TABLET, CHEWABLE ORAL EVERY 6 HOURS PRN
Status: DISCONTINUED | OUTPATIENT
Start: 2023-09-01 | End: 2023-09-02

## 2023-09-01 RX ORDER — MORPHINE SULFATE 2 MG/ML
2 INJECTION, SOLUTION INTRAMUSCULAR; INTRAVENOUS EVERY 2 HOUR PRN
Status: DISCONTINUED | OUTPATIENT
Start: 2023-09-01 | End: 2023-09-02

## 2023-09-01 RX ORDER — PROCHLORPERAZINE EDISYLATE 5 MG/ML
5 INJECTION INTRAMUSCULAR; INTRAVENOUS EVERY 8 HOURS PRN
Status: DISCONTINUED | OUTPATIENT
Start: 2023-09-01 | End: 2023-09-02

## 2023-09-01 RX ORDER — VANCOMYCIN HYDROCHLORIDE 125 MG/1
125 CAPSULE ORAL NIGHTLY
Status: DISCONTINUED | OUTPATIENT
Start: 2023-09-01 | End: 2023-09-01

## 2023-09-01 RX ORDER — HYDROCODONE BITARTRATE AND ACETAMINOPHEN 7.5; 325 MG/1; MG/1
1 TABLET ORAL EVERY 6 HOURS PRN
Status: DISCONTINUED | OUTPATIENT
Start: 2023-09-01 | End: 2023-09-02

## 2023-09-01 RX ORDER — MORPHINE SULFATE 4 MG/ML
4 INJECTION, SOLUTION INTRAMUSCULAR; INTRAVENOUS ONCE
Status: COMPLETED | OUTPATIENT
Start: 2023-09-01 | End: 2023-09-01

## 2023-09-01 RX ORDER — LORAZEPAM 2 MG/ML
0.5 INJECTION INTRAMUSCULAR EVERY 4 HOURS PRN
Status: DISCONTINUED | OUTPATIENT
Start: 2023-09-01 | End: 2023-09-02

## 2023-09-01 RX ORDER — MORPHINE SULFATE 2 MG/ML
1 INJECTION, SOLUTION INTRAMUSCULAR; INTRAVENOUS EVERY 2 HOUR PRN
Status: DISCONTINUED | OUTPATIENT
Start: 2023-09-01 | End: 2023-09-02

## 2023-09-01 RX ORDER — VANCOMYCIN HYDROCHLORIDE 125 MG/1
125 CAPSULE ORAL DAILY
Status: DISCONTINUED | OUTPATIENT
Start: 2023-09-01 | End: 2023-09-02

## 2023-09-01 RX ORDER — QUETIAPINE FUMARATE 25 MG/1
50 TABLET, FILM COATED ORAL NIGHTLY
Status: DISCONTINUED | OUTPATIENT
Start: 2023-09-01 | End: 2023-09-02

## 2023-09-01 RX ORDER — ALBUTEROL SULFATE 90 UG/1
2 AEROSOL, METERED RESPIRATORY (INHALATION) EVERY 4 HOURS PRN
Status: DISCONTINUED | OUTPATIENT
Start: 2023-09-01 | End: 2023-09-02

## 2023-09-01 RX ORDER — PROCHLORPERAZINE EDISYLATE 5 MG/ML
5 INJECTION INTRAMUSCULAR; INTRAVENOUS EVERY 8 HOURS PRN
Status: DISCONTINUED | OUTPATIENT
Start: 2023-09-01 | End: 2023-09-01

## 2023-09-01 NOTE — H&P
[facility]    PATIENT'S NAME: FERNANDEZ OLSON   ATTENDING PHYSICIAN: Irene Deluca MD   PATIENT ACCOUNT#:   [id number]    LOCATION:  [place] [address] RECORD #:   [id number]       YOB: 1990  ADMISSION DATE:       09/01/2023    HISTORY AND PHYSICAL EXAMINATION    Please note complex medical decision making; extensive coordination among Interventional Radiology, Surgery, and Infectious Disease; and coordinating care with nursing was involved in this admission. CHIEF COMPLAINT:  Abdominal pain. ADMITTING DIAGNOSIS:  Acute diverticulitis with abscesses and possible colovesicular fistula. HISTORY OF PRESENT ILLNESS:  This is a very pleasant, but extremely anxious 43-year-old white female who presents with a history of having previous episodes of diverticulitis with abscess. She previously grew yeast, Enterococcus avium, Haemophilus parainfluenza, and apparently some form of yeast.  She was seen by Infectious Disease and started on micafungin, meropenem, and daptomycin and was discharged, I believe, about a week ago. She returns now with these complaints. She informs me that she is deathly frightened of needles which give her anxiety so she will not do blood cultures unless Dr. Tre Donaldson feels they are necessary. She did agree to a urine culture because of her pain, dysuria, and bubbles that she found in her urine when she urinates. She has pain with urination; it is better now than it had been. The patient's CT here is grossly abnormal with evidence of abscesses in a lower anterior surgical drain area involving small bowel, sigmoid colon, and a second drain that abuts the left adnexa.   It really has not decreased significantly since she has been here, and I had a long discussion with her that I believe that there are no more antibiotic and IR options and that she will likely need surgery, and I asked her to discuss with Dr. Reinaldo Nguyen about the timing of such surgery and if a colostomy will be needed. 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  Also significant anxiety. MEDICATIONS:  Her home medications include vancomycin 125 mg oral daily, Ativan 0.5 mg as needed for anxiety, daptomycin 700 mg daily, micafungin 100 mg daily, Ventolin 2 puffs 4 times a day as needed, Norco 7.5/325 one tablet every 6 hours as needed, Xanax 0.5 mg 3 times a day as needed, sertraline 100 mg at bedtime, quetiapine 50 mg nightly, meropenem 1 g every 8 hours as needed, Tylenol 650 mg every 6 hours as needed, Klonopin 1 mg nightly, and esomeprazole 20 mg p.o. daily. ALLERGIES:  She is intolerant to lactose. FAMILY HISTORY:  Mother  at 48 of idiopathic pulmonary fibrosis with rheumatoid arthritis. Her father is 68 and living but has diabetes and is a colon cancer survivor and had a kidney transplant. SOCIAL HISTORY:  The patient does not smoke. Occasionally drinks and occasionally uses marijuana edibles. REVIEW OF SYSTEMS:  She is extremely anxious. She has had bleeding from the catheter. She does urinate with bubbles. She does not eat much and has only been mostly subsisting on Ensure shakes. She does snore but was never diagnosed with obstructive sleep apnea, and the rest of her 13 systems reviewed were negative. PHYSICAL EXAMINATION:    GENERAL:  Well-nourished, well-developed, obese white female, anxious, in mild distress largely from anxiety. VITAL SIGNS:  Temperature is 97.3, pulse 91, respiratory rate 20, blood pressure is 107/65, 96%. She is 5 feet 4 and weighs 253 pounds with a BMI of 43.48. HEENT:  She is normocephalic, atraumatic. Anicteric. Oropharynx has a very small oropharyngeal opening. NECK:  Supple. CHEST:  She has a very tender scar which gives her paresthesias from her lung surgery.   LUNGS:  Decreased breath sounds in both bases. Some rhonchi. HEART:  Normal S1, S2. No S3.  ABDOMEN:  Soft. Tender to palpation in the left lower quadrant and right lower quadrant. The patient has drains in place. EXTREMITIES:  She has no cyanosis, clubbing, or edema. SKIN:  Warm and dry. She does not appear to have any tattoos. VASCULAR:  She has 1+ dorsalis pedis pulses bilaterally. NEUROLOGIC:  She is awake and alert and oriented x3 with 5/5 motor strength with no deficits. PSYCHIATRIC:  The patient is extraordinarily anxious and talkative. BACK:  There is no dorsal spine or CVA tenderness. MUSCULOSKELETAL:  There are no obvious joint deformities or injuries. LYMPHATIC:  I did not palpate any submandibular lymphadenopathy. LABORATORY DATA:  The sodium is 136, potassium 3.7, chloride 109, CO2 of 23, BUN of 6, creatinine of 0.52. INR is 1.32. The white count is 5900, hemoglobin 11.9, platelets 503,739. ASSESSMENT AND PLAN:    1. Acute diverticulitis with abdominal abscesses, possible fistula to the bladder. I believe she has failed drainage and antibiotics and needs surgery. Await Dr. Edwige Bowen for his opinion. The patient has never had issues with surgery and she underwent general anesthesia for her lobectomy, although that was 15 years ago, and she had no issues with anything then. She did wake up during a bronchoscopy once under MAC anesthesia. She has a small oropharynx which may make her difficult to intubate. 2.   Code status is Full Code. Discuss with patient during this hospitalization. 3.   Morbid obesity, BMI of 43.48, with small oropharyngeal opening. Will recommend workup for obstructive sleep apnea. 4.   Severe anxiety, worsened in the hospital.  May even interfere with care as patient is afraid of needles and does not want to let us do DVT prophylaxis or other treatments unless absolutely necessary. She refused subcutaneous heparin. She refused blood cultures.   We will keep on IV Ativan for now and restart medications as soon as she can take p.o. Dictated By Rubén Sanchez.  MD Sandrine  d: 09/01/2023 10:42:27  t: 09/01/2023 11:22:03  [facility] [id number]  [facility]/

## 2023-09-01 NOTE — PLAN OF CARE
Problem: Patient Centered Care  Goal: Patient preferences are identified and integrated in the patient's plan of care  Description: Interventions:  - What would you like us to know as we care for you? Lives at home with family  - Provide timely, complete, and accurate information to patient/family  - Incorporate patient and family knowledge, values, beliefs, and cultural backgrounds into the planning and delivery of care  - Encourage patient/family to participate in care and decision-making at the level they choose  - Honor patient and family perspectives and choices  Note: Received from ER with c/o abdominal pain. Patient alert and oriented. NS infusing. Patient assisted to bed, vital signs taken, oriented to room and call light. Morphine given for pain. IV meropenem started as ordered. Vital signs stable. Will continue to monitor. Bed on low and locked position, call light within reach.

## 2023-09-01 NOTE — CM/SW NOTE
CM touched base with spouse. Ettie  was receiving IV antibiotics through [facility] at [phone] (option 2). Office team notified of admission. Referral sent via [facility] to [facility]. PLAN:  If Mica discharges on antibiotics, plan set forth so she can resume IV management throught the [facility] group as previously arranged. CM/SW will continue to follow for dc planning needs.     Lisa Demarco MBA BSN RN [address]  RN Case Manager  [phone]

## 2023-09-01 NOTE — ED QUICK NOTES
Orders for admission, patient is aware of plan and ready to go upstairs. Any questions, please call ED RN lucho at extension [phone].      Patient Covid vaccination status: Unvaccinated     COVID Test Ordered in ED: None    COVID Suspicion at Admission: N/A    Running Infusions:      Mental Status/LOC at time of transport: alert    Other pertinent information:   CIWA score: N/A   NIH score:  N/A

## 2023-09-01 NOTE — ED INITIAL ASSESSMENT (HPI)
Pt c/o of redness and drainage around abdominal drain site for diverticulitis. Pt had a Pt also states she is having blood in her drainage bag. She call her PCP and on call  told her to come to ED. Pt states she had a fever on Saturday. Denies chills or fevers at this moment. Pt has a PICC line to L arm.

## 2023-09-01 NOTE — CM/SW NOTE
CM met with Leyda Wisdom and her spouse on skin and dressing management of her two IR drains. Patient is in need of additional training to help prevent skin breakdown. Home health referrals started in aidin. CM/CRISTIANO to follow-up on 9.2.23 for accepting providers. / to remain available for support and/or discharge planning.       Kayla Armendariz MBA BSN RN [address]  RN Case Manager  [phone] [place]

## 2023-09-02 ENCOUNTER — APPOINTMENT (OUTPATIENT)
Dept: ULTRASOUND IMAGING | Facility: HOSPITAL | Age: 33
End: 2023-09-02
Attending: HOSPITALIST
Payer: COMMERCIAL

## 2023-09-02 VITALS
TEMPERATURE: 98 F | BODY MASS INDEX: 42.49 KG/M2 | OXYGEN SATURATION: 98 % | HEART RATE: 69 BPM | DIASTOLIC BLOOD PRESSURE: 83 MMHG | WEIGHT: 248.88 LBS | RESPIRATION RATE: 16 BRPM | SYSTOLIC BLOOD PRESSURE: 115 MMHG | HEIGHT: 64 IN

## 2023-09-02 LAB
ALBUMIN SERPL-MCNC: 2.8 G/DL (ref 3.4–5)
ALBUMIN/GLOB SERPL: 0.8 {RATIO} (ref 1–2)
ALP LIVER SERPL-CCNC: 78 U/L
ALT SERPL-CCNC: 20 U/L
ANION GAP SERPL CALC-SCNC: 3 MMOL/L (ref 0–18)
AST SERPL-CCNC: 25 U/L (ref 15–37)
BASOPHILS # BLD AUTO: 0.03 X10(3) UL (ref 0–0.2)
BASOPHILS NFR BLD AUTO: 0.8 %
BILIRUB SERPL-MCNC: 0.4 MG/DL (ref 0.1–2)
BUN BLD-MCNC: 6 MG/DL (ref 7–18)
BUN/CREAT SERPL: 14 (ref 10–20)
CALCIUM BLD-MCNC: 8.4 MG/DL (ref 8.5–10.1)
CHLORIDE SERPL-SCNC: 112 MMOL/L (ref 98–112)
CK SERPL-CCNC: 62 U/L
CO2 SERPL-SCNC: 28 MMOL/L (ref 21–32)
CREAT BLD-MCNC: 0.43 MG/DL
DEPRECATED RDW RBC AUTO: 56.4 FL (ref 35.1–46.3)
EGFRCR SERPLBLD CKD-EPI 2021: 132 ML/MIN/1.73M2 (ref 60–?)
EOSINOPHIL # BLD AUTO: 0.36 X10(3) UL (ref 0–0.7)
EOSINOPHIL NFR BLD AUTO: 9.8 %
ERYTHROCYTE [DISTWIDTH] IN BLOOD BY AUTOMATED COUNT: 17.3 % (ref 11–15)
GLOBULIN PLAS-MCNC: 3.7 G/DL (ref 2.8–4.4)
GLUCOSE BLD-MCNC: 95 MG/DL (ref 70–99)
HCT VFR BLD AUTO: 34.1 %
HGB BLD-MCNC: 10.8 G/DL
IMM GRANULOCYTES # BLD AUTO: 0.03 X10(3) UL (ref 0–1)
IMM GRANULOCYTES NFR BLD: 0.8 %
INR BLD: 1.28 (ref 0.85–1.16)
LYMPHOCYTES # BLD AUTO: 1.44 X10(3) UL (ref 1–4)
LYMPHOCYTES NFR BLD AUTO: 39.1 %
MAGNESIUM SERPL-MCNC: 1.9 MG/DL (ref 1.6–2.6)
MCH RBC QN AUTO: 27.8 PG (ref 26–34)
MCHC RBC AUTO-ENTMCNC: 31.7 G/DL (ref 31–37)
MCV RBC AUTO: 87.9 FL
MONOCYTES # BLD AUTO: 0.17 X10(3) UL (ref 0.1–1)
MONOCYTES NFR BLD AUTO: 4.6 %
NEUTROPHILS # BLD AUTO: 1.65 X10 (3) UL (ref 1.5–7.7)
NEUTROPHILS # BLD AUTO: 1.65 X10(3) UL (ref 1.5–7.7)
NEUTROPHILS NFR BLD AUTO: 44.9 %
OSMOLALITY SERPL CALC.SUM OF ELEC: 293 MOSM/KG (ref 275–295)
PHOSPHATE SERPL-MCNC: 3.2 MG/DL (ref 2.5–4.9)
PLATELET # BLD AUTO: 129 10(3)UL (ref 150–450)
POTASSIUM SERPL-SCNC: 4.2 MMOL/L (ref 3.5–5.1)
PROT SERPL-MCNC: 6.5 G/DL (ref 6.4–8.2)
PROTHROMBIN TIME: 15.9 SECONDS (ref 11.6–14.8)
RBC # BLD AUTO: 3.88 X10(6)UL
SODIUM SERPL-SCNC: 143 MMOL/L (ref 136–145)
WBC # BLD AUTO: 3.7 X10(3) UL (ref 4–11)

## 2023-09-02 PROCEDURE — 93971 EXTREMITY STUDY: CPT | Performed by: HOSPITALIST

## 2023-09-02 PROCEDURE — 99239 HOSP IP/OBS DSCHRG MGMT >30: CPT | Performed by: HOSPITALIST

## 2023-09-02 RX ORDER — CALCIUM CARBONATE 500 MG/1
500 TABLET, CHEWABLE ORAL EVERY 6 HOURS PRN
Qty: 30 TABLET | Refills: 0 | Status: SHIPPED | OUTPATIENT
Start: 2023-09-02

## 2023-09-02 RX ORDER — HYDROCODONE BITARTRATE AND ACETAMINOPHEN 7.5; 325 MG/1; MG/1
1 TABLET ORAL EVERY 6 HOURS PRN
Qty: 12 TABLET | Refills: 0 | Status: SHIPPED | OUTPATIENT
Start: 2023-09-02

## 2023-09-02 RX ORDER — VANCOMYCIN HYDROCHLORIDE 125 MG/1
125 CAPSULE ORAL DAILY
Qty: 30 CAPSULE | Refills: 0 | Status: SHIPPED | OUTPATIENT
Start: 2023-09-02 | End: 2023-10-02

## 2023-09-02 NOTE — CM/SW NOTE
09/02/23 1400   Discharge disposition   Expected discharge disposition Home or Self   DME/Infusion Providers [facility]   Discharge transportation Private car       Patient with discharge order in. Patient current with [facility] for home infusions. Confirmed with Dr Brandy Ramirez that patients IV antibiotics are not changing on discharge. CM spoke with Kiran Mina RN from [facility] office and patient is all set for discharge, patient has enough meds at home to get her through until her next delivery. Patient has office visit arranged for Tuesday 9/5. Patient's RN Faye Preston also spoke with Kiran Mina and confirmed above information. Per Faye Preston, patient does not want home health at discharge and is eager to go home.      Plan: Home with resumption of home infusions with [facility]- no changes to antibiotic plan    Agustina De León, RN, BSN

## 2023-09-02 NOTE — PLAN OF CARE
Received call from Jeremy Ledom, [address] [facility] @ [facility] verifying patient has antibiotics and supplies in the home to continue those infusions. Patient has an appointment to be seen in the [facility] clinic on Tuesday 9/5/2023 for follow up labs and to have the PICC line dressing changed. This was all communicated to patient & . Problem: Patient Centered Care  Goal: Patient preferences are identified and integrated in the patient's plan of care  Description: Interventions:  - What would you like us to know as we care for you?  Lives at home with family  - Provide timely, complete, and accurate information to patient/family  - Incorporate patient and family knowledge, values, beliefs, and cultural backgrounds into the planning and delivery of care  - Encourage patient/family to participate in care and decision-making at the level they choose  - Honor patient and family perspectives and choices  Outcome: Completed     Problem: Patient/Family Goals  Goal: Patient/Family Long Term Goal  Description: Patient's Long Term Goal: go home    Interventions:  - IVF  -IV antibiotics  - monitor vital signs  - monitor lab  - See additional Care Plan goals for specific interventions  Outcome: Completed  Goal: Patient/Family Short Term Goal  Description: Patient's Short Term Goal:     Interventions:   - IVF  -IV antibiotics  -ID and IR on consult  - See additional Care Plan goals for specific interventions  Outcome: Completed     Problem: PAIN - ADULT  Goal: Verbalizes/displays adequate comfort level or patient's stated pain goal  Description: INTERVENTIONS:  - Encourage pt to monitor pain and request assistance  - Assess pain using appropriate pain scale  - Administer analgesics based on type and severity of pain and evaluate response  - Implement non-pharmacological measures as appropriate and evaluate response  - Consider cultural and social influences on pain and pain management  - Manage/alleviate anxiety  - Utilize distraction and/or relaxation techniques  - Monitor for opioid side effects  - Notify MD/LIP if interventions unsuccessful or patient reports new pain  - Anticipate increased pain with activity and pre-medicate as appropriate  Outcome: Completed     Problem: RISK FOR INFECTION - ADULT  Goal: Absence of fever/infection during anticipated neutropenic period  Description: INTERVENTIONS  - Monitor WBC  - Administer growth factors as ordered  - Implement neutropenic guidelines  Outcome: Completed     Problem: SAFETY ADULT - FALL  Goal: Free from fall injury  Description: INTERVENTIONS:  - Assess pt frequently for physical needs  - Identify cognitive and physical deficits and behaviors that affect risk of falls.   - [place] fall precautions as indicated by assessment.  - Educate pt/family on patient safety including physical limitations  - Instruct pt to call for assistance with activity based on assessment  - Modify environment to reduce risk of injury  - Provide assistive devices as appropriate  - Consider OT/PT consult to assist with strengthening/mobility  - Encourage toileting schedule  Outcome: Completed     Problem: DISCHARGE PLANNING  Goal: Discharge to home or other facility with appropriate resources  Description: INTERVENTIONS:  - Identify barriers to discharge w/pt and caregiver  - Include patient/family/discharge partner in discharge planning  - Arrange for needed discharge resources and transportation as appropriate  - Identify discharge learning needs (meds, wound care, etc)  - Arrange for interpreters to assist at discharge as needed  - Consider post-discharge preferences of patient/family/discharge partner  - Complete POLST form as appropriate  - Assess patient's ability to be responsible for managing their own health  - Refer to Case Management Department for coordinating discharge planning if the patient needs post-hospital services based on physician/LIP order or complex needs related to functional status, cognitive ability or social support system  Outcome: Completed     Problem: SKIN/TISSUE INTEGRITY - ADULT  Goal: Incision(s), wounds(s) or drain site(s) healing without S/S of infection  Description: INTERVENTIONS:  - Assess and document risk factors for pressure ulcer development  - Assess and document skin integrity  - Assess and document dressing/incision, wound bed, drain sites and surrounding tissue  - Implement wound care per orders  - Initiate isolation precautions as appropriate  - Initiate Pressure Ulcer prevention bundle as indicated  Outcome: Completed  Goal: Skin integrity remains intact  Description: INTERVENTIONS  - Assess and document risk factors for pressure ulcer development  - Assess and document skin integrity  - Monitor for areas of redness and/or skin breakdown  - Initiate interventions, skin care algorithm/standards of care as needed  Outcome: Completed  Goal: Oral mucous membranes remain intact  Description: INTERVENTIONS  - Assess oral mucosa and hygiene practices  - Implement preventative oral hygiene regimen  - Implement oral medicated treatments as ordered  Outcome: Completed     Problem: METABOLIC/FLUID AND ELECTROLYTES - ADULT  Goal: Glucose maintained within prescribed range  Description: INTERVENTIONS:  - Monitor Blood Glucose as ordered  - Assess for signs and symptoms of hyperglycemia and hypoglycemia  - Administer ordered medications to maintain glucose within target range  - Assess barriers to adequate nutritional intake and initiate nutrition consult as needed  - Instruct patient on self management of diabetes  Outcome: Completed  Goal: Electrolytes maintained within normal limits  Description: INTERVENTIONS:  - Monitor labs and rhythm and assess patient for signs and symptoms of electrolyte imbalances  - Administer electrolyte replacement as ordered  - Monitor response to electrolyte replacements, including rhythm and repeat lab results as appropriate  - Fluid restriction as ordered  - Instruct patient on fluid and nutrition restrictions as appropriate  Outcome: Completed  Goal: Hemodynamic stability and optimal renal function maintained  Description: INTERVENTIONS:  - Monitor labs and assess for signs and symptoms of volume excess or deficit  - Monitor intake, output and patient weight  - Monitor urine specific gravity, serum osmolarity and serum sodium as indicated or ordered  - Monitor response to interventions for patient's volume status, including labs, urine output, blood pressure (other measures as available)  - Encourage oral intake as appropriate  - Instruct patient on fluid and nutrition restrictions as appropriate  Outcome: Completed

## 2023-09-02 NOTE — DISCHARGE INSTRUCTIONS
Ok to go home abx/routine picc care; low residue diet  Fu dr Sujit Mejia asap     Medication List        START taking these medications      calcium carbonate 500 MG Chew  Commonly known as: Tums  Chew 1 tablet (500 mg total) by mouth every 6 (six) hours as needed. CHANGE how you take these medications      clonazePAM 0.5 MG Tabs  Commonly known as: KlonoPIN  Take 1 tablet (0.5 mg total) by mouth nightly. What changed: how much to take     HYDROcodone-acetaminophen 7.5-325 MG Tabs  Commonly known as: Norco  Take 1 tablet by mouth every 6 (six) hours as needed for Pain. Watch drowsiness no alcohol  What changed: additional instructions     sertraline 50 MG Tabs  Commonly known as: Zoloft  Take 2 tablets (100 mg total) by mouth daily. What changed: when to take this     vancomycin 125 MG Caps  Commonly known as: Vancocin  Take 1 capsule (125 mg total) by mouth daily. Ok to sub liquid or pills as insurance covers  What changed: additional instructions            CONTINUE taking these medications      acetaminophen 325 MG Tabs  Commonly known as: Tylenol     albuterol 108 (90 Base) MCG/ACT Aers  Commonly known as: Ventolin HFA  Inhale 2 puffs into the lungs every 4 (four) hours as needed for Wheezing.      ALPRAZolam 0.5 MG Tabs  Commonly known as: Xanax     BreatheRite Flori Spacer Adult Misc  Use with albuterol     DAPTOmycin 50 mg/mL in sodium chloride     Esomeprazole Magnesium 20 MG Cpdr  Commonly known as: NEXIUM     LORazepam 0.5 MG Tabs  Commonly known as: Ativan     QUEtiapine 50 MG Tabs  Commonly known as: SEROquel  Take 1 tablet (50 mg total) by mouth nightly.     sodium chloride 0.9% SOLN 100 mL with meropenem 1 g SOLR 1 g     sodium chloride 0.9% SOLN 100 mL with micafungin 100 MG SOLR 100 mg               Where to Get Your Medications        These medications were sent to [address]  [facility], [phone], [address] [facility] [address], [facility] [address]      Phone: [phone]   calcium carbonate 500 MG Chew  HYDROcodone-acetaminophen 7.5-325 MG Tabs  vancomycin 125 MG Caps

## 2023-09-02 NOTE — PLAN OF CARE
Problem: Patient Centered Care  Goal: Patient preferences are identified and integrated in the patient's plan of care  Description: Interventions:  - What would you like us to know as we care for you?  Lives at home with family  - Provide timely, complete, and accurate information to patient/family  - Incorporate patient and family knowledge, values, beliefs, and cultural backgrounds into the planning and delivery of care  - Encourage patient/family to participate in care and decision-making at the level they choose  - Honor patient and family perspectives and choices  Outcome: Progressing     Problem: Patient/Family Goals  Goal: Patient/Family Long Term Goal  Description: Patient's Long Term Goal: go home    Interventions:  - IVF  -IV antibiotics  - monitor vital signs  - monitor lab  - See additional Care Plan goals for specific interventions  Outcome: Progressing  Goal: Patient/Family Short Term Goal  Description: Patient's Short Term Goal:     Interventions:   - IVF  -IV antibiotics  -ID and IR on consult  - See additional Care Plan goals for specific interventions  Outcome: Progressing     Problem: PAIN - ADULT  Goal: Verbalizes/displays adequate comfort level or patient's stated pain goal  Description: INTERVENTIONS:  - Encourage pt to monitor pain and request assistance  - Assess pain using appropriate pain scale  - Administer analgesics based on type and severity of pain and evaluate response  - Implement non-pharmacological measures as appropriate and evaluate response  - Consider cultural and social influences on pain and pain management  - Manage/alleviate anxiety  - Utilize distraction and/or relaxation techniques  - Monitor for opioid side effects  - Notify MD/LIP if interventions unsuccessful or patient reports new pain  - Anticipate increased pain with activity and pre-medicate as appropriate  Outcome: Progressing     Problem: RISK FOR INFECTION - ADULT  Goal: Absence of fever/infection during anticipated neutropenic period  Description: INTERVENTIONS  - Monitor WBC  - Administer growth factors as ordered  - Implement neutropenic guidelines  Outcome: Progressing     Problem: SAFETY ADULT - FALL  Goal: Free from fall injury  Description: INTERVENTIONS:  - Assess pt frequently for physical needs  - Identify cognitive and physical deficits and behaviors that affect risk of falls.   - [place] fall precautions as indicated by assessment.  - Educate pt/family on patient safety including physical limitations  - Instruct pt to call for assistance with activity based on assessment  - Modify environment to reduce risk of injury  - Provide assistive devices as appropriate  - Consider OT/PT consult to assist with strengthening/mobility  - Encourage toileting schedule  Outcome: Progressing     Problem: DISCHARGE PLANNING  Goal: Discharge to home or other facility with appropriate resources  Description: INTERVENTIONS:  - Identify barriers to discharge w/pt and caregiver  - Include patient/family/discharge partner in discharge planning  - Arrange for needed discharge resources and transportation as appropriate  - Identify discharge learning needs (meds, wound care, etc)  - Arrange for interpreters to assist at discharge as needed  - Consider post-discharge preferences of patient/family/discharge partner  - Complete POLST form as appropriate  - Assess patient's ability to be responsible for managing their own health  - Refer to Case Management Department for coordinating discharge planning if the patient needs post-hospital services based on physician/LIP order or complex needs related to functional status, cognitive ability or social support system  Outcome: Progressing     Problem: SKIN/TISSUE INTEGRITY - ADULT  Goal: Incision(s), wounds(s) or drain site(s) healing without S/S of infection  Description: INTERVENTIONS:  - Assess and document risk factors for pressure ulcer development  - Assess and document skin integrity  - Assess and document dressing/incision, wound bed, drain sites and surrounding tissue  - Implement wound care per orders  - Initiate isolation precautions as appropriate  - Initiate Pressure Ulcer prevention bundle as indicated  Outcome: Progressing  Goal: Skin integrity remains intact  Description: INTERVENTIONS  - Assess and document risk factors for pressure ulcer development  - Assess and document skin integrity  - Monitor for areas of redness and/or skin breakdown  - Initiate interventions, skin care algorithm/standards of care as needed  Outcome: Progressing  Goal: Oral mucous membranes remain intact  Description: INTERVENTIONS  - Assess oral mucosa and hygiene practices  - Implement preventative oral hygiene regimen  - Implement oral medicated treatments as ordered  Outcome: Progressing     Problem: METABOLIC/FLUID AND ELECTROLYTES - ADULT  Goal: Glucose maintained within prescribed range  Description: INTERVENTIONS:  - Monitor Blood Glucose as ordered  - Assess for signs and symptoms of hyperglycemia and hypoglycemia  - Administer ordered medications to maintain glucose within target range  - Assess barriers to adequate nutritional intake and initiate nutrition consult as needed  - Instruct patient on self management of diabetes  Outcome: Progressing  Goal: Electrolytes maintained within normal limits  Description: INTERVENTIONS:  - Monitor labs and rhythm and assess patient for signs and symptoms of electrolyte imbalances  - Administer electrolyte replacement as ordered  - Monitor response to electrolyte replacements, including rhythm and repeat lab results as appropriate  - Fluid restriction as ordered  - Instruct patient on fluid and nutrition restrictions as appropriate  Outcome: Progressing  Goal: Hemodynamic stability and optimal renal function maintained  Description: INTERVENTIONS:  - Monitor labs and assess for signs and symptoms of volume excess or deficit  - Monitor intake, output and patient weight  - Monitor urine specific gravity, serum osmolarity and serum sodium as indicated or ordered  - Monitor response to interventions for patient's volume status, including labs, urine output, blood pressure (other measures as available)  - Encourage oral intake as appropriate  - Instruct patient on fluid and nutrition restrictions as appropriate  Outcome: Progressing

## 2023-09-02 NOTE — DISCHARGE SUMMARY
Dc summary#[id number]  > 30 min spent on [facility] Discharge Diagnoses: diverticulitis with abscesses    Lace+ Score: 54  59-90 High Risk  29-58 Medium Risk  0-28   Low Risk. TCM Follow-Up Recommendation:  LACE > 58:  High Risk of readmission after discharge from the hospital.  Tcm fu recommended

## 2023-09-04 NOTE — DISCHARGE SUMMARY
[facility]    PATIENT'S NAME: FERNANDEZ OLSON   ATTENDING PHYSICIAN: Irene Deluca MD   PATIENT ACCOUNT#:   [id number]    LOCATION:  [place] [address] RECORD #:   [id number]       YOB: 1990  ADMISSION DATE:       09/01/2023      DISCHARGE DATE:  09/02/2023    DISCHARGE SUMMARY    More than 45 minutes were spent preparing this discharge, coordinating care with nurse, counseling the patient, her , and her father in her room with her permission, coordinating care with Dr. Bradley Ashley and Infectious Disease as well. 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 HISTORY AND HOSPITAL COURSE:  The patient is a very pleasant but very anxious 43-year-old  American female who presents with a history of having complex diverticulitis with multiple admissions. Initially, she declined surgery. But, unfortunately, abscesses developed along with fistulae. She was seen by Infectious Disease who maintained her antibiotics and by Dr. Bradley Ashley of General Surgery who would prefer not to operate at this time because a colostomy would be necessary. Because of the patient's abdominal wall being large, a colostomy would be difficult to thread through the thick abdominal wall and maintain without ischemia and other issues, so, the preference would be to maintain antibiotics and hope for a primary closure. Unfortunately, the patient has symptoms. She has bubbles in her urine probably from air from the bowel fistula. She has several sources of several collections of pus, and Interventional Radiology says there are no further drainage procedures to be done, so we sent her home on her antibiotics with PICC line care, home health care, and will follow up with Dr. Bradley Ashley as an outpatient. Hopefully, this will be resolved.   I had an extensive discussion with her about diverticulitis and diet after this type of surgery and so forth. PHYSICAL EXAMINATION ON DISCHARGE:  VITAL SIGNS:  Temperature 98.1, pulse 69, respiratory rate 16, blood pressure 115/83, 98%. LUNGS:  Occasional rhonchi. HEART:  Normal S1, S2. No S3.  ABDOMEN:  Soft. Tender in both left and right lower quadrants. EXTREMITIES:  Without calf tenderness. NEUROLOGIC:  She is alert and oriented, very anxious, friendly and cooperative. LABORATORY STUDIES:  Please see chart. ASSESSMENT AND PLAN:  1. Acute diverticulitis with abdominal abscesses and fistulae. The patient failed drainage procedures, antibiotics so far. Will need surgery at some point. Hopefully, she can be improved to the point that she does not need a colostomy. 2.   Code status is full code. Discussed with the patient this hospitalization. 3.   Morbid obesity. BMI of 43.48 with small oropharyngeal opening. Recommend workup for sleep apnea. 4.   Severe anxiety, worsened in the hospital.  5.   Left arm swelling. Ultrasound of the left arm shows no DVT in the left arm. CONDITION ON DISCHARGE:  Stable. CODE STATUS:  Full Code. DIET:  Fiber, soft. ACTIVITY:  No heavy exercise, otherwise as tolerated. FOLLOWUP:  Followup with Dr. Redd Shoemaker in a few weeks. Followup with Dr. Marjan Haas on Tuesday for followup advice. Dr. Carol Porter in 3 weeks. Followup with [facility] care, routine PICC care. Return hopefully for surgery as soon as it is feasible. MEDICATIONS ON DISCHARGE:  1. Nexium 20 mg every morning. 2.   Lorazepam 0.5 mg by mouth as needed for anxiety. 3.   Tylenol 650 mg every 6 hours as needed. Watch total daily Tylenol limit of 3 g.  4.   Ventolin 2 puffs every 4 hours as needed. 5.   Xanax 0.5 mg 3 times a day as needed. 6.   Klonopin 0.5 mg nightly. 7.   Daptomycin 700 mg daily. 8.   Norco 7.5/325 one tablet every 6 hours as needed for pain. 9.   Seroquel 50 mg nightly. Watch for drowsiness. 10.   Sertraline 100 mg nightly.   11.   Meropenem 1 g every 8 hours.  12.   Micafungin 100 mg daily. 13.   Vancomycin capsule 125 mg daily. 14.   Calcium carbonate 500 mg every 6 hours as needed. RISK OF READMISSION:  Very high. TCM followup is recommended. Dictated By Muna Serrano.  MD Sandrine  d: 09/02/2023 16:29:13  t: 09/03/2023 18:22:06  [facility] [id number]  LAS/

## 2023-09-05 ENCOUNTER — APPOINTMENT (OUTPATIENT)
Dept: INTERVENTIONAL RADIOLOGY/VASCULAR | Facility: HOSPITAL | Age: 33
End: 2023-09-05
Attending: RADIOLOGY
Payer: COMMERCIAL

## 2023-09-21 ENCOUNTER — TELEPHONE (OUTPATIENT)
Facility: CLINIC | Age: 33
End: 2023-09-21

## 2023-09-21 DIAGNOSIS — K57.92 ACUTE DIVERTICULITIS: Primary | ICD-10-CM

## 2023-09-21 NOTE — TELEPHONE ENCOUNTER
Yes, sounds good.     Would arrange for colonoscopy day prior to planned surgery, pt would then go home on clear liquids to do surgical colon prep ( usually bowel prep and antibiotics)    Dx: diverticulitis    Colonoscopy with MAC sedation    Split dose plenvue preparation, I believe I gave her a sample when she was in to see me last but please confirm this with patient/    Please review prep instructions with patient    Any provider on September 27 or October 4th ( would check Dr Mari Bush first for either day) otherwise any provider     - NO herbal supplements, weight loss or ADHD medications x 7 days prior to the procedure(s)

## 2023-09-21 NOTE — TELEPHONE ENCOUNTER
Pt is requesting to speak directly to Meryle Erichsen or Rn states surgeon will perform surgery on Sept 28th or Oct 5th and pt states she would need CLN either Sept 27th or Oct 4th would like to speak to Rn about this matter to see if this is possible please call

## 2023-09-21 NOTE — TELEPHONE ENCOUNTER
Saman Henderson,    I spoke with Gamal Eunice and discussed your message below,  she stated she did not get sample for Plenvue. I instructed her to notify us of her exact colectomy date once confirmed with surgeon so we can schedule her colonoscopy. She verbalized understanding    Stated she is not on herbal supplements, wt loss or ADHD medications. She is asking if she can take antianxiety meds prior to her colonoscopy as she has panick attacks. I discouraged her taking those given MAC sedation but she said she needs them. She is also on antipsychotic meds. Please advise. Thank you. Fyi: pt sleeps late d/t her antibiotic infusion and emptying her drain and is requesting to be called after 12noon or leave message in case she does not answer her phone.

## 2023-09-21 NOTE — TELEPHONE ENCOUNTER
Saman Henderson,    I spoke with pt, name/ verified. She was told by Dr. Darwin Begum that she will have colon procedure (sigmoid colectomy?) on either 23 or 10/5/23. She is still on antibiotics 'til the day of or after her surgery. She was told that she needs to have colonoscopy a day prior to her colectomy, therefore on 23 or 10/4/23. Please advise. Thank you.

## 2023-09-22 RX ORDER — POLYETHYLENE GLYCOL 3350, SODIUM SULFATE, SODIUM CHLORIDE, POTASSIUM CHLORIDE, ASCORBIC ACID, SODIUM ASCORBATE 140-9-5.2G
480 KIT ORAL
Qty: 1 EACH | Refills: 0 | Status: SHIPPED | OUTPATIENT
Start: 2023-09-22 | End: 2023-09-22

## 2023-09-22 NOTE — TELEPHONE ENCOUNTER
Left her a detailed message, she didn't answer just now. Sent in Rx for plenvu for her. Will wait to hear back from her as to when her surgery is going to be scheduled.

## 2023-09-22 NOTE — TELEPHONE ENCOUNTER
Called pt, left detailed message in voicemail regarding prep sent to pharmacy that needs to be picked up.

## 2023-09-26 ENCOUNTER — TELEPHONE (OUTPATIENT)
Facility: CLINIC | Age: 33
End: 2023-09-26

## 2023-09-26 DIAGNOSIS — K57.92 DIVERTICULITIS: Primary | ICD-10-CM

## 2023-09-26 NOTE — TELEPHONE ENCOUNTER
Kaitlin Rodgers      Can this patient be added to your schedule on 10/04/2023 at [facility] please advise

## 2023-09-26 NOTE — TELEPHONE ENCOUNTER
Saman Henderson,     Please see LOV with Dr. Lawerance Cranker in care everywhere dated 9/19/23 [facility]. Thank you.      RUFINA:  Dr. Cristobal Farris

## 2023-09-26 NOTE — TELEPHONE ENCOUNTER
Review of last CT 9/1 shows severe perforated diverticulitis with abscess s/p IR drains. Her complicated diverticulitis has failed non-operative management and she is reportedly scheduled for surgery with Dr Shad Chen in the coming week. Will d/w Dr Shad Chen. We can plan for flex sig either day prior or intra-operative to exclude sigmoid mass.      thanks

## 2023-09-26 NOTE — TELEPHONE ENCOUNTER
Kaitlin Rodgers          Thank you, can you please send orders for the flex-sig and prep I'll add her to 10/4 thanks

## 2023-09-27 NOTE — TELEPHONE ENCOUNTER
Spoke to patient directly for condition update    Still on IV abx, micofungin and meropenem - will remain on this until surgery    On low fiber soft diet and ensure    Minimal/mild occasional abdominal pain    BMs - soft, can be urgent, 8 BMs a day - no nocturnal symptoms. Patient reports fecal matter from the urethra, there is communication from the colon to the bladder, surgery and ID are aware of this.  She is taking [employer] probiotic ( at Dr Pierce Jeffers recommendation )     Possibly Oct 5th for surgery - Surgery clinical staff needs to confirm

## 2023-09-28 NOTE — TELEPHONE ENCOUNTER
Called patient, name/ verified. She was asking about her 8am dose of IV abx at home, I told pt to continue it the am of procedure. Informed that ensure is not a \"see through\" liquid thus she cannot take it when she starts her clear liquid diet. Saman Henderson,    Pt has double lumen PICC line that is being flushed with Heparin 500 units each line to prevent occlusion. Please advise if pt need to hold this pre-procedure. Thank you.

## 2023-09-28 NOTE — TELEPHONE ENCOUNTER
Dr. Sarah Barragan to use colonoscopy orders for flex sig? Also what prep would you like patient to do?       Thanks

## 2023-09-28 NOTE — TELEPHONE ENCOUNTER
Patient contacted, name/ verified. Relayed message below from Dr. Krystal Nowak below    Patient verbalized understanding, stated someone already called her to review her history and that they saw the note below. She has no further questions at this time.

## 2023-09-28 NOTE — TELEPHONE ENCOUNTER
Scheduled for:  Flex Sig 87430  Provider Name:  Dr. Josy Herrera  Date:  10/4/2023  Location:  [facility]  Sedation:  MAC  Time:  1:30pm, (pt is aware to arrive at 12:30pm)   Prep:  Golytely  Meds/Allergies Reconciled?:  Physician reviewed     Diagnosis with codes:  diverticulitis K57.92  Was patient informed to call insurance with codes (Y/N):  Yes, I confirmed [facility] with this patient. Referral sent?:  Referral was sent at the time of electronic surgical scheduling. [address] [place] [address] or [facility]  notified?:  I sent an electronic request to Endo Scheduling and received a confirmation today.       Medication Orders:  n/a  Misc Orders:  n/a     Further instructions given by staff:   I discussed the prep instructions with the patient which she verbally understood and is aware that I will send the instructions via [employer]

## 2023-09-28 NOTE — TELEPHONE ENCOUNTER
GI Rns-      Patient has questions regarding her IV medications for day of procedure, also states she thinks she is on a blood thinner from Dr. Michael Ma.  Patient is also wondering if she can drink ensure the vanilla flavor when she does her clear liquid diet the evening before procedure    Patient is scheduled on 10/4/2023    Please advise    Thanks

## 2023-09-29 NOTE — TELEPHONE ENCOUNTER
Spoke with Dr Sheila Melchor, she is on for surgery w ith him on 10/26.   We can go the day prior thus 10/25   Please reschedule flex sig for 10/25

## 2023-09-29 NOTE — TELEPHONE ENCOUNTER
LMTCB please transfer to [address] till 9/29- patient now is scheduled on 10/25/2023          Rescheduled for:  Flex Sig 41927  Provider Name:  Dr. Stefan Castro  Date:  FROM 10/4/2023 TO 10/25/2023  Location:  [facility]  Sedation:  MAC  Time: FROM 1:30pm, TO 11:15am (pt is aware to arrive at 10:15am)   Prep:  Plenvue  Meds/Allergies Reconciled?:  Physician reviewed      Diagnosis with codes:  diverticulitis K57.92  Was patient informed to call insurance with codes (Y/N):  Yes, I confirmed [email or web address] with this patient. Referral sent?:  Referral was sent at the time of electronic surgical scheduling. [facility] or [address] notified?:  I sent an electronic request to Endo Scheduling and received a confirmation today.       Medication Orders:  n/a  Misc Orders:  n/a     Further instructions given by staff:   I discussed the prep instructions with the patient which she verbally understood and is aware that I will send the instructions via [employer]

## 2023-10-20 ENCOUNTER — LAB ENCOUNTER (OUTPATIENT)
Dept: LAB | Facility: HOSPITAL | Age: 33
End: 2023-10-20
Attending: COLON & RECTAL SURGERY

## 2023-10-20 ENCOUNTER — OFFICE VISIT (OUTPATIENT)
Dept: INTERVENTIONAL RADIOLOGY/VASCULAR | Facility: HOSPITAL | Age: 33
End: 2023-10-20
Attending: CLINICAL NURSE SPECIALIST
Payer: COMMERCIAL

## 2023-10-20 DIAGNOSIS — K57.20 COLONIC DIVERTICULAR ABSCESS: Primary | ICD-10-CM

## 2023-10-20 DIAGNOSIS — Z01.818 PRE-OP TESTING: ICD-10-CM

## 2023-10-20 LAB
ANION GAP SERPL CALC-SCNC: 7 MMOL/L (ref 0–18)
ANTIBODY SCREEN: NEGATIVE
BASOPHILS # BLD AUTO: 0.04 X10(3) UL (ref 0–0.2)
BASOPHILS NFR BLD AUTO: 0.7 %
BUN BLD-MCNC: 7 MG/DL (ref 7–18)
BUN/CREAT SERPL: 11.7 (ref 10–20)
CALCIUM BLD-MCNC: 9.1 MG/DL (ref 8.5–10.1)
CHLORIDE SERPL-SCNC: 107 MMOL/L (ref 98–112)
CO2 SERPL-SCNC: 26 MMOL/L (ref 21–32)
CREAT BLD-MCNC: 0.6 MG/DL
DEPRECATED RDW RBC AUTO: 46.2 FL (ref 35.1–46.3)
EGFRCR SERPLBLD CKD-EPI 2021: 121 ML/MIN/1.73M2 (ref 60–?)
EOSINOPHIL # BLD AUTO: 0.28 X10(3) UL (ref 0–0.7)
EOSINOPHIL NFR BLD AUTO: 4.8 %
ERYTHROCYTE [DISTWIDTH] IN BLOOD BY AUTOMATED COUNT: 14.5 % (ref 11–15)
GLUCOSE BLD-MCNC: 114 MG/DL (ref 70–99)
HCT VFR BLD AUTO: 40.5 %
HGB BLD-MCNC: 13.9 G/DL
IMM GRANULOCYTES # BLD AUTO: 0.04 X10(3) UL (ref 0–1)
IMM GRANULOCYTES NFR BLD: 0.7 %
LYMPHOCYTES # BLD AUTO: 1.95 X10(3) UL (ref 1–4)
LYMPHOCYTES NFR BLD AUTO: 33.7 %
MCH RBC QN AUTO: 30.5 PG (ref 26–34)
MCHC RBC AUTO-ENTMCNC: 34.3 G/DL (ref 31–37)
MCV RBC AUTO: 88.8 FL
MONOCYTES # BLD AUTO: 0.35 X10(3) UL (ref 0.1–1)
MONOCYTES NFR BLD AUTO: 6.1 %
NEUTROPHILS # BLD AUTO: 3.12 X10 (3) UL (ref 1.5–7.7)
NEUTROPHILS # BLD AUTO: 3.12 X10(3) UL (ref 1.5–7.7)
NEUTROPHILS NFR BLD AUTO: 54 %
OSMOLALITY SERPL CALC.SUM OF ELEC: 289 MOSM/KG (ref 275–295)
PLATELET # BLD AUTO: 145 10(3)UL (ref 150–450)
POTASSIUM SERPL-SCNC: 4.1 MMOL/L (ref 3.5–5.1)
RBC # BLD AUTO: 4.56 X10(6)UL
RH BLOOD TYPE: POSITIVE
RH BLOOD TYPE: POSITIVE
SODIUM SERPL-SCNC: 140 MMOL/L (ref 136–145)
WBC # BLD AUTO: 5.8 X10(3) UL (ref 4–11)

## 2023-10-20 PROCEDURE — 36415 COLL VENOUS BLD VENIPUNCTURE: CPT

## 2023-10-20 PROCEDURE — 86901 BLOOD TYPING SEROLOGIC RH(D): CPT

## 2023-10-20 PROCEDURE — 86850 RBC ANTIBODY SCREEN: CPT

## 2023-10-20 PROCEDURE — 85025 COMPLETE CBC W/AUTO DIFF WBC: CPT

## 2023-10-20 PROCEDURE — 86900 BLOOD TYPING SEROLOGIC ABO: CPT

## 2023-10-20 PROCEDURE — 80048 BASIC METABOLIC PNL TOTAL CA: CPT

## 2023-10-20 NOTE — PROGRESS NOTES
Patient here with   Needs dressings changed and supplies  Sorbaview dressings and drainage bags x 2 changed  Inferior drain skin mildly irritated where the prior dressing was, recommend Aquaphor cream  She is having surgery next week Thursday

## 2023-10-24 ENCOUNTER — TELEPHONE (OUTPATIENT)
Facility: CLINIC | Age: 33
End: 2023-10-24

## 2023-10-24 NOTE — TELEPHONE ENCOUNTER
Yes, as far as I was told she should take the whole plenvu, split dose as per the usual. We want as clean of a picture as possible to reduce the possibility of complication and to reduce the possibility Dr Elton Barlow would potentially not get a good view for the surgeon.

## 2023-10-24 NOTE — TELEPHONE ENCOUNTER
Thank you Humberto Crawford. I called/spoke with pt, name/ verified. I confirm split dose Plenvu prep instructions with pt, she also read instruction in [address] [place] [address] [address], pt verbalized understanding. I also instructed pt to call back Endo as she was worried that her surgical questionnaires were not completed correctly, she said she will call. No further action required, TE closed.

## 2023-10-24 NOTE — TELEPHONE ENCOUNTER
Saman Henderson,     Please verify is pt should take the whole amount of plenvu for her scheduled flex sig tomorrow with Dr. Irene Seek,    Thank you.

## 2023-10-25 ENCOUNTER — APPOINTMENT (OUTPATIENT)
Dept: GENERAL RADIOLOGY | Facility: HOSPITAL | Age: 33
DRG: 330 | End: 2023-10-25
Attending: HOSPITALIST
Payer: COMMERCIAL

## 2023-10-25 ENCOUNTER — HOSPITAL ENCOUNTER (INPATIENT)
Facility: HOSPITAL | Age: 33
LOS: 5 days | Discharge: HOME | DRG: 330 | End: 2023-10-31
Attending: INTERNAL MEDICINE | Admitting: INTERNAL MEDICINE
Payer: COMMERCIAL

## 2023-10-25 ENCOUNTER — ANESTHESIA (OUTPATIENT)
Dept: ENDOSCOPY | Facility: HOSPITAL | Age: 33
DRG: 330 | End: 2023-10-25
Payer: COMMERCIAL

## 2023-10-25 ENCOUNTER — ANESTHESIA EVENT (OUTPATIENT)
Dept: ENDOSCOPY | Facility: HOSPITAL | Age: 33
DRG: 330 | End: 2023-10-25
Payer: COMMERCIAL

## 2023-10-25 PROBLEM — K57.20 PERFORATION OF SIGMOID COLON DUE TO DIVERTICULITIS: Status: ACTIVE | Noted: 2023-10-25

## 2023-10-25 PROBLEM — K57.90 DIVERTICULOSIS: Status: ACTIVE | Noted: 2023-10-25

## 2023-10-25 PROCEDURE — 45378 DIAGNOSTIC COLONOSCOPY: CPT | Performed by: INTERNAL MEDICINE

## 2023-10-25 PROCEDURE — 0DJD8ZZ INSPECTION OF LOWER INTESTINAL TRACT, VIA NATURAL OR ARTIFICIAL OPENING ENDOSCOPIC: ICD-10-PCS | Performed by: INTERNAL MEDICINE

## 2023-10-25 PROCEDURE — 71045 X-RAY EXAM CHEST 1 VIEW: CPT | Performed by: HOSPITALIST

## 2023-10-25 PROCEDURE — 99223 1ST HOSP IP/OBS HIGH 75: CPT | Performed by: HOSPITALIST

## 2023-10-25 RX ORDER — SODIUM CHLORIDE 9 MG/ML
INJECTION, SOLUTION INTRAVENOUS CONTINUOUS
Status: DISCONTINUED | OUTPATIENT
Start: 2023-10-25 | End: 2023-10-31

## 2023-10-25 RX ORDER — ALPRAZOLAM 0.5 MG/1
0.5 TABLET ORAL ONCE
Status: DISCONTINUED | OUTPATIENT
Start: 2023-10-25 | End: 2023-10-25 | Stop reason: HOSPADM

## 2023-10-25 RX ORDER — ONDANSETRON 2 MG/ML
4 INJECTION INTRAMUSCULAR; INTRAVENOUS EVERY 6 HOURS PRN
Status: DISCONTINUED | OUTPATIENT
Start: 2023-10-25 | End: 2023-10-31

## 2023-10-25 RX ORDER — ZOLPIDEM TARTRATE 5 MG/1
5 TABLET ORAL NIGHTLY PRN
Status: DISCONTINUED | OUTPATIENT
Start: 2023-10-25 | End: 2023-10-30

## 2023-10-25 RX ORDER — ACETAMINOPHEN 500 MG
500 TABLET ORAL EVERY 4 HOURS PRN
Status: DISCONTINUED | OUTPATIENT
Start: 2023-10-25 | End: 2023-10-27

## 2023-10-25 RX ORDER — SERTRALINE HYDROCHLORIDE 100 MG/1
100 TABLET, FILM COATED ORAL NIGHTLY
Status: DISCONTINUED | OUTPATIENT
Start: 2023-10-25 | End: 2023-10-31

## 2023-10-25 RX ORDER — MIDAZOLAM HYDROCHLORIDE 1 MG/ML
INJECTION INTRAMUSCULAR; INTRAVENOUS AS NEEDED
Status: DISCONTINUED | OUTPATIENT
Start: 2023-10-25 | End: 2023-10-25 | Stop reason: SURG

## 2023-10-25 RX ORDER — PANTOPRAZOLE SODIUM 40 MG/1
40 TABLET, DELAYED RELEASE ORAL NIGHTLY
Status: DISCONTINUED | OUTPATIENT
Start: 2023-10-25 | End: 2023-10-31

## 2023-10-25 RX ORDER — SODIUM CHLORIDE, SODIUM LACTATE, POTASSIUM CHLORIDE, CALCIUM CHLORIDE 600; 310; 30; 20 MG/100ML; MG/100ML; MG/100ML; MG/100ML
INJECTION, SOLUTION INTRAVENOUS CONTINUOUS
Status: DISCONTINUED | OUTPATIENT
Start: 2023-10-25 | End: 2023-10-30 | Stop reason: ALTCHOICE

## 2023-10-25 RX ORDER — CLONAZEPAM 1 MG/1
1 TABLET ORAL NIGHTLY
Status: DISCONTINUED | OUTPATIENT
Start: 2023-10-25 | End: 2023-10-31

## 2023-10-25 RX ORDER — LIDOCAINE HYDROCHLORIDE 10 MG/ML
INJECTION, SOLUTION EPIDURAL; INFILTRATION; INTRACAUDAL; PERINEURAL AS NEEDED
Status: DISCONTINUED | OUTPATIENT
Start: 2023-10-25 | End: 2023-10-25 | Stop reason: SURG

## 2023-10-25 RX ORDER — NEOMYCIN SULFATE 500 MG/1
1000 TABLET ORAL
Status: COMPLETED | OUTPATIENT
Start: 2023-10-25 | End: 2023-10-25

## 2023-10-25 RX ORDER — METRONIDAZOLE 500 MG/1
500 TABLET ORAL 3 TIMES DAILY
Status: DISCONTINUED | OUTPATIENT
Start: 2023-10-25 | End: 2023-10-25

## 2023-10-25 RX ORDER — HYDROCODONE BITARTRATE AND ACETAMINOPHEN 7.5; 325 MG/1; MG/1
1 TABLET ORAL EVERY 6 HOURS PRN
Status: DISCONTINUED | OUTPATIENT
Start: 2023-10-25 | End: 2023-10-31

## 2023-10-25 RX ORDER — METOCLOPRAMIDE 5 MG/1
10 TABLET ORAL ONCE
Status: DISCONTINUED | OUTPATIENT
Start: 2023-10-25 | End: 2023-10-26 | Stop reason: HOSPADM

## 2023-10-25 RX ORDER — LORAZEPAM 0.5 MG/1
0.5 TABLET ORAL EVERY 4 HOURS PRN
Status: DISCONTINUED | OUTPATIENT
Start: 2023-10-25 | End: 2023-10-31

## 2023-10-25 RX ORDER — METRONIDAZOLE 500 MG/1
500 TABLET ORAL
Status: COMPLETED | OUTPATIENT
Start: 2023-10-25 | End: 2023-10-25

## 2023-10-25 RX ORDER — VANCOMYCIN HYDROCHLORIDE 125 MG/1
125 CAPSULE ORAL NIGHTLY
Status: DISCONTINUED | OUTPATIENT
Start: 2023-10-25 | End: 2023-10-31

## 2023-10-25 RX ORDER — FAMOTIDINE 20 MG/1
20 TABLET, FILM COATED ORAL ONCE
Status: DISCONTINUED | OUTPATIENT
Start: 2023-10-25 | End: 2023-10-26 | Stop reason: HOSPADM

## 2023-10-25 RX ORDER — ALBUTEROL SULFATE 90 UG/1
2 AEROSOL, METERED RESPIRATORY (INHALATION) EVERY 4 HOURS PRN
Status: DISCONTINUED | OUTPATIENT
Start: 2023-10-25 | End: 2023-10-31

## 2023-10-25 RX ORDER — ALPRAZOLAM 0.5 MG/1
0.5 TABLET ORAL 3 TIMES DAILY PRN
Status: DISCONTINUED | OUTPATIENT
Start: 2023-10-25 | End: 2023-10-31

## 2023-10-25 RX ORDER — ACETAMINOPHEN 500 MG
1000 TABLET ORAL ONCE
Status: DISCONTINUED | OUTPATIENT
Start: 2023-10-25 | End: 2023-10-26 | Stop reason: HOSPADM

## 2023-10-25 RX ORDER — CASPOFUNGIN ACETATE 5 MG/ML
50 INJECTION, POWDER, LYOPHILIZED, FOR SOLUTION INTRAVENOUS NIGHTLY
Status: DISCONTINUED | OUTPATIENT
Start: 2023-10-25 | End: 2023-10-25

## 2023-10-25 RX ORDER — QUETIAPINE FUMARATE 25 MG/1
50 TABLET, FILM COATED ORAL NIGHTLY
Status: DISCONTINUED | OUTPATIENT
Start: 2023-10-25 | End: 2023-10-31

## 2023-10-25 RX ORDER — NEOMYCIN SULFATE 500 MG/1
500 TABLET ORAL 4 TIMES DAILY
Status: DISCONTINUED | OUTPATIENT
Start: 2023-10-25 | End: 2023-10-25

## 2023-10-25 RX ADMIN — LIDOCAINE HYDROCHLORIDE 50 MG: 10 INJECTION, SOLUTION EPIDURAL; INFILTRATION; INTRACAUDAL; PERINEURAL at 11:20:00

## 2023-10-25 RX ADMIN — MIDAZOLAM HYDROCHLORIDE 2 MG: 1 INJECTION INTRAMUSCULAR; INTRAVENOUS at 11:20:00

## 2023-10-25 NOTE — H&P
History & Physical Examination    Patient Name: Belle Heard  MRN: [id number]  [facility]: [id number]  YOB: 1990    Diagnosis: complicated diverticulitis    sertraline 100 MG Oral Tab, Take 1 tablet (100 mg total) by mouth at bedtime. , Disp: , Rfl: , 10/24/2023  clonazePAM 1 MG Oral Tab, Take 1 tablet (1 mg total) by mouth at bedtime. , Disp: , Rfl: , 10/24/2023  zolpidem 10 MG Oral Tab, Take 0.5 tablets (5 mg total) by mouth nightly as needed for Sleep., Disp: , Rfl:   Omeprazole 40 MG Oral Capsule Delayed Release, Take 1 capsule (40 mg total) by mouth at bedtime. , Disp: , Rfl: , 10/24/2023  heparin 100 Units/mL Intravenous Solution, 5 mL (500 Units total) by Intracatheter route every morning. Flush to PICC line, Disp: , Rfl: , 10/24/2023  HYDROcodone-acetaminophen 7.5-325 MG Oral Tab, Take 1 tablet by mouth every 6 (six) hours as needed for Pain. Watch drowsiness no alcohol, Disp: 12 tablet, Rfl: 0, Past Week  LORazepam 0.5 MG Oral Tab, Take 1 tablet (0.5 mg total) by mouth as needed for Anxiety. , Disp: , Rfl: , 10/24/2023  ALPRAZolam 0.5 MG Oral Tab, Take 1 tablet (0.5 mg total) by mouth 3 (three) times daily as needed for Anxiety. , Disp: , Rfl: , 10/24/2023  QUEtiapine 50 MG Oral Tab, Take 1 tablet (50 mg total) by mouth nightly., Disp: 30 tablet, Rfl: 0, 10/24/2023  sodium chloride 0.9% SOLN 100 mL with meropenem 1 g SOLR 1 g, Inject 1 g into the vein every 8 (eight) hours. , Disp: , Rfl:   Caspofungin Acetate 50 MG Intravenous Recon Soln, Inject 50 mg into the vein at bedtime. , Disp: , Rfl:   Lactobacillus (PROBIOTIC ACIDOPHILUS OR), Take 1 capsule by mouth every evening., Disp: , Rfl: , 10/9/2023  neomycin 500 MG Oral Tab, Take 1 tablet (500 mg total) by mouth 4 (four) times daily. Pt to take 2 tabs a day before surgery at 5pm, 7pm, 9pm., Disp: , Rfl:   metRONIDAZOLE 500 MG Oral Tab, Take 1 tablet (500 mg total) by mouth 3 (three) times daily.  Pt to take 2 tabs a day before surgery at 5pm, 7pm, 9pm., Disp: , Rfl: , 10/25/2023  [] polyethylene glycol-electrolyte solution (PLENVU) 140 g Oral Recon Soln, Take 480 mL by mouth See Admin Instructions for 1 dose., Disp: 1 each, Rfl: 0  [] vancomycin 125 MG Oral Cap, Take 1 capsule (125 mg total) by mouth daily. Ok to sub liquid or pills as insurance covers (Patient taking differently: Take 1 capsule (125 mg total) by mouth at bedtime. Ok to sub liquid or pills as insurance covers), Disp: 30 capsule, Rfl: 0  calcium carbonate 500 MG Oral Chew Tab, Chew 1 tablet (500 mg total) by mouth every 6 (six) hours as needed. , Disp: 30 tablet, Rfl: 0  sodium chloride 0.9% SOLN 100 mL with micafungin 100 MG SOLR 100 mg, Inject 100 mg into the vein daily. , Disp: , Rfl: 0  [] albuterol 108 (90 Base) MCG/ACT Inhalation Aero Soln, Inhale 2 puffs into the lungs every 4 (four) hours as needed for Wheezing., Disp: 1 each, Rfl: 0  sodium chloride 0.9% SOLN 100 mL with meropenem 1 g SOLR 1 g, Inject 1 g into the vein every 8 (eight) hours. , Disp: 1 Bag, Rfl: 0  Spacer/Aero-Holding Chambers (BREATHERITE EMMETT SPACER ADULT) Does not apply Misc, Use with albuterol, Disp: 1 each, Rfl: 0      lactated ringers infusion, , Intravenous, Continuous  ALPRAZolam (Xanax) tab 0.5 mg, 0.5 mg, Oral, Once        Allergies:   Dairy Products          DIARRHEA    Past Medical History:   Diagnosis Date    Anxiety     Blood disorder     anemia    Bronchial tumor     Diverticulosis of large intestine     Esophageal reflux     History of lobectomy of lung     Hx of motion sickness     Pneumonia due to organism     Shortness of breath     Visual impairment     Wears eyeglasses     Past Surgical History:   Procedure Laterality Date    ADENOIDECTOMY      REMOVAL OF LUNG,LOBECTOMY      TONSILLECTOMY       Family History   Problem Relation Age of Onset    Cancer Father         Colon CA    Other (Other) Father         kidney transplant    Diabetes Father     Other (Other) Mother pulm,onary fibrosis     Social History    Tobacco Use      Smoking status: Never      Smokeless tobacco: Never    Alcohol use: Yes      Comment: occasional wine        SYSTEM Check if Review is Normal Check if Physical Exam is Normal If not normal, please explain:   HEDALE Mordecai.Retort ] [ Rolanda Gaytan  Mordecai.Retort ] [ X]    HEART Mordecai.Retort ] [ Jennifer Romeo Mordecai.Retort ] [ Ravin Miguel Mordecai.Retort ] [ Robyn Phipps Mordecai.Retort ] [ X]    OTHER        I have discussed the risks and benefits and alternatives of the procedure with the patient/family. They understand and agree to proceed with plan of care. I have reviewed the History and Physical done within the last 30 days. Any changes noted above.     Peña Prince MD  [facility], [facility] - Gastroenterology  10/25/2023  11:07 AM

## 2023-10-25 NOTE — OR NURSING
Pt complain of chest pain scale 4 related to the anxiety  Dr. Rice Confucianism and anaesthesia aware order for xanax enter waiting for pharmacy .

## 2023-10-25 NOTE — DISCHARGE INSTRUCTIONS
Surgical Discharge Instructions     Diet: Low residue/fiber restricted: avoid raw fruits and vegetables, seeds, nuts, corn. Activity: no lifting more than 10 pounds for 6 weeks     Driving: when pain free and not using narcotic pain medication     Showering: permitted, let soapy water run over incisions and pat dry     Follow up: call office at [phone] to make appointment to be seen in about 2 weeks            Home Care Instructions for Colonoscopy with Sedation    Diet:  - Resume your regular diet as tolerated unless otherwise instructed. - Start with light meals to minimize bloating.  - Do not drink alcohol today. Medication:  - If you have questions about resuming your normal medications, please contact your Primary Care Physician. Activities:  - Take it easy today. Do not return to work today. - Do not drive today. - Do not operate any machinery today (including kitchen equipment). Colonoscopy:  - You may notice some rectal \"spotting\" (a little blood on the toilet tissue) for a day or two after the exam. This is normal.  - If you experience any rectal bleeding (not spotting), persistent tenderness or sharp severe abdominal pains, oral temperature over 100 degrees Fahrenheit, light-headedness or dizziness, or any other problems, contact your doctor. **If unable to reach your doctor, please go to the [facility] Emergency Room**    - Your referring physician will receive a full report of your examination.  - If you do not hear from your doctor's office within two weeks of your biopsy, please call them for your results. You may be able to see your laboratory results in [facility] between 4 and 7 business days. In some cases, your physician may not have viewed the results before they are released to [address] If you have questions regarding your results contact the physician who ordered the test/exam by phone or via [address] by choosing \"Ask a Medical Question. \"

## 2023-10-25 NOTE — ANESTHESIA POSTPROCEDURE EVALUATION
Patient: Morgan Bernabe    Procedure Summary       Date: 10/25/23 Room / Location: [address] [place] [address] ENDOSCOPY 01 / [address] [place] [address] ENDOSCOPY    Anesthesia Start: 6212 Anesthesia Stop:     Procedure: FLEXIBLE SIGMOIDOSCOPY Diagnosis:       Diverticulitis      (diverticulosis)    Surgeons: Mai Bhatia MD Anesthesiologist: mEerson Castle MD    Anesthesia Type: general, MAC ASA Status: 3            Anesthesia Type: general, MAC    Vitals Value Taken Time   /79 10/25/23 1138   Temp 98 10/25/23 1140   Pulse 96 10/25/23 1139   Resp 14 10/25/23 1140   SpO2 97 % 10/25/23 1139   Vitals shown include unfiled device data.     [address] [place] [address] AN Post Evaluation:   Patient Evaluated in PACU (endo)  Patient Participation: complete - patient cannot participate  Level of Consciousness: sleepy but conscious  Pain Score: 0  Pain Management: adequate  Airway Patency:patent  Dental exam unchanged from preop  Yes    Cardiovascular Status: acceptable  Respiratory Status: acceptable  Postoperative Hydration acceptable      Eugenie Camara MD  10/25/2023 11:40 AM

## 2023-10-25 NOTE — PLAN OF CARE
Received patient from ENDO. Patient is alert and orientated. Vitals stable. Left arm double lumen picc line, blood return noted, both lumens are positional. 2 JUNA drains noted in the LLQ. Merrem, micafungin continued from home and Neomycin and flagyl given for prep surgery tomorrow. X1 surgical ensure given to patient. Refused urine pregnancy test. Ambulating independently. NPO at midnight. Tolerating clears. Patient calls appropriately. All needs met at this time.    Problem: Patient Centered Care  Goal: Patient preferences are identified and integrated in the patient's plan of care  Description: Interventions:  - What would you like us to know as we care for you?   - Provide timely, complete, and accurate information to patient/family  - Incorporate patient and family knowledge, values, beliefs, and cultural backgrounds into the planning and delivery of care  - Encourage patient/family to participate in care and decision-making at the level they choose  - Honor patient and family perspectives and choices  10/25/2023 1804 by Raisa Mcghee RN  Outcome: Progressing  10/25/2023 1533 by Raisa Mcghee RN  Outcome: Progressing     Problem: Patient/Family Goals  Goal: Patient/Family Long Term Goal  Description: Patient's Long Term Goal:    Interventions:  -  - See additional Care Plan goals for specific interventions  10/25/2023 1804 by Raisa Mcghee RN  Outcome: Progressing  10/25/2023 1533 by Raisa Mcghee RN  Outcome: Progressing  Goal: Patient/Family Short Term Goal  Description: Patient's Short Term Goal:    Interventions:   -  - See additional Care Plan goals for specific interventions  10/25/2023 1804 by Raisa Mcghee RN  Outcome: Progressing  10/25/2023 1533 by Raisa Mcghee RN  Outcome: Progressing

## 2023-10-25 NOTE — OPERATIVE REPORT
COLONOSCOPY REPORT    Verito Bernabe     1990 Age 35year old   PCP Cody Barragan MD Endoscopist Rosa Kim MD     Date of procedure: 10/25/23    Procedure: Colonoscopy     Pre-operative diagnosis: h/o complicated diverticulitis    Post-operative diagnosis: see impression    Medications: MAC    Withdrawal time: 5 minutes    Procedure:  Informed consent was obtained from the patient after the risks of the procedure were discussed, including but not limited to bleeding, perforation, aspiration, infection, or possibility of a missed lesion. After discussions of the risks/benefits and alternatives to this procedure, as well as the planned sedation, the patient was placed in the left lateral decubitus position and begun on continuous blood pressure pulse oximetry and EKG monitoring and this was maintained throughout the procedure. Once an adequate level of sedation was obtained a digital rectal exam was completed. Then the lubricated tip of the [id number] diagnostic video pediatric colonoscope was inserted and advanced without difficulty to the cecum using the CO2 insufflation technique. The cecum was identified by localizing the trifold, the appendix and the ileocecal valve. Withdrawal was begun with thorough washing and careful examination of the colonic walls and folds. A routine second examination of the cecum/ascending colon was performed. Retroflexion was performed in the rectum. Photodocumentation was obtained. [place] bowel prep score of 6 (Right colon-2; Transverse colon-2, Left colon-2). I then carefully withdrew the instrument from the patient who tolerated the procedure well. Complications: none. Findings:   -- SHASHI: normal rectal tone, no masses palpated. -- significant bile staining throughout colon    -- Diverticulosis: moderate in the sigmoid coon with haustral hypertrophy and areas of mild mucosal erythema in the sigmoid.     -- no mass or polyps seen    -- A retroflexed view of the rectum revealed no abnormalities. Impression:   Sigmoid diverticulosis with scattered erythema    Recommend:  Proceed with planned surgery tomorrow    >>>If tissue was obtained and you have not received your pathology results either by phone or letter within 2 weeks, please call our office at [phone].     Specimens: none    Blood loss: <1 ml      Jorge Cedeno MD  [facility], [facility] Gastroenterology

## 2023-10-25 NOTE — H&P
[facility]    PATIENT'S NAME: Tali Vela   ATTENDING PHYSICIAN: Maximo Verma MD   PATIENT ACCOUNT#:   [id number]    LOCATION:  [facility] ROOM 3 [facility] 10  MEDICAL RECORD #:   [id number]       YOB: 1990  ADMISSION DATE:       10/25/2023    HISTORY AND PHYSICAL EXAMINATION    CHIEF COMPLAINT:  Perforated sigmoid diverticulitis in preparation for sigmoid colectomy tomorrow morning. HISTORY OF PRESENT ILLNESS:  The patient is a 20-year-old  female who had a complex recent past medical history starting in June 2023 when she was hospitalized with sigmoid diverticulitis with phlegmonous changes, pericolonic. Discharged home and returned with full perforation and pericolonic perienteric abscesses. She required interventional radiology drain placement and IV antibiotics via PICC line. She continued to have draining tubes and currently on caspofungin and IV meropenem. The patient was brought in today for preoperative colonoscopy which showed sigmoid diverticulosis and erythema but no other complications. The patient will be admitted to the hospital in preparation for surgical resection of her sigmoid colon and combined surgery with General Surgery and Urology. There is question and symptoms suggestive of colovesical fistula. PAST MEDICAL HISTORY:  Perforated sigmoid diverticulitis requiring 2 drain placements by Interventional radiology, left lower quadrant area with imaging studies suggestive of pericolonic and perienteric abscesses and possible colovesical fistula with symptoms of bubbles at the end of urination and dysuria with fecaluria. Also history of morbid obesity, severe anxiety disorder, major depressive disorder, gastroesophageal reflux disease. PAST SURGICAL HISTORY:  Bronchial tumor requiring lung lobectomy, adenoidectomy and tonsillectomy.     MEDICATIONS:  Please see medication reconciliation list.    ALLERGIES:  She is sensitive to dairy products, but no known drug allergies. FAMILY HISTORY:  Mother had pulmonary fibrosis. Father had colon cancer and kidney transplant. SOCIAL HISTORY:  No tobacco, alcohol, or drug use. Lives with her family. Independent in her basic activities of daily living. REVIEW OF SYSTEMS:  The patient reports continuous lower abdominal discomfort requiring Norco as needed. No fever or chills. No chest pain, no shortness of breath. Very anxious at this moment. Other 12-point review of systems negative. PHYSICAL EXAMINATION:    GENERAL:  Alert. Oriented to time, place, and person. Anxious. No acute distress. VITAL SIGNS:  Temperature 98.0, pulse 81, respiratory rate 23, blood pressure 102/81, pulse ox 97% on room air. HEENT:  Atraumatic. Oropharynx clear, dry mucous membranes. Normal hard and soft palate. Eyes:  Anicteric sclerae. NECK:  Supple. No lymphadenopathy. Trachea midline. Full range of motion. LUNGS:  Clear to auscultation bilaterally. Normal respiratory effort. Diminished breathing sounds both bases. HEART:  Regular rate and rhythm. S1, S2 auscultated. No murmur. ABDOMEN:  Soft, nondistended. Some discomfort to palpation, left lower quadrant. Two drain catheters noted in the left lower quadrant area with minimal drainage in collecting bags. Positive bowel sounds. EXTREMITIES:  Lymphedema but no clubbing or cyanosis. NEUROLOGIC:  Motor and sensory intact. ASSESSMENT:    1. Perforated sigmoid diverticulitis with pericolonic phlegmonous abscess changes and possible colovesical fistula currently on intravenous antibiotics with caspofungin and intravenous meropenem. 2.   Morbid obesity. 3.   Severe anxiety disorder. PLAN:  The patient will be admitted to general medical floor. Continue her home medications including antibiotics. We will place her on clear liquid diet, n.p.o. after midnight, bowel prep with Flagyl and neomycin.   General Surgery, Dr. Ty Patient, will be on consult. The patient will be having laparoscopic sigmoid colectomy, cystoscopy, lighted bilateral ureter catheters, and possible colovesical fistula repair. Further recommendations to follow.     Dictated By Urban Donovan MD  d: 10/25/2023 12:31:40  t: 10/25/2023 12:34:49  [facility] [id number]  FB/

## 2023-10-26 ENCOUNTER — TELEPHONE (OUTPATIENT)
Facility: CLINIC | Age: 33
End: 2023-10-26

## 2023-10-26 ENCOUNTER — ANESTHESIA (OUTPATIENT)
Dept: SURGERY | Facility: HOSPITAL | Age: 33
DRG: 330 | End: 2023-10-26
Payer: COMMERCIAL

## 2023-10-26 ENCOUNTER — ANESTHESIA EVENT (OUTPATIENT)
Dept: SURGERY | Facility: HOSPITAL | Age: 33
DRG: 330 | End: 2023-10-26
Payer: COMMERCIAL

## 2023-10-26 PROBLEM — Z01.818 PREOP TESTING: Status: ACTIVE | Noted: 2023-10-26

## 2023-10-26 LAB
ANION GAP SERPL CALC-SCNC: 6 MMOL/L (ref 0–18)
ANTIBODY SCREEN: NEGATIVE
BASOPHILS # BLD AUTO: 0.03 X10(3) UL (ref 0–0.2)
BASOPHILS NFR BLD AUTO: 0.6 %
BUN BLD-MCNC: 7 MG/DL (ref 7–18)
BUN/CREAT SERPL: 12.7 (ref 10–20)
C DIFF TOX B STL QL: NEGATIVE
CALCIUM BLD-MCNC: 8.5 MG/DL (ref 8.5–10.1)
CHLORIDE SERPL-SCNC: 109 MMOL/L (ref 98–112)
CO2 SERPL-SCNC: 26 MMOL/L (ref 21–32)
CREAT BLD-MCNC: 0.55 MG/DL
DEPRECATED RDW RBC AUTO: 47.1 FL (ref 35.1–46.3)
EGFRCR SERPLBLD CKD-EPI 2021: 124 ML/MIN/1.73M2 (ref 60–?)
EOSINOPHIL # BLD AUTO: 0.18 X10(3) UL (ref 0–0.7)
EOSINOPHIL NFR BLD AUTO: 3.9 %
ERYTHROCYTE [DISTWIDTH] IN BLOOD BY AUTOMATED COUNT: 14.4 % (ref 11–15)
GLUCOSE BLD-MCNC: 92 MG/DL (ref 70–99)
HCT VFR BLD AUTO: 34.3 %
HGB BLD-MCNC: 11.9 G/DL
IMM GRANULOCYTES # BLD AUTO: 0.03 X10(3) UL (ref 0–1)
IMM GRANULOCYTES NFR BLD: 0.6 %
LYMPHOCYTES # BLD AUTO: 1.35 X10(3) UL (ref 1–4)
LYMPHOCYTES NFR BLD AUTO: 29 %
MCH RBC QN AUTO: 31.2 PG (ref 26–34)
MCHC RBC AUTO-ENTMCNC: 34.7 G/DL (ref 31–37)
MCV RBC AUTO: 89.8 FL
MONOCYTES # BLD AUTO: 0.3 X10(3) UL (ref 0.1–1)
MONOCYTES NFR BLD AUTO: 6.5 %
NEUTROPHILS # BLD AUTO: 2.76 X10 (3) UL (ref 1.5–7.7)
NEUTROPHILS # BLD AUTO: 2.76 X10(3) UL (ref 1.5–7.7)
NEUTROPHILS NFR BLD AUTO: 59.4 %
OSMOLALITY SERPL CALC.SUM OF ELEC: 290 MOSM/KG (ref 275–295)
PLATELET # BLD AUTO: 114 10(3)UL (ref 150–450)
POTASSIUM SERPL-SCNC: 3.7 MMOL/L (ref 3.5–5.1)
RBC # BLD AUTO: 3.82 X10(6)UL
RH BLOOD TYPE: POSITIVE
SODIUM SERPL-SCNC: 141 MMOL/L (ref 136–145)
WBC # BLD AUTO: 4.7 X10(3) UL (ref 4–11)

## 2023-10-26 PROCEDURE — 0DBL4ZZ EXCISION OF TRANSVERSE COLON, PERCUTANEOUS ENDOSCOPIC APPROACH: ICD-10-PCS | Performed by: COLON & RECTAL SURGERY

## 2023-10-26 PROCEDURE — 0JQC3ZZ REPAIR PELVIC REGION SUBCUTANEOUS TISSUE AND FASCIA, PERCUTANEOUS APPROACH: ICD-10-PCS | Performed by: COLON & RECTAL SURGERY

## 2023-10-26 PROCEDURE — 0W9J4ZZ DRAINAGE OF PELVIC CAVITY, PERCUTANEOUS ENDOSCOPIC APPROACH: ICD-10-PCS | Performed by: COLON & RECTAL SURGERY

## 2023-10-26 PROCEDURE — 4A1BXSH MONITORING OF GASTROINTESTINAL VASCULAR PERFUSION USING INDOCYANINE GREEN DYE, EXTERNAL APPROACH: ICD-10-PCS | Performed by: COLON & RECTAL SURGERY

## 2023-10-26 PROCEDURE — 0DTN4ZZ RESECTION OF SIGMOID COLON, PERCUTANEOUS ENDOSCOPIC APPROACH: ICD-10-PCS | Performed by: COLON & RECTAL SURGERY

## 2023-10-26 PROCEDURE — 0T788DZ DILATION OF BILATERAL URETERS WITH INTRALUMINAL DEVICE, VIA NATURAL OR ARTIFICIAL OPENING ENDOSCOPIC: ICD-10-PCS | Performed by: UROLOGY

## 2023-10-26 RX ORDER — HYDROMORPHONE HYDROCHLORIDE 1 MG/ML
0.4 INJECTION, SOLUTION INTRAMUSCULAR; INTRAVENOUS; SUBCUTANEOUS EVERY 2 HOUR PRN
Status: DISCONTINUED | OUTPATIENT
Start: 2023-10-26 | End: 2023-10-28

## 2023-10-26 RX ORDER — MAGNESIUM OXIDE 400 MG/1
400 TABLET ORAL DAILY
Status: DISCONTINUED | OUTPATIENT
Start: 2023-10-26 | End: 2023-10-31

## 2023-10-26 RX ORDER — LORAZEPAM 2 MG/ML
0.5 INJECTION INTRAMUSCULAR ONCE
Status: COMPLETED | OUTPATIENT
Start: 2023-10-26 | End: 2023-10-26

## 2023-10-26 RX ORDER — HYDROMORPHONE HYDROCHLORIDE 1 MG/ML
0.4 INJECTION, SOLUTION INTRAMUSCULAR; INTRAVENOUS; SUBCUTANEOUS EVERY 5 MIN PRN
Status: DISCONTINUED | OUTPATIENT
Start: 2023-10-26 | End: 2023-10-26 | Stop reason: HOSPADM

## 2023-10-26 RX ORDER — MIDAZOLAM HYDROCHLORIDE 1 MG/ML
INJECTION INTRAMUSCULAR; INTRAVENOUS AS NEEDED
Status: DISCONTINUED | OUTPATIENT
Start: 2023-10-26 | End: 2023-10-26 | Stop reason: SURG

## 2023-10-26 RX ORDER — NALOXONE HYDROCHLORIDE 0.4 MG/ML
80 INJECTION, SOLUTION INTRAMUSCULAR; INTRAVENOUS; SUBCUTANEOUS AS NEEDED
Status: DISCONTINUED | OUTPATIENT
Start: 2023-10-26 | End: 2023-10-26 | Stop reason: HOSPADM

## 2023-10-26 RX ORDER — LIDOCAINE HYDROCHLORIDE ANHYDROUS AND DEXTROSE MONOHYDRATE .8; 5 G/100ML; G/100ML
INJECTION, SOLUTION INTRAVENOUS CONTINUOUS PRN
Status: DISCONTINUED | OUTPATIENT
Start: 2023-10-26 | End: 2023-10-26 | Stop reason: SURG

## 2023-10-26 RX ORDER — HYDROMORPHONE HYDROCHLORIDE 1 MG/ML
0.8 INJECTION, SOLUTION INTRAMUSCULAR; INTRAVENOUS; SUBCUTANEOUS EVERY 2 HOUR PRN
Status: DISCONTINUED | OUTPATIENT
Start: 2023-10-26 | End: 2023-10-28

## 2023-10-26 RX ORDER — FAMOTIDINE 10 MG/ML
20 INJECTION, SOLUTION INTRAVENOUS 2 TIMES DAILY
Status: DISCONTINUED | OUTPATIENT
Start: 2023-10-26 | End: 2023-10-26

## 2023-10-26 RX ORDER — HYDROMORPHONE HYDROCHLORIDE 1 MG/ML
0.2 INJECTION, SOLUTION INTRAMUSCULAR; INTRAVENOUS; SUBCUTANEOUS EVERY 5 MIN PRN
Status: DISCONTINUED | OUTPATIENT
Start: 2023-10-26 | End: 2023-10-26 | Stop reason: HOSPADM

## 2023-10-26 RX ORDER — POLYETHYLENE GLYCOL 3350 17 G/17G
17 POWDER, FOR SOLUTION ORAL DAILY PRN
Status: DISCONTINUED | OUTPATIENT
Start: 2023-10-26 | End: 2023-10-31

## 2023-10-26 RX ORDER — HYDROMORPHONE HYDROCHLORIDE 1 MG/ML
0.6 INJECTION, SOLUTION INTRAMUSCULAR; INTRAVENOUS; SUBCUTANEOUS EVERY 5 MIN PRN
Status: DISCONTINUED | OUTPATIENT
Start: 2023-10-26 | End: 2023-10-26 | Stop reason: HOSPADM

## 2023-10-26 RX ORDER — ROCURONIUM BROMIDE 10 MG/ML
INJECTION, SOLUTION INTRAVENOUS AS NEEDED
Status: DISCONTINUED | OUTPATIENT
Start: 2023-10-26 | End: 2023-10-26 | Stop reason: SURG

## 2023-10-26 RX ORDER — MORPHINE SULFATE 4 MG/ML
2 INJECTION, SOLUTION INTRAMUSCULAR; INTRAVENOUS EVERY 10 MIN PRN
Status: DISCONTINUED | OUTPATIENT
Start: 2023-10-26 | End: 2023-10-26 | Stop reason: HOSPADM

## 2023-10-26 RX ORDER — KETAMINE HYDROCHLORIDE 50 MG/ML
INJECTION, SOLUTION, CONCENTRATE INTRAMUSCULAR; INTRAVENOUS AS NEEDED
Status: DISCONTINUED | OUTPATIENT
Start: 2023-10-26 | End: 2023-10-26 | Stop reason: SURG

## 2023-10-26 RX ORDER — SODIUM CHLORIDE, SODIUM LACTATE, POTASSIUM CHLORIDE, CALCIUM CHLORIDE 600; 310; 30; 20 MG/100ML; MG/100ML; MG/100ML; MG/100ML
INJECTION, SOLUTION INTRAVENOUS CONTINUOUS PRN
Status: DISCONTINUED | OUTPATIENT
Start: 2023-10-26 | End: 2023-10-26 | Stop reason: SURG

## 2023-10-26 RX ORDER — HEPARIN SODIUM 5000 [USP'U]/ML
5000 INJECTION, SOLUTION INTRAVENOUS; SUBCUTANEOUS EVERY 8 HOURS SCHEDULED
Status: DISCONTINUED | OUTPATIENT
Start: 2023-10-27 | End: 2023-10-31

## 2023-10-26 RX ORDER — MORPHINE SULFATE 4 MG/ML
4 INJECTION, SOLUTION INTRAMUSCULAR; INTRAVENOUS EVERY 10 MIN PRN
Status: DISCONTINUED | OUTPATIENT
Start: 2023-10-26 | End: 2023-10-26 | Stop reason: HOSPADM

## 2023-10-26 RX ORDER — DEXAMETHASONE SODIUM PHOSPHATE 4 MG/ML
VIAL (ML) INJECTION AS NEEDED
Status: DISCONTINUED | OUTPATIENT
Start: 2023-10-26 | End: 2023-10-26 | Stop reason: SURG

## 2023-10-26 RX ORDER — OXYCODONE HYDROCHLORIDE 5 MG/1
10 TABLET ORAL EVERY 4 HOURS PRN
Status: DISCONTINUED | OUTPATIENT
Start: 2023-10-26 | End: 2023-10-31

## 2023-10-26 RX ORDER — OXYCODONE HYDROCHLORIDE 5 MG/1
5 TABLET ORAL EVERY 4 HOURS PRN
Status: DISCONTINUED | OUTPATIENT
Start: 2023-10-26 | End: 2023-10-31

## 2023-10-26 RX ORDER — SENNOSIDES 8.6 MG
17.2 TABLET ORAL NIGHTLY PRN
Status: DISCONTINUED | OUTPATIENT
Start: 2023-10-26 | End: 2023-10-31

## 2023-10-26 RX ORDER — SODIUM CHLORIDE, SODIUM LACTATE, POTASSIUM CHLORIDE, CALCIUM CHLORIDE 600; 310; 30; 20 MG/100ML; MG/100ML; MG/100ML; MG/100ML
2 INJECTION, SOLUTION INTRAVENOUS CONTINUOUS
Status: DISCONTINUED | OUTPATIENT
Start: 2023-10-26 | End: 2023-10-31

## 2023-10-26 RX ORDER — SODIUM CHLORIDE, SODIUM LACTATE, POTASSIUM CHLORIDE, CALCIUM CHLORIDE 600; 310; 30; 20 MG/100ML; MG/100ML; MG/100ML; MG/100ML
INJECTION, SOLUTION INTRAVENOUS CONTINUOUS
Status: DISCONTINUED | OUTPATIENT
Start: 2023-10-26 | End: 2023-10-26 | Stop reason: HOSPADM

## 2023-10-26 RX ORDER — MORPHINE SULFATE 10 MG/ML
6 INJECTION, SOLUTION INTRAMUSCULAR; INTRAVENOUS EVERY 10 MIN PRN
Status: DISCONTINUED | OUTPATIENT
Start: 2023-10-26 | End: 2023-10-26 | Stop reason: HOSPADM

## 2023-10-26 RX ORDER — FAMOTIDINE 20 MG/1
20 TABLET, FILM COATED ORAL 2 TIMES DAILY
Status: DISCONTINUED | OUTPATIENT
Start: 2023-10-26 | End: 2023-10-26

## 2023-10-26 RX ADMIN — LIDOCAINE HYDROCHLORIDE ANHYDROUS AND DEXTROSE MONOHYDRATE 2 MG/MIN: .8; 5 INJECTION, SOLUTION INTRAVENOUS at 13:23:00

## 2023-10-26 RX ADMIN — ROCURONIUM BROMIDE 10 MG: 10 INJECTION, SOLUTION INTRAVENOUS at 15:42:00

## 2023-10-26 RX ADMIN — SODIUM CHLORIDE, SODIUM LACTATE, POTASSIUM CHLORIDE, CALCIUM CHLORIDE: 600; 310; 30; 20 INJECTION, SOLUTION INTRAVENOUS at 12:56:00

## 2023-10-26 RX ADMIN — MIDAZOLAM HYDROCHLORIDE 2 MG: 1 INJECTION INTRAMUSCULAR; INTRAVENOUS at 13:09:00

## 2023-10-26 RX ADMIN — SODIUM CHLORIDE: 9 INJECTION, SOLUTION INTRAVENOUS at 13:23:00

## 2023-10-26 RX ADMIN — ROCURONIUM BROMIDE 10 MG: 10 INJECTION, SOLUTION INTRAVENOUS at 14:31:00

## 2023-10-26 RX ADMIN — ROCURONIUM BROMIDE 20 MG: 10 INJECTION, SOLUTION INTRAVENOUS at 16:42:00

## 2023-10-26 RX ADMIN — ROCURONIUM BROMIDE 10 MG: 10 INJECTION, SOLUTION INTRAVENOUS at 17:23:00

## 2023-10-26 RX ADMIN — DEXAMETHASONE SODIUM PHOSPHATE 4 MG: 4 MG/ML VIAL (ML) INJECTION at 13:42:00

## 2023-10-26 RX ADMIN — ROCURONIUM BROMIDE 20 MG: 10 INJECTION, SOLUTION INTRAVENOUS at 18:16:00

## 2023-10-26 RX ADMIN — ROCURONIUM BROMIDE 20 MG: 10 INJECTION, SOLUTION INTRAVENOUS at 15:58:00

## 2023-10-26 RX ADMIN — KETAMINE HYDROCHLORIDE 60 MG: 50 INJECTION, SOLUTION, CONCENTRATE INTRAMUSCULAR; INTRAVENOUS at 13:42:00

## 2023-10-26 RX ADMIN — ROCURONIUM BROMIDE 10 MG: 10 INJECTION, SOLUTION INTRAVENOUS at 15:01:00

## 2023-10-26 RX ADMIN — ROCURONIUM BROMIDE 50 MG: 10 INJECTION, SOLUTION INTRAVENOUS at 13:27:00

## 2023-10-26 RX ADMIN — MIDAZOLAM HYDROCHLORIDE 2 MG: 1 INJECTION INTRAMUSCULAR; INTRAVENOUS at 13:57:00

## 2023-10-26 RX ADMIN — SODIUM CHLORIDE: 9 INJECTION, SOLUTION INTRAVENOUS at 14:45:00

## 2023-10-26 RX ADMIN — SODIUM CHLORIDE: 9 INJECTION, SOLUTION INTRAVENOUS at 18:19:00

## 2023-10-26 NOTE — ANESTHESIA PROCEDURE NOTES
Peripheral IV  Date/Time: 10/26/2023 1:23 PM  Inserted by: Brie Angeles MD    Placement  Needle size: 18 G  Laterality: right  Location: wrist  Local anesthetic: none  Site prep: alcohol  Technique: anatomical landmarks  Attempts: 1

## 2023-10-26 NOTE — PLAN OF CARE
Patient is alert and oriented x4. Room air. Vital signs stable. Voiding freely. IVF infusing. IV Abx continued. Drains to abdomen intact. PRN Xanax, Ativan given for anxiety. Patient down at surgery at change of shift.      Problem: Patient Centered Care  Goal: Patient preferences are identified and integrated in the patient's plan of care  Description: Interventions:  - What would you like us to know as we care for you?   - Provide timely, complete, and accurate information to patient/family  - Incorporate patient and family knowledge, values, beliefs, and cultural backgrounds into the planning and delivery of care  - Encourage patient/family to participate in care and decision-making at the level they choose  - Honor patient and family perspectives and choices  Outcome: Progressing    Problem: RISK FOR INFECTION - ADULT  Goal: Absence of fever/infection during anticipated neutropenic period  Description: INTERVENTIONS  - Monitor WBC  - Administer growth factors as ordered  - Implement neutropenic guidelines  Outcome: Progressing     Problem: DISCHARGE PLANNING  Goal: Discharge to home or other facility with appropriate resources  Description: INTERVENTIONS:  - Identify barriers to discharge w/pt and caregiver  - Include patient/family/discharge partner in discharge planning  - Arrange for needed discharge resources and transportation as appropriate  - Identify discharge learning needs (meds, wound care, etc)  - Arrange for interpreters to assist at discharge as needed  - Consider post-discharge preferences of patient/family/discharge partner  - Complete POLST form as appropriate  - Assess patient's ability to be responsible for managing their own health  - Refer to Case Management Department for coordinating discharge planning if the patient needs post-hospital services based on physician/LIP order or complex needs related to functional status, cognitive ability or social support system  Outcome: Progressing     Problem: RESPIRATORY - ADULT  Goal: Achieves optimal ventilation and oxygenation  Description: INTERVENTIONS:  - Assess for changes in respiratory status  - Assess for changes in mentation and behavior  - Position to facilitate oxygenation and minimize respiratory effort  - Oxygen supplementation based on oxygen saturation or ABGs  - Provide Smoking Cessation handout, if applicable  - Encourage broncho-pulmonary hygiene including cough, deep breathe, Incentive Spirometry  - Assess the need for suctioning and perform as needed  - Assess and instruct to report SOB or any respiratory difficulty  - Respiratory Therapy support as indicated  - Manage/alleviate anxiety  - Monitor for signs/symptoms of CO2 retention  Outcome: Progressing

## 2023-10-26 NOTE — PROGRESS NOTES
Pt not seen and examined as she is off the floor for surgery.    Chart reviewed, d/w RN  Will see in AM  Labs ordered

## 2023-10-26 NOTE — PLAN OF CARE
A&Ox4 - anxious at times, RA - hx of R lung lobectomy of two lower sections of the lung. Tolerated clears (NPO at midnight for procedure), ambulating independently, heparin held per order for pre procedure prep. Pt denies pain. Anxious prior to bed time mentioned having difficulty falling asleep - xanax and ambien were not sufficient, per pt request ativan was later given. Call light within reach, calls appropriately,  at bedside. Problem: Patient Centered Care  Goal: Patient preferences are identified and integrated in the patient's plan of care  Description: Interventions:  - What would you like us to know as we care for you?  Premedicate prior to anything w needles  - Provide timely, complete, and accurate information to patient/family  - Incorporate patient and family knowledge, values, beliefs, and cultural backgrounds into the planning and delivery of care  - Encourage patient/family to participate in care and decision-making at the level they choose  - Honor patient and family perspectives and choices  10/25/2023 2300 by Iron Belt Reynolds  Outcome: Progressing  10/25/2023 2259 by Iron Belt Reynolds  Outcome: Progressing     Problem: Patient/Family Goals  Goal: Patient/Family Long Term Goal  Description: Patient's Long Term Goal: return home    Interventions:  - complete procedure, follow post op instructions   - See additional Care Plan goals for specific interventions  10/25/2023 2300 by Iron Belt Reynolds  Outcome: Progressing  10/25/2023 2259 by Iron Belt Reynolds  Outcome: Progressing  Goal: Patient/Family Short Term Goal  Description: Patient's Short Term Goal: manage anxious events    Interventions:   - PRN meds, deep breathing  - See additional Care Plan goals for specific interventions  10/25/2023 2300 by Iron Belt Reynolds  Outcome: Progressing  10/25/2023 2259 by Audi Reynolds  Outcome: Progressing     Problem: RISK FOR INFECTION - ADULT  Goal: Absence of fever/infection during anticipated neutropenic period  Description: INTERVENTIONS  - Monitor WBC  - Administer growth factors as ordered  - Implement neutropenic guidelines  Outcome: Progressing     Problem: DISCHARGE PLANNING  Goal: Discharge to home or other facility with appropriate resources  Description: INTERVENTIONS:  - Identify barriers to discharge w/pt and caregiver  - Include patient/family/discharge partner in discharge planning  - Arrange for needed discharge resources and transportation as appropriate  - Identify discharge learning needs (meds, wound care, etc)  - Arrange for interpreters to assist at discharge as needed  - Consider post-discharge preferences of patient/family/discharge partner  - Complete POLST form as appropriate  - Assess patient's ability to be responsible for managing their own health  - Refer to Case Management Department for coordinating discharge planning if the patient needs post-hospital services based on physician/LIP order or complex needs related to functional status, cognitive ability or social support system  Outcome: Progressing     Problem: RESPIRATORY - ADULT  Goal: Achieves optimal ventilation and oxygenation  Description: INTERVENTIONS:  - Assess for changes in respiratory status  - Assess for changes in mentation and behavior  - Position to facilitate oxygenation and minimize respiratory effort  - Oxygen supplementation based on oxygen saturation or ABGs  - Provide Smoking Cessation handout, if applicable  - Encourage broncho-pulmonary hygiene including cough, deep breathe, Incentive Spirometry  - Assess the need for suctioning and perform as needed  - Assess and instruct to report SOB or any respiratory difficulty  - Respiratory Therapy support as indicated  - Manage/alleviate anxiety  - Monitor for signs/symptoms of CO2 retention  Outcome: Progressing

## 2023-10-26 NOTE — TELEPHONE ENCOUNTER
Maximus Moya with Utilization management is calling to find out if an Authorization for Hospital admission was acquired prior to 10/26 Admission for Sigmoidoscopy. Pt is in surgery with Dr Toni Brush and a PA for today's admission for this surgery was approved but is now in question due to the early admission to hospital for Sigmoidoscopy. Maximus Moya RN is requesting a call back.   Please call

## 2023-10-26 NOTE — ANESTHESIA PROCEDURE NOTES
Airway  Date/Time: 10/26/2023 1:18 PM  Urgency: elective    Difficult airway    General Information and Staff    Patient location during procedure: OR  Anesthesiologist: Rosibel Padilla MD  Performed: anesthesiologist   Performed by: Rosibel Padilla MD  Authorized by: Rosibel Padilla MD      Indications and Patient Condition  Indications for airway management: anesthesia  Spontaneous Ventilation: absent  Sedation level: deep  Preoxygenated: yes  Patient position: sniffing  Mask difficulty assessment: 1 - vent by mask  Planned trial extubation    Final Airway Details  Final airway type: endotracheal airway      Successful airway: ETT  Cuffed: yes   Successful intubation technique: Video laryngoscopy  Facilitating devices/methods: intubating stylet, cricoid pressure and rapid sequence intubation  Endotracheal tube insertion site: oral  Blade: Salome  Blade size: #3  ETT size (mm): 7.5    Cormack-Lehane Classification: grade I - full view of glottis  Placement verified by: capnometry   Measured from: teeth  ETT to teeth (cm): 22  Number of attempts at approach: 1  Ventilation between attempts: none  Number of other approaches attempted: 0    Additional Comments  Olivarez #4

## 2023-10-27 LAB
ANION GAP SERPL CALC-SCNC: 5 MMOL/L (ref 0–18)
BASOPHILS # BLD AUTO: 0.01 X10(3) UL (ref 0–0.2)
BASOPHILS NFR BLD AUTO: 0.2 %
BUN BLD-MCNC: 5 MG/DL (ref 7–18)
BUN/CREAT SERPL: 11.1 (ref 10–20)
CALCIUM BLD-MCNC: 7.6 MG/DL (ref 8.5–10.1)
CHLORIDE SERPL-SCNC: 110 MMOL/L (ref 98–112)
CO2 SERPL-SCNC: 27 MMOL/L (ref 21–32)
CREAT BLD-MCNC: 0.45 MG/DL
DEPRECATED RDW RBC AUTO: 46.2 FL (ref 35.1–46.3)
EGFRCR SERPLBLD CKD-EPI 2021: 130 ML/MIN/1.73M2 (ref 60–?)
EOSINOPHIL # BLD AUTO: 0.01 X10(3) UL (ref 0–0.7)
EOSINOPHIL NFR BLD AUTO: 0.2 %
ERYTHROCYTE [DISTWIDTH] IN BLOOD BY AUTOMATED COUNT: 14.3 % (ref 11–15)
GLUCOSE BLD-MCNC: 110 MG/DL (ref 70–99)
GLUCOSE BLDC GLUCOMTR-MCNC: 139 MG/DL (ref 70–99)
HCT VFR BLD AUTO: 31.7 %
HGB BLD-MCNC: 11 G/DL
IMM GRANULOCYTES # BLD AUTO: 0.03 X10(3) UL (ref 0–1)
IMM GRANULOCYTES NFR BLD: 0.5 %
LYMPHOCYTES # BLD AUTO: 1.09 X10(3) UL (ref 1–4)
LYMPHOCYTES NFR BLD AUTO: 17.4 %
MAGNESIUM SERPL-MCNC: 1.9 MG/DL (ref 1.6–2.6)
MCH RBC QN AUTO: 31.2 PG (ref 26–34)
MCHC RBC AUTO-ENTMCNC: 34.7 G/DL (ref 31–37)
MCV RBC AUTO: 89.8 FL
MONOCYTES # BLD AUTO: 0.36 X10(3) UL (ref 0.1–1)
MONOCYTES NFR BLD AUTO: 5.7 %
NEUTROPHILS # BLD AUTO: 4.78 X10 (3) UL (ref 1.5–7.7)
NEUTROPHILS # BLD AUTO: 4.78 X10(3) UL (ref 1.5–7.7)
NEUTROPHILS NFR BLD AUTO: 76 %
OSMOLALITY SERPL CALC.SUM OF ELEC: 292 MOSM/KG (ref 275–295)
PHOSPHATE SERPL-MCNC: 2.3 MG/DL (ref 2.5–4.9)
PLATELET # BLD AUTO: 106 10(3)UL (ref 150–450)
POTASSIUM SERPL-SCNC: 4.2 MMOL/L (ref 3.5–5.1)
RBC # BLD AUTO: 3.53 X10(6)UL
SODIUM SERPL-SCNC: 142 MMOL/L (ref 136–145)
WBC # BLD AUTO: 6.3 X10(3) UL (ref 4–11)

## 2023-10-27 PROCEDURE — 99233 SBSQ HOSP IP/OBS HIGH 50: CPT | Performed by: HOSPITALIST

## 2023-10-27 RX ORDER — SIMETHICONE 80 MG
80 TABLET,CHEWABLE ORAL 4 TIMES DAILY PRN
Status: DISCONTINUED | OUTPATIENT
Start: 2023-10-27 | End: 2023-10-31

## 2023-10-27 RX ORDER — MORPHINE SULFATE 4 MG/ML
4 INJECTION, SOLUTION INTRAMUSCULAR; INTRAVENOUS EVERY 2 HOUR PRN
Status: DISCONTINUED | OUTPATIENT
Start: 2023-10-27 | End: 2023-10-29

## 2023-10-27 RX ORDER — MORPHINE SULFATE 2 MG/ML
2 INJECTION, SOLUTION INTRAMUSCULAR; INTRAVENOUS EVERY 2 HOUR PRN
Status: DISCONTINUED | OUTPATIENT
Start: 2023-10-27 | End: 2023-10-29

## 2023-10-27 RX ORDER — ACETAMINOPHEN 500 MG
1000 TABLET ORAL EVERY 8 HOURS
Status: DISCONTINUED | OUTPATIENT
Start: 2023-10-27 | End: 2023-10-31

## 2023-10-27 RX ORDER — MORPHINE SULFATE 2 MG/ML
1 INJECTION, SOLUTION INTRAMUSCULAR; INTRAVENOUS EVERY 2 HOUR PRN
Status: DISCONTINUED | OUTPATIENT
Start: 2023-10-27 | End: 2023-10-29

## 2023-10-27 RX ORDER — SIMETHICONE 80 MG
80 TABLET,CHEWABLE ORAL
Status: DISCONTINUED | OUTPATIENT
Start: 2023-10-27 | End: 2023-10-27

## 2023-10-27 NOTE — CM/SW NOTE
CM was notified by Jessica Arthur with [facility] that they were servicing pt for home inf of  IV Caspofungin and Meropenem pta. EOT 10/25    Pt admitted for Insertion of bilateral lighted ureteral stents Raji Hope) Proctoscopy, laparoscopic sigmoid colon resection, take down of colovesicular fitula done 10/26. Plan  Pending abx plan. / to remain available for support and/or discharge planning.      Adina Alvarez RN    Ext [phone]

## 2023-10-27 NOTE — BRIEF OP NOTE
Pre-Operative Diagnosis: Diverticular disease, perforation and abcess of the intestine     Post-Operative Diagnosis: Diverticular disease, perforation and abcess of the intestine      Procedure Performed:   Insertion of bilateral lighted ureteral stents Kvng Ramos Proctoscopy, laparoscopic sigmoid colon resection, take down of colovesicular fitula.     Surgeon(s) and Role:  Panel 1:     Yodit Melvin MD - Primary  Panel 2:     * Sunny Phalen, MD - Primary    Assistant(s):  Surgical Assistant.: Janie Jones CSA     Surgical Findings: Extensive adhesions and fusion of sigmoid colon to bladder, left adnexa, uterus and small bowel     Specimen:   1) retrocystic abscess  2) sigmoid colon     Estimated Blood Loss: 50 ml    Mir Villarreal MD  10/26/2023  7:58 PM

## 2023-10-27 NOTE — PLAN OF CARE
Problem: Patient Centered Care  Goal: Patient preferences are identified and integrated in the patient's plan of care  Description: Interventions:  - What would you like us to know as we care for you?  From home with   - Provide timely, complete, and accurate information to patient/family  - Incorporate patient and family knowledge, values, beliefs, and cultural backgrounds into the planning and delivery of care  - Encourage patient/family to participate in care and decision-making at the level they choose  - Honor patient and family perspectives and choices  Outcome: Progressing     Problem: Patient/Family Goals  Goal: Patient/Family Long Term Goal  Description: Patient's Long Term Goal:     Interventions:  -  - See additional Care Plan goals for specific interventions  Outcome: Progressing  Goal: Patient/Family Short Term Goal  Description: Patient's Short Term Goal:     Interventions:   - See additional Care Plan goals for specific interventions  Outcome: Progressing     Problem: RISK FOR INFECTION - ADULT  Goal: Absence of fever/infection during anticipated neutropenic period  Description: INTERVENTIONS  - Monitor WBC  - Administer growth factors as ordered  - Implement neutropenic guidelines  Outcome: Progressing     Problem: DISCHARGE PLANNING  Goal: Discharge to home or other facility with appropriate resources  Description: INTERVENTIONS:  - Identify barriers to discharge w/pt and caregiver  - Include patient/family/discharge partner in discharge planning  - Arrange for needed discharge resources and transportation as appropriate  - Identify discharge learning needs (meds, wound care, etc)  - Arrange for interpreters to assist at discharge as needed  - Consider post-discharge preferences of patient/family/discharge partner  - Complete POLST form as appropriate  - Assess patient's ability to be responsible for managing their own health  - Refer to Case Management Department for coordinating discharge planning if the patient needs post-hospital services based on physician/LIP order or complex needs related to functional status, cognitive ability or social support system  Outcome: Progressing     Problem: RESPIRATORY - ADULT  Goal: Achieves optimal ventilation and oxygenation  Description: INTERVENTIONS:  - Assess for changes in respiratory status  - Assess for changes in mentation and behavior  - Position to facilitate oxygenation and minimize respiratory effort  - Oxygen supplementation based on oxygen saturation or ABGs  - Provide Smoking Cessation handout, if applicable  - Encourage broncho-pulmonary hygiene including cough, deep breathe, Incentive Spirometry  - Assess the need for suctioning and perform as needed  - Assess and instruct to report SOB or any respiratory difficulty  - Respiratory Therapy support as indicated  - Manage/alleviate anxiety  - Monitor for signs/symptoms of CO2 retention  Outcome: Progressing   A/Ox4. Vital signs stable on 2-3L O2. Cerda in place. 5 lap sites to abdomen clean, dry, intact. JUAN drain in place. PRN Dilaudid for pain management. IVF infusing. IV abx continued. PRN ativan given for anxiety. Safety precautions maintained, call light within reach, frequent rounding done.  at bedside.

## 2023-10-27 NOTE — PLAN OF CARE
Patient is alert and oriented x4. Room air. Vital signs stable. Cerda in place. Denies passing gas. Lap sites abdomen clean, dry, and intact. JUAN drain to LLQ with bloody output. Scheduled Tylenol and PRN Oxycodone, Morphine for pain management. PRN Ativan, Xanax for anxiety. IVF infusing. IV Abx continued. Tolerating low fiber/soft diet, denies nausea.     Problem: Patient Centered Care  Goal: Patient preferences are identified and integrated in the patient's plan of care  Description: Interventions:  - What would you like us to know as we care for you?   - Provide timely, complete, and accurate information to patient/family  - Incorporate patient and family knowledge, values, beliefs, and cultural backgrounds into the planning and delivery of care  - Encourage patient/family to participate in care and decision-making at the level they choose  - Honor patient and family perspectives and choices  Outcome: Progressing    Problem: RISK FOR INFECTION - ADULT  Goal: Absence of fever/infection during anticipated neutropenic period  Description: INTERVENTIONS  - Monitor WBC  - Administer growth factors as ordered  - Implement neutropenic guidelines  Outcome: Progressing     Problem: DISCHARGE PLANNING  Goal: Discharge to home or other facility with appropriate resources  Description: INTERVENTIONS:  - Identify barriers to discharge w/pt and caregiver  - Include patient/family/discharge partner in discharge planning  - Arrange for needed discharge resources and transportation as appropriate  - Identify discharge learning needs (meds, wound care, etc)  - Arrange for interpreters to assist at discharge as needed  - Consider post-discharge preferences of patient/family/discharge partner  - Complete POLST form as appropriate  - Assess patient's ability to be responsible for managing their own health  - Refer to Case Management Department for coordinating discharge planning if the patient needs post-hospital services based on physician/LIP order or complex needs related to functional status, cognitive ability or social support system  Outcome: Progressing     Problem: RESPIRATORY - ADULT  Goal: Achieves optimal ventilation and oxygenation  Description: INTERVENTIONS:  - Assess for changes in respiratory status  - Assess for changes in mentation and behavior  - Position to facilitate oxygenation and minimize respiratory effort  - Oxygen supplementation based on oxygen saturation or ABGs  - Provide Smoking Cessation handout, if applicable  - Encourage broncho-pulmonary hygiene including cough, deep breathe, Incentive Spirometry  - Assess the need for suctioning and perform as needed  - Assess and instruct to report SOB or any respiratory difficulty  - Respiratory Therapy support as indicated  - Manage/alleviate anxiety  - Monitor for signs/symptoms of CO2 retention  Outcome: Progressing

## 2023-10-27 NOTE — OPERATIVE REPORT
Operative Note    Patient Name: Anamika Colunga    Date of Surgery: 10/26/23     Preoperative Diagnosis: Diverticular disease, perforation and abcess of the intestine    Postoperative Diagnosis: same    Primary Surgeon: Jason Landry MD    Assistant: Oh ESPINOZA    Procedures:   Cystoscopy and bilateral ureteral stents (Dr. Ian Escobar),  Laparoscopic sigmoid colectomy, takedown colovesical fistula, drain pelvic abscess, colorectal anastomosis, mobilization of splenic flexure, in vivo fluorescence angiography    Surgical Findings:   Retrocystic abscess cavity, cultured and drained  Small bowel and cecum, omentum, sigmoid colon, bladder, uterus and left adnexa all fused together      Anesthesia: General    Complications: none    Implants: none    Specimen:   1) retrocystic abscess culture  2) sigmoid colon    Drains: 13 Fr Geovanny in pelvis    Condition: good    Estimated Blood Loss: 50 mL       DESCRIPTION OF PROCEDURE    INDICATION   The patient is a 35year old year old female with diverticulitis and fistula (colocutaneous and colovesical). The procedure, indications, risks, benefits, and alternatives were discussed with the patient prior to the procedure. All questions were answered, and the patient wished to proceed. TECHNIQUE  The patient was taken to the operating room and placed supine on the operating table. General anesthesia was induced and he was then repositioned in modified lithotomy using [address] with appropriate attention to all joints and pressure points. The abdomen was prepared with Chloraprep and draped in the usual sterile fashion. Timeout was called to reconfirm the patient's identity, diagnosis, planned procedure, and completeness of preoperative preparations. The procedure began with placement of a Dixon cannula at the umbilicus. This was done using an open technique. The abdomen was safely entered without injury to underlying viscera.  A 0-Vicryl pursestring suture was placed at this location to allow for maintenance of pneumoperitoneum and later for closure of the fascia at the end of the operation. Additional cannulas were placed. Three 5 mm cannulas were placed with 1 in the left lower quadrant and 2 on the right side of the abdomen. We then placed our last cannula, 12 mm size, in the suprapubic position. We then placed the patient in Trendelenburg with the right side down. Transverse colon was placed up into the upper abdomen. Survey of the abdomen did not demonstrate any evidence of carcinomatosis or ascites. The mesentery to the sigmoid colon was obscured by extremely distorted anatomy due to small bowel and cecum, omentum, sigmoid colon, bladder, uterus and left adnexa all being fused together. This was carefully dissected using Enseal and sharp scissors dissection as needed. All structures were  and preserved. A retrocystic abscess was drained and cultured. The abscess was remote from the proposed distal ine of resection and colorectal anastomosis. An incision was made at the sacral promontory and then carried up over the inferior mesenteric artery pedicle and towards the ligament of Treitz on the left lateral aspect. The Enseal was used to dissect through the tissue and divide with hemostasis. We dissected the left mesocolon off of the retroperitoneum and were able to identify the left ureter (with the aid of the lighted ureteral stent) and gonadal vessels which were pushed posteriorly free from injury. The LORELEI pedicle was then dissected and we were able to divide the LORELEI using Enseal with excellent hemostasis just distal to the takeoff of the left colic artery. The IMV was divided at this level as well. We then continued dissection underneath the sigmoid mesocolon laterally to the abdominal sidewall and cephalad over the left kidney to reach a point under the splenic flexure.     Having completed the medial phase of dissection, we then divided the lateral peritoneal attachments at the white line of Toldt. The splenic flexure was then taken down sharply with use of the Enseal without injury to the spleen or tail of the pancreas. The mesentery to the splenic flexure was preserved. Having completed the abdominal phase of the operation, attention was then turned towards the pelvic phase of the operation. The lateral peritoneal attachments of the rectum were divided down to the upper third of the rectum. This allowed us to reach to a point in the proximal rectum just distal to the confluent layer of tenia. The mesorectum was elevated off of the presacral space by bluntly dissecting in the plane between the fascia propria of the rectum and Waldeyer fascia. Once the rectum was circumferentially mobilized , the mesorectum was divided at this level using Enseal. We then divided the rectum at this level using the [place] Flex 45 stapler with one firing. The proximal margin which easily reached down to the pelvis was identified and marked. The remaining mesentery and the proximal colon at the proximal line of resection was divided with the Enseal.  ICG fluorescence angiography was performed using [employer] system. Excellent perfusion was noted at the proposed/prepared proximal and distal lines of resection. The specimen was extracted out through the suprapubic incision which was lengthened along the existing scar. The Alok wound retractor was placed and then the specimen was exteriorized. The colon was then divided and a 29 mm anvil was placed through the open end of the colon and brought out through a colotomy on the antimesenteric wall of the colon. The open end of the colon was then stapled closed using another firing of the [place] stapler. A 3-0 Vicryl U-stitch was placed at the stem of anvil to help support it during docking. The proximal colon was irrigated and returned to the abdominal cavity.     The pneumoperitoneum was reestablished, and the proximal colon was oriented to lie without torsion. The cartridge of the 29 mm circular stapler was advanced transanally up to the top of the rectal stump under laparoscopic vision. The spike of the cartridge was advanced through the staple lines in the rectal stump. The anvil and cartridge were mated and closed. Fifteen seconds were allowed to pass to allow tissue compression and optimal staple formation. The orientation of the mesentery to reach the pelvis without tension or torsion was again confirmed, as well as ensuring that there was no small bowel herniating under the left colon. After all inspections were satisfactory, the circular stapler was fired. Both donuts were inspected and found to be circumferentially intact. The anastomosis was then tested with air insufflation under saline submersion using the proctoscope. With proximal compression, the anastomosis distended well and transanal passage of gas occurred around the anastomosis tested. There were no air bubbles, and therefore, a secure anastomosis had been achieved. A #15 Geovanny drain was placed in the pelvic space behind the bladder through a separate stab incision in the left lower quadrant. It was secured to the skin with a drain stitch. The omentum was mobilized and placed between the bladder and the anastomosis. The bladder was filled with 450 mL saline and we did not notice leakage from the bladder. We then removed the cannulas and closed the incisions. The transverse suprapubic incision was closed using running #1 PDS suture. The umbilical incision was closed by tying the previously placed 0 Vicryl pursestring suture. Subcutaneous tissues were irrigated with saline and then hemostasis secured with cautery. Skin was closed using 4-0 Vicryl subcuticular sutures. The skin was cleansed and Dermabond was applied. The patient was then allowed to emerge from anesthesia without incident.  The patient was extubated in the operating room and taken to the recovery room in satisfactory condition. Please note, this operation was significantly prolonged and required substantial effort beyond a standard procedure due to obesity (BMI 47) and extremely distorted anatomy with small bowel and cecum, omentum, sigmoid colon, bladder, uterus and left adnexa all fused together. It consumed 6 hours and exceeded normal operative time by 3 hours.      (Ms. Rika Kern' skilled assistance was necessary for patient positioning, prepping, instrument passing and holding, retraction, suturing, and camera holding.)      _____________________________________   Attending Surgeon: Odalys Pike MD   Dictated By: Odalys Pike MD

## 2023-10-27 NOTE — PROGRESS NOTES
[facility] note: Duplicate Proton Pump Inhibitor with Histamine 2 blocker. Belle Heard is a 35year old patient who has been prescribed both famotidine (Pepcid)  And pantoprazole (Protonix). Pepcid was discontinued per P&T approved protocol for duplicate therapy in adult patients for medications not ordered by gastroenterology.     Thank you,  Helder Philippe PharmD  10/26/2023

## 2023-10-27 NOTE — PHYSICAL THERAPY NOTE
PHYSICAL THERAPY EVALUATION - INPATIENT     Room Number: 455/455-A  Evaluation Date: 10/27/2023  Type of Evaluation: Initial   Physician Order: PT Eval and Treat    Presenting Problem:  (lap colon resection)  Reason for Therapy: Mobility Dysfunction and Discharge Planning    PHYSICAL THERAPY ASSESSMENT   Orders received and chart reviewed. RN approved participation in physical therapy. PPE worn by therapist: gloves. Patient was wearing a mask during session. Patient is a 35year old female admitted 10/25/2023 for Presenting Problem:  (lap colon resection). Patient presented in bed with 8/10 pain. Education provided on Physical therapy plan of care, physiological benefits of out of bed mobility, and abdominal precautions . Patient with good carryover. Bed mobility: Supervision  Transfers: Supervision  Gait Assistance: Supervision  Distance (ft): 280  Assistive Device: Rolling walker  Pattern:  (decreased stone)        Patient was left in bed at end of session with all needs in reach. Patient's current functional deficits include ambulation. The patient's Approx Degree of Impairment: 46.58% has been calculated based on documentation in the [facility] '6 clicks' Inpatient Basic Mobility Short Form. Research supports that patients with this level of impairment may benefit from Home with no services. RN aware of patient status post session. Patient will benefit from continued IP PT services to address these deficits in preparation for discharge. DISCHARGE RECOMMENDATIONS  PT Discharge Recommendations: Home    PLAN  PT Treatment Plan: Bed mobility; Patient education; Family education;Gait training;Transfer training  Rehab Potential : Fair  Frequency (Obs): Daily       PHYSICAL THERAPY MEDICAL/SOCIAL HISTORY     History related to current admission:      Problem List  Active Problems:    Diverticulosis    Perforation of sigmoid colon due to diverticulitis    Preop testing      Past Medical History  Past Medical History:   Diagnosis Date    Anxiety     Blood disorder     anemia    Bronchial tumor     Diverticulosis of large intestine     Esophageal reflux     History of lobectomy of lung     Hx of motion sickness     Pneumonia due to organism     Shortness of breath     Visual impairment     Wears eyeglasses       Past Surgical History  Past Surgical History:   Procedure Laterality Date    ADENOIDECTOMY      REMOVAL OF LUNG,LOBECTOMY      TONSILLECTOMY         HOME SITUATION  Type of Home: House   Home Layout: One level  Stairs to Enter : 0           Lives With: Spouse  Drives: Yes     Patient Regularly Uses: None    Prior Level of [place]: ind    SUBJECTIVE  \"I need to sleep\"    PHYSICAL THERAPY EXAMINATION     OBJECTIVE          WEIGHT BEARING RESTRICTION                   PAIN ASSESSMENT             COGNITION  Overall Cognitive Status:  WFL - within functional limits    RANGE OF MOTION AND STRENGTH ASSESSMENT  Upper extremity ROM and strength are within functional limits     Lower extremity ROM is within functional limits     Lower extremity strength is within functional limits     BALANCE  Static Sitting: Fair  Dynamic Sitting: Fair  Static Standing: Fair -  Dynamic Standing: Fair -    AM-PAC '6-Clicks' INPATIENT SHORT FORM - BASIC MOBILITY  How much difficulty does the patient currently have. .. Patient Difficulty: Turning over in bed (including adjusting bedclothes, sheets and blankets)?: A Little   Patient Difficulty: Sitting down on and standing up from a chair with arms (e.g., wheelchair, bedside commode, etc.): A Little   Patient Difficulty: Moving from lying on back to sitting on the side of the bed?: A Little   How much help from another person does the patient currently need. ..    Help from Another: Moving to and from a bed to a chair (including a wheelchair)?: A Little   Help from Another: Need to walk in hospital room?: A Little   Help from Another: Climbing 3-5 steps with a railing?: A Little     AM-PAC Score:  Raw Score: 18   Approx Degree of Impairment: 46.58%   Standardized Score (AM-PAC Scale): 43.63   CMS Modifier (G-Code): CK      Exercise/Education Provided:  Bed mobility  Gait training  Transfer training    Patient End of Session: In bed    CURRENT GOALS    Goals to be met by: 11/4  Patient Goal Patient's self-stated goal is: home   Goal #1 Patient is able to demonstrate supine - sit EOB @ level: supervision     Goal #1   Current Status    Goal #2 Patient is able to demonstrate transfers Sit to/from Stand at assistance level: supervision with walker - rolling     Goal #2  Current Status    Goal #3 Patient is able to ambulate 300 feet with assist device: walker - rolling at assistance level: supervision   Goal #3   Current Status    Goal #4 Patient will negotiate 3 stairs/one curb w/ assistive device and supervision   Goal #4   Current Status    Goal #5 Patient to demonstrate independence with home activity/exercise instructions provided to patient in preparation for discharge.    Goal #5   Current Status    Goal #6    Goal #6  Current Status      Patient Evaluation Complexity Level:  History Low - no personal factors and/or co-morbidities   Examination of body systems Low - addressing 1-2 elements   Clinical Presentation Low - Stable   Clinical Decision Making Low Complexity       Gait Training: 10 minutes

## 2023-10-28 LAB
ALBUMIN SERPL-MCNC: 2.7 G/DL (ref 3.4–5)
ALBUMIN/GLOB SERPL: 0.7 {RATIO} (ref 1–2)
ALP LIVER SERPL-CCNC: 56 U/L
ALT SERPL-CCNC: 22 U/L
ANION GAP SERPL CALC-SCNC: 7 MMOL/L (ref 0–18)
AST SERPL-CCNC: 48 U/L (ref 15–37)
BASOPHILS # BLD AUTO: 0.02 X10(3) UL (ref 0–0.2)
BASOPHILS NFR BLD AUTO: 0.4 %
BILIRUB SERPL-MCNC: 1 MG/DL (ref 0.1–2)
BUN BLD-MCNC: 2 MG/DL (ref 7–18)
BUN/CREAT SERPL: 4.8 (ref 10–20)
CALCIUM BLD-MCNC: 8.2 MG/DL (ref 8.5–10.1)
CHLORIDE SERPL-SCNC: 107 MMOL/L (ref 98–112)
CO2 SERPL-SCNC: 27 MMOL/L (ref 21–32)
CREAT BLD-MCNC: 0.42 MG/DL
DEPRECATED RDW RBC AUTO: 47.3 FL (ref 35.1–46.3)
EGFRCR SERPLBLD CKD-EPI 2021: 132 ML/MIN/1.73M2 (ref 60–?)
EOSINOPHIL # BLD AUTO: 0.08 X10(3) UL (ref 0–0.7)
EOSINOPHIL NFR BLD AUTO: 1.5 %
ERYTHROCYTE [DISTWIDTH] IN BLOOD BY AUTOMATED COUNT: 14.3 % (ref 11–15)
GLOBULIN PLAS-MCNC: 3.7 G/DL (ref 2.8–4.4)
GLUCOSE BLD-MCNC: 98 MG/DL (ref 70–99)
HCT VFR BLD AUTO: 29.5 %
HGB BLD-MCNC: 10.1 G/DL
IMM GRANULOCYTES # BLD AUTO: 0.05 X10(3) UL (ref 0–1)
IMM GRANULOCYTES NFR BLD: 0.9 %
LYMPHOCYTES # BLD AUTO: 1.44 X10(3) UL (ref 1–4)
LYMPHOCYTES NFR BLD AUTO: 26.6 %
MAGNESIUM SERPL-MCNC: 1.9 MG/DL (ref 1.6–2.6)
MCH RBC QN AUTO: 31 PG (ref 26–34)
MCHC RBC AUTO-ENTMCNC: 34.2 G/DL (ref 31–37)
MCV RBC AUTO: 90.5 FL
MONOCYTES # BLD AUTO: 0.31 X10(3) UL (ref 0.1–1)
MONOCYTES NFR BLD AUTO: 5.7 %
NEUTROPHILS # BLD AUTO: 3.51 X10 (3) UL (ref 1.5–7.7)
NEUTROPHILS # BLD AUTO: 3.51 X10(3) UL (ref 1.5–7.7)
NEUTROPHILS NFR BLD AUTO: 64.9 %
OSMOLALITY SERPL CALC.SUM OF ELEC: 288 MOSM/KG (ref 275–295)
PHOSPHATE SERPL-MCNC: 1.8 MG/DL (ref 2.5–4.9)
PLATELET # BLD AUTO: 102 10(3)UL (ref 150–450)
POTASSIUM SERPL-SCNC: 3.6 MMOL/L (ref 3.5–5.1)
PROT SERPL-MCNC: 6.4 G/DL (ref 6.4–8.2)
RBC # BLD AUTO: 3.26 X10(6)UL
SODIUM SERPL-SCNC: 141 MMOL/L (ref 136–145)
WBC # BLD AUTO: 5.4 X10(3) UL (ref 4–11)

## 2023-10-28 PROCEDURE — 99233 SBSQ HOSP IP/OBS HIGH 50: CPT | Performed by: HOSPITALIST

## 2023-10-28 NOTE — PLAN OF CARE
Pt A/O x4, anxious throughout shift. Xanax & Ativan administered. Tolerating diet. Morphine & Oxy IR for pain. JUAN drain dressing changed per pt's request. JUAN drain to bulb suction. Ambulation encouraged. BM this shift. Surgical dressing C/D/I. Cerda in place & draining. IV abx continued. Fall precautions in place. Call light within reach. Frequent rounding. Anticipate dc home with Cerda & JUAN drain when medically stable.      Problem: Patient Centered Care  Goal: Patient preferences are identified and integrated in the patient's plan of care  Description: Interventions:  - What would you like us to know as we care for you?   - Provide timely, complete, and accurate information to patient/family  - Incorporate patient and family knowledge, values, beliefs, and cultural backgrounds into the planning and delivery of care  - Encourage patient/family to participate in care and decision-making at the level they choose  - Honor patient and family perspectives and choices  Outcome: Progressing     Problem: Patient/Family Goals  Goal: Patient/Family Long Term Goal  Description: Patient's Long Term Goal:     Interventions:  -   - See additional Care Plan goals for specific interventions  Outcome: Progressing  Goal: Patient/Family Short Term Goal  Description: Patient's Short Term Goal:     Interventions:   -   - See additional Care Plan goals for specific interventions  Outcome: Progressing     Problem: RISK FOR INFECTION - ADULT  Goal: Absence of fever/infection during anticipated neutropenic period  Description: INTERVENTIONS  - Monitor WBC  - Administer growth factors as ordered  - Implement neutropenic guidelines  Outcome: Progressing     Problem: DISCHARGE PLANNING  Goal: Discharge to home or other facility with appropriate resources  Description: INTERVENTIONS:  - Identify barriers to discharge w/pt and caregiver  - Include patient/family/discharge partner in discharge planning  - Arrange for needed discharge resources and transportation as appropriate  - Identify discharge learning needs (meds, wound care, etc)  - Arrange for interpreters to assist at discharge as needed  - Consider post-discharge preferences of patient/family/discharge partner  - Complete POLST form as appropriate  - Assess patient's ability to be responsible for managing their own health  - Refer to Case Management Department for coordinating discharge planning if the patient needs post-hospital services based on physician/LIP order or complex needs related to functional status, cognitive ability or social support system  Outcome: Progressing     Problem: RESPIRATORY - ADULT  Goal: Achieves optimal ventilation and oxygenation  Description: INTERVENTIONS:  - Assess for changes in respiratory status  - Assess for changes in mentation and behavior  - Position to facilitate oxygenation and minimize respiratory effort  - Oxygen supplementation based on oxygen saturation or ABGs  - Provide Smoking Cessation handout, if applicable  - Encourage broncho-pulmonary hygiene including cough, deep breathe, Incentive Spirometry  - Assess the need for suctioning and perform as needed  - Assess and instruct to report SOB or any respiratory difficulty  - Respiratory Therapy support as indicated  - Manage/alleviate anxiety  - Monitor for signs/symptoms of CO2 retention  Outcome: Progressing     Problem: PAIN - ADULT  Goal: Verbalizes/displays adequate comfort level or patient's stated pain goal  Description: INTERVENTIONS:  - Encourage pt to monitor pain and request assistance  - Assess pain using appropriate pain scale  - Administer analgesics based on type and severity of pain and evaluate response  - Implement non-pharmacological measures as appropriate and evaluate response  - Consider cultural and social influences on pain and pain management  - Manage/alleviate anxiety  - Utilize distraction and/or relaxation techniques  - Monitor for opioid side effects  - Notify MD/LIP if interventions unsuccessful or patient reports new pain  - Anticipate increased pain with activity and pre-medicate as appropriate  Outcome: Progressing

## 2023-10-28 NOTE — PHYSICAL THERAPY NOTE
PHYSICAL THERAPY TREATMENT NOTE - INPATIENT     Room Number: 455/455-A       Presenting Problem:  (lap colon resection)    Problem List  Active Problems:    Diverticulosis    Perforation of sigmoid colon due to diverticulitis    Preop testing      PHYSICAL THERAPY ASSESSMENT   Chart reviewed. RN Tamie Ravi approved participation in physical therapy. Aaron Hutchison PPE worn by therapist: mask and gloves. Patient was not wearing a mask during session. Patient presented in bed with unrated pain. Patient with good  progress towards goals during this session. Education provided on Physical therapy plan of care and physiological benefits of out of bed mobility. Patient with good carryover. Pt agreeable to therapy, gait belt donned for mobility, reporting B shoulder pain that is worse than abdominal/incisional pain. Family at bedside, very supportive providing assistance. Pt meeting goals for bed mobility, transfers, and ambulation, without use of RW, only HHA for ambulation. Pt on bedside couch at end of session. Pt on track to discharge Home once medically cleared. Bed mobility: Supervision supine to sit via log roll  Transfers: Supervision for STS transfer without device   Gait Assistance: Supervision  Distance (ft): 450  Assistive Device:  (HHA)  Pattern: Within Functional Limits          Patient was left in  bedside couch  at end of session with all needs in reach. The patient's Approx Degree of Impairment: 28.97% has been calculated based on documentation in the [facility] '6 clicks' Inpatient Basic Mobility Short Form. Research supports that patients with this level of impairment may benefit from Home with no services. RN aware of patient status post session. DISCHARGE RECOMMENDATIONS  PT Discharge Recommendations: Home     PLAN  PT Treatment Plan: Bed mobility; Patient education; Family education;Gait training;Transfer training    SUBJECTIVE  I'll go for a walk    OBJECTIVE  Precautions: Drain(s); Abdominal protective strategies      PAIN ASSESSMENT   Rating:  (did not rate)  Location: B shoulders  Management Techniques: Activity promotion; Body mechanics    BALANCE                                                                                                                       Static Sitting: Good  Dynamic Sitting: Fair +           Static Standing: Fair  Dynamic [place] [facility] '6-Clicks' INPATIENT SHORT FORM - BASIC MOBILITY  How much difficulty does the patient currently have. .. Patient Difficulty: Turning over in bed (including adjusting bedclothes, sheets and blankets)?: A Little   Patient Difficulty: Sitting down on and standing up from a chair with arms (e.g., wheelchair, bedside commode, etc.): None   Patient Difficulty: Moving from lying on back to sitting on the side of the bed?: None   How much help from another person does the patient currently need. ..    Help from Another: Moving to and from a bed to a chair (including a wheelchair)?: None   Help from Another: Need to walk in hospital room?: A Little   Help from Another: Climbing 3-5 steps with a railing?: A Little     AM-PAC Score:  Raw Score: 21   Approx Degree of Impairment: 28.97%   Standardized Score (AM-PAC Scale): 50.25   CMS Modifier (G-Code): CJ          Patient End of Session:  (bedside couch)    CURRENT GOALS   Goals to be met by:   Patient Goal Patient's self-stated goal is: home   Goal #1 Patient is able to demonstrate supine - sit EOB @ level: supervision      Goal #1   Current Status  GOAL MET 10/28: Supervision    Goal #2 Patient is able to demonstrate transfers Sit to/from Stand at assistance level: supervision with walker - rolling      Goal #2  Current Status  GOAL MET 10/28: Supervision without device    Goal #3 Patient is able to ambulate 300 feet with assist device: walker - rolling at assistance level: supervision   Goal #3   Current Status  GOAL MET: supervision with HHA, 450 ft     Goal #4 Patient will negotiate 3 stairs/one curb w/ assistive device and supervision   Goal #4   Current Status  NT   Goal #5 Patient to demonstrate independence with home activity/exercise instructions provided to patient in preparation for discharge.    Goal #5   Current Status  ongoing    Goal #6     Goal #6  Current Status       Gait Training: 15 minutes    [address] [place] [address], [address]  [phone]

## 2023-10-28 NOTE — PLAN OF CARE
A&Ox4. Monitoring vital signs- stable at this time. No acute changes noted throughout shift. JUAN drain to LLQ. Voiding via fritz. Ativan and Xanax provided for as needed for anxiety. Oxycodone and excedrin provided as needed for pain. IV antibiotics infusing. Safety measures in place- call light and personal belongings within reach, non-skid socks in place to bilateral feet. Frequent rounding by nursing staff.     Problem: Patient Centered Care  Goal: Patient preferences are identified and integrated in the patient's plan of care  Description: Interventions:  - What would you like us to know as we care for you?   - Provide timely, complete, and accurate information to patient/family  - Incorporate patient and family knowledge, values, beliefs, and cultural backgrounds into the planning and delivery of care  - Encourage patient/family to participate in care and decision-making at the level they choose  - Honor patient and family perspectives and choices  Outcome: Progressing     Problem: Patient/Family Goals  Goal: Patient/Family Long Term Goal  Description: Patient's Long Term Goal: Discharge to home    Interventions:  - discharge planning  - Medical/surgical clearance  - See additional Care Plan goals for specific interventions  Outcome: Progressing  Goal: Patient/Family Short Term Goal  Description: Patient's Short Term Goal: Comfortable pain score    Interventions:   - Pain medications/discuss pain management  - See additional Care Plan goals for specific interventions  Outcome: Progressing     Problem: RISK FOR INFECTION - ADULT  Goal: Absence of fever/infection during anticipated neutropenic period  Description: INTERVENTIONS  - Monitor WBC  - Administer growth factors as ordered  - Implement neutropenic guidelines  Outcome: Progressing     Problem: DISCHARGE PLANNING  Goal: Discharge to home or other facility with appropriate resources  Description: INTERVENTIONS:  - Identify barriers to discharge w/pt and caregiver  - Include patient/family/discharge partner in discharge planning  - Arrange for needed discharge resources and transportation as appropriate  - Identify discharge learning needs (meds, wound care, etc)  - Arrange for interpreters to assist at discharge as needed  - Consider post-discharge preferences of patient/family/discharge partner  - Complete POLST form as appropriate  - Assess patient's ability to be responsible for managing their own health  - Refer to Case Management Department for coordinating discharge planning if the patient needs post-hospital services based on physician/LIP order or complex needs related to functional status, cognitive ability or social support system  Outcome: Progressing     Problem: RESPIRATORY - ADULT  Goal: Achieves optimal ventilation and oxygenation  Description: INTERVENTIONS:  - Assess for changes in respiratory status  - Assess for changes in mentation and behavior  - Position to facilitate oxygenation and minimize respiratory effort  - Oxygen supplementation based on oxygen saturation or ABGs  - Provide Smoking Cessation handout, if applicable  - Encourage broncho-pulmonary hygiene including cough, deep breathe, Incentive Spirometry  - Assess the need for suctioning and perform as needed  - Assess and instruct to report SOB or any respiratory difficulty  - Respiratory Therapy support as indicated  - Manage/alleviate anxiety  - Monitor for signs/symptoms of CO2 retention  Outcome: Progressing     Problem: PAIN - ADULT  Goal: Verbalizes/displays adequate comfort level or patient's stated pain goal  Description: INTERVENTIONS:  - Encourage pt to monitor pain and request assistance  - Assess pain using appropriate pain scale  - Administer analgesics based on type and severity of pain and evaluate response  - Implement non-pharmacological measures as appropriate and evaluate response  - Consider cultural and social influences on pain and pain management  - Manage/alleviate anxiety  - Utilize distraction and/or relaxation techniques  - Monitor for opioid side effects  - Notify MD/LIP if interventions unsuccessful or patient reports new pain  - Anticipate increased pain with activity and pre-medicate as appropriate  Outcome: Progressing

## 2023-10-29 LAB
ALBUMIN SERPL-MCNC: 2.6 G/DL (ref 3.4–5)
ANION GAP SERPL CALC-SCNC: 5 MMOL/L (ref 0–18)
BASOPHILS # BLD AUTO: 0.03 X10(3) UL (ref 0–0.2)
BASOPHILS NFR BLD AUTO: 0.6 %
BUN BLD-MCNC: 4 MG/DL (ref 7–18)
BUN/CREAT SERPL: 8.2 (ref 10–20)
CALCIUM BLD-MCNC: 8.2 MG/DL (ref 8.5–10.1)
CHLORIDE SERPL-SCNC: 109 MMOL/L (ref 98–112)
CO2 SERPL-SCNC: 29 MMOL/L (ref 21–32)
CREAT BLD-MCNC: 0.49 MG/DL
DEPRECATED RDW RBC AUTO: 46.9 FL (ref 35.1–46.3)
EGFRCR SERPLBLD CKD-EPI 2021: 128 ML/MIN/1.73M2 (ref 60–?)
EOSINOPHIL # BLD AUTO: 0.18 X10(3) UL (ref 0–0.7)
EOSINOPHIL NFR BLD AUTO: 3.9 %
ERYTHROCYTE [DISTWIDTH] IN BLOOD BY AUTOMATED COUNT: 14.5 % (ref 11–15)
GLUCOSE BLD-MCNC: 92 MG/DL (ref 70–99)
HCT VFR BLD AUTO: 27.3 %
HGB BLD-MCNC: 9.6 G/DL
IMM GRANULOCYTES # BLD AUTO: 0.05 X10(3) UL (ref 0–1)
IMM GRANULOCYTES NFR BLD: 1.1 %
LYMPHOCYTES # BLD AUTO: 1.55 X10(3) UL (ref 1–4)
LYMPHOCYTES NFR BLD AUTO: 33.3 %
MAGNESIUM SERPL-MCNC: 2 MG/DL (ref 1.6–2.6)
MCH RBC QN AUTO: 31.5 PG (ref 26–34)
MCHC RBC AUTO-ENTMCNC: 35.2 G/DL (ref 31–37)
MCV RBC AUTO: 89.5 FL
MONOCYTES # BLD AUTO: 0.26 X10(3) UL (ref 0.1–1)
MONOCYTES NFR BLD AUTO: 5.6 %
NEUTROPHILS # BLD AUTO: 2.58 X10 (3) UL (ref 1.5–7.7)
NEUTROPHILS # BLD AUTO: 2.58 X10(3) UL (ref 1.5–7.7)
NEUTROPHILS NFR BLD AUTO: 55.5 %
OSMOLALITY SERPL CALC.SUM OF ELEC: 293 MOSM/KG (ref 275–295)
PHOSPHATE SERPL-MCNC: 2.8 MG/DL (ref 2.5–4.9)
PHOSPHATE SERPL-MCNC: 2.8 MG/DL (ref 2.5–4.9)
PLATELET # BLD AUTO: 123 10(3)UL (ref 150–450)
POTASSIUM SERPL-SCNC: 3.4 MMOL/L (ref 3.5–5.1)
RBC # BLD AUTO: 3.05 X10(6)UL
SODIUM SERPL-SCNC: 143 MMOL/L (ref 136–145)
WBC # BLD AUTO: 4.7 X10(3) UL (ref 4–11)

## 2023-10-29 PROCEDURE — 99233 SBSQ HOSP IP/OBS HIGH 50: CPT | Performed by: HOSPITALIST

## 2023-10-29 RX ORDER — KETOROLAC TROMETHAMINE 15 MG/ML
30 INJECTION, SOLUTION INTRAMUSCULAR; INTRAVENOUS EVERY 6 HOURS PRN
Status: DISCONTINUED | OUTPATIENT
Start: 2023-10-29 | End: 2023-10-31

## 2023-10-29 RX ORDER — MORPHINE SULFATE 4 MG/ML
4 INJECTION, SOLUTION INTRAMUSCULAR; INTRAVENOUS EVERY 2 HOUR PRN
Status: DISCONTINUED | OUTPATIENT
Start: 2023-10-29 | End: 2023-10-31

## 2023-10-29 RX ORDER — METOCLOPRAMIDE HYDROCHLORIDE 5 MG/ML
10 INJECTION INTRAMUSCULAR; INTRAVENOUS 3 TIMES DAILY PRN
Status: DISCONTINUED | OUTPATIENT
Start: 2023-10-29 | End: 2023-10-31

## 2023-10-29 RX ORDER — MORPHINE SULFATE 2 MG/ML
2 INJECTION, SOLUTION INTRAMUSCULAR; INTRAVENOUS EVERY 2 HOUR PRN
Status: DISCONTINUED | OUTPATIENT
Start: 2023-10-29 | End: 2023-10-31

## 2023-10-29 RX ORDER — MORPHINE SULFATE 2 MG/ML
1 INJECTION, SOLUTION INTRAMUSCULAR; INTRAVENOUS EVERY 8 HOURS PRN
Status: DISCONTINUED | OUTPATIENT
Start: 2023-10-29 | End: 2023-10-31

## 2023-10-29 NOTE — PLAN OF CARE
Pt A/O x4, anxious throughout shift. Xanax & Ativan administered. Tolerating diet, ERAS protocol. Morphine & Oxy IR for pain; pt tearful throughout shift d/t Morphine being switched to q8. Educated pt &  on need of ambulation and IS use to help decrease pain; pt continues to be upset. JUAN drain to bulb suction. Ambulation encouraged. Surgical dressing C/D/I. Cerda in place & draining -- no complaints . IV abx continued. K+ replaced per protocol. Fall precautions in place. Call light within reach. Frequent rounding. Anticipate dc home with Cerda & JUAN drain when medically stable.      Problem: Patient Centered Care  Goal: Patient preferences are identified and integrated in the patient's plan of care  Description: Interventions:  - What would you like us to know as we care for you?   - Provide timely, complete, and accurate information to patient/family  - Incorporate patient and family knowledge, values, beliefs, and cultural backgrounds into the planning and delivery of care  - Encourage patient/family to participate in care and decision-making at the level they choose  - Honor patient and family perspectives and choices  Outcome: Progressing     Problem: Patient/Family Goals  Goal: Patient/Family Long Term Goal  Description: Patient's Long Term Goal:     Interventions:  -   - See additional Care Plan goals for specific interventions  Outcome: Progressing  Goal: Patient/Family Short Term Goal  Description: Patient's Short Term Goal:     Interventions:   -   - See additional Care Plan goals for specific interventions  Outcome: Progressing     Problem: RISK FOR INFECTION - ADULT  Goal: Absence of fever/infection during anticipated neutropenic period  Description: INTERVENTIONS  - Monitor WBC  - Administer growth factors as ordered  - Implement neutropenic guidelines  Outcome: Progressing     Problem: DISCHARGE PLANNING  Goal: Discharge to home or other facility with appropriate resources  Description: INTERVENTIONS:  - Identify barriers to discharge w/pt and caregiver  - Include patient/family/discharge partner in discharge planning  - Arrange for needed discharge resources and transportation as appropriate  - Identify discharge learning needs (meds, wound care, etc)  - Arrange for interpreters to assist at discharge as needed  - Consider post-discharge preferences of patient/family/discharge partner  - Complete POLST form as appropriate  - Assess patient's ability to be responsible for managing their own health  - Refer to Case Management Department for coordinating discharge planning if the patient needs post-hospital services based on physician/LIP order or complex needs related to functional status, cognitive ability or social support system  Outcome: Progressing     Problem: RESPIRATORY - ADULT  Goal: Achieves optimal ventilation and oxygenation  Description: INTERVENTIONS:  - Assess for changes in respiratory status  - Assess for changes in mentation and behavior  - Position to facilitate oxygenation and minimize respiratory effort  - Oxygen supplementation based on oxygen saturation or ABGs  - Provide Smoking Cessation handout, if applicable  - Encourage broncho-pulmonary hygiene including cough, deep breathe, Incentive Spirometry  - Assess the need for suctioning and perform as needed  - Assess and instruct to report SOB or any respiratory difficulty  - Respiratory Therapy support as indicated  - Manage/alleviate anxiety  - Monitor for signs/symptoms of CO2 retention  Outcome: Progressing     Problem: PAIN - ADULT  Goal: Verbalizes/displays adequate comfort level or patient's stated pain goal  Description: INTERVENTIONS:  - Encourage pt to monitor pain and request assistance  - Assess pain using appropriate pain scale  - Administer analgesics based on type and severity of pain and evaluate response  - Implement non-pharmacological measures as appropriate and evaluate response  - Consider cultural and social influences on pain and pain management  - Manage/alleviate anxiety  - Utilize distraction and/or relaxation techniques  - Monitor for opioid side effects  - Notify MD/LIP if interventions unsuccessful or patient reports new pain  - Anticipate increased pain with activity and pre-medicate as appropriate  Outcome: Progressing

## 2023-10-29 NOTE — PLAN OF CARE
Pt from home with . A&O x 4, but is anxious. Ativan given for anxiety. Pt complains of shoulder and abdominal pain- oxy and morphine given for pain control. Pt had a temp of 100.5 - tylenol given. Cerda in place and draining well. JUAN drain on left side to bulb suction. Pt ambulated in cramer with .  Frequent rounding done during shift   Problem: Patient Centered Care  Goal: Patient preferences are identified and integrated in the patient's plan of care  Description: Interventions:   - What would you like us to know as we care for you?   - Provide timely, complete, and accurate information to patient/family  - Incorporate patient and family knowledge, values, beliefs, and cultural backgrounds into the planning and delivery of care  - Encourage patient/family to participate in care and decision-making at the level they choose  - Honor patient and family perspectives and choices  Outcome: Progressing     Problem: Patient/Family Goals  Goal: Patient/Family Long Term Goal  Description: Patient's Long Term Goal:     Interventions:  -   - See additional Care Plan goals for specific interventions  Outcome: Progressing  Goal: Patient/Family Short Term Goal  Description: Patient's Short Term Goal:     Interventions:   -   - See additional Care Plan goals for specific interventions  Outcome: Progressing     Problem: RISK FOR INFECTION - ADULT  Goal: Absence of fever/infection during anticipated neutropenic period  Description: INTERVENTIONS  - Monitor WBC  - Administer growth factors as ordered  - Implement neutropenic guidelines  Outcome: Progressing     Problem: DISCHARGE PLANNING  Goal: Discharge to home or other facility with appropriate resources  Description: INTERVENTIONS:  - Identify barriers to discharge w/pt and caregiver  - Include patient/family/discharge partner in discharge planning  - Arrange for needed discharge resources and transportation as appropriate  - Identify discharge learning needs (meds, wound care, etc)  - Arrange for interpreters to assist at discharge as needed  - Consider post-discharge preferences of patient/family/discharge partner  - Complete POLST form as appropriate  - Assess patient's ability to be responsible for managing their own health  - Refer to Case Management Department for coordinating discharge planning if the patient needs post-hospital services based on physician/LIP order or complex needs related to functional status, cognitive ability or social support system  Outcome: Progressing     Problem: RESPIRATORY - ADULT  Goal: Achieves optimal ventilation and oxygenation  Description: INTERVENTIONS:  - Assess for changes in respiratory status  - Assess for changes in mentation and behavior  - Position to facilitate oxygenation and minimize respiratory effort  - Oxygen supplementation based on oxygen saturation or ABGs  - Provide Smoking Cessation handout, if applicable  - Encourage broncho-pulmonary hygiene including cough, deep breathe, Incentive Spirometry  - Assess the need for suctioning and perform as needed  - Assess and instruct to report SOB or any respiratory difficulty  - Respiratory Therapy support as indicated  - Manage/alleviate anxiety  - Monitor for signs/symptoms of CO2 retention  Outcome: Progressing     Problem: PAIN - ADULT  Goal: Verbalizes/displays adequate comfort level or patient's stated pain goal  Description: INTERVENTIONS:  - Encourage pt to monitor pain and request assistance  - Assess pain using appropriate pain scale  - Administer analgesics based on type and severity of pain and evaluate response  - Implement non-pharmacological measures as appropriate and evaluate response  - Consider cultural and social influences on pain and pain management  - Manage/alleviate anxiety  - Utilize distraction and/or relaxation techniques  - Monitor for opioid side effects  - Notify MD/LIP if interventions unsuccessful or patient reports new pain  - Anticipate increased pain with activity and pre-medicate as appropriate  Outcome: Progressing

## 2023-10-30 LAB
ALBUMIN SERPL-MCNC: 2.5 G/DL (ref 3.4–5)
ALBUMIN/GLOB SERPL: 0.7 {RATIO} (ref 1–2)
ALP LIVER SERPL-CCNC: 64 U/L
ALT SERPL-CCNC: 16 U/L
ANION GAP SERPL CALC-SCNC: 3 MMOL/L (ref 0–18)
AST SERPL-CCNC: 22 U/L (ref 15–37)
BASOPHILS # BLD AUTO: 0.02 X10(3) UL (ref 0–0.2)
BASOPHILS NFR BLD AUTO: 0.5 %
BILIRUB SERPL-MCNC: 0.7 MG/DL (ref 0.1–2)
BUN BLD-MCNC: 5 MG/DL (ref 7–18)
BUN/CREAT SERPL: 9.3 (ref 10–20)
CALCIUM BLD-MCNC: 8.4 MG/DL (ref 8.5–10.1)
CHLORIDE SERPL-SCNC: 111 MMOL/L (ref 98–112)
CO2 SERPL-SCNC: 30 MMOL/L (ref 21–32)
CREAT BLD-MCNC: 0.54 MG/DL
DEPRECATED RDW RBC AUTO: 47.6 FL (ref 35.1–46.3)
EGFRCR SERPLBLD CKD-EPI 2021: 125 ML/MIN/1.73M2 (ref 60–?)
EOSINOPHIL # BLD AUTO: 0.19 X10(3) UL (ref 0–0.7)
EOSINOPHIL NFR BLD AUTO: 4.8 %
ERYTHROCYTE [DISTWIDTH] IN BLOOD BY AUTOMATED COUNT: 14.5 % (ref 11–15)
GLOBULIN PLAS-MCNC: 3.7 G/DL (ref 2.8–4.4)
GLUCOSE BLD-MCNC: 96 MG/DL (ref 70–99)
HCT VFR BLD AUTO: 28.7 %
HGB BLD-MCNC: 9.7 G/DL
IMM GRANULOCYTES # BLD AUTO: 0.06 X10(3) UL (ref 0–1)
IMM GRANULOCYTES NFR BLD: 1.5 %
LYMPHOCYTES # BLD AUTO: 1.49 X10(3) UL (ref 1–4)
LYMPHOCYTES NFR BLD AUTO: 37.3 %
MCH RBC QN AUTO: 30.8 PG (ref 26–34)
MCHC RBC AUTO-ENTMCNC: 33.8 G/DL (ref 31–37)
MCV RBC AUTO: 91.1 FL
MONOCYTES # BLD AUTO: 0.22 X10(3) UL (ref 0.1–1)
MONOCYTES NFR BLD AUTO: 5.5 %
NEUTROPHILS # BLD AUTO: 2.02 X10 (3) UL (ref 1.5–7.7)
NEUTROPHILS # BLD AUTO: 2.02 X10(3) UL (ref 1.5–7.7)
NEUTROPHILS NFR BLD AUTO: 50.4 %
OSMOLALITY SERPL CALC.SUM OF ELEC: 295 MOSM/KG (ref 275–295)
PLATELET # BLD AUTO: 135 10(3)UL (ref 150–450)
POTASSIUM SERPL-SCNC: 3.9 MMOL/L (ref 3.5–5.1)
POTASSIUM SERPL-SCNC: 3.9 MMOL/L (ref 3.5–5.1)
PROT SERPL-MCNC: 6.2 G/DL (ref 6.4–8.2)
RBC # BLD AUTO: 3.15 X10(6)UL
SODIUM SERPL-SCNC: 144 MMOL/L (ref 136–145)
WBC # BLD AUTO: 4 X10(3) UL (ref 4–11)

## 2023-10-30 PROCEDURE — 99233 SBSQ HOSP IP/OBS HIGH 50: CPT | Performed by: HOSPITALIST

## 2023-10-30 RX ORDER — LIDOCAINE HYDROCHLORIDE 20 MG/ML
6 JELLY TOPICAL 3 TIMES DAILY
Status: DISCONTINUED | OUTPATIENT
Start: 2023-10-30 | End: 2023-10-31

## 2023-10-30 RX ORDER — PHENAZOPYRIDINE HYDROCHLORIDE 100 MG/1
100 TABLET, FILM COATED ORAL
Status: DISCONTINUED | OUTPATIENT
Start: 2023-10-30 | End: 2023-10-31

## 2023-10-30 RX ORDER — ZOLPIDEM TARTRATE 5 MG/1
10 TABLET ORAL NIGHTLY PRN
Status: DISCONTINUED | OUTPATIENT
Start: 2023-10-30 | End: 2023-10-31

## 2023-10-30 RX ORDER — LEVOFLOXACIN 750 MG/1
750 TABLET, FILM COATED ORAL
Status: DISCONTINUED | OUTPATIENT
Start: 2023-10-30 | End: 2023-10-31

## 2023-10-30 NOTE — TELEPHONE ENCOUNTER
Called Billing dept regarding below message, spoke with Raven Forbes. Pt's name/ verified.      Raven Forbes stated that he will reach out to the insurance team.

## 2023-10-30 NOTE — PHYSICAL THERAPY NOTE
Physical Therapy Contact Note    Orders received and chart reviewed. Attempted to see patient for Physical Therapy services. Patient not available secondary to patient recently ate, reporting indigestion and would like some time to rest, requesting therapy to return this PM.      10/30/23 1051   VISIT TYPE   PT Inpatient Visit Type (Documentation Required) Attempted Treatment  (refusal)     Will re-attempt pending patient availability/appropriateness as schedule allows, otherwise will re-schedule visit.     Debby Lagunas, PT, DPT  [id number]  Inpatient Rehabilitation  Physical Therapy  [phone]

## 2023-10-30 NOTE — PHYSICAL THERAPY NOTE
PHYSICAL THERAPY TREATMENT/DISCHARGE NOTE - INPATIENT     Room Number: 455/455-A       Presenting Problem:  (lap colon resection)       Problem List  Active Problems:    Diverticulosis    Perforation of sigmoid colon due to diverticulitis    Preop testing      PHYSICAL THERAPY ASSESSMENT     Patient received semi-fowlers in bed, agreeable to therapy session. Vital signs monitored as noted below, no adverse symptoms and patient stable during session. Patient has progressed to independent without an assistive device with all functional mobility skills. Pt educated on abdominal protective and pain management strategies as well as positioning in bed, verbalizing understanding. Patient verbalizes no further mobility concerns at this time. Physical Therapy to sign off at this time as patient has achieved all therapy goals at this level of care to D/C safely, please re-consult if patient experiences a decline in functional status. Discharge recommendation remains appropriate based on the patient's performance, personal factors, and remaining functional impairments. Goals adequately achieved at this level of care. Updated nurse on results of session, patient left semifowlers in bed with family present, all lines intact, all needs met with call light in reach. The patient's Approx Degree of Impairment: 20.91% has been calculated based on documentation in the [facility] '6 clicks' Inpatient Basic Mobility Short Form. Research supports that patients with this level of impairment may benefit from home with no skilled therapy needs anticipated. DISCHARGE RECOMMENDATIONS  PT Discharge Recommendations: Home     PLAN  PT Treatment Plan: Bed mobility; Patient education; Family education;Gait training;Transfer training  Frequency (Obs): Daily    SUBJECTIVE  \"I'm a side sleeper so I cannot sleep on my back. \"    OBJECTIVE  Precautions: Drain(s); Abdominal protective strategies    WEIGHT BEARING RESTRICTION  None    PAIN ASSESSMENT Ratin  Location: abdomen  Management Techniques: Body mechanics;Breathing techniques    BALANCE  Static Sitting: Normal  Dynamic Sitting: Normal  Static Standing: Normal  Dynamic Standing: Normal      AM-PAC '6-Clicks' INPATIENT SHORT FORM - BASIC MOBILITY  How much difficulty does the patient currently have. .. Patient Difficulty: Turning over in bed (including adjusting bedclothes, sheets and blankets)?: None   Patient Difficulty: Sitting down on and standing up from a chair with arms (e.g., wheelchair, bedside commode, etc.): None   Patient Difficulty: Moving from lying on back to sitting on the side of the bed?: None   How much help from another person does the patient currently need. .. Help from Another: Moving to and from a bed to a chair (including a wheelchair)?: None   Help from Another: Need to walk in hospital room?: A Little   Help from Another: Climbing 3-5 steps with a railing?: A Little     AM-PAC Score:  Raw Score: 22   Approx Degree of Impairment: 20.91%   Standardized Score (AM-PAC Scale): 53.28   CMS Modifier (G-Code): CJ    FUNCTIONAL ABILITY STATUS  Functional Mobility/Gait Assessment  Gait Assistance: Supervision; Independent  Distance (ft): 400'  Assistive Device: None; Other (Comment) (IV pole)  Pattern: Within Functional Limits        Patient End of Session: In bed;Needs met;Call light within reach;RN aware of session/findings; All patient questions and concerns addressed; Family present    CURRENT GOALS   Goals to be met by:   Patient Goal Patient's self-stated goal is: home   Goal #1 Patient is able to demonstrate supine - sit EOB @ level: supervision      Goal #1   Current Status  GOAL MET 10/28: Supervision    Goal #2 Patient is able to demonstrate transfers Sit to/from Stand at assistance level: supervision with walker - rolling      Goal #2  Current Status  GOAL MET 10/28: Supervision without device    Goal #3 Patient is able to ambulate 300 feet with assist device: walker - rolling at assistance level: supervision   Goal #3   Current Status  GOAL MET: supervision with HHA, 450 ft      Goal #4 Patient will negotiate 3 stairs/one curb w/ assistive device and supervision   Goal #4   Current Status  N/A   Goal #5 Patient to demonstrate independence with home activity/exercise instructions provided to patient in preparation for discharge.    Goal #5   Current Status  ADEQUATELY MET 10/30/23   Goal #6     Goal #6  Current Status         Therapeutic Activity: 10 minutes      Ramandeep Colindres, PT, DPT  [facility]  Inpatient Rehabilitation  Physical Therapy  [phone]

## 2023-10-30 NOTE — PLAN OF CARE
Problem: Patient Centered Care  Goal: Patient preferences are identified and integrated in the patient's plan of care  Description: Interventions:  - What would you like us to know as we care for you?   at bedside.  - Provide timely, complete, and accurate information to patient/family  - Incorporate patient and family knowledge, values, beliefs, and cultural backgrounds into the planning and delivery of care  - Encourage patient/family to participate in care and decision-making at the level they choose  - Honor patient and family perspectives and choices  Outcome: Progressing     Problem: Patient/Family Goals  Goal: Patient/Family Long Term Goal  Description: Patient's Long Term Goal: to go home    Interventions:  - Monitor vitals  - Monitor appropriate labs  - Pain management  - Follow MD order  - Diagnostics per order  - Update/Informing patient and family on plan of care  - Discharge planning  - See additional Care Plan goals for specific interventions  Outcome: Progressing  Goal: Patient/Family Short Term Goal  Description: Patient's Short Term Goal: to manage pain    Interventions:   - administer pain medications per MD order  - See additional Care Plan goals for specific interventions  Outcome: Progressing     Problem: RISK FOR INFECTION - ADULT  Goal: Absence of fever/infection during anticipated neutropenic period  Description: INTERVENTIONS  - Monitor WBC  - Administer growth factors as ordered  - Implement neutropenic guidelines  Outcome: Progressing     Problem: DISCHARGE PLANNING  Goal: Discharge to home or other facility with appropriate resources  Description: INTERVENTIONS:  - Identify barriers to discharge w/pt and caregiver  - Include patient/family/discharge partner in discharge planning  - Arrange for needed discharge resources and transportation as appropriate  - Identify discharge learning needs (meds, wound care, etc)  - Arrange for interpreters to assist at discharge as needed  - Consider post-discharge preferences of patient/family/discharge partner  - Complete POLST form as appropriate  - Assess patient's ability to be responsible for managing their own health  - Refer to Case Management Department for coordinating discharge planning if the patient needs post-hospital services based on physician/LIP order or complex needs related to functional status, cognitive ability or social support system  Outcome: Progressing     Problem: RESPIRATORY - ADULT  Goal: Achieves optimal ventilation and oxygenation  Description: INTERVENTIONS:  - Assess for changes in respiratory status  - Assess for changes in mentation and behavior  - Position to facilitate oxygenation and minimize respiratory effort  - Oxygen supplementation based on oxygen saturation or ABGs  - Provide Smoking Cessation handout, if applicable  - Encourage broncho-pulmonary hygiene including cough, deep breathe, Incentive Spirometry  - Assess the need for suctioning and perform as needed  - Assess and instruct to report SOB or any respiratory difficulty  - Respiratory Therapy support as indicated  - Manage/alleviate anxiety  - Monitor for signs/symptoms of CO2 retention  Outcome: Progressing     Problem: PAIN - ADULT  Goal: Verbalizes/displays adequate comfort level or patient's stated pain goal  Description: INTERVENTIONS:  - Encourage pt to monitor pain and request assistance  - Assess pain using appropriate pain scale  - Administer analgesics based on type and severity of pain and evaluate response  - Implement non-pharmacological measures as appropriate and evaluate response  - Consider cultural and social influences on pain and pain management  - Manage/alleviate anxiety  - Utilize distraction and/or relaxation techniques  - Monitor for opioid side effects  - Notify MD/LIP if interventions unsuccessful or patient reports new pain  - Anticipate increased pain with activity and pre-medicate as appropriate  Outcome: Progressing Patient Aox4. POD #4. RA. PICC line in place. Fritz in place to be removed in 10 days. Patiet felt nauseous and zofran was given. Lidocaine perineal area to decrease discomfort of fritz. Oxycodone, tylenol, and morphine given for pain. Standby assist. Low fiber soft diet tolerated. Plan to continue anabiotic treatment.

## 2023-10-30 NOTE — PLAN OF CARE
Patient is alert and oriented. Room air. Vital signs stable. Cerda in place and draining. JUAN to bulb suction. Surgical incisions clean/dry/intact. Prn oxy and scheduled tylenol given for pain. Xanax given prn for anxiety. Up with SB and a walker. Ambulated in halls with spouse. Zofran and reglan given prn for nausea. IV abx and antifungal continued through L PICC line. Dressing changed. Call light and personal belongings within reach. Safety precautions in place.  at bedside.      Problem: Patient Centered Care  Goal: Patient preferences are identified and integrated in the patient's plan of care  Description: Interventions:  - Provide timely, complete, and accurate information to patient/family  - Incorporate patient and family knowledge, values, beliefs, and cultural backgrounds into the planning and delivery of care  - Encourage patient/family to participate in care and decision-making at the level they choose  - Honor patient and family perspectives and choices  Outcome: Progressing     Problem: RISK FOR INFECTION - ADULT  Goal: Absence of fever/infection during anticipated neutropenic period  Description: INTERVENTIONS  - Monitor WBC  - Administer growth factors as ordered  - Implement neutropenic guidelines  Outcome: Progressing     Problem: DISCHARGE PLANNING  Goal: Discharge to home or other facility with appropriate resources  Description: INTERVENTIONS:  - Identify barriers to discharge w/pt and caregiver  - Include patient/family/discharge partner in discharge planning  - Arrange for needed discharge resources and transportation as appropriate  - Identify discharge learning needs (meds, wound care, etc)  - Arrange for interpreters to assist at discharge as needed  - Consider post-discharge preferences of patient/family/discharge partner  - Complete POLST form as appropriate  - Assess patient's ability to be responsible for managing their own health  - Refer to Case Management Department for coordinating discharge planning if the patient needs post-hospital services based on physician/LIP order or complex needs related to functional status, cognitive ability or social support system  Outcome: Progressing     Problem: RESPIRATORY - ADULT  Goal: Achieves optimal ventilation and oxygenation  Description: INTERVENTIONS:  - Assess for changes in respiratory status  - Assess for changes in mentation and behavior  - Position to facilitate oxygenation and minimize respiratory effort  - Oxygen supplementation based on oxygen saturation or ABGs  - Provide Smoking Cessation handout, if applicable  - Encourage broncho-pulmonary hygiene including cough, deep breathe, Incentive Spirometry  - Assess the need for suctioning and perform as needed  - Assess and instruct to report SOB or any respiratory difficulty  - Respiratory Therapy support as indicated  - Manage/alleviate anxiety  - Monitor for signs/symptoms of CO2 retention  Outcome: Progressing     Problem: PAIN - ADULT  Goal: Verbalizes/displays adequate comfort level or patient's stated pain goal  Description: INTERVENTIONS:  - Encourage pt to monitor pain and request assistance  - Assess pain using appropriate pain scale  - Administer analgesics based on type and severity of pain and evaluate response  - Implement non-pharmacological measures as appropriate and evaluate response  - Consider cultural and social influences on pain and pain management  - Manage/alleviate anxiety  - Utilize distraction and/or relaxation techniques  - Monitor for opioid side effects  - Notify MD/LIP if interventions unsuccessful or patient reports new pain  - Anticipate increased pain with activity and pre-medicate as appropriate  Outcome: Progressing

## 2023-10-31 VITALS
HEIGHT: 64 IN | SYSTOLIC BLOOD PRESSURE: 114 MMHG | DIASTOLIC BLOOD PRESSURE: 72 MMHG | TEMPERATURE: 97 F | RESPIRATION RATE: 16 BRPM | OXYGEN SATURATION: 92 % | WEIGHT: 274.06 LBS | HEART RATE: 74 BPM | BODY MASS INDEX: 46.79 KG/M2

## 2023-10-31 LAB
ALBUMIN SERPL-MCNC: 2.5 G/DL (ref 3.4–5)
ANION GAP SERPL CALC-SCNC: 3 MMOL/L (ref 0–18)
BASOPHILS # BLD AUTO: 0.02 X10(3) UL (ref 0–0.2)
BASOPHILS NFR BLD AUTO: 0.5 %
BUN BLD-MCNC: 4 MG/DL (ref 7–18)
BUN/CREAT SERPL: 7.7 (ref 10–20)
CALCIUM BLD-MCNC: 8.8 MG/DL (ref 8.5–10.1)
CHLORIDE SERPL-SCNC: 110 MMOL/L (ref 98–112)
CO2 SERPL-SCNC: 30 MMOL/L (ref 21–32)
CREAT BLD-MCNC: 0.52 MG/DL
DEPRECATED RDW RBC AUTO: 47.4 FL (ref 35.1–46.3)
EGFRCR SERPLBLD CKD-EPI 2021: 126 ML/MIN/1.73M2 (ref 60–?)
EOSINOPHIL # BLD AUTO: 0.2 X10(3) UL (ref 0–0.7)
EOSINOPHIL NFR BLD AUTO: 5.1 %
ERYTHROCYTE [DISTWIDTH] IN BLOOD BY AUTOMATED COUNT: 14.5 % (ref 11–15)
GLUCOSE BLD-MCNC: 100 MG/DL (ref 70–99)
HCT VFR BLD AUTO: 27.4 %
HGB BLD-MCNC: 9.3 G/DL
IMM GRANULOCYTES # BLD AUTO: 0.08 X10(3) UL (ref 0–1)
IMM GRANULOCYTES NFR BLD: 2.1 %
LYMPHOCYTES # BLD AUTO: 1.55 X10(3) UL (ref 1–4)
LYMPHOCYTES NFR BLD AUTO: 39.8 %
MCH RBC QN AUTO: 30.7 PG (ref 26–34)
MCHC RBC AUTO-ENTMCNC: 33.9 G/DL (ref 31–37)
MCV RBC AUTO: 90.4 FL
MONOCYTES # BLD AUTO: 0.26 X10(3) UL (ref 0.1–1)
MONOCYTES NFR BLD AUTO: 6.7 %
NEUTROPHILS # BLD AUTO: 1.78 X10 (3) UL (ref 1.5–7.7)
NEUTROPHILS # BLD AUTO: 1.78 X10(3) UL (ref 1.5–7.7)
NEUTROPHILS NFR BLD AUTO: 45.8 %
OSMOLALITY SERPL CALC.SUM OF ELEC: 293 MOSM/KG (ref 275–295)
PHOSPHATE SERPL-MCNC: 4.7 MG/DL (ref 2.5–4.9)
PLATELET # BLD AUTO: 132 10(3)UL (ref 150–450)
POTASSIUM SERPL-SCNC: 3.9 MMOL/L (ref 3.5–5.1)
RBC # BLD AUTO: 3.03 X10(6)UL
SODIUM SERPL-SCNC: 143 MMOL/L (ref 136–145)
WBC # BLD AUTO: 3.9 X10(3) UL (ref 4–11)

## 2023-10-31 PROCEDURE — 99214 OFFICE O/P EST MOD 30 MIN: CPT | Performed by: HOSPITALIST

## 2023-10-31 RX ORDER — ALBUTEROL SULFATE 90 UG/1
2 AEROSOL, METERED RESPIRATORY (INHALATION) EVERY 4 HOURS PRN
Qty: 1 EACH | Refills: 0 | Status: SHIPPED | OUTPATIENT
Start: 2023-10-31 | End: 2023-11-30

## 2023-10-31 RX ORDER — NALOXONE HYDROCHLORIDE 4 MG/.1ML
4 SPRAY NASAL AS NEEDED
Qty: 1 KIT | Refills: 0 | Status: SHIPPED | OUTPATIENT
Start: 2023-10-31

## 2023-10-31 RX ORDER — OXYCODONE HYDROCHLORIDE 10 MG/1
10 TABLET ORAL EVERY 4 HOURS PRN
Qty: 30 TABLET | Refills: 0 | Status: SHIPPED | OUTPATIENT
Start: 2023-10-31

## 2023-10-31 RX ORDER — LIDOCAINE HYDROCHLORIDE 20 MG/ML
6 JELLY TOPICAL 3 TIMES DAILY
Qty: 1 EACH | Refills: 0 | Status: SHIPPED | OUTPATIENT
Start: 2023-10-31

## 2023-10-31 RX ORDER — LEVOFLOXACIN 750 MG/1
750 TABLET, FILM COATED ORAL DAILY
Qty: 7 TABLET | Refills: 0 | Status: SHIPPED | OUTPATIENT
Start: 2023-10-31 | End: 2023-11-07

## 2023-10-31 RX ORDER — PHENAZOPYRIDINE HYDROCHLORIDE 100 MG/1
100 TABLET, FILM COATED ORAL
Qty: 6 TABLET | Refills: 0 | Status: SHIPPED | OUTPATIENT
Start: 2023-10-31 | End: 2023-11-02

## 2023-10-31 NOTE — PLAN OF CARE
Problem: Patient Centered Care  Goal: Patient preferences are identified and integrated in the patient's plan of care  Description: Interventions:  - What would you like us to know as we care for you? ***  - Provide timely, complete, and accurate information to patient/family  - Incorporate patient and family knowledge, values, beliefs, and cultural backgrounds into the planning and delivery of care  - Encourage patient/family to participate in care and decision-making at the level they choose  - Honor patient and family perspectives and choices  Outcome: Adequate for Discharge     Problem: Patient/Family Goals  Goal: Patient/Family Long Term Goal  Description: Patient's Long Term Goal: to go home    Interventions:  - Monitor vitals  - Monitor appropriate labs  - Pain management  - Follow MD order  - Diagnostics per order  - Update/Informing patient and family on plan of care  - Discharge planning  - See additional Care Plan goals for specific interventions  Outcome: Adequate for Discharge  Goal: Patient/Family Short Term Goal  Description: Patient's Short Term Goal: to manage pain    Interventions:   - administer pain medications per MD order  - See additional Care Plan goals for specific interventions  Outcome: Adequate for Discharge     Problem: RISK FOR INFECTION - ADULT  Goal: Absence of fever/infection during anticipated neutropenic period  Description: INTERVENTIONS  - Monitor WBC  - Administer growth factors as ordered  - Implement neutropenic guidelines  Outcome: Adequate for Discharge     Problem: DISCHARGE PLANNING  Goal: Discharge to home or other facility with appropriate resources  Description: INTERVENTIONS:  - Identify barriers to discharge w/pt and caregiver  - Include patient/family/discharge partner in discharge planning  - Arrange for needed discharge resources and transportation as appropriate  - Identify discharge learning needs (meds, wound care, etc)  - Arrange for interpreters to assist at discharge as needed  - Consider post-discharge preferences of patient/family/discharge partner  - Complete POLST form as appropriate  - Assess patient's ability to be responsible for managing their own health  - Refer to Case Management Department for coordinating discharge planning if the patient needs post-hospital services based on physician/LIP order or complex needs related to functional status, cognitive ability or social support system  Outcome: Adequate for Discharge     Problem: RESPIRATORY - ADULT  Goal: Achieves optimal ventilation and oxygenation  Description: INTERVENTIONS:  - Assess for changes in respiratory status  - Assess for changes in mentation and behavior  - Position to facilitate oxygenation and minimize respiratory effort  - Oxygen supplementation based on oxygen saturation or ABGs  - Provide Smoking Cessation handout, if applicable  - Encourage broncho-pulmonary hygiene including cough, deep breathe, Incentive Spirometry  - Assess the need for suctioning and perform as needed  - Assess and instruct to report SOB or any respiratory difficulty  - Respiratory Therapy support as indicated  - Manage/alleviate anxiety  - Monitor for signs/symptoms of CO2 retention  Outcome: Adequate for Discharge     Problem: PAIN - ADULT  Goal: Verbalizes/displays adequate comfort level or patient's stated pain goal  Description: INTERVENTIONS:  - Encourage pt to monitor pain and request assistance  - Assess pain using appropriate pain scale  - Administer analgesics based on type and severity of pain and evaluate response  - Implement non-pharmacological measures as appropriate and evaluate response  - Consider cultural and social influences on pain and pain management  - Manage/alleviate anxiety  - Utilize distraction and/or relaxation techniques  - Monitor for opioid side effects  - Notify MD/LIP if interventions unsuccessful or patient reports new pain  - Anticipate increased pain with activity and pre-medicate as appropriate  Outcome: Adequate for Discharge  Per surgery patient will keep PICC line in place for 1 week. Patient going home with fritz and JUAN drain which will be removed at follow up appointment.

## 2023-10-31 NOTE — PLAN OF CARE
A&Ox4. Monitoring vital signs- stable at this time. No acute changes noted throughout shift. Meropenem and micafungin infusing via PICC- intact. JUAN drain LLQ to bulb suction. Tolerating low fiber/soft diet. Voiding via fritz. Oxycodone provided as needed for pain. Xanax and ativan provided as needed for anxiety. Zofran provided for nausea. Frequent rounding by nursing staff. Plan for discharge today (10/31) pending medical clearance.     Problem: Patient Centered Care  Goal: Patient preferences are identified and integrated in the patient's plan of care  Description: Interventions:  - What would you like us to know as we care for you?   - Provide timely, complete, and accurate information to patient/family  - Incorporate patient and family knowledge, values, beliefs, and cultural backgrounds into the planning and delivery of care  - Encourage patient/family to participate in care and decision-making at the level they choose  - Honor patient and family perspectives and choices  10/31/2023 0158 by Mathew Vasquez RN  Outcome: Progressing  10/31/2023 0158 by Mathew Vasquez RN  Outcome: Progressing     Problem: Patient/Family Goals  Goal: Patient/Family Long Term Goal  Description: Patient's Long Term Goal: to go home    Interventions:  - Monitor vitals  - Monitor appropriate labs  - Pain management  - Follow MD order  - Diagnostics per order  - Update/Informing patient and family on plan of care  - Discharge planning  - See additional Care Plan goals for specific interventions  10/31/2023 0158 by Mathew Vasquez RN  Outcome: Progressing  10/31/2023 0158 by Mathew Vasquez RN  Outcome: Progressing  Goal: Patient/Family Short Term Goal  Description: Patient's Short Term Goal: to manage pain    Interventions:   - administer pain medications per MD order  - See additional Care Plan goals for specific interventions  10/31/2023 0158 by Mathew Vasquez RN  Outcome: Progressing  10/31/2023 0158 by Mathew Vasquez RN  Outcome: Progressing     Problem: RISK FOR INFECTION - ADULT  Goal: Absence of fever/infection during anticipated neutropenic period  Description: INTERVENTIONS  - Monitor WBC  - Administer growth factors as ordered  - Implement neutropenic guidelines  10/31/2023 0158 by Sarita Garza RN  Outcome: Progressing  10/31/2023 0158 by Sarita Garza RN  Outcome: Progressing     Problem: DISCHARGE PLANNING  Goal: Discharge to home or other facility with appropriate resources  Description: INTERVENTIONS:  - Identify barriers to discharge w/pt and caregiver  - Include patient/family/discharge partner in discharge planning  - Arrange for needed discharge resources and transportation as appropriate  - Identify discharge learning needs (meds, wound care, etc)  - Arrange for interpreters to assist at discharge as needed  - Consider post-discharge preferences of patient/family/discharge partner  - Complete POLST form as appropriate  - Assess patient's ability to be responsible for managing their own health  - Refer to Case Management Department for coordinating discharge planning if the patient needs post-hospital services based on physician/LIP order or complex needs related to functional status, cognitive ability or social support system  10/31/2023 0158 by Sarita Garza RN  Outcome: Progressing  10/31/2023 0158 by Sarita Garza RN  Outcome: Progressing     Problem: RESPIRATORY - ADULT  Goal: Achieves optimal ventilation and oxygenation  Description: INTERVENTIONS:  - Assess for changes in respiratory status  - Assess for changes in mentation and behavior  - Position to facilitate oxygenation and minimize respiratory effort  - Oxygen supplementation based on oxygen saturation or ABGs  - Provide Smoking Cessation handout, if applicable  - Encourage broncho-pulmonary hygiene including cough, deep breathe, Incentive Spirometry  - Assess the need for suctioning and perform as needed  - Assess and instruct to report SOB or any respiratory difficulty  - Respiratory Therapy support as indicated  - Manage/alleviate anxiety  - Monitor for signs/symptoms of CO2 retention  10/31/2023 0158 by Suzanne Gallo RN  Outcome: Progressing  10/31/2023 0158 by Suzanne Gallo RN  Outcome: Progressing     Problem: PAIN - ADULT  Goal: Verbalizes/displays adequate comfort level or patient's stated pain goal  Description: INTERVENTIONS:  - Encourage pt to monitor pain and request assistance  - Assess pain using appropriate pain scale  - Administer analgesics based on type and severity of pain and evaluate response  - Implement non-pharmacological measures as appropriate and evaluate response  - Consider cultural and social influences on pain and pain management  - Manage/alleviate anxiety  - Utilize distraction and/or relaxation techniques  - Monitor for opioid side effects  - Notify MD/LIP if interventions unsuccessful or patient reports new pain  - Anticipate increased pain with activity and pre-medicate as appropriate  10/31/2023 0158 by Suzanne Gallo RN  Outcome: Progressing  10/31/2023 0158 by Suzanne Gallo RN  Outcome: Progressing

## 2023-11-01 ENCOUNTER — TELEPHONE (OUTPATIENT)
Dept: INTERNAL MEDICINE UNIT | Facility: HOSPITAL | Age: 33
End: 2023-11-01

## 2023-11-01 NOTE — TELEPHONE ENCOUNTER
VM received from patient stating Lidocaine 2%order was not received by [facility] pharmacy. Chart review done and verified escript was sent by Dr Shanda Jose and receipt confirmed by the pharmacy. Call made to [facility] in [place]. Pharmacist states the reason script was not active with them is because they do not carry lidocaine 2% prefilled syringes. Recommended ordering from "[facility] in [place] [phone]. Call made to [facility] pharmacy, confirmed they do carry this medication although it is not in stock. They can have delivered by tomorrow but first would need pt to come in and pay. Also states because they are a specialty pharmacy they do not take insurance. States cost for the pt for ten pens would be approximately $100. Discussed w Jairo Abreu who is agreeable to eprescribing  to the specialty pharmacy if the pt is agreeable to plan. Call made to pt X2 no answer VM left. Awaiting call back. Addendum: call back received from patient whom requested we hold off from ordering the lidocaine syringes from the specialty pharmacy. States she will try and over the counter Marialuisa vaginal spray she was recommended by one of the nurses. Pt to call back tomorrow if she would like the lidocaine syringes ordered.

## 2023-11-08 ENCOUNTER — HOSPITAL ENCOUNTER (OUTPATIENT)
Dept: GENERAL RADIOLOGY | Facility: HOSPITAL | Age: 33
Discharge: HOME | End: 2023-11-08
Attending: COLON & RECTAL SURGERY
Payer: COMMERCIAL

## 2023-11-08 DIAGNOSIS — K57.80 DIVERTICULAR DISEASE OF INTESTINE WITH PERFORATION AND ABSCESS: ICD-10-CM

## 2023-11-08 PROCEDURE — 74430 CONTRAST X-RAY BLADDER: CPT | Performed by: COLON & RECTAL SURGERY

## 2023-11-08 PROCEDURE — 51600 INJECTION FOR BLADDER X-RAY: CPT | Performed by: COLON & RECTAL SURGERY

## 2023-11-16 ENCOUNTER — TELEPHONE (OUTPATIENT)
Dept: INTERVENTIONAL RADIOLOGY/VASCULAR | Facility: HOSPITAL | Age: 33
End: 2023-11-16

## 2023-11-17 ENCOUNTER — OFFICE VISIT (OUTPATIENT)
Dept: INTERVENTIONAL RADIOLOGY/VASCULAR | Facility: HOSPITAL | Age: 33
End: 2023-11-17
Attending: COLON & RECTAL SURGERY
Payer: COMMERCIAL

## 2023-11-17 DIAGNOSIS — K57.20 DIVERTICULITIS OF LARGE INTESTINE WITH ABSCESS WITHOUT BLEEDING: ICD-10-CM

## 2023-11-17 NOTE — PROGRESS NOTES
LUE PICC line removed successfully in it's entirety and pressure held at the site. No bleeding.   Gauze dressing applied, covered by Tegaderm

## 2023-11-20 ENCOUNTER — HOSPITAL ENCOUNTER (EMERGENCY)
Facility: HOSPITAL | Age: 33
Discharge: HOME | End: 2023-11-20
Attending: EMERGENCY MEDICINE
Payer: COMMERCIAL

## 2023-11-20 ENCOUNTER — APPOINTMENT (OUTPATIENT)
Dept: CT IMAGING | Facility: HOSPITAL | Age: 33
End: 2023-11-20
Attending: EMERGENCY MEDICINE
Payer: COMMERCIAL

## 2023-11-20 VITALS
HEIGHT: 64 IN | WEIGHT: 265 LBS | RESPIRATION RATE: 18 BRPM | TEMPERATURE: 98 F | SYSTOLIC BLOOD PRESSURE: 124 MMHG | HEART RATE: 84 BPM | OXYGEN SATURATION: 95 % | BODY MASS INDEX: 45.24 KG/M2 | DIASTOLIC BLOOD PRESSURE: 81 MMHG

## 2023-11-20 DIAGNOSIS — A04.72 CLOSTRIDIUM DIFFICILE COLITIS: Primary | ICD-10-CM

## 2023-11-20 LAB
ALBUMIN SERPL-MCNC: 4.5 G/DL (ref 3.2–4.8)
ALBUMIN/GLOB SERPL: 1.3 {RATIO} (ref 1–2)
ALP LIVER SERPL-CCNC: 67 U/L
ALT SERPL-CCNC: 10 U/L
ANION GAP SERPL CALC-SCNC: 8 MMOL/L (ref 0–18)
AST SERPL-CCNC: 20 U/L (ref ?–34)
BASOPHILS # BLD AUTO: 0.02 X10(3) UL (ref 0–0.2)
BASOPHILS NFR BLD AUTO: 0.2 %
BILIRUB SERPL-MCNC: 1.1 MG/DL (ref 0.3–1.2)
BILIRUB UR QL: NEGATIVE
BUN BLD-MCNC: <5 MG/DL (ref 9–23)
C DIFF GDH + TOXINS A+B STL QL IA.RAPID: DETECTED
C DIFF TOX B STL QL: POSITIVE
CALCIUM BLD-MCNC: 9.8 MG/DL (ref 8.7–10.4)
CHLORIDE SERPL-SCNC: 108 MMOL/L (ref 98–112)
CO2 SERPL-SCNC: 23 MMOL/L (ref 21–32)
COLOR UR: YELLOW
CREAT BLD-MCNC: 0.69 MG/DL
DEPRECATED RDW RBC AUTO: 47 FL (ref 35.1–46.3)
EGFRCR SERPLBLD CKD-EPI 2021: 117 ML/MIN/1.73M2 (ref 60–?)
EOSINOPHIL # BLD AUTO: 0.22 X10(3) UL (ref 0–0.7)
EOSINOPHIL NFR BLD AUTO: 2.7 %
ERYTHROCYTE [DISTWIDTH] IN BLOOD BY AUTOMATED COUNT: 14.2 % (ref 11–15)
GLOBULIN PLAS-MCNC: 3.6 G/DL (ref 2.8–4.4)
GLUCOSE BLD-MCNC: 95 MG/DL (ref 70–99)
GLUCOSE UR-MCNC: NORMAL MG/DL
HCT VFR BLD AUTO: 39.6 %
HGB BLD-MCNC: 13.8 G/DL
IMM GRANULOCYTES # BLD AUTO: 0.03 X10(3) UL (ref 0–1)
IMM GRANULOCYTES NFR BLD: 0.4 %
KETONES UR-MCNC: NEGATIVE MG/DL
LEUKOCYTE ESTERASE UR QL STRIP.AUTO: 500
LYMPHOCYTES # BLD AUTO: 1.52 X10(3) UL (ref 1–4)
LYMPHOCYTES NFR BLD AUTO: 18.4 %
MCH RBC QN AUTO: 31.5 PG (ref 26–34)
MCHC RBC AUTO-ENTMCNC: 34.8 G/DL (ref 31–37)
MCV RBC AUTO: 90.4 FL
MONOCYTES # BLD AUTO: 0.55 X10(3) UL (ref 0.1–1)
MONOCYTES NFR BLD AUTO: 6.7 %
NEUTROPHILS # BLD AUTO: 5.92 X10 (3) UL (ref 1.5–7.7)
NEUTROPHILS # BLD AUTO: 5.92 X10(3) UL (ref 1.5–7.7)
NEUTROPHILS NFR BLD AUTO: 71.6 %
NITRITE UR QL STRIP.AUTO: NEGATIVE
PH UR: 5.5 [PH] (ref 5–8)
PLATELET # BLD AUTO: 146 10(3)UL (ref 150–450)
POTASSIUM SERPL-SCNC: 3.8 MMOL/L (ref 3.5–5.1)
PROT SERPL-MCNC: 8.1 G/DL (ref 5.7–8.2)
RBC # BLD AUTO: 4.38 X10(6)UL
SODIUM SERPL-SCNC: 139 MMOL/L (ref 136–145)
SP GR UR STRIP: 1.02 (ref 1–1.03)
UROBILINOGEN UR STRIP-ACNC: NORMAL
WBC # BLD AUTO: 8.3 X10(3) UL (ref 4–11)
YEAST UR QL: PRESENT /HPF

## 2023-11-20 PROCEDURE — 81001 URINALYSIS AUTO W/SCOPE: CPT | Performed by: EMERGENCY MEDICINE

## 2023-11-20 PROCEDURE — 99284 EMERGENCY DEPT VISIT MOD MDM: CPT

## 2023-11-20 PROCEDURE — 87077 CULTURE AEROBIC IDENTIFY: CPT | Performed by: EMERGENCY MEDICINE

## 2023-11-20 PROCEDURE — 85025 COMPLETE CBC W/AUTO DIFF WBC: CPT | Performed by: EMERGENCY MEDICINE

## 2023-11-20 PROCEDURE — 96374 THER/PROPH/DIAG INJ IV PUSH: CPT

## 2023-11-20 PROCEDURE — 87186 SC STD MICRODIL/AGAR DIL: CPT | Performed by: EMERGENCY MEDICINE

## 2023-11-20 PROCEDURE — 87493 C DIFF AMPLIFIED PROBE: CPT | Performed by: EMERGENCY MEDICINE

## 2023-11-20 PROCEDURE — 87086 URINE CULTURE/COLONY COUNT: CPT | Performed by: EMERGENCY MEDICINE

## 2023-11-20 PROCEDURE — 74177 CT ABD & PELVIS W/CONTRAST: CPT | Performed by: EMERGENCY MEDICINE

## 2023-11-20 PROCEDURE — 80053 COMPREHEN METABOLIC PANEL: CPT | Performed by: EMERGENCY MEDICINE

## 2023-11-20 PROCEDURE — 99285 EMERGENCY DEPT VISIT HI MDM: CPT

## 2023-11-20 RX ORDER — LORAZEPAM 2 MG/ML
1 INJECTION INTRAMUSCULAR ONCE
Status: COMPLETED | OUTPATIENT
Start: 2023-11-20 | End: 2023-11-20

## 2023-11-20 RX ORDER — VANCOMYCIN HYDROCHLORIDE 125 MG/1
125 CAPSULE ORAL ONCE
Status: COMPLETED | OUTPATIENT
Start: 2023-11-20 | End: 2023-11-20

## 2023-11-20 RX ORDER — VANCOMYCIN HYDROCHLORIDE 125 MG/1
125 CAPSULE ORAL 4 TIMES DAILY
Qty: 40 CAPSULE | Refills: 0 | Status: SHIPPED | OUTPATIENT
Start: 2023-11-20 | End: 2023-11-30

## 2023-11-20 NOTE — ED INITIAL ASSESSMENT (HPI)
Pt 3 weeks post-op from sigmoid colon removal. States in the last week, increased suprapubic pain over the past week with diarrhea. Pt denies fever, redness/drainage from incision site. Was on IV abx for months prior to surgery. Surgeon concerned about possible c diff. Pt took 3 xanax prior to arrival because of severe panic attacks r/t medical visits.

## 2023-11-21 NOTE — DISCHARGE INSTRUCTIONS
Take antibiotics as prescribed. Follow-up with your surgeon and primary physician. Return to the emergency department if increasing pain, worsening diarrhea, or other new symptoms develop.

## 2023-11-27 ENCOUNTER — TELEPHONE (OUTPATIENT)
Facility: CLINIC | Age: 33
End: 2023-11-27

## 2023-11-27 NOTE — TELEPHONE ENCOUNTER
Pt states that she was diagnosed with C Dif and also has UTI. Pt would like to speak to RN regarding stomach issue and recent colon surgery. Please call.

## 2023-11-27 NOTE — TELEPHONE ENCOUNTER
Spoke with patient and advised her to come in for a follow up appointment    Date, time and location confirmed with patient    Your Appointments      Friday December 15, 2023 10:30 AM  Follow Up Visit with Robyn Yan PA-C  [address], [address] [facility],[address], [facility] ([facility]) [address]  [phone]

## 2023-12-10 ENCOUNTER — TELEPHONE (OUTPATIENT)
Dept: GASTROENTEROLOGY | Facility: CLINIC | Age: 33
End: 2023-12-10

## 2023-12-10 RX ORDER — VANCOMYCIN HYDROCHLORIDE 125 MG/1
125 CAPSULE ORAL 4 TIMES DAILY
Qty: 120 CAPSULE | Refills: 1 | Status: SHIPPED | OUTPATIENT
Start: 2023-12-10 | End: 2024-01-09

## 2023-12-10 NOTE — TELEPHONE ENCOUNTER
Ashlyn Smith and [place], [place]. .. Ms. 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 She had been on antibiotics x 4.5 months including Meropenem ? after colorectal surgery. Several procedure report copied below. Recalls being on vancomycin prophylaxis on the broad-spectrum antibiotics. Prescribed vancomycin QID dosing for the C. difficile 3 weeks ago. Off the recent Vanco 10 day treatment for approx 1 week. Got sick last night with low grade temp 100.5; diarrhea every hour all night last night. She believes that the C. difficile odor is back. Plan:    We discussed high suspicion for recurrent C. difficile. I recommended resuming vancomycin immediately. If possible, we could prescribe a round of fidaxomicin. Prescriptions sent in for vancomycin and fidaxomicin. If fidaxomicin is blocked, start the vancomycin today. Aware of and following cleaning practices with bleach based , careful hand hygiene. Store brand of Align probiotic made her sick. Could try Florastor probiotic. Regardless of what is covered, I would recommend at least 6 weeks of therapy, ideally 10 days of fidaxomicin then 4-6 weeks of vancomycin taper. Concept of C. difficile spores and recurrence discussed in detail today. Very high risk for future recurrences next year.     Follow up appt scheduled with Le Simpson this week.      ======    Operative Note     Patient Name: Cari Ernandez     Date of Surgery: 10/26/23      Preoperative Diagnosis: Diverticular disease, perforation and abcess of the intestine     Postoperative Diagnosis: same     Primary Surgeon: Eron Rangel MD     Assistant: Bernie ESPINOZA     Procedures:   Cystoscopy and bilateral ureteral stents (Dr. Kevin Malik),  Laparoscopic sigmoid colectomy, takedown colovesical fistula, drain pelvic abscess, colorectal anastomosis, mobilization of splenic flexure, in vivo fluorescence angiography     Surgical Findings:   Retrocystic abscess cavity, cultured and drained  Small bowel and cecum, omentum, sigmoid colon, bladder, uterus and left adnexa all fused together        Anesthesia: General     Complications: none     Implants: none     Specimen:   1) retrocystic abscess culture  2) sigmoid colon     Drains: 13 Fr Geovanny in pelvis     Condition: good     Estimated Blood Loss: 50 mL        DESCRIPTION OF PROCEDURE     INDICATION   The patient is a 35year old year old female with diverticulitis and fistula (colocutaneous and colovesical). The procedure, indications, risks, benefits, and alternatives were discussed with the patient prior to the procedure. All questions were answered, and the patient wished to proceed. TECHNIQUE  The patient was taken to the operating room and placed supine on the operating table. General anesthesia was induced and he was then repositioned in modified lithotomy using [address] with appropriate attention to all joints and pressure points. The abdomen was prepared with Chloraprep and draped in the usual sterile fashion. Timeout was called to reconfirm the patient's identity, diagnosis, planned procedure, and completeness of preoperative preparations. The procedure began with placement of a Dixon cannula at the umbilicus. This was done using an open technique. The abdomen was safely entered without injury to underlying viscera. A 0-Vicryl pursestring suture was placed at this location to allow for maintenance of pneumoperitoneum and later for closure of the fascia at the end of the operation. Additional cannulas were placed.  Three 5 mm cannulas were placed with 1 in the left lower quadrant and 2 on the right side of the abdomen. We then placed our last cannula, 12 mm size, in the suprapubic position. We then placed the patient in Trendelenburg with the right side down. Transverse colon was placed up into the upper abdomen. Survey of the abdomen did not demonstrate any evidence of carcinomatosis or ascites. The mesentery to the sigmoid colon was obscured by extremely distorted anatomy due to small bowel and cecum, omentum, sigmoid colon, bladder, uterus and left adnexa all being fused together. This was carefully dissected using Enseal and sharp scissors dissection as needed. All structures were  and preserved. A retrocystic abscess was drained and cultured. The abscess was remote from the proposed distal ine of resection and colorectal anastomosis. An incision was made at the sacral promontory and then carried up over the inferior mesenteric artery pedicle and towards the ligament of Treitz on the left lateral aspect. The Enseal was used to dissect through the tissue and divide with hemostasis. We dissected the left mesocolon off of the retroperitoneum and were able to identify the left ureter (with the aid of the lighted ureteral stent) and gonadal vessels which were pushed posteriorly free from injury. The LORELEI pedicle was then dissected and we were able to divide the LORELEI using Enseal with excellent hemostasis just distal to the takeoff of the left colic artery. The IMV was divided at this level as well. We then continued dissection underneath the sigmoid mesocolon laterally to the abdominal sidewall and cephalad over the left kidney to reach a point under the splenic flexure. Having completed the medial phase of dissection, we then divided the lateral peritoneal attachments at the white line of Toldt. The splenic flexure was then taken down sharply with use of the Enseal without injury to the spleen or tail of the pancreas.  The mesentery to the splenic flexure was preserved. Having completed the abdominal phase of the operation, attention was then turned towards the pelvic phase of the operation. The lateral peritoneal attachments of the rectum were divided down to the upper third of the rectum. This allowed us to reach to a point in the proximal rectum just distal to the confluent layer of tenia. The mesorectum was elevated off of the presacral space by bluntly dissecting in the plane between the fascia propria of the rectum and Waldeyer fascia. Once the rectum was circumferentially mobilized , the mesorectum was divided at this level using Enseal. We then divided the rectum at this level using the [place] Flex 45 stapler with one firing. The proximal margin which easily reached down to the pelvis was identified and marked. The remaining mesentery and the proximal colon at the proximal line of resection was divided with the Enseal.  ICG fluorescence angiography was performed using Carmine system. Excellent perfusion was noted at the proposed/prepared proximal and distal lines of resection. The specimen was extracted out through the suprapubic incision which was lengthened along the existing scar. The Alok wound retractor was placed and then the specimen was exteriorized. The colon was then divided and a 29 mm anvil was placed through the open end of the colon and brought out through a colotomy on the antimesenteric wall of the colon. The open end of the colon was then stapled closed using another firing of the [place]eltna stapler. A 3-0 Vicryl U-stitch was placed at the stem of anvil to help support it during docking. The proximal colon was irrigated and returned to the abdominal cavity. The pneumoperitoneum was reestablished, and the proximal colon was oriented to lie without torsion. The cartridge of the 29 mm circular stapler was advanced transanally up to the top of the rectal stump under laparoscopic vision.  The spike of the cartridge was advanced through the staple lines in the rectal stump. The anvil and cartridge were mated and closed. Fifteen seconds were allowed to pass to allow tissue compression and optimal staple formation. The orientation of the mesentery to reach the pelvis without tension or torsion was again confirmed, as well as ensuring that there was no small bowel herniating under the left colon. After all inspections were satisfactory, the circular stapler was fired. Both donuts were inspected and found to be circumferentially intact. The anastomosis was then tested with air insufflation under saline submersion using the proctoscope. With proximal compression, the anastomosis distended well and transanal passage of gas occurred around the anastomosis tested. There were no air bubbles, and therefore, a secure anastomosis had been achieved. A #15 Geovanny drain was placed in the pelvic space behind the bladder through a separate stab incision in the left lower quadrant. It was secured to the skin with a drain stitch. The omentum was mobilized and placed between the bladder and the anastomosis. The bladder was filled with 450 mL saline and we did not notice leakage from the bladder. We then removed the cannulas and closed the incisions. The transverse suprapubic incision was closed using running #1 PDS suture. The umbilical incision was closed by tying the previously placed 0 Vicryl pursestring suture. Subcutaneous tissues were irrigated with saline and then hemostasis secured with cautery. Skin was closed using 4-0 Vicryl subcuticular sutures. The skin was cleansed and Dermabond was applied. The patient was then allowed to emerge from anesthesia without incident. The patient was extubated in the operating room and taken to the recovery room in satisfactory condition.      Please note, this operation was significantly prolonged and required substantial effort beyond a standard procedure due to obesity (BMI 47) and extremely distorted anatomy with small bowel and cecum, omentum, sigmoid colon, bladder, uterus and left adnexa all fused together. It consumed 6 hours and exceeded normal operative time by 3 hours.        (Ms. Meagan Montenegro' skilled assistance was necessary for patient positioning, prepping, instrument passing and holding, retraction, suturing, and camera holding.)        _____________________________________   Attending Surgeon: Mike Cohen MD   Dictated By: Mike Cohen MD      ======    COLONOSCOPY REPORT           Sridhar Andrew      1990 Age 35year old   PCP Joycelyn Kim MD Endoscopist Merlin Mott MD      Date of procedure: 10/25/23     Procedure: Colonoscopy      Pre-operative diagnosis: h/o complicated diverticulitis     Post-operative diagnosis: see impression     Medications: MAC     Withdrawal time: 5 minutes     Procedure:  Informed consent was obtained from the patient after the risks of the procedure were discussed, including but not limited to bleeding, perforation, aspiration, infection, or possibility of a missed lesion. After discussions of the risks/benefits and alternatives to this procedure, as well as the planned sedation, the patient was placed in the left lateral decubitus position and begun on continuous blood pressure pulse oximetry and EKG monitoring and this was maintained throughout the procedure. Once an adequate level of sedation was obtained a digital rectal exam was completed. Then the lubricated tip of the [id number] diagnostic video pediatric colonoscope was inserted and advanced without difficulty to the cecum using the CO2 insufflation technique. The cecum was identified by localizing the trifold, the appendix and the ileocecal valve. Withdrawal was begun with thorough washing and careful examination of the colonic walls and folds. A routine second examination of the cecum/ascending colon was performed. Retroflexion was performed in the rectum. Photodocumentation was obtained.  [place] bowel prep score of 6 (Right colon-2; Transverse colon-2, Left colon-2). I then carefully withdrew the instrument from the patient who tolerated the procedure well. Complications: none. Findings:   -- SHASHI: normal rectal tone, no masses palpated. -- significant bile staining throughout colon     -- Diverticulosis: moderate in the sigmoid coon with haustral hypertrophy and areas of mild mucosal erythema in the sigmoid. -- no mass or polyps seen     -- A retroflexed view of the rectum revealed no abnormalities. Impression:   Sigmoid diverticulosis with scattered erythema     Recommend:  Proceed with planned surgery tomorrow     >>>If tissue was obtained and you have not received your pathology results either by phone or letter within 2 weeks, please call our office at [phone]. Specimens: none     Blood loss: <1 ml        Lupe Dunbar MD  [facility], [facility]    ======  7/14/2023    PROCEDURE: CT DRAIN ABSCESS PERITONEAL (CPT=49406)     INDICATIONS: Perforated diverticulitis with left lower quadrant pelvic abscess     FINDINGS: Fully informed consent was obtained from the patient after lengthy discussion of treatment options, including percutaneous abscess drainage, its risks benefits limitations and alternatives. Percutaneous drainage is been requested by the  consultant surgeon. This ill-defined abscess is deep within the abdomen and closely opposed to sigmoid colon, increasing the risk of injury to the adjacent viscera. Additional risks include bleeding, worsening or recurrent infection, fistula formation. Following informed consent, the patient was placed supine,  axial CT images localized the left lower quadrant collection closely opposed to the adjacent sigmoid colon. Overlying skin site on the anterolateral abdominal wall was prepped and draped  in sterile fashion. Utilizing CT guidance, a Yueh sheath needle was advanced into the collection, and fluid aspirated.   An Amplatz guidewire was coiled within the collection, tract dilated, with placement of an 8 Western Bernice drain. Via the drain, approximately 2-3 cc of serosanguineous fluid were aspirated. Sample was sent for cultures and sensitivities. The drainage catheter was secured to skin with suture, connected to bulb suction, sterile dressing applied. Impression   CONCLUSION:  1. CT-guided percutaneous drainage of left lower quadrant pelvic diverticular abscess, placement of 8 Lao drain. Dictated by (CST): Carine Orta MD on 7/14/2023 at 4:29 PM     ======  6/23/2023    PROCEDURE: CT ASPIRATION intra-abdominal fluid collection     INDICATIONS: abscess drainage     DESCRIPTION: The patient was informed of the nature of the procedure which included a discussion of the benefits and risks including, but not limited to, bleeding, infection, recurrence. After obtaining informed consent, a CT-guided aspiration was  performed in the usual sterile manner. Automated exposure control for dose reduction was used. Adjustment of the mA and/or kV was done based on the patient's size. Use of iterative reconstruction technique for dose reduction was used. Dose information   is transmitted to the [facility]) [facility] ([address] [place] [address] Rd) which includes the Dose Index Registry. Follow informed consent, the patient was placed supine, and  axial CT images localized the complex air/fluid collection in the anterior lower abdomen. Overlying skin site on the lower anterior abdominal wall was prepped and draped in sterile  fashion. Utilizing CT guidance, a OYE!eh sheath needle was advanced from an anterolateral approach into the small collection, 30 cc of turbid yellow fluid aspirated, with collapse of the collection. Drainage catheter was not placed due to the immature, small  collection, and the patient had difficulty lying still despite sedation.   The sheath needle was removed, sterile dressing applied. COMPLICATIONS: None. Impression   CONCLUSION:     CT-guided percutaneous aspiration of small complex air/fluid collection in the lower anterior abdomen.           Dictated by (CST): Charlene Evans MD on 6/23/2023 at 1:32 PM

## 2023-12-15 ENCOUNTER — OFFICE VISIT (OUTPATIENT)
Facility: CLINIC | Age: 33
End: 2023-12-15
Payer: COMMERCIAL

## 2023-12-15 ENCOUNTER — PATIENT MESSAGE (OUTPATIENT)
Facility: CLINIC | Age: 33
End: 2023-12-15

## 2023-12-15 VITALS
SYSTOLIC BLOOD PRESSURE: 117 MMHG | HEART RATE: 89 BPM | WEIGHT: 267 LBS | DIASTOLIC BLOOD PRESSURE: 80 MMHG | BODY MASS INDEX: 45.58 KG/M2 | HEIGHT: 64 IN

## 2023-12-15 DIAGNOSIS — A04.71 RECURRENT CLOSTRIDIOIDES DIFFICILE DIARRHEA: Primary | ICD-10-CM

## 2023-12-15 PROCEDURE — 3079F DIAST BP 80-89 MM HG: CPT | Performed by: PHYSICIAN ASSISTANT

## 2023-12-15 PROCEDURE — 99214 OFFICE O/P EST MOD 30 MIN: CPT | Performed by: PHYSICIAN ASSISTANT

## 2023-12-15 PROCEDURE — 3008F BODY MASS INDEX DOCD: CPT | Performed by: PHYSICIAN ASSISTANT

## 2023-12-15 PROCEDURE — 3074F SYST BP LT 130 MM HG: CPT | Performed by: PHYSICIAN ASSISTANT

## 2023-12-15 RX ORDER — CLONAZEPAM 1 MG/1
TABLET ORAL
COMMUNITY
Start: 2023-10-05

## 2023-12-15 RX ORDER — ZOLPIDEM TARTRATE 10 MG/1
TABLET ORAL
COMMUNITY
Start: 2023-11-15

## 2023-12-15 RX ORDER — OMEPRAZOLE 10 MG/1
CAPSULE, DELAYED RELEASE ORAL
COMMUNITY

## 2023-12-15 NOTE — PATIENT INSTRUCTIONS
Recommendations:  - continue fidaxomicin  -Increase water intake to 64oz of water per day  -Increase fiber to 20-30 g per day with fruits, vegetables and whole grains  -Increase exercise to 150 mins/week

## 2023-12-15 NOTE — TELEPHONE ENCOUNTER
From: Brian Bernabe  To: Stewart Sommer  Sent: 12/15/2023 5:30 PM CST  Subject: Probiotic     Hi Kristy,     I'm so sorry to bother you but I completely forgot to ask you if I should be taking a probiotic? I ordered Florastor and it was delivered this afternoon which reminded me I forgot to ask. I had previously tried Align and it did not sit well with my stomach. I've also tried kefir milk but that also made me sick. I am lactose intolerant but continue to eat dairy with lactaid pills. Just wondering if it's a good idea for me to take a probiotic as well as increasing my fiber?      Thank you so much, and I look forward to your response!  -Zunilda Gamboa   (She/Her/Any)

## 2023-12-15 NOTE — PROGRESS NOTES
[address] [facility]                                                                                                               Reason for consult:   Chief Complaint   Patient presents with    Hospital F/U       Requesting physician or provider: Chastity Amaya MD      HPI:   Selvin Johnson is a 35year old year-old female with history of anxiety, diverticulitis, anemia, hx of c diff who presents for follow up. Patient seen in ED on 11/20/23 for abdominal pain and diarrhea, tested positive for c diff even with being on vanco prophylaxis. 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 She was on antibiotics for 4.5 months including meropenem. Patient underwent sigmoidectomy in October 2023. She called in to office on call on 12/10 after having recurrent diarrhea most likely return of c diff. Advised to start probiotic and combination of 10 days fidaxomicin followed by 4-6 weeks of vancomycin taper. Today, does have a lingering \"tummy ache\". She notes comes and goes. Lower abdomen. Pain can come \"whenever\" not specifically after eating. Having between 3-10 bowel movement per day. When she had c diff was having 20 bowel movements that woke her in the night. No longer night time symptoms. Did notice what she believes was blood in stool, but that resolved. Stools are yellow brown in color. Tolerating medication ok. No nausea or vomiting. Diet has been more bland. Sticking to more soft foods. Surgeon had her on low fiber diet for 6 weeks. Slowly introducing fibrous foods. No fevers, chills or body aches.      NSAIDS: PRN  Tobacco: none  Alcohol: rare  Marijuana:none  Illicit drugs: none    FH GI malignancy- father colon cancer    No history of adverse reaction to sedation  No REYNALDO  No anticoagulants  No pacemaker/defibrillator  No pain medications and/or sleep aides    Prior endoscopies:  Flexible sigmoidoscopy 10/25/2023- Dr. Cristobal Farris  Impression:   Sigmoid diverticulosis with scattered erythema     Recommend:  Proceed with planned surgery     Wt Readings from Last 6 Encounters:   12/15/23 267 lb (121.1 kg)   11/20/23 265 lb (120.2 kg)   10/27/23 274 lb 0.5 oz (124.3 kg)   10/19/23 268 lb (121.6 kg)   09/02/23 248 lb 14.4 oz (112.9 kg)   08/19/23 262 lb 12.6 oz (119.2 kg)        History, Medications, Allergies, ROS:      Past Medical History:   Diagnosis Date    Anxiety     Blood disorder     anemia    Bronchial tumor     Diverticulosis of large intestine     Esophageal reflux     History of lobectomy of lung     Hx of motion sickness     Pneumonia due to organism     Shortness of breath     Visual impairment     Wears eyeglasses      Past Surgical History:   Procedure Laterality Date    ADENOIDECTOMY      REMOVAL OF LUNG,LOBECTOMY      TONSILLECTOMY        Family Hx:   Family History   Problem Relation Age of Onset    Cancer Father         Colon CA    Other (Other) Father         kidney transplant    Diabetes Father     Other (Other) Mother         pulm,onary fibrosis      Social History:   Social History     Socioeconomic History    Marital status:    Tobacco Use    Smoking status: Never    Smokeless tobacco: Never   Vaping Use    Vaping Use: Never used   Substance and Sexual Activity    Alcohol use: Yes     Comment: occasional wine    Drug use: Never     Social Determinants of Health     Food Insecurity: No Food Insecurity (10/25/2023)    Food Insecurity     Food Insecurity: Never true   Transportation Needs: No Transportation Needs (10/25/2023)    Transportation Needs     Lack of Transportation: No   Housing Stability: Low Risk  (10/25/2023)    Housing Stability     Housing Instability: No        Medications (Active prior to today's visit):  Current Outpatient Medications   Medication Sig Dispense Refill    clonazePAM 1 MG Oral Tab       sertraline 50 MG Oral Tab Omeprazole 10 MG Oral Capsule Delayed Release       zolpidem 10 MG Oral Tab       fidaxomicin 200 MG Oral Tab Take 1 tablet (200 mg total) by mouth every other day for 20 days. 10 tablet 0    oxyCODONE 10 MG Oral Tab Take 1 tablet (10 mg total) by mouth every 4 (four) hours as needed. 30 tablet 0    LORazepam 0.5 MG Oral Tab Take 1 tablet (0.5 mg total) by mouth as needed for Anxiety. ALPRAZolam 0.5 MG Oral Tab Take 1 tablet (0.5 mg total) by mouth 3 (three) times daily as needed for Anxiety. QUEtiapine 50 MG Oral Tab Take 1 tablet (50 mg total) by mouth nightly. 30 tablet 0    Spacer/Aero-Holding Chambers (BREATHERITE EMMETT SPACER ADULT) Does not apply Misc Use with albuterol 1 each 0    vancomycin 125 MG Oral Cap Take 1 capsule (125 mg total) by mouth 4 (four) times daily. 120 capsule 1    fidaxomicin 200 MG Oral Tab Take 1 tablet (200 mg total) by mouth in the morning and 1 tablet (200 mg total) before bedtime. Do all this for 10 days. 20 tablet 1    lidocaine 2% External Prefilled Syringe Apply 6 mL topically in the morning, at noon, and at bedtime. 1 each 0    Naloxone HCl 4 MG/0.1ML Nasal Liquid 4 mg by Nasal route as needed. If patient remains unresponsive, repeat dose in other nostril 2-5 minutes after first dose. 1 kit 0       Allergies:   Allergies   Allergen Reactions    Lactose DIARRHEA       ROS:   CONSTITUTIONAL: negative for fevers, chills, sweats and weight loss  EYES Negative for red eyes, yellow eyes, changes in vision  HEENT: Negative for dysphagia and hoarseness  RESPIRATORY: Negative for cough and shortness of breath  CARDIOVASCULAR: Negative for chest pain, palpitations  GASTROINTESTINAL: See HPI  GENITOURINARY: Negative for dysuria and frequency  MUSCULOSKELETAL: Negative for arthralgias and myalgias  NEUROLOGICAL: Negative for dizziness and headaches  BEHAVIOR/PSYCH: Negative for anxiety and poor appetite    PHYSICAL EXAM:   Blood pressure 117/80, pulse 89, height 5' 4\" (1.626 m), weight 267 lb (121.1 kg), last menstrual period 12/04/2023. GEN: WD/WN, NAD  HEENT: Supple symmetrical, trachea midline  CV: RRR, the extremities are warm and well perfused   LUNGS: No increased work of breathing  ABDOMEN: No scars, normal bowel sounds, soft, non-tender, non-distended no rebound or guarding, no masses, no hepatomegaly  MSK: No redness, no warmth, no swelling of joints  SKIN: No jaundice, no erythema, no rashes  HEMATOLOGIC: No bleeding, no bruising  NEURO: Alert and interactive, normal gait    Labs/Imaging/Procedures:     Patient's pertinent labs and imaging were reviewed and discussed with patient today.      Lab Results   Component Value Date    WBC 8.3 11/20/2023    RBC 4.38 11/20/2023    HGB 13.8 11/20/2023    HCT 39.6 11/20/2023    MCV 90.4 11/20/2023    MCH 31.5 11/20/2023    MCHC 34.8 11/20/2023    RDW 14.2 11/20/2023    .0 (L) 11/20/2023        Lab Results   Component Value Date    GLU 95 11/20/2023    BUN <5 (L) 11/20/2023    BUNCREA 7.7 (L) 10/31/2023    CREATSERUM 0.69 11/20/2023    ANIONGAP 8 11/20/2023    GFRNAA 102 05/27/2021    GFRAA 117 05/27/2021    CA 9.8 11/20/2023    OSMOCALC 293 10/31/2023    ALKPHO 67 11/20/2023    AST 20 11/20/2023    ALT 10 11/20/2023    BILT 1.1 11/20/2023    TP 8.1 11/20/2023    ALB 4.5 11/20/2023    GLOBULIN 3.6 11/20/2023     11/20/2023    K 3.8 11/20/2023     11/20/2023    CO2 23.0 11/20/2023        CT ABDOMEN PELVIS IV CONTRAST, NO ORAL (ER)    Result Date: 11/20/2023  PROCEDURE: CT ABDOMEN PELVIS IV CONTRAST NO ORAL (ER)  COMPARISON: [facility], [facility]. for [facility], CT ABDOMEN + PELVIS (CONTRAST ONLY) (CPT=74177), 8/08/2023, 2:42 PM.  [facility], CT ABDOMEN + PELVIS (CONTRAST ONLY) (CPT=74177), 7/22/2023, 4:59 PM.  [facility], CT ABDOMEN + PELVIS (CONTRAST ONLY) (CPT=74177), 7/17/2023, 12:02 PM.  [facility], CT ABDOMEN + PELVIS (CONTRAST ONLY) (CPT=74177), 6/18/2023, 7:27 AM. [facility], CT ABDOMEN PELVIS IV CONTRAST NO ORAL (ER), 9/01/2023, 5:51 AM.  [facility], CT ABDOMEN PELVIS IV CONTRAST NO ORAL (ER), 8/19/2023, 3:05 AM.  [facility], CT ABDOMEN PELVIS IV CONTRAST NO ORAL (ER), 7/13/2023, 10:05 PM.  [facility], CT ABDOMEN+PELVIS (BNM=[id number]), 8/19/2023, 12:04 PM.  [facility], CT ABDOMEN+PELVIS (GNC=[id number]), 6/22/2023, 3:34 PM.  INDICATIONS: Suprapubic abdominal pain with associated diarrhea. TECHNIQUE: Multidetector CT images of the abdomen and pelvis were obtained with non-ionic intravenous contrast material. Automated exposure control for dose reduction was used. Adjustment of the mA and/or kV was done based on the patient's size. Iterative reconstruction technique for dose reduction was employed. Dose information was transmitted to the [facility] of Radiology) [address] [employer] [address] ([address] [place] [address] Rd), which includes the Dose Index Registry. Oral contrast was not ingested. FINDINGS: COMMENT: Overall examination quality is degraded by beam hardening artifact throughout the imaged volume secondary to patient body habitus and contact of the lateral body wall with the scanner gantry. LUNG BASES: The distal aspect of a central venous catheter appreciated. The heart is normal in size. There is prominent lobulated subpleural fat deposition on the right. There is dependent subsegmental atelectasis bilaterally. LIVER: Enlarged, measuring 23.6 cm in craniocaudal axis, and diffusely hypoattenuating with areas of relative hyperdensity adjacent to the gallbladder fossa, consistent with hepatic steatosis and areas of fatty sparing. BILIARY: The gallbladder is present. PANCREAS: No lesion, fluid collection, ductal dilatation, or atrophy. SPLEEN: Enlarged, measuring 16.0 cm. A small splenule is present in the left upper quadrant. ADRENALS:   No defined mass or abnormal enlargement.   KIDNEYS:   Symmetric enhancement is seen without evidence of hydronephrosis or underlying solid masses. GI/MESENTERY:  Apparent gastric wall thickening is likely secondary to underdistention. There is no evidence of bowel obstruction. A normal caliber contrast-opacified appendix is seen without definite primary inflammatory manifestations. Postoperative changes of subtotal sigmoid colectomy are demonstrated. A rectosigmoid primary colocolonic anastomosis is appreciated. There is surrounding colonic wall thickening with pericolonic fat stranding. Wall thickening and mucosal enhancement are demonstrated in the rectum. Minimal non-loculated fluid is seen adjacent to the sigmoid anastomosis, measuring 3.1 x 1.8 x 2.1 cm. A lobulated peripherally enhancing left adnexal collection with thick-walled appearance, internal septations, and heterogeneous enhancement measures up to 4.2 x 2.2 x 2.8 cm. URINARY BLADDER: Incompletely distended without visible calculus. Mild circumferential bladder wall thickening may relate to underdistention. PELVIC NODES: Numerous borderline-sized pelvic lymph nodes are apparent, likely reactive. PELVIC ORGANS: The uterus is present. Nonspecific debris and gas are suggested in the vaginal canal. Please see GI/MESENTERY section above for details regarding a left adnexal complex collection. A right adnexal follicular cystic lesion measures 2.2 x 2.3 x 2.3 cm. VASCULATURE:   No aneurysm is detected. RETROPERITONEUM: No mass or lymphadenopathy is apparent. Nonenlarged retroperitoneal lymph nodes are likely reactive. BONES:   Multilevel degenerative changes are seen throughout the spine. ABDOMINAL WALL: There is periumbilical and infraumbilical infiltration of the ventral abdominal subcutaneous fat, extending to the cutaneous surface. Associated cutaneous thickening is appreciated. Multiple ovoid nodular foci of infiltration are present and may reflect injection sites.  There is transversely oriented lower abdominopelvic wall scarring, suggestive of prior incision. There is a small fat-containing umbilical hernia. Dependent subcutaneous edema is perceived. OTHER: No free air is seen in the abdomen or pelvis. CONCLUSION:  1. Postoperative changes of subtotal sigmoid colectomy with primary colocolonic anastomosis. There is minimal phlegmonous pericolonic fluid, which may reflect postoperative seroma, with or without superimposed infection. 2. Nonspecific rectal wall thickening and mucosal enhancement, perhaps with some degree of underlying proctitis. 3. Suggestion of ventral abdominal cellulitis without discernible loculated, drainable abscess. 4. Indeterminate left adnexal fluid collection with lobulated margins, not significant changed. 5. A probable functional right adnexal cystic lesion is seen. 6. Borderline-sized retroperitoneal and pelvic lymph nodes are likely reactive. 7. Hepatomegaly with underlying hepatic steatosis. 8. Splenomegaly. 9. Lesser incidental findings as above. Dictated by (CST): Anmol Burgos MD on 11/20/2023 at 8:35 PM     Finalized by (CST): Anmol Burgos MD on 11/20/2023 at 8:45 PM                  .  ASSESSMENT/PLAN:   Eli Norton is a 35year old year-old female with history of anxiety, diverticulitis s/p sigmoidectomy, anemia, hx of c diff who presents for follow up. #cdiff  Patient with recurrent C diff. She was started on fidaxomicin 200 mg BID x 10 days. She was having 20 BM with night time symptoms, now having 3-10 BM per day that are soft, but no longer waking her in the night. She also has been on low fiber diet since her surgery in October and was just recently cleared for high fiber diet. She can have intermittent abdominal pain. Denies fevers, chills, body aches. No changes in appetite. Discussed with patient option for fidaxomicin taper at this time.  Advise patient if symptoms return, she should follow up with ID as well as GI to discuss symptoms. Patient to contact office with any worsening symptoms. Recommendations:  - continue fidaxomicin  -Increase water intake to 64oz of water per day  -Increase fiber to 20-30 g per day with fruits, vegetables and whole grains  -Increase exercise to 150 mins/week        Orders This Visit:  No orders of the defined types were placed in this encounter. Meds This Visit:  Requested Prescriptions     Signed Prescriptions Disp Refills    fidaxomicin 200 MG Oral Tab 10 tablet 0     Sig: Take 1 tablet (200 mg total) by mouth every other day for 20 days. Imaging & Referrals:  INFECTIOUS DISEASE - INTERNAL      Humble Camara PA-C   12/15/2023        This note was partially prepared using [id number] [address] [employer] voice recognition dictation software. As a result, errors may occur. When identified, these errors have been corrected.  While every attempt is made to correct errors during dictation, discrepancies may still exist.

## 2024-04-06 ENCOUNTER — APPOINTMENT (OUTPATIENT)
Dept: CT IMAGING | Facility: HOSPITAL | Age: 34
End: 2024-04-06
Attending: EMERGENCY MEDICINE
Payer: COMMERCIAL

## 2024-04-06 ENCOUNTER — HOSPITAL ENCOUNTER (EMERGENCY)
Facility: HOSPITAL | Age: 34
Discharge: HOME | End: 2024-04-06
Attending: EMERGENCY MEDICINE
Payer: COMMERCIAL

## 2024-04-06 VITALS
WEIGHT: 260 LBS | OXYGEN SATURATION: 95 % | TEMPERATURE: 98 F | SYSTOLIC BLOOD PRESSURE: 129 MMHG | RESPIRATION RATE: 18 BRPM | BODY MASS INDEX: 44.39 KG/M2 | HEIGHT: 64 IN | HEART RATE: 106 BPM | DIASTOLIC BLOOD PRESSURE: 86 MMHG

## 2024-04-06 DIAGNOSIS — J11.1 INFLUENZA: Primary | ICD-10-CM

## 2024-04-06 DIAGNOSIS — J18.9 COMMUNITY ACQUIRED PNEUMONIA, UNSPECIFIED LATERALITY: ICD-10-CM

## 2024-04-06 LAB
ANION GAP SERPL CALC-SCNC: 6 MMOL/L (ref 0–18)
BASOPHILS # BLD AUTO: 0.01 X10(3) UL (ref 0–0.2)
BASOPHILS NFR BLD AUTO: 0.4 %
BUN BLD-MCNC: <5 MG/DL (ref 9–23)
CALCIUM BLD-MCNC: 9.2 MG/DL (ref 8.7–10.4)
CHLORIDE SERPL-SCNC: 107 MMOL/L (ref 98–112)
CO2 SERPL-SCNC: 25 MMOL/L (ref 21–32)
CREAT BLD-MCNC: 0.63 MG/DL
D DIMER PPP FEU-MCNC: 0.54 UG/ML FEU (ref ?–0.5)
DEPRECATED RDW RBC AUTO: 42.7 FL (ref 35.1–46.3)
EGFRCR SERPLBLD CKD-EPI 2021: 120 ML/MIN/1.73M2 (ref 60–?)
EOSINOPHIL # BLD AUTO: 0.09 X10(3) UL (ref 0–0.7)
EOSINOPHIL NFR BLD AUTO: 3.2 %
ERYTHROCYTE [DISTWIDTH] IN BLOOD BY AUTOMATED COUNT: 12.7 % (ref 11–15)
FLUAV + FLUBV RNA SPEC NAA+PROBE: NEGATIVE
FLUAV + FLUBV RNA SPEC NAA+PROBE: POSITIVE
GLUCOSE BLD-MCNC: 122 MG/DL (ref 70–99)
HCT VFR BLD AUTO: 40.2 %
HGB BLD-MCNC: 14.1 G/DL
IMM GRANULOCYTES # BLD AUTO: 0.02 X10(3) UL (ref 0–1)
IMM GRANULOCYTES NFR BLD: 0.7 %
LYMPHOCYTES # BLD AUTO: 1.1 X10(3) UL (ref 1–4)
LYMPHOCYTES NFR BLD AUTO: 39.1 %
MCH RBC QN AUTO: 32.2 PG (ref 26–34)
MCHC RBC AUTO-ENTMCNC: 35.1 G/DL (ref 31–37)
MCV RBC AUTO: 91.8 FL
MONOCYTES # BLD AUTO: 0.17 X10(3) UL (ref 0.1–1)
MONOCYTES NFR BLD AUTO: 6 %
NEUTROPHILS # BLD AUTO: 1.42 X10 (3) UL (ref 1.5–7.7)
NEUTROPHILS # BLD AUTO: 1.42 X10(3) UL (ref 1.5–7.7)
NEUTROPHILS NFR BLD AUTO: 50.6 %
PLATELET # BLD AUTO: 83 10(3)UL (ref 150–450)
PLATELETS.RETICULATED NFR BLD AUTO: 2.1 % (ref 0–7)
POTASSIUM SERPL-SCNC: 3.9 MMOL/L (ref 3.5–5.1)
RBC # BLD AUTO: 4.38 X10(6)UL
RSV RNA SPEC NAA+PROBE: NEGATIVE
SARS-COV-2 RNA RESP QL NAA+PROBE: NOT DETECTED
SODIUM SERPL-SCNC: 138 MMOL/L (ref 136–145)
TROPONIN I SERPL HS-MCNC: <3 NG/L
WBC # BLD AUTO: 2.8 X10(3) UL (ref 4–11)

## 2024-04-06 PROCEDURE — 80048 BASIC METABOLIC PNL TOTAL CA: CPT | Performed by: EMERGENCY MEDICINE

## 2024-04-06 PROCEDURE — 99285 EMERGENCY DEPT VISIT HI MDM: CPT

## 2024-04-06 PROCEDURE — 94640 AIRWAY INHALATION TREATMENT: CPT

## 2024-04-06 PROCEDURE — 99284 EMERGENCY DEPT VISIT MOD MDM: CPT

## 2024-04-06 PROCEDURE — 85025 COMPLETE CBC W/AUTO DIFF WBC: CPT | Performed by: EMERGENCY MEDICINE

## 2024-04-06 PROCEDURE — 93005 ELECTROCARDIOGRAM TRACING: CPT

## 2024-04-06 PROCEDURE — 71260 CT THORAX DX C+: CPT | Performed by: EMERGENCY MEDICINE

## 2024-04-06 PROCEDURE — 85379 FIBRIN DEGRADATION QUANT: CPT | Performed by: EMERGENCY MEDICINE

## 2024-04-06 PROCEDURE — 84484 ASSAY OF TROPONIN QUANT: CPT | Performed by: EMERGENCY MEDICINE

## 2024-04-06 PROCEDURE — 0241U SARS-COV-2/FLU A AND B/RSV BY PCR (GENEXPERT): CPT | Performed by: EMERGENCY MEDICINE

## 2024-04-06 PROCEDURE — 36415 COLL VENOUS BLD VENIPUNCTURE: CPT

## 2024-04-06 RX ORDER — ACETAMINOPHEN 500 MG
1000 TABLET ORAL ONCE
Status: COMPLETED | OUTPATIENT
Start: 2024-04-06 | End: 2024-04-06

## 2024-04-06 RX ORDER — IPRATROPIUM BROMIDE AND ALBUTEROL SULFATE 2.5; .5 MG/3ML; MG/3ML
3 SOLUTION RESPIRATORY (INHALATION) ONCE
Status: COMPLETED | OUTPATIENT
Start: 2024-04-06 | End: 2024-04-06

## 2024-04-06 RX ORDER — DOXYCYCLINE HYCLATE 100 MG/1
100 CAPSULE ORAL 2 TIMES DAILY
Qty: 14 CAPSULE | Refills: 0 | Status: SHIPPED | OUTPATIENT
Start: 2024-04-06 | End: 2024-04-13

## 2024-04-06 RX ORDER — ALBUTEROL SULFATE 90 UG/1
2 AEROSOL, METERED RESPIRATORY (INHALATION) EVERY 4 HOURS PRN
Qty: 1 EACH | Refills: 0 | Status: SHIPPED | OUTPATIENT
Start: 2024-04-06 | End: 2024-05-06

## 2024-04-06 NOTE — ED PROVIDER NOTES
[facility]  Emergency Department Attending Note     Chief Complaint:   Chief Complaint   Patient presents with    Chest Pain Angina     HISTORY OF PRESENT ILLNESS:   Mica Bernabe is a 33 year old female who presents to the ED with a past medical history including bronchial tumor with lobectomy, anxiety presents for rhinorrhea congestion cough worsening over the last few days.  Duration 5 days.  Had some fever earlier not now.  Denies chills.  Denies chest pain.  Coughed some bloody sputum earlier which prompted ER visit.  Painful swallowing.  Sore throat.     MEDICAL & SOCIAL HISTORY:   Past Medical History:   Diagnosis Date    Anxiety     Blood disorder     anemia    Bronchial tumor     Diverticulosis of large intestine     Esophageal reflux     History of lobectomy of lung     Hx of motion sickness     Pneumonia due to organism     Shortness of breath     Visual impairment     Wears eyeglasses      Past Surgical History:   Procedure Laterality Date    ADENOIDECTOMY      REMOVAL OF LUNG,LOBECTOMY      TONSILLECTOMY        Social History     Socioeconomic History    Marital status:    Tobacco Use    Smoking status: Never    Smokeless tobacco: Never   Vaping Use    Vaping Use: Never used   Substance and Sexual Activity    Alcohol use: Yes     Comment: occasional wine    Drug use: Never     Social Determinants of Health     Food Insecurity: No Food Insecurity (10/25/2023)    Food Insecurity     Food Insecurity: Never true   Transportation Needs: No Transportation Needs (10/25/2023)    Transportation Needs     Lack of Transportation: No   Housing Stability: Low Risk  (10/25/2023)    Housing Stability     Housing Instability: No      Allergies   Allergen Reactions    Lactose DIARRHEA      Current Outpatient Medications   Medication Sig Dispense Refill    doxycycline 100 MG Oral Cap Take 1 capsule (100 mg total) by mouth 2 (two) times daily for 7 days. 14 capsule 0    albuterol 108 (90 Base)  MCG/ACT Inhalation Aero Soln Inhale 2 puffs into the lungs every 4 (four) hours as needed for Wheezing. 1 each 0    clonazePAM 1 MG Oral Tab       sertraline 50 MG Oral Tab       zolpidem 10 MG Oral Tab       LORazepam 0.5 MG Oral Tab Take 1 tablet (0.5 mg total) by mouth as needed for Anxiety.      ALPRAZolam 0.5 MG Oral Tab Take 1 tablet (0.5 mg total) by mouth 3 (three) times daily as needed for Anxiety.      QUEtiapine 50 MG Oral Tab Take 1 tablet (50 mg total) by mouth nightly. 30 tablet 0    Omeprazole 10 MG Oral Capsule Delayed Release       lidocaine 2% External Prefilled Syringe Apply 6 mL topically in the morning, at noon, and at bedtime. 1 each 0    oxyCODONE 10 MG Oral Tab Take 1 tablet (10 mg total) by mouth every 4 (four) hours as needed. (Patient not taking: Reported on 4/6/2024) 30 tablet 0    Naloxone HCl 4 MG/0.1ML Nasal Liquid 4 mg by Nasal route as needed. If patient remains unresponsive, repeat dose in other nostril 2-5 minutes after first dose. 1 kit 0    Spacer/Aero-Holding Chambers (BREATHERITE EMMETT SPACER ADULT) Does not apply Misc Use with albuterol 1 each 0          REVIEW OF SYSTEMS   A 10 point review of systems was completed and is negative except as listed in history of present illness      PHYSICAL EXAM   Vitals: /86   Pulse 106   Temp 97.7 °F (36.5 °C) (Temporal)   Resp 18   Ht 162.6 cm (5' 4\")   Wt 117.9 kg   LMP 03/13/2024 (Exact Date)   SpO2 95%   BMI 44.63 kg/m²   /86   Pulse 106   Temp 97.7 °F (36.5 °C) (Temporal)   Resp 18   Ht 162.6 cm (5' 4\")   Wt 117.9 kg   LMP 03/13/2024 (Exact Date)   SpO2 95%   BMI 44.63 kg/m²     General: A&Ox3, NAD  Constitutional: Well developed, well nourished, nontoxic  Head: atraumatic, normocephalic   Eyes: conjuctiva clear, no icterus, PERRL, EOMI, vision grossly normal  Ears: normal external appearance, no drainage  Nose:  Atraumatic, no swelling, no drainage, nares patent  Throat:  Moist pink mucous membranes, airway  is patent erythema noted to the posterior oropharynx without exudate or trismus or uvular deviation clear phonation  Neck:  Soft supple, no masses, no tracheal deviation, no stridor  Chest:  No bruising or abrasions, no tenderness, no deformity  Cardiac:  Regular rate and rhythm  Lung:  No distress, no retractions.  Faint wheeze to the left  Abdomen:  Soft, nontender, nondistended, normal BS  Back: No stepoff/deformity  Extremities: FROM all ext, no deformities, intact equal peripheral pulses, no cyanosis or edema  Neuro: No facial droop, no slurred speech, moving all extremities freely, SILT to the bilateral upper and lower extremities  Psych: A&Ox3, normal affect, cooperative, calm  Skin: No rash, no petechiae/purpura, warm, dry      RESULTS  LABS:   Results for orders placed or performed during the hospital encounter of 04/06/24   Basic Metabolic Panel (8)   Result Value Ref Range    Glucose 122 (H) 70 - 99 mg/dL    Sodium 138 136 - 145 mmol/L    Potassium 3.9 3.5 - 5.1 mmol/L    Chloride 107 98 - 112 mmol/L    CO2 25.0 21.0 - 32.0 mmol/L    Anion Gap 6 0 - 18 mmol/L    BUN <5 (L) 9 - 23 mg/dL    Creatinine 0.63 0.55 - 1.02 mg/dL    BUN/CREA Ratio      Calcium, Total 9.2 8.7 - 10.4 mg/dL    Calculated Osmolality      eGFR-Cr 120 >=60 mL/min/1.73m2   Troponin I (High Sensitivity)   Result Value Ref Range    Troponin I (High Sensitivity) <3 <=34 ng/L   D-Dimer   Result Value Ref Range    D-Dimer 0.54 (H) <0.50 ug/mL FEU   Scan slide   Result Value Ref Range    Slide Review       Platelets reviewed on smear. No giant platelets or platelet clumps observed.   EKG 12 Lead   Result Value Ref Range    Ventricular rate 107 BPM    Atrial rate 107 BPM    P-R Interval 132 ms    QRS Duration 66 ms    Q-T Interval 298 ms    QTC Calculation (Bezet) 397 ms    P Axis -16 degrees    R Axis 25 degrees    T Axis 15 degrees   SARS-CoV-2/Flu A and B/RSV by PCR (GeneXpert)    Specimen: Nares; Other   Result Value Ref Range     SARS-CoV-2 (COVID-19) - (GeneXpert) Not Detected Not Detected    Influenza A by PCR Negative Negative    Influenza B by PCR Positive (A) Negative    RSV by PCR Negative Negative   CBC W/ DIFFERENTIAL   Result Value Ref Range    WBC 2.8 (L) 4.0 - 11.0 x10(3) uL    RBC 4.38 3.80 - 5.30 x10(6)uL    HGB 14.1 12.0 - 16.0 g/dL    HCT 40.2 35.0 - 48.0 %    MCV 91.8 80.0 - 100.0 fL    MCH 32.2 26.0 - 34.0 pg    MCHC 35.1 31.0 - 37.0 g/dL    RDW-SD 42.7 35.1 - 46.3 fL    RDW 12.7 11.0 - 15.0 %    PLT 83.0 (L) 150.0 - 450.0 10(3)uL    Immature Platelet Fraction 2.1 0.0 - 7.0 %    Neutrophil Absolute Prelim 1.42 (L) 1.50 - 7.70 x10 (3) uL    Neutrophil Absolute 1.42 (L) 1.50 - 7.70 x10(3) uL    Lymphocyte Absolute 1.10 1.00 - 4.00 x10(3) uL    Monocyte Absolute 0.17 0.10 - 1.00 x10(3) uL    Eosinophil Absolute 0.09 0.00 - 0.70 x10(3) uL    Basophil Absolute 0.01 0.00 - 0.20 x10(3) uL    Immature Granulocyte Absolute 0.02 0.00 - 1.00 x10(3) uL    Neutrophil % 50.6 %    Lymphocyte % 39.1 %    Monocyte % 6.0 %    Eosinophil % 3.2 %    Basophil % 0.4 %    Immature Granulocyte % 0.7 %         IMAGING: CT CHEST PE AORTA (IV ONLY) (CPT=71260)    Result Date: 4/6/2024  CONCLUSION:  1.  Sensitivity for PE evaluation is limited due to patient body habitus and imaging technique.  Repeat imaging was performed.  No pulmonary embolus to the segmental pulmonary artery level.  No acute aortic injury; no dissection. 2.  Right-sided pulmonary opacities in the lower lobe.  There are nodular foci extending from the right hilum into the basal segments, suspicious for pneumonia/atypical infection.  In the posterior aspect of the right lower lobe there is a stable nodular  focus of subpleural fat herniation, and this finding is stable since 8/19/23.  Dictated by (CST): Maciej Majano MD on 4/06/2024 at 5:44 PM     Finalized by (CST): Maciej Majano MD on 4/06/2024 at 5:52 PM           I personally reviewed the radiology study and my finding were no  pulmonary embolism      Procedures:   Procedures       ED COURSE          Re-Evaluation: Improved      Disposition & Plan:   Clinical Impression/Final Diagnosis:   1. Influenza    2. Community acquired pneumonia, unspecified laterality        Medical Decision Making: Patient noted to be slightly thrombocytopenic, with a low white cell count.  I discussed her CBC results with the patient and she will follow-up with her doctor regarding this.  Tested positive for influenza.  Patient states that she did start having colorful sputum including hemoptysis will also give doxycycline discharged home with instruction about very close with primary doctor and to return immediately for new or worsening symptoms or new concerns but the patient was understanding and agreement with this plan    Medical Decision Making  Amount and/or Complexity of Data Reviewed  Independent Historian:      Details: Partner at bedside  Labs: ordered. Decision-making details documented in ED Course.  Radiology: ordered and independent interpretation performed. Decision-making details documented in ED Course.    Risk  OTC drugs.  Prescription drug management.  Decision regarding hospitalization.        Disposition: Discharge  There are no disposition comments on file for this visit.     This note was generated in part using voice recognition dictation technology. The report was reviewed by this physician but still may have unintentional errors due to inherent limitations of voice recognition technology. All times are estimates.

## 2024-04-06 NOTE — DISCHARGE INSTRUCTIONS
Please follow-up with your doctor regarding your CBC results and return immediately for any worsening symptoms or new concerns

## 2024-04-06 NOTE — ED QUICK NOTES
Patient is alert and oriented x4. Showing no signs or symptoms of any respiratory distress. Able to ambulate with a steady gait. Discharge paperwork reviewed with patient and spouse at bedside, verbalized understanding.

## 2024-04-06 NOTE — ED INITIAL ASSESSMENT (HPI)
To ED with c/o sore throat and cough for the past week. Patient states having blood when she coughs.     HX Bronchioid carcinoma and partial lung lobectomy

## 2024-04-07 LAB
ATRIAL RATE: 107 BPM
P AXIS: -16 DEGREES
P-R INTERVAL: 132 MS
Q-T INTERVAL: 298 MS
QRS DURATION: 66 MS
QTC CALCULATION (BEZET): 397 MS
R AXIS: 25 DEGREES
T AXIS: 15 DEGREES
VENTRICULAR RATE: 107 BPM

## 2025-01-10 ENCOUNTER — HOSPITAL ENCOUNTER (OUTPATIENT)
Age: 35
Discharge: HOME | End: 2025-01-10
Payer: COMMERCIAL

## 2025-01-10 ENCOUNTER — APPOINTMENT (OUTPATIENT)
Dept: GENERAL RADIOLOGY | Age: 35
End: 2025-01-10
Attending: NURSE PRACTITIONER
Payer: COMMERCIAL

## 2025-01-10 VITALS
TEMPERATURE: 100 F | SYSTOLIC BLOOD PRESSURE: 122 MMHG | DIASTOLIC BLOOD PRESSURE: 75 MMHG | HEART RATE: 102 BPM | RESPIRATION RATE: 18 BRPM | OXYGEN SATURATION: 98 %

## 2025-01-10 DIAGNOSIS — J06.9 VIRAL URI WITH COUGH: Primary | ICD-10-CM

## 2025-01-10 DIAGNOSIS — R05.9 COUGH: ICD-10-CM

## 2025-01-10 LAB
POCT INFLUENZA A: NEGATIVE
POCT INFLUENZA B: NEGATIVE
SARS-COV-2 RNA RESP QL NAA+PROBE: NOT DETECTED

## 2025-01-10 PROCEDURE — U0002 COVID-19 LAB TEST NON-CDC: HCPCS | Performed by: NURSE PRACTITIONER

## 2025-01-10 PROCEDURE — 99214 OFFICE O/P EST MOD 30 MIN: CPT | Performed by: NURSE PRACTITIONER

## 2025-01-10 PROCEDURE — 87502 INFLUENZA DNA AMP PROBE: CPT | Performed by: NURSE PRACTITIONER

## 2025-01-10 PROCEDURE — A9150 MISC/EXPER NON-PRESCRIPT DRU: HCPCS | Performed by: NURSE PRACTITIONER

## 2025-01-10 PROCEDURE — 71046 X-RAY EXAM CHEST 2 VIEWS: CPT | Performed by: NURSE PRACTITIONER

## 2025-01-10 RX ORDER — BENZONATATE 100 MG/1
100 CAPSULE ORAL 3 TIMES DAILY PRN
Qty: 30 CAPSULE | Refills: 0 | Status: SHIPPED | OUTPATIENT
Start: 2025-01-10 | End: 2025-01-20

## 2025-01-10 RX ORDER — PREDNISONE 20 MG/1
20 TABLET ORAL DAILY
Qty: 3 TABLET | Refills: 0 | Status: SHIPPED | OUTPATIENT
Start: 2025-01-10 | End: 2025-01-13

## 2025-01-10 RX ORDER — ACETAMINOPHEN 500 MG
1000 TABLET ORAL ONCE
Status: COMPLETED | OUTPATIENT
Start: 2025-01-10 | End: 2025-01-10

## 2025-01-11 NOTE — ED PROVIDER NOTES
Patient Seen in: [facility]      History     Chief Complaint   Patient presents with    Cough/URI     Stated Complaint: cough    Subjective: This is a 35-year-old female, past medical history of anxiety, GERD, pneumonia, bronchial tumor status post lobectomy in 2008.  Patient presents to immediate care for evaluation of: Cough x 2 weeks.  Patient reports positive fever, chills, headache, body aches.  Positive congestion.  Patient had some blood tinged sputum with coughing today.  She is without chest pain, dizziness, lightheadedness, palpitations, shortness of breath.  No recent long distance travel or prolonged immobilization.  She is a non-smoker.  She is not on birth control.  Denies a personal or family history of DVT/PE.  Patient very anxious in immediate care which could explain tachycardia.  She states one of her anxiety triggers is medical offices and situations.  She is otherwise well-appearing.  Slightly febrile, Tylenol given in immediate care.  Speaking in complete and full sentences without difficulty.  AOx4.  The history is provided by the patient.             Objective:     Past Medical History:    Anxiety    Blood disorder    anemia    Bronchial tumor    Diverticulosis of large intestine    Esophageal reflux    History of lobectomy of lung    Hx of motion sickness    Pneumonia due to organism    Shortness of breath    Visual impairment    Wears eyeglasses              Past Surgical History:   Procedure Laterality Date    Adenoidectomy      Removal of lung,lobectomy      Tonsillectomy                  Social History     Socioeconomic History    Marital status:    Tobacco Use    Smoking status: Never    Smokeless tobacco: Never   Vaping Use    Vaping status: Never Used   Substance and Sexual Activity    Alcohol use: Yes     Comment: occasional wine    Drug use: Never     Social Drivers of Health     Food Insecurity: No Food Insecurity (10/25/2023)    Food Insecurity     Food  Insecurity: Never true   Transportation Needs: No Transportation Needs (10/25/2023)    Transportation Needs     Lack of Transportation: No   Housing Stability: Low Risk  (10/25/2023)    Housing Stability     Housing Instability: No              Review of Systems   Constitutional:  Positive for activity change, appetite change, chills, fatigue and fever.   HENT:  Positive for congestion, postnasal drip and rhinorrhea. Negative for ear discharge, ear pain, sinus pressure, sinus pain and sneezing.    Respiratory:  Positive for cough. Negative for chest tightness, shortness of breath and wheezing.    Cardiovascular: Negative.  Negative for chest pain and palpitations.   Gastrointestinal: Negative.    Musculoskeletal: Negative.    Skin: Negative.    Neurological: Negative.  Negative for dizziness, tremors, weakness, light-headedness and headaches.       Positive for stated complaint: cough  Other systems are as noted in HPI.  Constitutional and vital signs reviewed.      All other systems reviewed and negative except as noted above.    Physical Exam     ED Triage Vitals [01/10/25 1813]   BP (!) 114/91   Pulse 113   Resp 18   Temp 100.1 °F (37.8 °C)   Temp src Oral   SpO2 99 %   O2 Device None (Room air)       Current Vitals:   Vital Signs  BP: 122/75  Pulse: 102  Resp: 18  Temp: 99.7 °F (37.6 °C)  Temp src: Oral    Oxygen Therapy  SpO2: 98 %  O2 Device: None (Room air)        Physical Exam  Constitutional:       General: She is not in acute distress.     Appearance: Normal appearance. She is not ill-appearing.   HENT:      Head: Normocephalic.      Right Ear: Tympanic membrane, ear canal and external ear normal.      Left Ear: Tympanic membrane, ear canal and external ear normal.      Nose: Congestion present.      Mouth/Throat:      Mouth: Mucous membranes are moist.      Pharynx: No oropharyngeal exudate or posterior oropharyngeal erythema.   Eyes:      Extraocular Movements: Extraocular movements intact.      Pupils:  Pupils are equal, round, and reactive to light.   Cardiovascular:      Rate and Rhythm: Regular rhythm. Tachycardia present.      Pulses: Normal pulses.      Heart sounds: Normal heart sounds.      Comments: Patient tachycardia likely secondary to febrile illness and extreme anxiety  Pulmonary:      Effort: Pulmonary effort is normal.      Breath sounds: Decreased breath sounds present. No wheezing, rhonchi or rales.   Musculoskeletal:         General: Normal range of motion.      Cervical back: Normal range of motion.   Lymphadenopathy:      Cervical: No cervical adenopathy.   Skin:     General: Skin is warm.      Capillary Refill: Capillary refill takes less than 2 seconds.   Neurological:      General: No focal deficit present.      Mental Status: She is alert and oriented to person, place, and time.   Psychiatric:         Mood and Affect: Mood is anxious.      Comments: Patient with history of anxiety.  Extreme anxiety on examination.  Patient is shaking both of her legs while seated.  Her upper body is very tense.  She is rocking back and forth slightly at times.  Speech is rapid.             ED Course     Labs Reviewed   POCT FLU TEST - Normal    Narrative:     This assay is a rapid molecular in vitro test utilizing nucleic acid amplification of influenza A and B viral RNA.   RAPID SARS-COV-2 BY PCR - Normal                   MDM      Differentials considered include: COVID-19, influenza A, influenza B, viral URI, pneumonia.    Patient is negative on respiratory swabs.    Her tachycardia is likely secondary to febrile illness and anxiety.  She is without chest pain, dizziness, lightheadedness, palpitations, shortness of breath.  Non-smoker.  Not on birth control.  No history of DVT/PE.  No prolonged immobilization.  No recent long distance travel.  Tachycardia improved with fever reduction and coaching at bedside for breath work to reduce her anxiety.  Heart rate anywhere between .  Low suspicion for  pulmonary embolism.    Patient had chest x-ray performed due to length and duration of cough, diminished breath sounds, history of right lobectomy due to benign pulmonary tumor.  This was several years ago in 2008, patient has not been followed by pulmonologist for several years.    Previous chest x-rays and CT scan of chest evaluated while waiting for today's chest x-ray.  There is redemonstration and mention of a stable subpleural fat herniation along the posterior aspect of the right lower lobe similar to previous CT of chest in August 2023    Patient's chest x-ray today read by radiologist.  There is no acute disease seen in the chest.  There is redemonstration of likely lipoma.  Discussed these results with patient -verbalized understanding.    Symptoms are likely viral in nature.  Prescribed a very short course of prednisone.  20 mg for 3 days only due to patient's baseline anxiety and tachycardia and adverse side effects of increased heart rate of steroids.  She verbalized understanding of clinical decision making rationale.  Also prescribed Tessalon Perles.  Patient has several albuterol inhalers at home that she can use if needed.    Encourage patient to drink plenty water and get plenty rest.  She is aware to continue with at home treatment, supportive and symptomatic management.    Educated patient on signs and symptoms that warrant ER visit.    Encourage patient to follow-up with her PCP in 1 week.        Medical Decision Making  Amount and/or Complexity of Data Reviewed  External Data Reviewed: notes.  Radiology: ordered and independent interpretation performed. Decision-making details documented in ED Course.        Disposition and Plan     Clinical Impression:  1. Viral URI with cough    2. Cough         Disposition:  Discharge  1/10/2025  8:12 pm    Follow-up:  Steven Ballesteros MD  [address] [place] [id number]  [phone]    In 1 week            Medications Prescribed:  Discharge Medication  List as of 1/10/2025  8:12 PM              Supplementary Documentation:

## 2025-01-11 NOTE — DISCHARGE INSTRUCTIONS
You are negative for COVID-19, influenza A, influenza B.  Chest x-ray clear.  No pneumonia.    You are prescribed a short course of steroids, 1 tablet daily for 3 days only.  Tessalon Perles which are cough suppressants 3 times a day as needed for cough.  You may use your albuterol inhaler as needed.    Recommend following up with your primary care doctor in 1 week for reevaluation and reassessment.    Please go to ER if you have any chest pain, dizziness, lightheadedness, palpitations, shortness of breath.

## 2025-03-14 ENCOUNTER — HOSPITAL ENCOUNTER (OUTPATIENT)
Age: 35
Discharge: ANOTHER HOSPITAL | End: 2025-03-14
Payer: COMMERCIAL

## 2025-03-14 ENCOUNTER — HOSPITAL ENCOUNTER (INPATIENT)
Facility: HOSPITAL | Age: 35
LOS: 6 days | Discharge: HOME | End: 2025-03-20
Attending: STUDENT IN AN ORGANIZED HEALTH CARE EDUCATION/TRAINING PROGRAM | Admitting: HOSPITALIST
Payer: COMMERCIAL

## 2025-03-14 ENCOUNTER — APPOINTMENT (OUTPATIENT)
Dept: ULTRASOUND IMAGING | Facility: HOSPITAL | Age: 35
End: 2025-03-14
Attending: STUDENT IN AN ORGANIZED HEALTH CARE EDUCATION/TRAINING PROGRAM
Payer: COMMERCIAL

## 2025-03-14 VITALS
DIASTOLIC BLOOD PRESSURE: 55 MMHG | RESPIRATION RATE: 18 BRPM | TEMPERATURE: 98 F | HEART RATE: 104 BPM | SYSTOLIC BLOOD PRESSURE: 120 MMHG | OXYGEN SATURATION: 96 %

## 2025-03-14 DIAGNOSIS — B17.9 ACUTE HEPATITIS: Primary | ICD-10-CM

## 2025-03-14 DIAGNOSIS — R17 JAUNDICE: Primary | ICD-10-CM

## 2025-03-14 DIAGNOSIS — Z78.9 ALCOHOL USE: ICD-10-CM

## 2025-03-14 DIAGNOSIS — R00.0 TACHYCARDIA: ICD-10-CM

## 2025-03-14 DIAGNOSIS — R53.83 OTHER FATIGUE: ICD-10-CM

## 2025-03-14 DIAGNOSIS — R10.31 ABDOMINAL PAIN, RIGHT LOWER QUADRANT: ICD-10-CM

## 2025-03-14 LAB
ALBUMIN SERPL-MCNC: 2.9 G/DL (ref 3.2–4.8)
ALBUMIN/GLOB SERPL: 0.7 {RATIO} (ref 1–2)
ALP LIVER SERPL-CCNC: 158 U/L
ALT SERPL-CCNC: 78 U/L
ANION GAP SERPL CALC-SCNC: 8 MMOL/L (ref 0–18)
AST SERPL-CCNC: 443 U/L (ref ?–34)
BASOPHILS # BLD AUTO: 0.03 X10(3) UL (ref 0–0.2)
BASOPHILS NFR BLD AUTO: 0.5 %
BILIRUB SERPL-MCNC: 14.2 MG/DL (ref 0.3–1.2)
BUN BLD-MCNC: <5 MG/DL (ref 9–23)
CALCIUM BLD-MCNC: 8.7 MG/DL (ref 8.7–10.4)
CHLORIDE SERPL-SCNC: 101 MMOL/L (ref 98–112)
CO2 SERPL-SCNC: 26 MMOL/L (ref 21–32)
CREAT BLD-MCNC: 0.76 MG/DL
DEPRECATED RDW RBC AUTO: 71.9 FL (ref 35.1–46.3)
EGFRCR SERPLBLD CKD-EPI 2021: 105 ML/MIN/1.73M2 (ref 60–?)
EOSINOPHIL # BLD AUTO: 0.16 X10(3) UL (ref 0–0.7)
EOSINOPHIL NFR BLD AUTO: 2.6 %
ERYTHROCYTE [DISTWIDTH] IN BLOOD BY AUTOMATED COUNT: 21.3 % (ref 11–15)
GLOBULIN PLAS-MCNC: 4.2 G/DL (ref 2–3.5)
GLUCOSE BLD-MCNC: 100 MG/DL (ref 70–99)
HAV AB SER QL IA: REACTIVE
HBV CORE AB SERPL QL IA: NONREACTIVE
HBV SURFACE AB SER QL: REACTIVE
HBV SURFACE AB SERPL IA-ACNC: 621.2 MIU/ML
HBV SURFACE AG SERPL QL IA: NONREACTIVE
HCT VFR BLD AUTO: 38.8 %
HCV AB SERPL QL IA: NONREACTIVE
HGB BLD-MCNC: 13 G/DL
IMM GRANULOCYTES # BLD AUTO: 0.06 X10(3) UL (ref 0–1)
IMM GRANULOCYTES NFR BLD: 1 %
INR BLD: 1.58 (ref 0.8–1.2)
LIPASE SERPL-CCNC: 50 U/L (ref 12–53)
LYMPHOCYTES # BLD AUTO: 1.47 X10(3) UL (ref 1–4)
LYMPHOCYTES NFR BLD AUTO: 23.7 %
MCH RBC QN AUTO: 31.8 PG (ref 26–34)
MCHC RBC AUTO-ENTMCNC: 33.5 G/DL (ref 31–37)
MCV RBC AUTO: 94.9 FL
MONOCYTES # BLD AUTO: 0.47 X10(3) UL (ref 0.1–1)
MONOCYTES NFR BLD AUTO: 7.6 %
NEUTROPHILS # BLD AUTO: 4 X10 (3) UL (ref 1.5–7.7)
NEUTROPHILS # BLD AUTO: 4 X10(3) UL (ref 1.5–7.7)
NEUTROPHILS NFR BLD AUTO: 64.6 %
PLATELET # BLD AUTO: 99 10(3)UL (ref 150–450)
PLATELET MORPHOLOGY: NORMAL
PLATELETS.RETICULATED NFR BLD AUTO: 4.1 % (ref 0–7)
POTASSIUM SERPL-SCNC: 4.2 MMOL/L (ref 3.5–5.1)
PROT SERPL-MCNC: 7.1 G/DL (ref 5.7–8.2)
PROTHROMBIN TIME: 19.7 SECONDS (ref 11.6–14.8)
RBC # BLD AUTO: 4.09 X10(6)UL
SODIUM SERPL-SCNC: 135 MMOL/L (ref 136–145)
WBC # BLD AUTO: 6.2 X10(3) UL (ref 4–11)

## 2025-03-14 PROCEDURE — 76705 ECHO EXAM OF ABDOMEN: CPT | Performed by: STUDENT IN AN ORGANIZED HEALTH CARE EDUCATION/TRAINING PROGRAM

## 2025-03-14 PROCEDURE — 99223 1ST HOSP IP/OBS HIGH 75: CPT | Performed by: HOSPITALIST

## 2025-03-14 RX ORDER — CALCIUM CARBONATE 500 MG/1
500 TABLET, CHEWABLE ORAL DAILY
Status: DISCONTINUED | OUTPATIENT
Start: 2025-03-14 | End: 2025-03-20

## 2025-03-14 RX ORDER — QUETIAPINE FUMARATE 50 MG/1
50 TABLET, FILM COATED ORAL NIGHTLY
COMMUNITY

## 2025-03-14 RX ORDER — ZOLPIDEM TARTRATE 5 MG/1
10 TABLET ORAL NIGHTLY PRN
Status: DISCONTINUED | OUTPATIENT
Start: 2025-03-14 | End: 2025-03-20

## 2025-03-14 RX ORDER — SODIUM CHLORIDE 9 MG/ML
INJECTION, SOLUTION INTRAVENOUS CONTINUOUS
Status: DISCONTINUED | OUTPATIENT
Start: 2025-03-14 | End: 2025-03-20

## 2025-03-14 RX ORDER — ALBUTEROL SULFATE 90 UG/1
2 INHALANT RESPIRATORY (INHALATION) EVERY 4 HOURS PRN
COMMUNITY

## 2025-03-14 RX ORDER — ALBUTEROL SULFATE 90 UG/1
2 INHALANT RESPIRATORY (INHALATION) EVERY 4 HOURS PRN
Status: DISCONTINUED | OUTPATIENT
Start: 2025-03-14 | End: 2025-03-20

## 2025-03-14 RX ORDER — HEPARIN SODIUM 5000 [USP'U]/ML
7500 INJECTION, SOLUTION INTRAVENOUS; SUBCUTANEOUS EVERY 8 HOURS SCHEDULED
Status: DISCONTINUED | OUTPATIENT
Start: 2025-03-15 | End: 2025-03-20

## 2025-03-14 RX ORDER — ACETAMINOPHEN 500 MG
500 TABLET ORAL EVERY 4 HOURS PRN
Status: DISCONTINUED | OUTPATIENT
Start: 2025-03-14 | End: 2025-03-20

## 2025-03-14 RX ORDER — SERTRALINE HYDROCHLORIDE 100 MG/1
100 TABLET, FILM COATED ORAL DAILY
Status: DISCONTINUED | OUTPATIENT
Start: 2025-03-14 | End: 2025-03-20

## 2025-03-14 RX ORDER — PANTOPRAZOLE SODIUM 40 MG/1
40 TABLET, DELAYED RELEASE ORAL
Status: DISCONTINUED | OUTPATIENT
Start: 2025-03-15 | End: 2025-03-17

## 2025-03-14 RX ORDER — CALCIUM CARBONATE 500 MG/1
1 TABLET, CHEWABLE ORAL DAILY
COMMUNITY

## 2025-03-14 RX ORDER — ALPRAZOLAM 0.5 MG
0.5 TABLET ORAL 2 TIMES DAILY PRN
Status: DISCONTINUED | OUTPATIENT
Start: 2025-03-14 | End: 2025-03-20

## 2025-03-14 RX ORDER — QUETIAPINE FUMARATE 25 MG/1
50 TABLET, FILM COATED ORAL NIGHTLY
Status: DISCONTINUED | OUTPATIENT
Start: 2025-03-14 | End: 2025-03-20

## 2025-03-14 NOTE — ED INITIAL ASSESSMENT (HPI)
Patient reports yellowing of eyes and skin, fatigue since yesterday. States drank \" four eric cups of water with liquid IV yesterday\". Reporting dark urine. Alcoholic drinks 4-5 days a week but   \"Not heavy\". Reports feeling SOB.

## 2025-03-14 NOTE — ED PROVIDER NOTES
Patient Seen in: [facility] Emergency Department      History     Chief Complaint   Patient presents with    Jaundice     Stated Complaint: Jaundice, Fatigue    Subjective:   HPI      34-year-old female history of anxiety depression, present for evaluation of fatigue, jaundice.  She states that yesterday noted highlighter/orange-colored urine, associated with yellow discoloration of conjunctiva.  Notes has been increasingly fatigued over the past few days.  No recent fever or diarrhea.  Was at immediate care noted to be jaundiced and sent to the ER.  Denies recent medication changes.  Denies overuse of Tylenol.  Drinks about 2 glasses of wine 4-5 times per week.  Does have previous history of hepatic steatosis.    Objective:     Past Medical History:    Anxiety    Blood disorder    anemia    Bronchial tumor    Diverticulosis of large intestine    Esophageal reflux    History of lobectomy of lung    Hx of motion sickness    Pneumonia due to organism    Shortness of breath    Visual impairment    Wears eyeglasses              Past Surgical History:   Procedure Laterality Date    Adenoidectomy      Removal of lung,lobectomy      Tonsillectomy                  Social History     Socioeconomic History    Marital status:    Tobacco Use    Smoking status: Never    Smokeless tobacco: Never   Vaping Use    Vaping status: Never Used   Substance and Sexual Activity    Alcohol use: Yes     Alcohol/week: 10.0 standard drinks of alcohol     Types: 10 Glasses of wine per week    Drug use: Never     Social Drivers of Health     Food Insecurity: No Food Insecurity (10/25/2023)    Food Insecurity     Food Insecurity: Never true   Transportation Needs: No Transportation Needs (10/25/2023)    Transportation Needs     Lack of Transportation: No   Housing Stability: Low Risk  (10/25/2023)    Housing Stability     Housing Instability: No                  Physical Exam     ED Triage Vitals [03/14/25 1706]   /85    Pulse 103   Resp 18   Temp 97.8 °F (36.6 °C)   Temp src Temporal   SpO2 95 %   O2 Device None (Room air)       Current Vitals:   Vital Signs  BP: 116/73  Pulse: 89  Resp: 18  Temp: 97.8 °F (36.6 °C)  Temp src: Temporal  MAP (mmHg): 86    Oxygen Therapy  SpO2: 93 %  O2 Device: None (Room air)        Physical Exam  Constitutional: awake, alert, no sig distress  HENT: mmm, no lesions,  Neck: normal range of motion, no tenderness, supple.  Eyes: PERRL, EOMI, conjunctiva normal, no discharge. Sclera icteric.  Cardiovascular: rr no murmur  Respiratory: Normal breath sounds, no respiratory distress, no wheezing, no chest tenderness.  GI: Bowel sounds normal, Soft, +RUQ pain, no masses, no pulsatile masses.  : No CVA tenderness.  Skin: Warm, dry, no erythema, no rash.  Musculoskeletal: Intact distal pulses, no edema, no tenderness, no cyanosis, no clubbing. Good range of motion in all major joints. No tenderness to palpation or major deformities noted. Back- No tenderness.  Neurologic: Alert & oriented x 3, normal motor function, normal sensory function, no focal deficits noted.  Psych: Calm, cooperative, nl affect        ED Course     Labs Reviewed   CBC WITH DIFFERENTIAL WITH PLATELET - Abnormal; Notable for the following components:       Result Value    RDW-SD 71.9 (*)     RDW 21.3 (*)     PLT 99.0 (*)     All other components within normal limits   COMP METABOLIC PANEL (14) - Abnormal; Notable for the following components:    Glucose 100 (*)     Sodium 135 (*)     BUN <5 (*)     ALT 78 (*)      (*)     Alkaline Phosphatase 158 (*)     Bilirubin, Total 14.2 (*)     Albumin 2.9 (*)     Globulin  4.2 (*)     A/G Ratio 0.7 (*)     All other components within normal limits   RBC MORPHOLOGY SCAN - Abnormal; Notable for the following components:    RBC Morphology See morphology below (*)     All other components within normal limits   PROTHROMBIN TIME (PT) - Abnormal; Notable for the following components:    PT  19.7 (*)     INR 1.58 (*)     All other components within normal limits   LIPASE - Normal   ACTIN (SMOOTH MUSCLE) ANTIBODY   MITOCHONDRIAL (M2) ANTIBODY   HEPATITIS A B + C PROFILE   RAINBOW DRAW LAVENDER   RAINBOW DRAW LIGHT GREEN   RAINBOW DRAW BLUE                   MDM      34F hx as above who presents with jaundice  On arrival vss, reassuring  Ddx: Alcoholic hepatitis, Cirrhosis, Choledocholithiasis  -May be 2/2 alcohol use however does not seem clear-cut etoh cirrhosis, so will d/w GI, obtain RUQUS, Hepatitis panel, SMA/M2 ab.   -given IVF bolus  -will admit d/w hospitalist    Admission disposition: 3/14/2025  7:33 PM           Medical Decision Making      Disposition and Plan     Clinical Impression:  1. Acute hepatitis         Disposition:  Admit  3/14/2025  7:33 pm    Follow-up:  No follow-up provider specified.        Medications Prescribed:  Current Discharge Medication List              Supplementary Documentation:         Hospital Problems       Present on Admission  Date Reviewed: 1/10/2025            ICD-10-CM Noted POA    Acute hepatitis B17.9 3/14/2025 Unknown

## 2025-03-14 NOTE — ED PROVIDER NOTES
Patient Seen in: [facility]      History     Chief Complaint   Patient presents with    Jaundice     Stated Complaint: eye complaint    Subjective:   HPI    Patient is a 34-year-old female with obesity, diverticulitis with abscess, colovesicular fistula, C. difficile, GERD, alcohol use, anxiety, presenting to immediate care for evaluation of acute yellowing of eyes and skin. Onset: Yesterday.  Associated extreme fatigue and feeling dehydrated.  States he drank 4 liquid IV with David cup of water for concern for dehydration.  Endorsing dark-colored urine.  She looked online.  She is concerned symptoms may be due related to underlying anemia.  She took initial Tylenol for symptoms for body aches with minimal improvement.  Switch to ibuprofen.  States she drinks 4-5 times weekly approximately 2 glasses of wine.  She does not believe she is a heavy drinker.  No hard alcohol.  In addition endorsing shortness of breath with exertion and right lower quadrant pain.  She denies prior episodes.      Objective:     No pertinent past medical history.            No pertinent past surgical history.              No pertinent social history.            Review of Systems   Constitutional:  Positive for fatigue. Negative for chills and fever.   Eyes:         Yellow eyes   Respiratory:  Positive for shortness of breath. Negative for cough.    Cardiovascular:  Negative for chest pain.   Gastrointestinal:  Negative for abdominal pain, constipation, diarrhea, nausea and vomiting.   Musculoskeletal:  Positive for myalgias.   Skin:  Positive for color change. Negative for rash.        Yellow skin.   Allergic/Immunologic: Negative for immunocompromised state.   Neurological:  Negative for dizziness, weakness, light-headedness and headaches.   Hematological:  Does not bruise/bleed easily.   Psychiatric/Behavioral:  Negative for confusion.        Positive for stated complaint: eye complaint  Other systems are as noted in  HPI.  Constitutional and vital signs reviewed.      All other systems reviewed and negative except as noted above.    Physical Exam     ED Triage Vitals [03/14/25 1535]   /55   Pulse 104   Resp 18   Temp 97.5 °F (36.4 °C)   Temp src Oral   SpO2 96 %   O2 Device None (Room air)       Current Vitals:   Vital Signs  BP: 120/55  Pulse: 104  Resp: 18  Temp: 97.5 °F (36.4 °C)  Temp src: Oral    Oxygen Therapy  SpO2: 96 %  O2 Device: None (Room air)        Physical Exam  Vitals and nursing note reviewed.   Constitutional:       General: She is not in acute distress.     Appearance: Normal appearance. She is well-developed. She is obese. She is not ill-appearing, toxic-appearing or diaphoretic.   HENT:      Head: Normocephalic and atraumatic.      Mouth/Throat:      Mouth: Mucous membranes are moist.   Eyes:      General: No scleral icterus.     Comments: Scleral icterus.   Cardiovascular:      Rate and Rhythm: Normal rate and regular rhythm.   Pulmonary:      Effort: Pulmonary effort is normal. No respiratory distress.      Breath sounds: Normal breath sounds.   Abdominal:      Comments: Minimal tenderness to palpation right lower quadrant/pelvic region.  No guarding or rebound tenderness.  Soft abdomen.   Musculoskeletal:         General: No tenderness. Normal range of motion.      Cervical back: Normal range of motion. No rigidity.   Skin:     Coloration: Skin is jaundiced.      Findings: No rash.   Neurological:      General: No focal deficit present.      Mental Status: She is alert and oriented to person, place, and time.      Motor: No weakness.      Coordination: Coordination normal.      Gait: Gait normal.   Psychiatric:         Mood and Affect: Mood is anxious.           ED Course   Labs Reviewed - No data to display         MDM     Patient is a 34-year-old female, presenting to immediate care for evaluation of a yellowing of eyes and skin with extreme fatigue that started yesterday.  Patient endorses recent  acetaminophen use.  Drinks EtOH 4-5 times weekly with wine 2 glasses daily.  Does endorse right lower abdominal/pelvic pain.  Discussed limitation immediate care evaluation.  Patient requires higher acuity of care for evaluation of jaundice including LFTs, acetaminophen levels, and advanced imaging.  Will go to [place] ER for evaluation of new onset jaundice    Of note: patient states gets extremely anxious with placement of IV.  She plans to take antianxiety medication prior to administration of peripheral IV.    Medical Decision Making      Disposition and Plan     Clinical Impression:  1. Jaundice    2. Alcohol use    3. Abdominal pain, right lower quadrant    4. Other fatigue    5. Tachycardia         Disposition:  Ic to ed  3/14/2025  4:06 pm    Follow-up:  No follow-up provider specified.        Medications Prescribed:  Discharge Medication List as of 3/14/2025  4:07 PM              Supplementary Documentation:

## 2025-03-14 NOTE — ED INITIAL ASSESSMENT (HPI)
Pt presents to ed with c/o jaundice. Pt states she woke up yesterday with yellow eyes and \"my urine was neon yellow.\"  Reports \"I have 4 40oz of water with liquid IV since yesterday and my urine is still neon yellow.\" Pt reports RLQ, weakness, and fatigue.    Sent to ed by IC.  Pt aox4, speaking in full sentences.

## 2025-03-15 ENCOUNTER — APPOINTMENT (OUTPATIENT)
Dept: MRI IMAGING | Facility: HOSPITAL | Age: 35
End: 2025-03-15
Attending: HOSPITALIST
Payer: COMMERCIAL

## 2025-03-15 ENCOUNTER — APPOINTMENT (OUTPATIENT)
Dept: CT IMAGING | Facility: HOSPITAL | Age: 35
End: 2025-03-15
Attending: INTERNAL MEDICINE
Payer: COMMERCIAL

## 2025-03-15 LAB
ALBUMIN SERPL-MCNC: 2.6 G/DL (ref 3.2–4.8)
ALP LIVER SERPL-CCNC: 124 U/L
ALT SERPL-CCNC: 70 U/L
ANION GAP SERPL CALC-SCNC: 9 MMOL/L (ref 0–18)
AST SERPL-CCNC: 368 U/L (ref ?–34)
BILIRUB DIRECT SERPL-MCNC: 9.7 MG/DL (ref ?–0.3)
BILIRUB SERPL-MCNC: 12.5 MG/DL (ref 0.3–1.2)
BUN BLD-MCNC: 7 MG/DL (ref 9–23)
BUN/CREAT SERPL: 10 (ref 10–20)
CALCIUM BLD-MCNC: 8.1 MG/DL (ref 8.7–10.4)
CERULOPLASMIN SERPL-MCNC: 28 MG/DL (ref 20–60)
CHLORIDE SERPL-SCNC: 103 MMOL/L (ref 98–112)
CK SERPL-CCNC: 1549 U/L
CO2 SERPL-SCNC: 26 MMOL/L (ref 21–32)
CREAT BLD-MCNC: 0.7 MG/DL
DEPRECATED HBV CORE AB SER IA-ACNC: 152 NG/ML
DEPRECATED RDW RBC AUTO: 74.8 FL (ref 35.1–46.3)
EGFRCR SERPLBLD CKD-EPI 2021: 116 ML/MIN/1.73M2 (ref 60–?)
ERYTHROCYTE [DISTWIDTH] IN BLOOD BY AUTOMATED COUNT: 22 % (ref 11–15)
GLUCOSE BLD-MCNC: 97 MG/DL (ref 70–99)
HAV IGM SER QL: NONREACTIVE
HCT VFR BLD AUTO: 32.2 %
HETEROPH AB SER QL: NEGATIVE
HGB BLD-MCNC: 10.8 G/DL
IGA SERPL-MCNC: 407 MG/DL (ref 40–350)
IGM SERPL-MCNC: 323 MG/DL (ref 50–300)
IMMUNOGLOBULIN PNL SER-MCNC: 2110 MG/DL (ref 650–1600)
INR BLD: 1.82 (ref 0.8–1.2)
MCH RBC QN AUTO: 32.2 PG (ref 26–34)
MCHC RBC AUTO-ENTMCNC: 33.5 G/DL (ref 31–37)
MCV RBC AUTO: 96.1 FL
OSMOLALITY SERPL CALC.SUM OF ELEC: 284 MOSM/KG (ref 275–295)
PLATELET # BLD AUTO: 81 10(3)UL (ref 150–450)
PLATELETS.RETICULATED NFR BLD AUTO: 2.8 % (ref 0–7)
POTASSIUM SERPL-SCNC: 3.7 MMOL/L (ref 3.5–5.1)
PROT SERPL-MCNC: 5.7 G/DL (ref 5.7–8.2)
PROTHROMBIN TIME: 22 SECONDS (ref 11.6–14.8)
RBC # BLD AUTO: 3.35 X10(6)UL
SODIUM SERPL-SCNC: 138 MMOL/L (ref 136–145)
TSI SER-ACNC: 3.77 UIU/ML (ref 0.55–4.78)
WBC # BLD AUTO: 4.8 X10(3) UL (ref 4–11)

## 2025-03-15 PROCEDURE — 76376 3D RENDER W/INTRP POSTPROCES: CPT | Performed by: HOSPITALIST

## 2025-03-15 PROCEDURE — 74170 CT ABD WO CNTRST FLWD CNTRST: CPT | Performed by: INTERNAL MEDICINE

## 2025-03-15 PROCEDURE — 74183 MRI ABD W/O CNTR FLWD CNTR: CPT | Performed by: HOSPITALIST

## 2025-03-15 PROCEDURE — 99222 1ST HOSP IP/OBS MODERATE 55: CPT | Performed by: INTERNAL MEDICINE

## 2025-03-15 PROCEDURE — 99233 SBSQ HOSP IP/OBS HIGH 50: CPT | Performed by: HOSPITALIST

## 2025-03-15 RX ORDER — GADOTERATE MEGLUMINE 376.9 MG/ML
20 INJECTION INTRAVENOUS
Status: COMPLETED | OUTPATIENT
Start: 2025-03-15 | End: 2025-03-15

## 2025-03-15 NOTE — CONSULTS
[facility]   Gastroenterology Consultation Note     Mica Bernabe  Patient Status:    Inpatient  Date of Admission:         3/14/2025, Hospital day #1  Attending:   Sergey Valencia MD  PCP:     AARON DAVILA MD    Reason for Consultation:  Elevated liver enzymes    History of Present Illness: spouse present bedside    Mica Bernabe is a 34 year old woman with history of BMI of 46, tonsillectomy, adenoidectomy, prior C. difficile, complicated/perforated left colon diverticulitis in June 2023 eventually undergoing surgery in October 2023 who was admitted after presenting to the emergency department yesterday with jaundice.    Says she has never had this before but woke up 2 days ago jaundiced prompting her to present to the emergency department.  Says she has been extremely weak and fatigued with muscle aches.  Otherwise denies any other specific symptoms including not limited to abdominal pain.  She denies any recent prescription medications or significant over-the-counter medication uses including acetaminophen.  No family history of liver problems.  She admits to drinking wine 2 glasses 4-5 times a week.    History:  Past Medical History:    Anxiety    Blood disorder    anemia    Bronchial tumor    Diverticulosis of large intestine    Esophageal reflux    History of lobectomy of lung    Hx of motion sickness    Pneumonia due to organism    Shortness of breath    Visual impairment    Wears eyeglasses     Past Surgical History:   Procedure Laterality Date    Adenoidectomy      Removal of lung,lobectomy      Tonsillectomy       Family History   Problem Relation Age of Onset    Cancer Father         Colon CA    Other (Other) Father         kidney transplant    Diabetes Father     Other (Other) Mother         pulm,onary fibrosis      reports that she has never smoked. She has never used smokeless tobacco. She reports current alcohol use of about 10.0 standard drinks of alcohol per  week. She reports that she does not use drugs.    Allergies:  Allergies[1]    Medications:    Current Facility-Administered Medications:     benzocaine-menthol (Cepacol) lozenge 1 lozenge, 1 lozenge, Oral, Q4H PRN    sodium chloride 0.9% infusion, , Intravenous, Continuous    heparin (Porcine) 5000 UNIT/ML injection 7,500 Units, 7,500 Units, Subcutaneous, Q8H DIONNE    acetaminophen (Tylenol Extra Strength) tab 500 mg, 500 mg, Oral, Q4H PRN    albuterol (Ventolin HFA) 108 (90 Base) MCG/ACT inhaler 2 puff, 2 puff, Inhalation, Q4H PRN    ALPRAZolam (Xanax) tab 0.5 mg, 0.5 mg, Oral, BID PRN    calcium carbonate (Tums) chewable tab 500 mg, 500 mg, Oral, Daily    pantoprazole (Protonix) DR tab 40 mg, 40 mg, Oral, QAM AC    QUEtiapine (SEROquel) tab 50 mg, 50 mg, Oral, Nightly    sertraline (Zoloft) tab 100 mg, 100 mg, Oral, Daily    zolpidem (Ambien) tab 10 mg, 10 mg, Oral, Nightly PRN    influenza virus trivalent PF (Fluzone Trivalent) 0.5 mL IM injection (ages 6 months to 64 years) 0.5 mL, 0.5 mL, Intramuscular, Prior to discharge    Review of Systems:  Systems were reviewed and were negative except as noted in the HPI    Physical Exam:    Blood pressure 108/58, pulse 100, temperature 97.7 °F (36.5 °C), temperature source Oral, resp. rate 18, height 5' 4\" (1.626 m), weight 284 lb 2.8 oz (128.9 kg), last menstrual period 02/12/2025, SpO2 90%. Body mass index is 48.78 kg/m².    General:awake, cooperative, no acute distress  HEENT: EOMI, no scleral icterus, MMM; oral pharnyx is without exudates or lesions  Neck: no lymphadenopathy; thyroid is not enlarged and without nodules  CV: RRR  Resp: non-labored breathing  Abd: soft, non-tender, non-distended  Ext: no lower extremity swelling  Neuro: Alert, Oriented X 3  Skin: no rashes, bruises  Psych: normal affect      Laboratory Data:  Lab Results   Component Value Date    WBC 4.8 03/15/2025    HGB 10.8 03/15/2025    HCT 32.2 03/15/2025    PLT 81.0 03/15/2025    CREATSERUM 0.70  03/15/2025    BUN 7 03/15/2025     03/15/2025    K 3.7 03/15/2025     03/15/2025    CO2 26.0 03/15/2025    GLU 97 03/15/2025    CA 8.1 03/15/2025    ALB 2.6 03/15/2025    ALKPHO 124 03/15/2025    BILT 12.5 03/15/2025    TP 5.7 03/15/2025     03/15/2025    ALT 70 03/15/2025    INR 1.82 03/15/2025    LIP 50 03/14/2025       Imaging:  US LIVER (CPT=76705)    Result Date: 3/14/2025  CONCLUSION:   Moderately limited examination.  No biliary ductal dilation on limited assessment.  In the setting of concern for biliary obstruction due to the presence of jaundice, recommend correlation with MRCP or ERCP.  Severe hepatomegaly with severe diffuse hepatic steatosis.   Possible sludge within the gallbladder, which is not well assessed due to examination limitations.  May represent artifact as well.  Otherwise, no imaging evidence of acute cholecystitis.        Dictated by (CST): Marion Belle MD on 3/14/2025 at 8:38 PM     Finalized by (CST): Marion Belle MD on 3/14/2025 at 8:41 PM           Assessment & Plan   Mica Bernabe is a 34 year old woman with history of BMI of 46, tonsillectomy, adenoidectomy, prior C. difficile, complicated/perforated left colon diverticulitis in June 2023 eventually undergoing surgery in October 2023 who was admitted after presenting to the emergency department yesterday with jaundice.    Liver enzymes are elevated on presentation with bilirubin at 14.2 which is slightly down trended today.  Previous LFTs in 2023 are essentially normal and unremarkable.  An ultrasound of the liver/right upper quadrant shows severe hepatomegaly with severe diffuse hepatic steatosis though otherwise unremarkable but limited.    We discussed the unclear etiology for the acute jaundice.  Acute viral hepatitis A, B, C serologies are unrevealing.  Possible EtOH.  Discussed recommendations for other evaluation with serologies.  Also recommend evaluation of the liver and biliary tree with  MRI/MRCP though patient reporting she is unable to do this due to claustrophobia and so we will plan for CT scan.    Recommend:  -serologies ordered for evaluation of acute/chronic jaundice  -CT liver  -ETOH cessation    Discussed with MD [facility] - Gastroenterology  3/15/2025         [1] No Active Allergies

## 2025-03-15 NOTE — H&P
Encounter Date: 11/23/2022       History     Chief Complaint   Patient presents with    Arm Pain     Pt presents to ED escorted by mother c/o left wrist radiating up  to left elbow.  Pt reports a fall today that occurred around 1600.  Denies loc or head injury.  Denies taking medication for pain.  Pain 6/10.      13yo F with chief complaint L wrist and elbow pain after fall at [employer] earlier today. Unsure of mechanism or how she injured arm; unsure if FOOSH. R hand dominant. No open wound. Pain mostly to wrist, radiates proximally to elbow. Limited ROM of wrist and elbow 2/2 pain. No open wound. No previous injury/surgery to the area.     Review of patient's allergies indicates:  No Known Allergies  Past Medical History:   Diagnosis Date    Dysmenorrhea      Past Surgical History:   Procedure Laterality Date    ADENOIDECTOMY      COLONOSCOPY Left 11/15/2021    Procedure: COLONOSCOPY;  Surgeon: Nurys Edward MD;  Location: [address]);  Service: Endoscopy;  Laterality: Left;    ESOPHAGOGASTRODUODENOSCOPY Left 11/15/2021    Procedure: ESOPHAGOGASTRODUODENOSCOPY (EGD);  Surgeon: Nurys Edward MD;  Location: [address]);  Service: Endoscopy;  Laterality: Left;  Fully vaccinated    TYMPANOSTOMY TUBE PLACEMENT       Family History   Problem Relation Age of Onset    No Known Problems Mother     No Known Problems Father     Heart attacks under age 50 Paternal Uncle     Heart defect Paternal Uncle     Breast cancer Maternal Grandmother 72    Pancreatic cancer Maternal Grandfather     Heart failure Paternal Grandmother     Colon cancer Paternal Grandfather      Social History     Tobacco Use    Smoking status: Never    Smokeless tobacco: Never   Substance Use Topics    Alcohol use: Never     Alcohol/week: 0.0 standard drinks    Drug use: Never     Review of Systems   Musculoskeletal:  Positive for arthralgias. Negative for joint swelling.   Skin:  Negative for wound.   Neurological:   [facility]    PATIENT'S NAME: FERNANDEZ OLSON   ATTENDING PHYSICIAN: Earnest Sheriff MD   PATIENT ACCOUNT#:   [id number]    LOCATION:  [address]  MEDICAL RECORD #:   [id number]       YOB: 1990  ADMISSION DATE:       03/14/2025    HISTORY AND PHYSICAL EXAMINATION    CHIEF COMPLAINT:  Jaundice and abnormal liver function test.    HISTORY OF PRESENT ILLNESS:  The patient is a 34-year-old  female with morbid obesity and underlying possible hepatic steatosis, presented today to the emergency department for evaluation after she noted her urine is dark in color since yesterday, and her eyes are turning yellow.  CBC was unremarkable, except for platelet count of 99.  Chemistry showed ALT 78 and , alkaline phosphatase 158, total bilirubin 14.2.  Lipase was normal.  Liver ultrasound still pending.  Patient will be admitted to the hospital for further management.  INR today is 1.58.    PAST MEDICAL HISTORY:  Morbid obesity, hepatic steatosis, diverticulosis, diverticulitis, C difficile infection after antibiotic exposure, severe anxiety disorder.    PAST SURGICAL HISTORY:  Right middle and lower lobe lobectomy for carcinoid tumor when she was 18.  She had perforated sigmoid diverticulitis, required sigmoidectomy and colovesical fistula takedown, tonsillectomy.    MEDICATIONS:  Please see medication reconciliation list.     ALLERGIES:  No known drug allergies.    FAMILY HISTORY:  Father had colon cancer.  Mother had pulmonary fibrosis.    SOCIAL HISTORY:  No tobacco or drug use.  She reports that she drinks 2 glasses of wine 4 to 5 times a week.  Last drink was 3 to 4 days ago.    REVIEW OF SYSTEMS:  Patient reports that no chest pain, no shortness of breath.  She has some abdominal fullness earlier today.  Yesterday started noticing her urine turned dark, tea colored, and started noticing yellowish discoloration in her eyes.  She denies nausea, vomiting, or  Negative for weakness.   All other systems reviewed and are negative.    Physical Exam     Initial Vitals [11/23/22 2339]   BP Pulse Resp Temp SpO2   137/75 81 16 98.4 °F (36.9 °C) 100 %      MAP       --         Physical Exam    Nursing note and vitals reviewed.  Constitutional: She appears well-developed and well-nourished. She is not diaphoretic. No distress.   Well-appearing, nontoxic.  Sitting upright on exam table.   HENT:   Head: Normocephalic and atraumatic.   Neck: Neck supple.   Normal range of motion.  Cardiovascular:  Intact distal pulses.           1+ radial bilaterally   Pulmonary/Chest: No respiratory distress.   Musculoskeletal:      Cervical back: Normal range of motion and neck supple.      Comments: Mild ttp L posterior olecranon. TTP distal radius. No ulnar styloid ttp. No bony deformity. Very mild snuffbox ttp. Full active ROM all MCPs, IPs. No open wound. No joint edema or obvious effusion.  Retains near full active range of motion of wrist. Painful, limited active pronation/supination of forearm.     Neurological: She is alert and oriented to person, place, and time. GCS score is 15. GCS eye subscore is 4. GCS verbal subscore is 5. GCS motor subscore is 6.   Skin: Skin is warm.   Psychiatric: She has a normal mood and affect. Thought content normal.       ED Course   Procedures  Labs Reviewed - No data to display       Imaging Results              X-Ray Wrist Complete Left (Final result)  Result time 11/24/22 00:12:59      Final result by Angie Yoder MD (11/24/22 00:12:59)                   Impression:      No acute bony abnormality detected.      Electronically signed by: Angie Yoder  Date:    11/24/2022  Time:    00:12               Narrative:    EXAMINATION:  THREE VIEWS OF THE LEFT ELBOW AND LEFT WRIST    CLINICAL HISTORY:  Pain.    TECHNIQUE:  AP, oblique, and lateral view of the left elbow    COMPARISON:  None.    FINDINGS:  Three views of the left elbow demonstrate no acute  abdominal pain.  No fever or chills.  Other 12-point review of systems is negative.       PHYSICAL EXAMINATION:    GENERAL:  Alert and oriented to time, place, and person.  No acute distress.  VITAL SIGNS:  Temperature 97.8, pulse 102, respiratory rate 18, blood pressure 92/65, pulse ox 96% on room air.    HEENT:  Atraumatic.  Oropharynx clear.  Dry mucous membranes.  Ears, nose normal.  Eyes:  Icteric sclerae bilaterally.  NECK:  Supple.  No lymphadenopathy.  Trachea midline.   LUNGS:  Clear to auscultation bilaterally.  Normal respiratory effort.    HEART:  Regular rate, rhythm.  S1 and S2 auscultated.   ABDOMEN:  Soft, nondistended.  Obese.  Positive bowel sounds.  No tenderness.  EXTREMITIES:  No edema, clubbing, or cyanosis.  NEUROLOGIC:  Motor and sensory intact.    ASSESSMENT AND PLAN:  Jaundice and abnormal liver function tests.  Clinical presentation is inconsistent with obstruction, possibly combination of hepatic steatosis and alcohol consumption.  Will obtain hepatitis profile, anti-smooth muscle antibody.  Rule autoimmune hepatitis and antimitochondrial antibodies.  Obtain gastroenterology consult.  Follow up on liver ultrasound, possible underlying developing cirrhosis.  Obtain MRCP with and without contrast.  Further recommendations to follow.     Dictated By Bean Naik MD  d: 03/14/2025 19:35:06  t: 03/14/2025 19:53:42  Job [id number]  FB/    cc: Earnest Sheriff MD     fracture or dislocation.    Three views of the left wrist demonstrate no acute fracture or dislocation.                                       X-Ray Elbow Complete Left (Final result)  Result time 11/24/22 00:12:59      Final result by Angie Yoder MD (11/24/22 00:12:59)                   Impression:      No acute bony abnormality detected.      Electronically signed by: Angie Yoder  Date:    11/24/2022  Time:    00:12               Narrative:    EXAMINATION:  THREE VIEWS OF THE LEFT ELBOW AND LEFT WRIST    CLINICAL HISTORY:  Pain.    TECHNIQUE:  AP, oblique, and lateral view of the left elbow    COMPARISON:  None.    FINDINGS:  Three views of the left elbow demonstrate no acute fracture or dislocation.    Three views of the left wrist demonstrate no acute fracture or dislocation.                                       Medications   acetaminophen tablet 650 mg (650 mg Oral Given 11/23/22 2349)     Medical Decision Making:   Differential Diagnosis:   Contusion, fracture, sprain/strain, arthritis  Clinical Tests:   Radiological Study: Ordered and Reviewed  ED Management:  No acute fracture on imaging.  Placed in removable/Velcro thumb spica to prevent re-injury, to limit Mobic.  She is very very mild tenderness to the snuffbox region, however I think unlikely occult fracture at this time given most for pain is to the distal radius.  However, referral to pediatric orthopedics placed for any persistent symptoms.  She and mom feel comfortable plan outpatient follow-up.                        Clinical Impression:   Final diagnoses:  [M25.532] Left wrist pain (Primary)  [M25.522] Left elbow pain        ED Disposition Condition    Discharge Stable          ED Prescriptions    None       Follow-up Information       Follow up With Specialties Details Why Contact Info    [facility] PEDIATRIC ORTHOPEDICS Pediatric Orthopedics Schedule an appointment as soon as possible for a visit  For reevaluation, If symptoms persist  [address]  [facility] [id number]  [phone]    [facility] - Orthopedics Orthopedic Surgery, Pediatric Orthopedic Surgery Schedule an appointment as soon as possible for a visit  For reevaluation, If symptoms persist [address]  [facility] [address]  [phone]               Deon Cesar PA-C  11/24/22 0035

## 2025-03-15 NOTE — PROGRESS NOTES
Progress Note     Mica Bernabe Patient Status:  Inpatient    1990 MRN [id number]   Location [facility] 5SW/SE Attending Sergey Valencia MD   Hosp Day # 1 PCP AARON DAVILA MD     Chief Complaint: jaundice    Subjective:   S: Patient here with jaundice, elevated bilirubin  Overall feeling unwell muscles are achy feels very fatigued  Denies chest pain shortness of breath dizziness  Denies abdominal pain    Review of Systems:   10 point ROS completed and was negative, except for pertinent positive and negatives stated in subjective.    Objective:   Vital signs:  Temp:  [97.5 °F (36.4 °C)-98.5 °F (36.9 °C)] 97.7 °F (36.5 °C)  Pulse:  [] 100  Resp:  [16-18] 18  BP: ()/(55-85) 108/58  SpO2:  [90 %-96 %] 90 %    Wt Readings from Last 6 Encounters:   25 284 lb 2.8 oz (128.9 kg)   24 260 lb (117.9 kg)   12/15/23 267 lb (121.1 kg)   23 265 lb (120.2 kg)   10/27/23 274 lb 0.5 oz (124.3 kg)   10/19/23 268 lb (121.6 kg)         Physical Exam:    General: No acute distress. Alert ,      , morbidly obese  Respiratory: Clear to auscultation bilaterally. No wheezes. No rhonchi.  Cardiovascular: S1, S2. Regular rate and rhythm. No murmurs, rubs or gallops.   Abdomen: Soft, nontender, nondistended.  Positive bowel sounds. No rebound or guarding.  Neurologic: No focal neurological deficits.   Musculoskeletal: Moves all extremities.  Extremities: No edema.    Results:   Diagnostic Data:      Labs:    Labs Last 24 Hours:   BMP     CBC    Other     Na 138 Cl 103 BUN 7 Glu 97   Hb 10.8   PTT - Procal -   K 3.7 CO2 26.0 Cr 0.70   WBC 4.8 >< PLT 81.0  INR 1.82 CRP -   Renal Lytes Endo    Hct 32.2   Trop - D dim -   eGFR - Ca 8.1 POC Gluc  -    LFT   pBNP - Lactic -   eGFR AA - PO4 - A1c -    APk 124 Prot 5.7  LDL -     Mg - TSH -   ALT 70 T jareth 12.5 Alb 2.6        COVID-19 Lab Results    COVID-19  Lab Results   Component Value Date    COVID19 Not Detected 01/10/2025    COVID19 Not  Detected 04/06/2024    COVID19 Not Detected 08/19/2023       Pro-Calcitonin  No results for input(s): \"PCT\" in the last 168 hours.    Cardiac  No results for input(s): \"TROP\", \"PBNP\" in the last 168 hours.    Creatinine Kinase  No results for input(s): \"CK\" in the last 168 hours.    Inflammatory Markers  No results for input(s): \"CRP\", \"ZHAO\", \"LDH\", \"DDIMER\" in the last 168 hours.    Imaging: Imaging data reviewed in Epic.    Medications:    heparin  7,500 Units Subcutaneous Q8H DIONNE    calcium carbonate  500 mg Oral Daily    pantoprazole  40 mg Oral QAM AC    QUEtiapine  50 mg Oral Nightly    sertraline  100 mg Oral Daily       Assessment & Plan:   ASSESSMENT / PLAN:     Jaundice   Elevated liver function test  Fatty liver  -Here with jaundice x 2 days bilirubin 14.2 on admission---> 12.5  -Gastroenterology consulted   -liver ultrasound--> no biliary ductal dilation on limited assessment, severe hepatomegaly with severe diffuse hepatic steatosis, possible sludge within the gallbladder which is not well assessed  -Plan MRCP  -Drinks about 2 glasses of wine 4 times a week  -Discussed plans for MRI today  -Appreciate GI evaluation  -Advised alcohol cessation in this acute setting with elevated transaminases    Morbid obesity    History of diverticulitis  History of C. difficile infection  Anxiety disorder      Quality:  DVT Prophylaxis: Heparin  CODE status: Full code  Cerda:    Central line: n/a  Dispo: further recs pending clinical course      Will the patient be referred to TCC on discharge?: yes  Estimated date of discharge: TBD  Discharge is dependent on: clinical improvement  At this point Ms. Bernabe is expected to be discharge to: home    Plan of care discussed with patient, nursing, GI    Outpatient records or previous hospital records reviewed.   Patient and/or patient's family given opportunity to ask questions and note understanding and agreeing with therapeutic plan as outlined     Coordinated care with  providers and counseling re: treatment plan and workup    MDM: High complexity     LYUBOV LUNDY MD    Supplementary Documentation:

## 2025-03-15 NOTE — ED QUICK NOTES
Orders for admission, patient is aware of plan and ready to go upstairs. Any questions, please call ED RN Sarai at extension [phone].     Patient Covid vaccination status: Unvaccinated     COVID Test Ordered in ED: None    COVID Suspicion at Admission: N/A    Running Infusions:      Mental Status/LOC at time of transport: A&Ox4    Other pertinent information:   CIWA score: N/A   NIH score:  N/A

## 2025-03-15 NOTE — ED QUICK NOTES
Rounding Completed    Plan of Care reviewed. Waiting for bed.  Elimination needs assessed.  Provided status update.    Bed is locked and in lowest position. Call light within reach.Family at bedside.

## 2025-03-15 NOTE — PLAN OF CARE
Problem: Patient Centered Care  Goal: Patient preferences are identified and integrated in the patient's plan of care  Description: Interventions:- What would you like us to know as we care for you? - Provide timely, complete, and accurate information to patient/family- Incorporate patient and family knowledge, values, beliefs, and cultural backgrounds into the planning and delivery of care- Encourage patient/family to participate in care and decision-making at the level they choose- Honor patient and family perspectives and choices  Outcome: Progressing     Problem: Patient/Family Goals  Goal: Patient/Family Long Term Goal  Description: Patient's Long Term Goal: Monitor liver function   Interventions:-   - Monitor vitals  - Monitor appropriate labs  - Administer medications as ordered  - Follow MD's orders  - Update patient on plan of care   - Discharge planning   - GI consult   - See additional Care Plan goals for specific interventions  Outcome: Progressing  Goal: Patient/Family Short Term Goal  Description: Patient's Short Term Goal: DC from hospital, treat  Interventions:   - Monitor vitals  - Monitor appropriate labs  - Administer medications as ordered  - Follow MD's orders  - Update patient on plan of care   - Discharge planning   - See additional Care Plan goals for specific interventions  Outcome: Progressing     Problem: GASTROINTESTINAL - ADULT  Goal: Minimal or absence of nausea and vomiting  Description: INTERVENTIONS:- Maintain adequate hydration with IV or PO as ordered and tolerated- Nasogastric tube to low intermittent suction as ordered- Evaluate effectiveness of ordered antiemetic medications- Provide nonpharmacologic comfort measures as appropriate- Advance diet as tolerated, if ordered- Obtain nutritional consult as needed- Evaluate fluid balance  Outcome: Progressing  Goal: Maintains or returns to baseline bowel function  Description: INTERVENTIONS:- Assess bowel function- Maintain adequate  hydration with IV or PO as ordered and tolerated- Evaluate effectiveness of GI medications- Encourage mobilization and activity- Obtain nutritional consult as needed- Establish a toileting routine/schedule- Consider collaborating with pharmacy to review patient's medication profile  Outcome: Progressing     Problem: METABOLIC/FLUID AND ELECTROLYTES - ADULT  Goal: Hemodynamic stability and optimal renal function maintained  Description: INTERVENTIONS:- Monitor labs and assess for signs and symptoms of volume excess or deficit- Monitor intake, output and patient weight- Monitor urine specific gravity, serum osmolarity and serum sodium as indicated or ordered- Monitor response to interventions for patient's volume status, including labs, urine output, blood pressure (other measures as available)- Encourage oral intake as appropriate- Instruct patient on fluid and nutrition restrictions as appropriate  Outcome: Progressing     Problem: HEMATOLOGIC - ADULT  Goal: Maintains hematologic stability  Description: INTERVENTIONS- Assess for signs and symptoms of bleeding or hemorrhage- Monitor labs and vital signs for trends- Administer supportive blood products/factors, fluids and medications as ordered and appropriate- Administer supportive blood products/factors as ordered and appropriate  Outcome: Progressing  Goal: Free from bleeding injury  Description: (Example usage: patient with low platelets)INTERVENTIONS:- Avoid intramuscular injections, enemas and rectal medication administration- Ensure safe mobilization of patient- Hold pressure on venipuncture sites to achieve adequate hemostasis- Assess for signs and symptoms of internal bleeding- Monitor lab trends- Patient is to report abnormal signs of bleeding to staff- Avoid use of toothpicks and dental floss- Use electric shaver for shaving- Use soft bristle tooth brush- Limit straining and forceful nose blowing  Outcome: Progressing     Problem: ANXIETY  Goal: Will report  anxiety at manageable levels  Description: INTERVENTIONS:- Administer medication as ordered- Teach and rehearse alternative coping skills- Provide emotional support with 1:1 interaction with staff  Outcome: Progressing

## 2025-03-15 NOTE — ED QUICK NOTES
Pt states that she took 2, 10mg Ambien instead of her Xanax around 3pm today. MD Sheriff made aware

## 2025-03-15 NOTE — PLAN OF CARE
Problem: Patient Centered Care  Goal: Patient preferences are identified and integrated in the patient's plan of care  Description: Interventions:- What would you like us to know as we care for you? Home with spouse   - Provide timely, complete, and accurate information to patient/family- Incorporate patient and family knowledge, values, beliefs, and cultural backgrounds into the planning and delivery of care- Encourage patient/family to participate in care and decision-making at the level they choose- Honor patient and family perspectives and choices  Outcome: Progressing     Problem: Patient/Family Goals  Goal: Patient/Family Long Term Goal  Description: Patient's Long Term Goal: Monitor liver function   Interventions:-   - Monitor vitals  - Monitor appropriate labs  - Administer medications as ordered  - Follow MD's orders  - Update patient on plan of care   - Discharge planning   - GI consult   - See additional Care Plan goals for specific interventions  Outcome: Progressing  Goal: Patient/Family Short Term Goal  Description: Patient's Short Term Goal: DC from hospital, treat  Interventions:   - Monitor vitals  - Monitor appropriate labs  - Administer medications as ordered  - Follow MD's orders  - Update patient on plan of care   - Discharge planning   - See additional Care Plan goals for specific interventions  Outcome: Progressing     Problem: GASTROINTESTINAL - ADULT  Goal: Minimal or absence of nausea and vomiting  Description: INTERVENTIONS:- Maintain adequate hydration with IV or PO as ordered and tolerated- Nasogastric tube to low intermittent suction as ordered- Evaluate effectiveness of ordered antiemetic medications- Provide nonpharmacologic comfort measures as appropriate- Advance diet as tolerated, if ordered- Obtain nutritional consult as needed- Evaluate fluid balance  Outcome: Progressing  Goal: Maintains or returns to baseline bowel function  Description: INTERVENTIONS:- Assess bowel function-  Maintain adequate hydration with IV or PO as ordered and tolerated- Evaluate effectiveness of GI medications- Encourage mobilization and activity- Obtain nutritional consult as needed- Establish a toileting routine/schedule- Consider collaborating with pharmacy to review patient's medication profile  Outcome: Progressing     Problem: METABOLIC/FLUID AND ELECTROLYTES - ADULT  Goal: Hemodynamic stability and optimal renal function maintained  Description: INTERVENTIONS:- Monitor labs and assess for signs and symptoms of volume excess or deficit- Monitor intake, output and patient weight- Monitor urine specific gravity, serum osmolarity and serum sodium as indicated or ordered- Monitor response to interventions for patient's volume status, including labs, urine output, blood pressure (other measures as available)- Encourage oral intake as appropriate- Instruct patient on fluid and nutrition restrictions as appropriate  Outcome: Progressing     Problem: HEMATOLOGIC - ADULT  Goal: Maintains hematologic stability  Description: INTERVENTIONS- Assess for signs and symptoms of bleeding or hemorrhage- Monitor labs and vital signs for trends- Administer supportive blood products/factors, fluids and medications as ordered and appropriate- Administer supportive blood products/factors as ordered and appropriate  Outcome: Progressing  Goal: Free from bleeding injury  Description: (Example usage: patient with low platelets)INTERVENTIONS:- Avoid intramuscular injections, enemas and rectal medication administration- Ensure safe mobilization of patient- Hold pressure on venipuncture sites to achieve adequate hemostasis- Assess for signs and symptoms of internal bleeding- Monitor lab trends- Patient is to report abnormal signs of bleeding to staff- Avoid use of toothpicks and dental floss- Use electric shaver for shaving- Use soft bristle tooth brush- Limit straining and forceful nose blowing  Outcome: Progressing     Problem:  ANXIETY  Goal: Will report anxiety at manageable levels  Description: INTERVENTIONS:- Administer medication as ordered- Teach and rehearse alternative coping skills- Provide emotional support with 1:1 interaction with staff  Outcome: Progressing

## 2025-03-16 LAB
ALBUMIN SERPL-MCNC: 2.3 G/DL (ref 3.2–4.8)
ALBUMIN/GLOB SERPL: 0.7 {RATIO} (ref 1–2)
ALP LIVER SERPL-CCNC: 131 U/L
ALT SERPL-CCNC: 68 U/L
ANION GAP SERPL CALC-SCNC: 8 MMOL/L (ref 0–18)
AST SERPL-CCNC: 278 U/L (ref ?–34)
BASOPHILS # BLD AUTO: 0.03 X10(3) UL (ref 0–0.2)
BASOPHILS NFR BLD AUTO: 0.7 %
BILIRUB SERPL-MCNC: 11.8 MG/DL (ref 0.3–1.2)
BUN BLD-MCNC: 6 MG/DL (ref 9–23)
BUN/CREAT SERPL: 10.2 (ref 10–20)
CALCIUM BLD-MCNC: 7.8 MG/DL (ref 8.7–10.4)
CHLORIDE SERPL-SCNC: 106 MMOL/L (ref 98–112)
CK SERPL-CCNC: 1035 U/L
CO2 SERPL-SCNC: 24 MMOL/L (ref 21–32)
CREAT BLD-MCNC: 0.59 MG/DL
DEPRECATED RDW RBC AUTO: 74.9 FL (ref 35.1–46.3)
EGFRCR SERPLBLD CKD-EPI 2021: 121 ML/MIN/1.73M2 (ref 60–?)
EOSINOPHIL # BLD AUTO: 0.15 X10(3) UL (ref 0–0.7)
EOSINOPHIL NFR BLD AUTO: 3.3 %
ERYTHROCYTE [DISTWIDTH] IN BLOOD BY AUTOMATED COUNT: 22.5 % (ref 11–15)
GLOBULIN PLAS-MCNC: 3.2 G/DL (ref 2–3.5)
GLUCOSE BLD-MCNC: 87 MG/DL (ref 70–99)
HCT VFR BLD AUTO: 30.3 %
HGB BLD-MCNC: 10.2 G/DL
IMM GRANULOCYTES # BLD AUTO: 0.05 X10(3) UL (ref 0–1)
IMM GRANULOCYTES NFR BLD: 1.1 %
LYMPHOCYTES # BLD AUTO: 1.24 X10(3) UL (ref 1–4)
LYMPHOCYTES NFR BLD AUTO: 27.6 %
MCH RBC QN AUTO: 32.4 PG (ref 26–34)
MCHC RBC AUTO-ENTMCNC: 33.7 G/DL (ref 31–37)
MCV RBC AUTO: 96.2 FL
MONOCYTES # BLD AUTO: 0.36 X10(3) UL (ref 0.1–1)
MONOCYTES NFR BLD AUTO: 8 %
NEUTROPHILS # BLD AUTO: 2.66 X10 (3) UL (ref 1.5–7.7)
NEUTROPHILS # BLD AUTO: 2.66 X10(3) UL (ref 1.5–7.7)
NEUTROPHILS NFR BLD AUTO: 59.3 %
OSMOLALITY SERPL CALC.SUM OF ELEC: 283 MOSM/KG (ref 275–295)
PLATELET # BLD AUTO: 89 10(3)UL (ref 150–450)
PLATELETS.RETICULATED NFR BLD AUTO: 2 % (ref 0–7)
POTASSIUM SERPL-SCNC: 3.8 MMOL/L (ref 3.5–5.1)
PROT SERPL-MCNC: 5.5 G/DL (ref 5.7–8.2)
RBC # BLD AUTO: 3.15 X10(6)UL
SODIUM SERPL-SCNC: 138 MMOL/L (ref 136–145)
WBC # BLD AUTO: 4.5 X10(3) UL (ref 4–11)

## 2025-03-16 PROCEDURE — 99233 SBSQ HOSP IP/OBS HIGH 50: CPT | Performed by: HOSPITALIST

## 2025-03-16 PROCEDURE — 99233 SBSQ HOSP IP/OBS HIGH 50: CPT | Performed by: INTERNAL MEDICINE

## 2025-03-16 RX ORDER — MAGNESIUM HYDROXIDE/ALUMINUM HYDROXICE/SIMETHICONE 120; 1200; 1200 MG/30ML; MG/30ML; MG/30ML
30 SUSPENSION ORAL ONCE
Status: COMPLETED | OUTPATIENT
Start: 2025-03-16 | End: 2025-03-16

## 2025-03-16 NOTE — PLAN OF CARE
Problem: Patient Centered Care  Goal: Patient preferences are identified and integrated in the patient's plan of care  Description: Interventions:- What would you like us to know as we care for you? - Provide timely, complete, and accurate information to patient/family- Incorporate patient and family knowledge, values, beliefs, and cultural backgrounds into the planning and delivery of care- Encourage patient/family to participate in care and decision-making at the level they choose- Honor patient and family perspectives and choices  3/15/2025 2253 by Mauro Gamboa RN  Outcome: Progressing  3/15/2025 2251 by Mauro Gamboa RN  Outcome: Progressing     Problem: Patient/Family Goals  Goal: Patient/Family Long Term Goal  Description: Patient's Long Term Goal: Monitor liver function   Interventions:-   - Monitor vitals  - Monitor appropriate labs  - Administer medications as ordered  - Follow MD's orders  - Update patient on plan of care   - Discharge planning   - GI consult   - See additional Care Plan goals for specific interventions  3/15/2025 2253 by Mauro Gamboa RN  Outcome: Progressing  3/15/2025 2251 by Mauro Gamboa RN  Outcome: Progressing     Problem: Patient/Family Goals  Goal: Patient/Family Short Term Goal  Description: Patient's Short Term Goal: DC from hospital, treat  Interventions:   - Monitor vitals  - Monitor appropriate labs  - Administer medications as ordered  - Follow MD's orders  - Update patient on plan of care   - Discharge planning   - See additional Care Plan goals for specific interventions  3/15/2025 2253 by Mauro Gamboa RN  Outcome: Progressing  3/15/2025 2251 by Mauro Gamboa RN  Outcome: Progressing     Problem: GASTROINTESTINAL - ADULT  Goal: Minimal or absence of nausea and vomiting  Description: INTERVENTIONS:- Maintain adequate hydration with IV or PO as ordered and tolerated- Nasogastric tube to low intermittent suction as ordered- Evaluate  effectiveness of ordered antiemetic medications- Provide nonpharmacologic comfort measures as appropriate- Advance diet as tolerated, if ordered- Obtain nutritional consult as needed- Evaluate fluid balance  3/15/2025 2253 by Mauro Gamboa RN  Outcome: Progressing  3/15/2025 2251 by Mauro Gamboa RN  Outcome: Progressing     Problem: GASTROINTESTINAL - ADULT  Goal: Maintains or returns to baseline bowel function  Description: INTERVENTIONS:- Assess bowel function- Maintain adequate hydration with IV or PO as ordered and tolerated- Evaluate effectiveness of GI medications- Encourage mobilization and activity- Obtain nutritional consult as needed- Establish a toileting routine/schedule- Consider collaborating with pharmacy to review patient's medication profile  3/15/2025 2253 by Mauro Gamboa RN  Outcome: Progressing  3/15/2025 2251 by Mauro Gamboa RN  Outcome: Progressing     Problem: METABOLIC/FLUID AND ELECTROLYTES - ADULT  Goal: Hemodynamic stability and optimal renal function maintained  Description: INTERVENTIONS:- Monitor labs and assess for signs and symptoms of volume excess or deficit- Monitor intake, output and patient weight- Monitor urine specific gravity, serum osmolarity and serum sodium as indicated or ordered- Monitor response to interventions for patient's volume status, including labs, urine output, blood pressure (other measures as available)- Encourage oral intake as appropriate- Instruct patient on fluid and nutrition restrictions as appropriate  3/15/2025 2253 by Mauro Gamboa RN  Outcome: Progressing  3/15/2025 2251 by Mauro Gamboa RN  Outcome: Progressing     Problem: HEMATOLOGIC - ADULT  Goal: Maintains hematologic stability  Description: INTERVENTIONS- Assess for signs and symptoms of bleeding or hemorrhage- Monitor labs and vital signs for trends- Administer supportive blood products/factors, fluids and medications as ordered and appropriate- Administer  supportive blood products/factors as ordered and appropriate  3/15/2025 2253 by Mauro Gamboa, RN  Outcome: Progressing  3/15/2025 2251 by Mauro Gamboa, RN  Outcome: Progressing     Problem: ANXIETY  Goal: Will report anxiety at manageable levels  Description: INTERVENTIONS:- Administer medication as ordered- Teach and rehearse alternative coping skills- Provide emotional support with 1:1 interaction with staff  3/15/2025 2253 by Mauro Gamboa, RN  Outcome: Progressing  3/15/2025 2251 by Mauro Gamboa, RN  Outcome: Progressing

## 2025-03-16 NOTE — PROGRESS NOTES
[facility]     Gastroenterology Progress Note    Mica Bernabe Patient Status:  Inpatient    1990 MRN [id number]   Location [facility] 5SW/SE Attending Mello Williamson MD   Hosp Day # 2 PCP AARON DAVILA MD       Subjective:   No acute events overnight.  Says she still feels weak and achy throughout her body.  Denies abdominal pain, chest pain, shortness of breath.  Significant other at bedside.  She is describing significant heartburn this morning.  She does note taking omeprazole 40 mg once daily.    Objective:   Blood pressure 110/66, pulse 91, temperature 98.2 °F (36.8 °C), temperature source Oral, resp. rate 18, height 5' 4\" (1.626 m), weight 284 lb 2.8 oz (128.9 kg), last menstrual period 2025, SpO2 91%. Body mass index is 48.78 kg/m².    General:awake, cooperative, no acute distress  HEENT: EOMI, no scleral icterus, MMM; oral pharnyx is without exudates or lesions  Neck: no lymphadenopathy; thyroid is not enlarged and without nodules  CV: RRR  Resp: non-labored breathing  Abd: soft, non-tender, non-distended  Ext: no lower extremity swelling  Neuro: Alert, Oriented X 3  Skin: no rashes, bruises  Psych: normal affect    Results:     Lab Results   Component Value Date    WBC 4.5 2025    HGB 10.2 (L) 2025    HCT 30.3 (L) 2025    PLT 89.0 (L) 2025    CREATSERUM 0.59 2025    BUN 6 (L) 2025     2025    K 3.8 2025     2025    CO2 24.0 2025    GLU 87 2025    CA 7.8 (L) 2025    ALB 2.3 (L) 2025    ALKPHO 131 (H) 2025    BILT 11.8 (H) 2025    TP 5.5 (L) 2025     (H) 2025    ALT 68 (H) 2025    PTT 29.9 2023    INR 1.82 (H) 03/15/2025    TSH 3.775 03/15/2025    LIP 50 2025    DDIMER 0.54 (H) 2024    MG 2.0 10/29/2023    PHOS 4.7 10/31/2023    TROP <0.045 2021    CK 1,035 (H) 2025       MRI ABDOMEN AND MRCP W/3D  (W+W0)(CPT=74183/45728)    Result Date: 3/16/2025  CONCLUSION:   Hepatomegaly and steatosis.  Mild contour nodularity which may indicate a component of fibrosis.  No suspicious hepatic lesions are identified.  The portal and hepatic veins are patent.  Splenomegaly and small volume ascites which may indicate portal hypertension.  Gallbladder wall edema at probably relates to hepatocellular disease.    elm-remote    Dictated by (CST): Mino Medrano MD on 3/16/2025 at 6:24 AM     Finalized by (CST): Mino Medrano MD on 3/16/2025 at 6:33 AM          CT ABDOMEN TRIPHASIC LIVER (W+WO) (CPT=74170)    Result Date: 3/15/2025  CONCLUSION:  1. Marked hepatic steatosis, moderate hepatomegaly .  Diffuse heterogeneous hepatic enhancement may be related to hepatitis, and or cirrhosis.  Portal vein and branches patent. 2. Moderate splenomegaly. 3. Small amount of ascites and gallbladder wall edema related to liver disease.     Dictated by (CST): Jose Luis Rosa MD on 3/15/2025 at 5:20 PM     Finalized by (CST): Jose Luis Rosa MD on 3/15/2025 at 5:36 PM          US LIVER (CPT=76705)    Result Date: 3/14/2025  CONCLUSION:   Moderately limited examination.  No biliary ductal dilation on limited assessment.  In the setting of concern for biliary obstruction due to the presence of jaundice, recommend correlation with MRCP or ERCP.  Severe hepatomegaly with severe diffuse hepatic steatosis.   Possible sludge within the gallbladder, which is not well assessed due to examination limitations.  May represent artifact as well.  Otherwise, no imaging evidence of acute cholecystitis.        Dictated by (CST): Marion Belle MD on 3/14/2025 at 8:38 PM     Finalized by (CST): Marion Belle MD on 3/14/2025 at 8:41 PM               Assessment and Plan:   Mica Bernabe is a 34 year old woman with history of BMI of 46, tonsillectomy, adenoidectomy, prior C. difficile, complicated/perforated left colon diverticulitis in June 2023 eventually  undergoing surgery in October 2023 who was admitted after presenting to the emergency department yesterday with jaundice.     Liver enzymes are elevated on presentation with bilirubin at 14.2 which has down trended.  Previous LFTs in 2023 are essentially normal and unremarkable.  An ultrasound of the liver/right upper quadrant shows severe hepatomegaly with severe diffuse hepatic steatosis though otherwise unremarkable but limited.    MRI/MRCP and CT scan imaging from 3/15/2025 morphologically suggest cirrhosis which we discussed as a possibility today.  Possibility of MASH and/or ETOH.  Chronic liver serologies are in process but those thus far unrevealing.    We had a lengthy conversation about the possibility of a diagnosis of cirrhosis again based on the appearance of the liver.  We discussed if there is cirrhosis it appears it would be compensated.  We did discuss decompensating signs/symptoms to monitor for going forward.  We discussed no alcohol is recommended going forward.  We discussed consideration of a liver biopsy in the future.  We also discussed consideration of ultrasound elastography.    We discussed anticipated discharge in the near future with anticipated follow-up with our PA in the GI office 1 to 2 weeks following discharge with likely anticipation of referral to a hepatologist as well.    I discussed with hospitalist for elevated CK and body aches. Plan for monitoring and IV fluids at this time.    Will continue pantoprazole 40 mg daily and add antacids as needed.     Recommend:  -daily PP and antacids prn  -await final serologies ordered for evaluation of acute/chronic jaundice  -complete ETOH cessation  -IV fluids  -outpatient GI discharge clinic in 1-2 weeks and anticipated hepatology referral/evaluation outpatient    Discussed with hospitalist    MD Scott Malik-[facility] - Gastroenterology  3/16/2025

## 2025-03-16 NOTE — PLAN OF CARE
Problem: Patient Centered Care  Goal: Patient preferences are identified and integrated in the patient's plan of care  Description: Interventions:- What would you like us to know as we care for you? Home with spouse  - Provide timely, complete, and accurate information to patient/family  - Incorporate patient and family knowledge, values, beliefs, and cultural backgrounds into the planning and delivery of care- Encourage patient/family to participate in care and decision-making at the level they choose  - Honor patient and family perspectives and choices  Outcome: Progressing     Problem: Patient/Family Goals  Goal: Patient/Family Long Term Goal  Description: Patient's Long Term Goal: Monitor liver function   Interventions:-   - Monitor vitals  - Monitor appropriate labs  - Administer medications as ordered  - Follow MD's orders  - Update patient on plan of care   - Discharge planning   - GI consult   - See additional Care Plan goals for specific interventions  Outcome: Progressing  Goal: Patient/Family Short Term Goal  Description: Patient's Short Term Goal: DC from hospital, treat  Interventions:   - Monitor vitals  - Monitor appropriate labs  - Administer medications as ordered  - Follow MD's orders  - Update patient on plan of care   - Discharge planning   - See additional Care Plan goals for specific interventions  Outcome: Progressing     Problem: GASTROINTESTINAL - ADULT  Goal: Minimal or absence of nausea and vomiting  Description: INTERVENTIONS:- Maintain adequate hydration with IV or PO as ordered and tolerated- Nasogastric tube to low intermittent suction as ordered- Evaluate effectiveness of ordered antiemetic medications- Provide nonpharmacologic comfort measures as appropriate- Advance diet as tolerated, if ordered- Obtain nutritional consult as needed- Evaluate fluid balance  Outcome: Progressing  Goal: Maintains or returns to baseline bowel function  Description: INTERVENTIONS:- Assess bowel  function- Maintain adequate hydration with IV or PO as ordered and tolerated- Evaluate effectiveness of GI medications- Encourage mobilization and activity- Obtain nutritional consult as needed- Establish a toileting routine/schedule- Consider collaborating with pharmacy to review patient's medication profile  Outcome: Progressing     Problem: METABOLIC/FLUID AND ELECTROLYTES - ADULT  Goal: Hemodynamic stability and optimal renal function maintained  Description: INTERVENTIONS:- Monitor labs and assess for signs and symptoms of volume excess or deficit- Monitor intake, output and patient weight- Monitor urine specific gravity, serum osmolarity and serum sodium as indicated or ordered- Monitor response to interventions for patient's volume status, including labs, urine output, blood pressure (other measures as available)- Encourage oral intake as appropriate- Instruct patient on fluid and nutrition restrictions as appropriate  Outcome: Progressing     Problem: HEMATOLOGIC - ADULT  Goal: Maintains hematologic stability  Description: INTERVENTIONS- Assess for signs and symptoms of bleeding or hemorrhage- Monitor labs and vital signs for trends- Administer supportive blood products/factors, fluids and medications as ordered and appropriate- Administer supportive blood products/factors as ordered and appropriate  Outcome: Progressing  Goal: Free from bleeding injury  Description: (Example usage: patient with low platelets)INTERVENTIONS:- Avoid intramuscular injections, enemas and rectal medication administration- Ensure safe mobilization of patient- Hold pressure on venipuncture sites to achieve adequate hemostasis- Assess for signs and symptoms of internal bleeding- Monitor lab trends- Patient is to report abnormal signs of bleeding to staff- Avoid use of toothpicks and dental floss- Use electric shaver for shaving- Use soft bristle tooth brush- Limit straining and forceful nose blowing  Outcome: Progressing      Problem: ANXIETY  Goal: Will report anxiety at manageable levels  Description: INTERVENTIONS:- Administer medication as ordered- Teach and rehearse alternative coping skills- Provide emotional support with 1:1 interaction with staff  Outcome: Progressing

## 2025-03-16 NOTE — PROGRESS NOTES
Progress Note     Mica Bernabe Patient Status:  Inpatient    1990 MRN [id number]   Location [facility] 5SW/SE Attending Mello Williamson MD   Hosp Day # 2 PCP AARON DAVILA MD     Chief Complaint: jaundice    Subjective:   S: Patient here with jaundice, elevated bilirubin  Feels fatigued and also having generalized muscle aches  Denies chest pain shortness of breath dizziness  Denies abdominal pain    Review of Systems:   10 point ROS completed and was negative, except for pertinent positive and negatives stated in subjective.    Objective:   Vital signs:  Temp:  [98.1 °F (36.7 °C)-99.7 °F (37.6 °C)] 98.2 °F (36.8 °C)  Pulse:  [91-96] 91  Resp:  [18] 18  BP: ()/(58-77) 110/66  SpO2:  [91 %-95 %] 91 %    Wt Readings from Last 6 Encounters:   25 284 lb 2.8 oz (128.9 kg)   24 260 lb (117.9 kg)   12/15/23 267 lb (121.1 kg)   23 265 lb (120.2 kg)   10/27/23 274 lb 0.5 oz (124.3 kg)   10/19/23 268 lb (121.6 kg)         Physical Exam:    General: No acute distress. Alert ,      , morbidly obese  Respiratory: Clear to auscultation bilaterally. No wheezes. No rhonchi.  Cardiovascular: S1, S2. Regular rate and rhythm. No murmurs, rubs or gallops.   Abdomen: Soft, nontender, nondistended.  Positive bowel sounds. No rebound or guarding.  Neurologic: No focal neurological deficits.   Musculoskeletal: Moves all extremities.  Extremities: No edema.    Results:   Diagnostic Data:      Labs:    Labs Last 24 Hours:   BMP     CBC    Other     Na 138 Cl 106 BUN 6 Glu 87   Hb 10.2   PTT - Procal -   K 3.8 CO2 24.0 Cr 0.59   WBC 4.5 >< PLT 89.0  INR - CRP -   Renal Lytes Endo    Hct 30.3   Trop - D dim -   eGFR - Ca 7.8 POC Gluc  -    LFT   pBNP - Lactic -   eGFR AA - PO4 - A1c -    APk 131 Prot 5.5  LDL -     Mg - TSH 3.775   ALT 68 T jareth 11.8 Alb 2.3        COVID-19 Lab Results    COVID-19  Lab Results   Component Value Date    COVID19 Not Detected 01/10/2025    COVID19 Not  Detected 04/06/2024    COVID19 Not Detected 08/19/2023       Pro-Calcitonin  No results for input(s): \"PCT\" in the last 168 hours.    Cardiac  No results for input(s): \"TROP\", \"PBNP\" in the last 168 hours.    Creatinine Kinase  Recent Labs   Lab 03/15/25  1452 03/16/25  0715   CK 1,549* 1,035*       Inflammatory Markers  Recent Labs   Lab 03/15/25  1452   ZHAO 152       Imaging: Imaging data reviewed in Epic.    Medications:    heparin  7,500 Units Subcutaneous Q8H DIONNE    calcium carbonate  500 mg Oral Daily    pantoprazole  40 mg Oral QAM AC    QUEtiapine  50 mg Oral Nightly    sertraline  100 mg Oral Daily       Assessment & Plan:   ASSESSMENT / PLAN:     Jaundice   Elevated liver function test  Fatty liver  -Here with jaundice x 2 days bilirubin 14.2 on admission---> 11.8  -Gastroenterology consulted   -liver ultrasound--> no biliary ductal dilation on limited assessment, severe hepatomegaly with severe diffuse hepatic steatosis, possible sludge within the gallbladder which is not well assessed  -Plan MRCP -> reviewed  -Drinks about 2 glasses of wine 4 times a week -> advised to stop etoh consumption  -Appreciate GI evaluation  -Advised alcohol cessation in this acute setting with elevated transaminases    Morbid obesity    History of diverticulitis  History of C. difficile infection  Anxiety disorder      Quality:  DVT Prophylaxis: Heparin  CODE status: Full code  Cerda:    Central line: n/a  Dispo: further recs pending clinical course      Will the patient be referred to [facility] on discharge?: yes  Estimated date of discharge: TBD  Discharge is dependent on: clinical improvement  At this point Ms. Bernabe is expected to be discharge to: home    Plan of care discussed with patient, nursing, GI    Outpatient records or previous hospital records reviewed.   Patient and/or patient's family given opportunity to ask questions and note understanding and agreeing with therapeutic plan as outlined     Coordinated care  with providers and counseling re: treatment plan and workup    MDM: High complexity

## 2025-03-16 NOTE — PLAN OF CARE
Problem: Patient Centered Care  Goal: Patient preferences are identified and integrated in the patient's plan of care  Description: Interventions:- What would you like us to know as we care for you? - Provide timely, complete, and accurate information to patient/family- Incorporate patient and family knowledge, values, beliefs, and cultural backgrounds into the planning and delivery of care- Encourage patient/family to participate in care and decision-making at the level they choose- Honor patient and family perspectives and choices  3/16/2025 0532 by Mauro Gamboa RN  Outcome: Progressing  3/15/2025 2253 by Mauro Gamboa RN  Outcome: Progressing  3/15/2025 2251 by Mauro Gamboa RN  Outcome: Progressing     Problem: Patient/Family Goals  Goal: Patient/Family Long Term Goal  Description: Patient's Long Term Goal: Monitor liver function   Interventions:-   - Monitor vitals  - Monitor appropriate labs  - Administer medications as ordered  - Follow MD's orders  - Update patient on plan of care   - Discharge planning   - GI consult   - See additional Care Plan goals for specific interventions  3/16/2025 0532 by Mauro Gamboa RN  Outcome: Progressing  3/15/2025 2253 by Mauro Gamboa RN  Outcome: Progressing  3/15/2025 2251 by Mauro Gamboa RN  Outcome: Progressing  Goal: Patient/Family Short Term Goal  Description: Patient's Short Term Goal: DC from hospital, treat  Interventions:   - Monitor vitals  - Monitor appropriate labs  - Administer medications as ordered  - Follow MD's orders  - Update patient on plan of care   - Discharge planning   - See additional Care Plan goals for specific interventions  3/16/2025 0532 by Mauro Gamboa RN  Outcome: Progressing  3/15/2025 2253 by Mauro Gamboa RN  Outcome: Progressing  3/15/2025 2251 by Mauro Gamboa RN  Outcome: Progressing     Problem: GASTROINTESTINAL - ADULT  Goal: Minimal or absence of nausea and vomiting  Description:  INTERVENTIONS:- Maintain adequate hydration with IV or PO as ordered and tolerated- Nasogastric tube to low intermittent suction as ordered- Evaluate effectiveness of ordered antiemetic medications- Provide nonpharmacologic comfort measures as appropriate- Advance diet as tolerated, if ordered- Obtain nutritional consult as needed- Evaluate fluid balance  3/16/2025 0532 by Mauro Gamboa RN  Outcome: Progressing  3/15/2025 2253 by Mauro Gamboa RN  Outcome: Progressing  3/15/2025 2251 by Mauro Gamboa RN  Outcome: Progressing  Goal: Maintains or returns to baseline bowel function  Description: INTERVENTIONS:- Assess bowel function- Maintain adequate hydration with IV or PO as ordered and tolerated- Evaluate effectiveness of GI medications- Encourage mobilization and activity- Obtain nutritional consult as needed- Establish a toileting routine/schedule- Consider collaborating with pharmacy to review patient's medication profile  3/16/2025 0532 by Mauro Gamboa RN  Outcome: Progressing  3/15/2025 2253 by Mauro Gamboa RN  Outcome: Progressing  3/15/2025 2251 by Mauro Gamboa RN  Outcome: Progressing     Problem: METABOLIC/FLUID AND ELECTROLYTES - ADULT  Goal: Hemodynamic stability and optimal renal function maintained  Description: INTERVENTIONS:- Monitor labs and assess for signs and symptoms of volume excess or deficit- Monitor intake, output and patient weight- Monitor urine specific gravity, serum osmolarity and serum sodium as indicated or ordered- Monitor response to interventions for patient's volume status, including labs, urine output, blood pressure (other measures as available)- Encourage oral intake as appropriate- Instruct patient on fluid and nutrition restrictions as appropriate  3/16/2025 0532 by Mauro Gamboa RN  Outcome: Progressing  3/15/2025 2253 by Mauro Gamboa RN  Outcome: Progressing  3/15/2025 2251 by Mauro Gamboa RN  Outcome: Progressing      Problem: HEMATOLOGIC - ADULT  Goal: Maintains hematologic stability  Description: INTERVENTIONS- Assess for signs and symptoms of bleeding or hemorrhage- Monitor labs and vital signs for trends- Administer supportive blood products/factors, fluids and medications as ordered and appropriate- Administer supportive blood products/factors as ordered and appropriate  3/16/2025 0532 by Mauro Gamboa RN  Outcome: Progressing  3/15/2025 2253 by Muaro Gamboa RN  Outcome: Progressing  3/15/2025 2251 by Mauro Gamboa RN  Outcome: Progressing  Goal: Free from bleeding injury  Description: (Example usage: patient with low platelets)INTERVENTIONS:- Avoid intramuscular injections, enemas and rectal medication administration- Ensure safe mobilization of patient- Hold pressure on venipuncture sites to achieve adequate hemostasis- Assess for signs and symptoms of internal bleeding- Monitor lab trends- Patient is to report abnormal signs of bleeding to staff- Avoid use of toothpicks and dental floss- Use electric shaver for shaving- Use soft bristle tooth brush- Limit straining and forceful nose blowing  3/16/2025 0532 by Mauro Gamboa RN  Outcome: Progressing  3/15/2025 2253 by Mauro Gamboa RN  Outcome: Progressing  3/15/2025 2251 by Mauro Gamboa RN  Outcome: Progressing     Problem: ANXIETY  Goal: Will report anxiety at manageable levels  Description: INTERVENTIONS:- Administer medication as ordered- Teach and rehearse alternative coping skills- Provide emotional support with 1:1 interaction with staff  3/16/2025 0532 by Mauro Gamboa RN  Outcome: Progressing  3/15/2025 2253 by Mauro Gamboa RN  Outcome: Progressing  3/15/2025 2251 by Mauro Gamboa RN  Outcome: Progressing

## 2025-03-17 LAB
ACTIN SMOOTH MUSCLE AB: 17 UNITS
AFP-TM SERPL-MCNC: 7.2 NG/ML
ALBUMIN SERPL-MCNC: 2.3 G/DL (ref 3.2–4.8)
ALBUMIN/GLOB SERPL: 0.7 {RATIO} (ref 1–2)
ALP LIVER SERPL-CCNC: 118 U/L
ALT SERPL-CCNC: 67 U/L
AMMONIA PLAS-MCNC: 43 UMOL/L (ref 11–32)
ANION GAP SERPL CALC-SCNC: 8 MMOL/L (ref 0–18)
AST SERPL-CCNC: 225 U/L (ref ?–34)
BASOPHILS # BLD AUTO: 0.03 X10(3) UL (ref 0–0.2)
BASOPHILS NFR BLD AUTO: 0.7 %
BILIRUB SERPL-MCNC: 12.2 MG/DL (ref 0.3–1.2)
BILIRUB UR QL CFM: POSITIVE
BUN BLD-MCNC: 7 MG/DL (ref 9–23)
BUN/CREAT SERPL: 13 (ref 10–20)
CALCIUM BLD-MCNC: 7.7 MG/DL (ref 8.7–10.4)
CHLORIDE SERPL-SCNC: 107 MMOL/L (ref 98–112)
CO2 SERPL-SCNC: 23 MMOL/L (ref 21–32)
CREAT BLD-MCNC: 0.54 MG/DL
DEPRECATED RDW RBC AUTO: 77 FL (ref 35.1–46.3)
EBV NA IGG SER QL IA: POSITIVE
EBV VCA IGG SER QL IA: POSITIVE
EBV VCA IGM SER QL IA: NEGATIVE
EGFRCR SERPLBLD CKD-EPI 2021: 124 ML/MIN/1.73M2 (ref 60–?)
EOSINOPHIL # BLD AUTO: 0.12 X10(3) UL (ref 0–0.7)
EOSINOPHIL NFR BLD AUTO: 2.9 %
ERYTHROCYTE [DISTWIDTH] IN BLOOD BY AUTOMATED COUNT: 22.7 % (ref 11–15)
GLOBULIN PLAS-MCNC: 3.2 G/DL (ref 2–3.5)
GLUCOSE BLD-MCNC: 84 MG/DL (ref 70–99)
GLUCOSE UR-MCNC: NORMAL MG/DL
HCT VFR BLD AUTO: 31 %
HGB BLD-MCNC: 10.6 G/DL
IMM GRANULOCYTES # BLD AUTO: 0.05 X10(3) UL (ref 0–1)
IMM GRANULOCYTES NFR BLD: 1.2 %
KETONES UR-MCNC: NEGATIVE MG/DL
LEUKOCYTE ESTERASE UR QL STRIP.AUTO: 250
LYMPHOCYTES # BLD AUTO: 1.25 X10(3) UL (ref 1–4)
LYMPHOCYTES NFR BLD AUTO: 30 %
M2 MITOCHONDRIAL AB: <20 UNITS
MAGNESIUM SERPL-MCNC: 1.8 MG/DL (ref 1.6–2.6)
MCH RBC QN AUTO: 33.8 PG (ref 26–34)
MCHC RBC AUTO-ENTMCNC: 34.2 G/DL (ref 31–37)
MCV RBC AUTO: 98.7 FL
MONOCYTES # BLD AUTO: 0.38 X10(3) UL (ref 0.1–1)
MONOCYTES NFR BLD AUTO: 9.1 %
NEUTROPHILS # BLD AUTO: 2.34 X10 (3) UL (ref 1.5–7.7)
NEUTROPHILS # BLD AUTO: 2.34 X10(3) UL (ref 1.5–7.7)
NEUTROPHILS NFR BLD AUTO: 56.1 %
NITRITE UR QL STRIP.AUTO: NEGATIVE
OSMOLALITY SERPL CALC.SUM OF ELEC: 283 MOSM/KG (ref 275–295)
PH UR: 6.5 [PH] (ref 5–8)
PLATELET # BLD AUTO: 88 10(3)UL (ref 150–450)
PLATELETS.RETICULATED NFR BLD AUTO: 2 % (ref 0–7)
POTASSIUM SERPL-SCNC: 4 MMOL/L (ref 3.5–5.1)
PROT SERPL-MCNC: 5.5 G/DL (ref 5.7–8.2)
PROT UR-MCNC: 20 MG/DL
RBC # BLD AUTO: 3.14 X10(6)UL
SODIUM SERPL-SCNC: 138 MMOL/L (ref 136–145)
SP GR UR STRIP: 1.02 (ref 1–1.03)
TTG IGG SER-ACNC: 1.2 U/ML (ref ?–7)
UROBILINOGEN UR STRIP-ACNC: 3
WBC # BLD AUTO: 4.2 X10(3) UL (ref 4–11)

## 2025-03-17 PROCEDURE — 99233 SBSQ HOSP IP/OBS HIGH 50: CPT | Performed by: HOSPITALIST

## 2025-03-17 PROCEDURE — 99232 SBSQ HOSP IP/OBS MODERATE 35: CPT | Performed by: REGISTERED NURSE

## 2025-03-17 RX ORDER — ONDANSETRON 2 MG/ML
4 INJECTION INTRAMUSCULAR; INTRAVENOUS EVERY 6 HOURS PRN
Status: DISCONTINUED | OUTPATIENT
Start: 2025-03-17 | End: 2025-03-20

## 2025-03-17 RX ORDER — MAGNESIUM SULFATE HEPTAHYDRATE 40 MG/ML
2 INJECTION, SOLUTION INTRAVENOUS ONCE
Status: COMPLETED | OUTPATIENT
Start: 2025-03-17 | End: 2025-03-17

## 2025-03-17 RX ORDER — MAGNESIUM HYDROXIDE/ALUMINUM HYDROXICE/SIMETHICONE 120; 1200; 1200 MG/30ML; MG/30ML; MG/30ML
30 SUSPENSION ORAL 3 TIMES DAILY PRN
Status: DISCONTINUED | OUTPATIENT
Start: 2025-03-17 | End: 2025-03-20

## 2025-03-17 RX ORDER — PANTOPRAZOLE SODIUM 40 MG/1
40 TABLET, DELAYED RELEASE ORAL
Status: DISCONTINUED | OUTPATIENT
Start: 2025-03-17 | End: 2025-03-20

## 2025-03-17 NOTE — PAYOR COMM NOTE
ADMISSION REVIEW     Payor: [employer]   Subscriber #:  [id number]  Authorization Number: [id number]    Admit date: 3/14/25  Admit time:  9:34 PM       REVIEW DOCUMENTATION:     ED Provider Notes        ED Provider Notes signed by Earnest Sheriff MD at 3/14/2025  8:01 PM       Author: Earnest Sheriff MD Service: Emergency Medicine Author Type: Physician    Filed: 3/14/2025  8:01 PM Date of Service: 3/14/2025  6:47 PM Status: Signed    : Earnest Sheriff MD (Physician)           Patient Seen in: [facility] Emergency Department      History     Chief Complaint   Patient presents with    Jaundice     Stated Complaint: Jaundice, Fatigue    Subjective:   HPI      34-year-old female history of anxiety depression, present for evaluation of fatigue, jaundice.  She states that yesterday noted highlighter/orange-colored urine, associated with yellow discoloration of conjunctiva.  Notes has been increasingly fatigued over the past few days.  No recent fever or diarrhea.  Was at immediate care noted to be jaundiced and sent to the ER.  Denies recent medication changes.  Denies overuse of Tylenol.  Drinks about 2 glasses of wine 4-5 times per week.  Does have previous history of hepatic steatosis.    Objective:     Past Medical History:    Anxiety    Blood disorder    anemia    Bronchial tumor    Diverticulosis of large intestine    Esophageal reflux    History of lobectomy of lung    Hx of motion sickness    Pneumonia due to organism    Shortness of breath    Visual impairment    Wears eyeglasses       Past Surgical History:   Procedure Laterality Date    Adenoidectomy      Removal of lung,lobectomy      Tonsillectomy           ED Triage Vitals [03/14/25 1706]   /85   Pulse 103   Resp 18   Temp 97.8 °F (36.6 °C)   Temp src Temporal   SpO2 95 %   O2 Device None (Room air)       Current Vitals:   Vital Signs  BP: 116/73  Pulse: 89  Resp: 18  Temp: 97.8 °F (36.6 °C)  Temp  src: Temporal  MAP (mmHg): 86    Oxygen Therapy  SpO2: 93 %  O2 Device: None (Room air)        Physical Exam  Constitutional: awake, alert, no sig distress  HENT: mmm, no lesions,  Neck: normal range of motion, no tenderness, supple.  Eyes: PERRL, EOMI, conjunctiva normal, no discharge. Sclera icteric.  Cardiovascular: rr no murmur  Respiratory: Normal breath sounds, no respiratory distress, no wheezing, no chest tenderness.  GI: Bowel sounds normal, Soft, +RUQ pain, no masses, no pulsatile masses.  : No CVA tenderness.  Skin: Warm, dry, no erythema, no rash.  Musculoskeletal: Intact distal pulses, no edema, no tenderness, no cyanosis, no clubbing. Good range of motion in all major joints. No tenderness to palpation or major deformities noted. Back- No tenderness.  Neurologic: Alert & oriented x 3, normal motor function, normal sensory function, no focal deficits noted.  Psych: Calm, cooperative, nl affect        ED Course     Labs Reviewed   CBC WITH DIFFERENTIAL WITH PLATELET - Abnormal; Notable for the following components:       Result Value    RDW-SD 71.9 (*)     RDW 21.3 (*)     PLT 99.0 (*)     All other components within normal limits   COMP METABOLIC PANEL (14) - Abnormal; Notable for the following components:    Glucose 100 (*)     Sodium 135 (*)     BUN <5 (*)     ALT 78 (*)      (*)     Alkaline Phosphatase 158 (*)     Bilirubin, Total 14.2 (*)     Albumin 2.9 (*)     Globulin  4.2 (*)     A/G Ratio 0.7 (*)     All other components within normal limits   RBC MORPHOLOGY SCAN - Abnormal; Notable for the following components:    RBC Morphology See morphology below (*)     All other components within normal limits   PROTHROMBIN TIME (PT) - Abnormal; Notable for the following components:    PT 19.7 (*)     INR 1.58 (*)     All other components within normal limits   LIPASE - Normal   ACTIN (SMOOTH MUSCLE) ANTIBODY   MITOCHONDRIAL (M2) ANTIBODY   HEPATITIS A B + C PROFILE   RAINBOW DRAW LAVENDER    RAINBOW DRAW LIGHT GREEN   RAINBOW DRAW BLUE        34F hx as above who presents with jaundice  On arrival vss, reassuring  Ddx: Alcoholic hepatitis, Cirrhosis, Choledocholithiasis  -May be 2/2 alcohol use however does not seem clear-cut etoh cirrhosis, so will d/w GI, obtain RUQUS, Hepatitis panel, SMA/M2 ab.   -given IVF bolus  -will admit d/w hospitalist    Admission disposition: 3/14/2025  7:33 PM           Medical Decision Making      Disposition and Plan     Clinical Impression:  1. Acute hepatitis         Disposition:  Admit             Present on Admission  Date Reviewed: 1/10/2025            ICD-10-CM Noted POA    Acute hepatitis B17.9 3/14/2025 Unknown                3/14 HOSPITALIST:     HISTORY AND PHYSICAL EXAMINATION     CHIEF COMPLAINT:  Jaundice and abnormal liver function test.     HISTORY OF PRESENT ILLNESS:  The patient is a 34-year-old  female with morbid obesity and underlying possible hepatic steatosis, presented today to the emergency department for evaluation after she noted her urine is dark in color since yesterday, and her eyes are turning yellow.  CBC was unremarkable, except for platelet count of 99.  Chemistry showed ALT 78 and , alkaline phosphatase 158, total bilirubin 14.2.  Lipase was normal.  Liver ultrasound still pending.  Patient will be admitted to the hospital for further management.  INR today is 1.58.     PAST MEDICAL HISTORY:  Morbid obesity, hepatic steatosis, diverticulosis, diverticulitis, C difficile infection after antibiotic exposure, severe anxiety disorder.     PAST SURGICAL HISTORY:  Right middle and lower lobe lobectomy for carcinoid tumor when she was 18.  She had perforated sigmoid diverticulitis, required sigmoidectomy and colovesical fistula takedown, tonsillectomy.       REVIEW OF SYSTEMS:  Patient reports that no chest pain, no shortness of breath.  She has some abdominal fullness earlier today.  Yesterday started noticing her urine turned  dark, tea colored, and started noticing yellowish discoloration in her eyes.  She denies nausea, vomiting, or abdominal pain.  No fever or chills.  Other 12-point review of systems is negative.        PHYSICAL EXAMINATION:    GENERAL:  Alert and oriented to time, place, and person.  No acute distress.  VITAL SIGNS:  Temperature 97.8, pulse 102, respiratory rate 18, blood pressure 92/65, pulse ox 96% on room air.    HEENT:  Atraumatic.  Oropharynx clear.  Dry mucous membranes.  Ears, nose normal.  Eyes:  Icteric sclerae bilaterally.  NECK:  Supple.  No lymphadenopathy.  Trachea midline.   LUNGS:  Clear to auscultation bilaterally.  Normal respiratory effort.    HEART:  Regular rate, rhythm.  S1 and S2 auscultated.   ABDOMEN:  Soft, nondistended.  Obese.  Positive bowel sounds.  No tenderness.  EXTREMITIES:  No edema, clubbing, or cyanosis.  NEUROLOGIC:  Motor and sensory intact.     ASSESSMENT AND PLAN:  Jaundice and abnormal liver function tests.  Clinical presentation is inconsistent with obstruction, possibly combination of hepatic steatosis and alcohol consumption.  Will obtain hepatitis profile, anti-smooth muscle antibody.  Rule autoimmune hepatitis and antimitochondrial antibodies.  Obtain gastroenterology consult.  Follow up on liver ultrasound, possible underlying developing cirrhosis.  Obtain MRCP with and without contrast.  Further recommendations to follow.      Dictated By Bean Naik MD  d:03/14/2025 19:35:06          3/15 HOSPITALIST:       Chief Complaint: jaundice        Subjective:  S: Patient here with jaundice, elevated bilirubin  Overall feeling unwell muscles are achy feels very fatigued  Denies chest pain shortness of breath dizziness  Denies abdominal pain    Objective:  Vital signs:  Temp:  [97.5 °F (36.4 °C)-98.5 °F (36.9 °C)] 97.7 °F (36.5 °C)  Pulse:  [] 100  Resp:  [16-18] 18  BP: ()/(55-85) 108/58  SpO2:  [90 %-96 %] 90 %      Assessment & Plan:  ASSESSMENT / PLAN:       Jaundice   Elevated liver function test  Fatty liver  -Here with jaundice x 2 days bilirubin 14.2 on admission---> 12.5  -Gastroenterology consulted   -liver ultrasound--> no biliary ductal dilation on limited assessment, severe hepatomegaly with severe diffuse hepatic steatosis, possible sludge within the gallbladder which is not well assessed  -Plan MRCP  -Drinks about 2 glasses of wine 4 times a week  -Discussed plans for MRI today  -Appreciate GI evaluation  -Advised alcohol cessation in this acute setting with elevated transaminases     Morbid obesity     History of diverticulitis  History of C. difficile infection  Anxiety disorder        Quality:  DVT Prophylaxis: Heparin  CODE status: Full code  Cerda:    Central line: n/a  Dispo: further recs pending clinical course        Will the patient be referred to TCC on discharge?: yes    Will the patient be referred to TCC on discharge?: yes  Estimated date of discharge: TBD  Discharge is dependent on: clinical improvement  At this point Ms. Bernabe is expected to be discharge to: home     Plan of care discussed with patient, nursing, GI     Outpatient records or previous hospital records reviewed.   Patient and/or patient's family given opportunity to ask questions and note understanding and agreeing with therapeutic plan as outlined     Coordinated care with providers and counseling re: treatment plan and workup     MDM: High complexity     LYUBOV LUNDY MD           3/15 GI:               [facility]   Gastroenterology Consultation Note      Mica Bernabe  Patient Status:                  Inpatient  Date of Admission:         3/14/2025, Hospital day #1  Attending:                          Lyubov Lundy MD  PCP:                                  AARON DAVILA MD     Reason for Consultation:  Elevated liver enzymes     History of Present Illness: spouse present bedside     Mica Bernabe is a 34 year old woman with history of  BMI of 46, tonsillectomy, adenoidectomy, prior C. difficile, complicated/perforated left colon diverticulitis in June 2023 eventually undergoing surgery in October 2023 who was admitted after presenting to the emergency department yesterday with jaundice.     Says she has never had this before but woke up 2 days ago jaundiced prompting her to present to the emergency department.  Says she has been extremely weak and fatigued with muscle aches.  Otherwise denies any other specific symptoms including not limited to abdominal pain.  She denies any recent prescription medications or significant over-the-counter medication uses including acetaminophen.  No family history of liver problems.  She admits to drinking wine 2 glasses 4-5 times a week.    Physical Exam:    Blood pressure 108/58, pulse 100, temperature 97.7 °F (36.5 °C), temperature source Oral, resp. rate 18, height 5' 4\" (1.626 m), weight 284 lb 2.8 oz (128.9 kg), last menstrual period 02/12/2025, SpO2 90%. Body mass index is 48.78 kg/m².     General:awake, cooperative, no acute distress  HEENT: EOMI, no scleral icterus, MMM; oral pharnyx is without exudates or lesions  Neck: no lymphadenopathy; thyroid is not enlarged and without nodules  CV: RRR  Resp: non-labored breathing  Abd: soft, non-tender, non-distended  Ext: no lower extremity swelling  Neuro: Alert, Oriented X 3     Imaging:  US LIVER (CPT=76705)     Result Date: 3/14/2025  CONCLUSION:   Moderately limited examination.  No biliary ductal dilation on limited assessment.  In the setting of concern for biliary obstruction due to the presence of jaundice, recommend correlation with MRCP or ERCP.  Severe hepatomegaly with severe diffuse hepatic steatosis.   Possible sludge within the gallbladder, which is not well assessed due to examination limitations.  May represent artifact as well.  Otherwise, no imaging evidence of acute cholecystitis.        Dictated by (CST): Marion Belle MD on 3/14/2025 at  8:38 PM     Finalized by (CST): Marion Belle MD on 3/14/2025 at 8:41 PM            Assessment & Plan   Mica Bernabe is a 34 year old woman with history of BMI of 46, tonsillectomy, adenoidectomy, prior C. difficile, complicated/perforated left colon diverticulitis in June 2023 eventually undergoing surgery in October 2023 who was admitted after presenting to the emergency department yesterday with jaundice.     Liver enzymes are elevated on presentation with bilirubin at 14.2 which is slightly down trended today.  Previous LFTs in 2023 are essentially normal and unremarkable.  An ultrasound of the liver/right upper quadrant shows severe hepatomegaly with severe diffuse hepatic steatosis though otherwise unremarkable but limited.     We discussed the unclear etiology for the acute jaundice.  Acute viral hepatitis A, B, C serologies are unrevealing.  Possible EtOH.  Discussed recommendations for other evaluation with serologies.  Also recommend evaluation of the liver and biliary tree with MRI/MRCP though patient reporting she is unable to do this due to claustrophobia and so we will plan for CT scan.     Recommend:  -serologies ordered for evaluation of acute/chronic jaundice  -CT liver  -ETOH cessation     Discussed with Dr. Annie Coyne MD  [facility]      3/16  HOSPITALIST:     Chief Complaint: jaundice        Subjective:  S: Patient here with jaundice, elevated bilirubin  Feels fatigued and also having generalized muscle aches  Denies chest pain shortness of breath dizziness  Denies abdominal pain      Objective:  Vital signs:  Temp:  [98.1 °F (36.7 °C)-99.7 °F (37.6 °C)] 98.2 °F (36.8 °C)  Pulse:  [91-96] 91  Resp:  [18] 18  BP: ()/(58-77) 110/66  SpO2:  [91 %-95 %] 91 %         Assessment & Plan:  ASSESSMENT / PLAN:      Jaundice   Elevated liver function test  Fatty liver  -Here with jaundice x 2 days bilirubin 14.2 on admission---> 11.8  -Gastroenterology  consulted   -liver ultrasound--> no biliary ductal dilation on limited assessment, severe hepatomegaly with severe diffuse hepatic steatosis, possible sludge within the gallbladder which is not well assessed  -Plan MRCP -> reviewed  -Drinks about 2 glasses of wine 4 times a week -> advised to stop etoh consumption  -Appreciate GI evaluation  -Advised alcohol cessation in this acute setting with elevated transaminases     Morbid obesity     History of diverticulitis  History of C. difficile infection  Anxiety disorder        Quality:  DVT Prophylaxis: Heparin  CODE status: Full code  Cerda:    Central line: n/a  Dispo: further recs pending clinical course        Will the patient be referred to TCC on discharge?: yes  Estimated date of discharge: TBD  Discharge is dependent on: clinical improvement  At this point Ms. Bernabe is expected to be discharge to: home     Plan of care discussed with patient, nursing, GI     Outpatient records or previous hospital records reviewed.   Patient and/or patient's family given opportunity to ask questions and note understanding and agreeing with therapeutic plan as outlined     Coordinated care with providers and counseling re: treatment plan and workup     MDM: High complexity              3/16 GI:            Subjective:   No acute events overnight.  Says she still feels weak and achy throughout her body.  Denies abdominal pain, chest pain, shortness of breath.  Significant other at bedside.  She is describing significant heartburn this morning.  She does note taking omeprazole 40 mg once daily.     Objective:   Blood pressure 110/66, pulse 91, temperature 98.2 °F (36.8 °C), temperature source Oral, resp. rate 18, height 5' 4\" (1.626 m), weight 284 lb 2.8 oz (128.9 kg), last menstrual period 02/12/2025, SpO2 91%. Body mass index is 48.78 kg/m².     General:awake, cooperative, no acute distress  HEENT: EOMI, no scleral icterus, MMM; oral pharnyx is without exudates or  lesions  Neck: no lymphadenopathy; thyroid is not enlarged and without nodules  CV: RRR  Resp: non-labored breathing  Abd: soft, non-tender, non-distended  Ext: no lower extremity swelling  Neuro: Alert, Oriented X 3  Skin: no rashes, bruises  Psych: normal affect          Component Value Date     WBC 4.5 03/16/2025     HGB 10.2 (L) 03/16/2025     HCT 30.3 (L) 03/16/2025     PLT 89.0 (L) 03/16/2025     CREATSERUM 0.59 03/16/2025     BUN 6 (L) 03/16/2025      03/16/2025     K 3.8 03/16/2025      03/16/2025     CO2 24.0 03/16/2025     GLU 87 03/16/2025     CA 7.8 (L) 03/16/2025     ALB 2.3 (L) 03/16/2025     ALKPHO 131 (H) 03/16/2025     BILT 11.8 (H) 03/16/2025     TP 5.5 (L) 03/16/2025      (H) 03/16/2025     ALT 68 (H) 03/16/2025       MRI ABDOMEN AND MRCP W/3D (W+W0)(CPT=74183/30170)     Result Date: 3/16/2025  CONCLUSION:   Hepatomegaly and steatosis.  Mild contour nodularity which may indicate a component of fibrosis.  No suspicious hepatic lesions are identified.  The portal and hepatic veins are patent.  Splenomegaly and small volume ascites which may indicate portal hypertension.  Gallbladder wall edema at probably relates to hepatocellular disease.    elm-remote    Dictated by (CST): Mino Medrano MD on 3/16/2025 at 6:24 AM     Finalized by (CST): Mino Medrano MD on 3/16/2025 at 6:33 AM           CT ABDOMEN TRIPHASIC LIVER (W+WO) (CPT=74170)     Result Date: 3/15/2025  CONCLUSION:  1. Marked hepatic steatosis, moderate hepatomegaly .  Diffuse heterogeneous hepatic enhancement may be related to hepatitis, and or cirrhosis.  Portal vein and branches patent. 2. Moderate splenomegaly. 3. Small amount of ascites and gallbladder wall edema related to liver disease.     Dictated by (CST): Jose Luis Rosa MD on 3/15/2025 at 5:20 PM     Finalized by (CST): Jose Luis Rosa MD on 3/15/2025 at 5:36 PM           US LIVER (CPT=76705)     Result Date: 3/14/2025  CONCLUSION:   Moderately limited  examination.  No biliary ductal dilation on limited assessment.  In the setting of concern for biliary obstruction due to the presence of jaundice, recommend correlation with MRCP or ERCP.  Severe hepatomegaly with severe diffuse hepatic steatosis.   Possible sludge within the gallbladder, which is not well assessed due to examination limitations.  May represent artifact as well.  Otherwise, no imaging evidence of acute cholecystitis.        Dictated by (CST): Marion Belle MD on 3/14/2025 at 8:38 PM     Finalized by (CST): Marion Belle MD on 3/14/2025 at 8:41 PM                Assessment and Plan:   Mica Bernabe is a 34 year old woman with history of BMI of 46, tonsillectomy, adenoidectomy, prior C. difficile, complicated/perforated left colon diverticulitis in June 2023 eventually undergoing surgery in October 2023 who was admitted after presenting to the emergency department yesterday with jaundice.     Liver enzymes are elevated on presentation with bilirubin at 14.2 which has down trended.  Previous LFTs in 2023 are essentially normal and unremarkable.  An ultrasound of the liver/right upper quadrant shows severe hepatomegaly with severe diffuse hepatic steatosis though otherwise unremarkable but limited.     MRI/MRCP and CT scan imaging from 3/15/2025 morphologically suggest cirrhosis which we discussed as a possibility today.  Possibility of MASH and/or ETOH.  Chronic liver serologies are in process but those thus far unrevealing.     We had a lengthy conversation about the possibility of a diagnosis of cirrhosis again based on the appearance of the liver.  We discussed if there is cirrhosis it appears it would be compensated.  We did discuss decompensating signs/symptoms to monitor for going forward.  We discussed no alcohol is recommended going forward.  We discussed consideration of a liver biopsy in the future.  We also discussed consideration of ultrasound elastography.     We discussed  anticipated discharge in the near future with anticipated follow-up with our PA in the GI office 1 to 2 weeks following discharge with likely anticipation of referral to a hepatologist as well.     I discussed with hospitalist for elevated CK and body aches. Plan for monitoring and IV fluids at this time.     Will continue pantoprazole 40 mg daily and add antacids as needed.     Recommend:  -daily PP and antacids prn  -await final serologies ordered for evaluation of acute/chronic jaundice  -complete ETOH cessation  -IV fluids  -outpatient GI discharge clinic in 1-2 weeks and anticipated hepatology referral/evaluation outpatient     Discussed with hospitalist     Colby Coyne MD            MEDICATIONS ADMINISTERED IN LAST 1 DAY:  ALPRAZolam (Xanax) tab 0.5 mg       Date Action Dose Route User    3/17/2025 0504 Given 0.5 mg Oral Lucy Torres RN          alum-mag hydroxide-simethicone (Maalox) 200-200-20 MG/5ML oral suspension 30 mL       Date Action Dose Route User    3/17/2025 1036 Given 30 mL Oral Krupa Chao RN          heparin (Porcine) 5000 UNIT/ML injection 7,500 Units       Date Action Dose Route User    3/17/2025 0749 Given 7,500 Units Subcutaneous (Right Lower Abdomen) Krupa Chao RN    3/16/2025 2059 Given 7,500 Units Subcutaneous (Right Lower Abdomen) Lucy Torres RN    3/16/2025 1431 Given 7,500 Units Subcutaneous (Right Lower Abdomen) Krupa Chao RN          magnesium sulfate in sterile water for injection 2 g/50mL IVPB premix 2 g       Date Action Dose Route User    3/17/2025 0749 New Bag 2 g Intravenous Krupa Chao RN          pantoprazole (Protonix) DR tab 40 mg       Date Action Dose Route User    3/17/2025 0504 Given 40 mg Oral Lucy Torres RN          QUEtiapine (SEROquel) tab 50 mg       Date Action Dose Route User    3/16/2025 2059 Given 50 mg Oral Lucy Torres RN          sertraline (Zoloft) tab 100 mg       Date Action Dose Route User    3/17/2025 0848 Given  100 mg Oral Krupa Chao RN          sodium chloride 0.9% infusion       Date Action Dose Route User    3/17/2025 0848 New Bag (none) Intravenous Krupa Chao RN    3/16/2025 1645 New Bag (none) Intravenous Krupa Chao RN    3/16/2025 1317 Rate/Dose Change (none) Intravenous Krupa Chao RN          zolpidem (Ambien) tab 10 mg       Date Action Dose Route User    3/16/2025 2059 Given 10 mg Oral Lucy Torres RN            lactated ringers IV bolus 1,000 mL  Dose: 1,000 mL  Freq: Once Route: IV  Last Dose: Stopped (03/14/25 1958)  Start: 03/14/25 1847 End: 03/14/25 1958 1858 GE-New Bag     1958 EO-Stopped              Vitals (last day)       Date/Time Temp Pulse Resp BP SpO2 Weight O2 Device O2 Flow Rate (L/min) [facility]    03/17/25 0844 98.3 °F (36.8 °C) 75 18 105/64 93 % -- None (Room air) --     03/17/25 0503 98.3 °F (36.8 °C) 87 18 104/79 93 % -- None (Room air) --     03/16/25 2056 98.4 °F (36.9 °C) 86 18 103/66 92 % -- None (Room air) --     03/16/25 1435 98.1 °F (36.7 °C) 85 18 107/75 94 % -- None (Room air) --     03/16/25 0843 98.2 °F (36.8 °C) 91 18 110/66 91 % -- None (Room air) -- AP    03/16/25 0538 99.6 °F (37.6 °C) 96 18 143/77 91 % -- None (Room air) -- EM            03/15/25 2100 99.7 °F (37.6 °C) 93 18 134/67 95 % -- None (Room air) -- EM   03/15/25 1409 98.1 °F (36.7 °C) 91 18 98/58 91 % -- None (Room air) -- AP   03/15/25 0900 97.7 °F (36.5 °C) 100 18 108/58 90 % -- None (Room air) -- AP   03/15/25 0546 98 °F (36.7 °C) 99 18 97/57 92 % -- None (Room air) -- JT   03/14/25 2157 -- -- -- -- -- 284 lb 2.8 oz (128.9 kg) -- -- AR   03/14/25 2155 98.5 °F (36.9 °C) 93 18 111/67 94 % -- None (Room air) -- AR   03/14/25 2100 -- 93 -- 98/58 91 % -- None (Room air) -- EO       03/14/25 2000 -- 87 16 96/56 95 % -- None (Room air) --    03/14/25 1945 -- 89 18 116/73 93 % -- None (Room air) --    03/14/25 1830 -- 102 18 92/65 96 % -- None (Room air) -- GEA       03/14/25 1706  97.8 °F (36.6 °C) 103 18 121/85 95 %

## 2025-03-17 NOTE — PROGRESS NOTES
Progress Note     Mica Bernabe Patient Status:  Inpatient    1990 MRN [id number]   Location [facility] 5SW/SE Attending Mello Williamson MD   Hosp Day # 3 PCP AARON DAVILA MD     Chief Complaint: jaundice    Subjective:   S: Patient here with jaundice, elevated bilirubin  Feels fatigued and also continues to have generalized muscle aches  Denies chest pain shortness of breath dizziness  Denies abdominal pain    Review of Systems:   10 point ROS completed and was negative, except for pertinent positive and negatives stated in subjective.    Objective:   Vital signs:  Temp:  [98.1 °F (36.7 °C)-98.4 °F (36.9 °C)] 98.3 °F (36.8 °C)  Pulse:  [75-87] 75  Resp:  [18] 18  BP: (103-107)/(64-79) 105/64  SpO2:  [92 %-94 %] 93 %    Wt Readings from Last 6 Encounters:   25 284 lb 2.8 oz (128.9 kg)   24 260 lb (117.9 kg)   12/15/23 267 lb (121.1 kg)   23 265 lb (120.2 kg)   10/27/23 274 lb 0.5 oz (124.3 kg)   10/19/23 268 lb (121.6 kg)         Physical Exam:    General: No acute distress. Alert ,      , morbidly obese  Respiratory: Clear to auscultation bilaterally. No wheezes. No rhonchi.  Cardiovascular: S1, S2. Regular rate and rhythm. No murmurs, rubs or gallops.   Abdomen: Soft, nontender, nondistended.  Positive bowel sounds. No rebound or guarding.  Neurologic: No focal neurological deficits.   Musculoskeletal: Moves all extremities.  Extremities: No edema.    Results:   Diagnostic Data:      Labs:    Labs Last 24 Hours:   BMP     CBC    Other     Na 138 Cl 107 BUN 7 Glu 84   Hb 10.6   PTT - Procal -   K 4.0 CO2 23.0 Cr 0.54   WBC 4.2 >< PLT 88.0  INR - CRP -   Renal Lytes Endo    Hct 31.0   Trop - D dim -   eGFR - Ca 7.7 POC Gluc  -    LFT   pBNP - Lactic -   eGFR AA - PO4 - A1c -    APk 118 Prot 5.5  LDL -     Mg 1.8 TSH -   ALT 67 T jareth 12.2 Alb 2.3        COVID-19 Lab Results    COVID-19  Lab Results   Component Value Date    COVID19 Not Detected 01/10/2025    COVID19  Not Detected 04/06/2024    COVID19 Not Detected 08/19/2023       Pro-Calcitonin  No results for input(s): \"PCT\" in the last 168 hours.    Cardiac  No results for input(s): \"TROP\", \"PBNP\" in the last 168 hours.    Creatinine Kinase  Recent Labs   Lab 03/15/25  1452 03/16/25  0715   CK 1,549* 1,035*       Inflammatory Markers  Recent Labs   Lab 03/15/25  1452   ZHAO 152       Imaging: Imaging data reviewed in Epic.    Medications:    pantoprazole  40 mg Oral BID AC    heparin  7,500 Units Subcutaneous Q8H DIONNE    calcium carbonate  500 mg Oral Daily    QUEtiapine  50 mg Oral Nightly    sertraline  100 mg Oral Daily       Assessment & Plan:   ASSESSMENT / PLAN:     Jaundice   Elevated liver function test  Fatty liver  -Here with jaundice x 2 days bilirubin 14.2 on admission---> 11.8 -> 12.2 today  -Gastroenterology consulted   -liver ultrasound--> no biliary ductal dilation on limited assessment, severe hepatomegaly with severe diffuse hepatic steatosis, possible sludge within the gallbladder which is not well assessed  -Plan MRCP -> reviewed  -Drinks about 2 glasses of wine 4 times a week -> advised to stop etoh consumption  -Appreciate GI evaluation  -Advised alcohol cessation in this acute setting with elevated transaminases  -await auto-immune work up    Morbid obesity    History of diverticulitis  History of C. difficile infection  Anxiety disorder      Quality:  DVT Prophylaxis: Heparin  CODE status: Full code  Cerda:    Central line: n/a  Dispo: further recs pending clinical course      Will the patient be referred to TCC on discharge?: yes  Estimated date of discharge: TBD  Discharge is dependent on: clinical improvement  At this point Ms. Bernabe is expected to be discharge to: home    Plan of care discussed with patient, nursing, GI    Outpatient records or previous hospital records reviewed.   Patient and/or patient's family given opportunity to ask questions and note understanding and agreeing with  therapeutic plan as outlined     Coordinated care with providers and counseling re: treatment plan and workup    MDM: High complexity

## 2025-03-17 NOTE — PLAN OF CARE
Problem: Patient Centered Care  Goal: Patient preferences are identified and integrated in the patient's plan of care  Description: Interventions:- What would you like us to know as we care for you?    Provide timely, complete, and accurate information to patient/family- Incorporate patient and family knowledge, values, beliefs, and cultural backgrounds into the planning and delivery of care- Encourage patient/family to participate in care and decision-making at the level they choose- Honor patient and family perspectives and choices  Outcome: Progressing     Problem: Patient/Family Goals  Goal: Patient/Family Long Term Goal  Description: Patient's Long Term Goal: Monitor liver function   Interventions:-   - Monitor vitals  - Monitor appropriate labs  - Administer medications as ordered  - Follow MD's orders  - Update patient on plan of care   - Discharge planning   - GI consult   - See additional Care Plan goals for specific interventions  Outcome: Progressing  Goal: Patient/Family Short Term Goal  Description: Patient's Short Term Goal: DC from hospital, treat  Interventions:   - Monitor vitals  - Monitor appropriate labs  - Administer medications as ordered  - Follow MD's orders  - Update patient on plan of care   - Discharge planning   - See additional Care Plan goals for specific interventions  Outcome: Progressing     Problem: GASTROINTESTINAL - ADULT  Goal: Minimal or absence of nausea and vomiting  Description: INTERVENTIONS:- Maintain adequate hydration with IV or PO as ordered and tolerated- Nasogastric tube to low intermittent suction as ordered- Evaluate effectiveness of ordered antiemetic medications- Provide nonpharmacologic comfort measures as appropriate- Advance diet as tolerated, if ordered- Obtain nutritional consult as needed- Evaluate fluid balance  Outcome: Progressing  Goal: Maintains or returns to baseline bowel function  Description: INTERVENTIONS:- Assess bowel function- Maintain adequate  hydration with IV or PO as ordered and tolerated- Evaluate effectiveness of GI medications- Encourage mobilization and activity- Obtain nutritional consult as needed- Establish a toileting routine/schedule- Consider collaborating with pharmacy to review patient's medication profile  Outcome: Progressing     Problem: METABOLIC/FLUID AND ELECTROLYTES - ADULT  Goal: Hemodynamic stability and optimal renal function maintained  Description: INTERVENTIONS:- Monitor labs and assess for signs and symptoms of volume excess or deficit- Monitor intake, output and patient weight- Monitor urine specific gravity, serum osmolarity and serum sodium as indicated or ordered- Monitor response to interventions for patient's volume status, including labs, urine output, blood pressure (other measures as available)- Encourage oral intake as appropriate- Instruct patient on fluid and nutrition restrictions as appropriate  Outcome: Progressing     Problem: HEMATOLOGIC - ADULT  Goal: Maintains hematologic stability  Description: INTERVENTIONS- Assess for signs and symptoms of bleeding or hemorrhage- Monitor labs and vital signs for trends- Administer supportive blood products/factors, fluids and medications as ordered and appropriate- Administer supportive blood products/factors as ordered and appropriate  Outcome: Progressing  Goal: Free from bleeding injury  Description: (Example usage: patient with low platelets)INTERVENTIONS:- Avoid intramuscular injections, enemas and rectal medication administration- Ensure safe mobilization of patient- Hold pressure on venipuncture sites to achieve adequate hemostasis- Assess for signs and symptoms of internal bleeding- Monitor lab trends- Patient is to report abnormal signs of bleeding to staff- Avoid use of toothpicks and dental floss- Use electric shaver for shaving- Use soft bristle tooth brush- Limit straining and forceful nose blowing  Outcome: Progressing     Problem: ANXIETY  Goal: Will report  anxiety at manageable levels  Description: INTERVENTIONS:- Administer medication as ordered- Teach and rehearse alternative coping skills- Provide emotional support with 1:1 interaction with staff  Outcome: Progressing

## 2025-03-17 NOTE — PROGRESS NOTES
[facility]     Gastroenterology Progress Note    Mica Bernabe Patient Status:  Inpatient    1990 MRN [id number]   Location [facility] 5SW/SE Attending Mello Williamson MD   Hosp Day # 3 PCP AARON DAVILA MD       Subjective:   Pt sleeping upon arrival to room   States she is tired and sleeping 16 hours per day  No ABD pain  Feels like she has urinary urgency  No fever, chills  Objective:   Blood pressure 111/79, pulse 85, temperature 98.3 °F (36.8 °C), temperature source Oral, resp. rate 18, height 5' 4\" (1.626 m), weight 284 lb 2.8 oz (128.9 kg), last menstrual period 2025, SpO2 94%. Body mass index is 48.78 kg/m².    Gen: awake, alert patient, NAD  HEENT: EOMI, the sclera appears anicteric, oropharynx clear, mucus membranes appear moist  CV: RRR  Lung: no conversational dyspnea   Abdomen: soft NTND abdomen with NABS appreciated   Skin: dry, warm, + jaundice  Ext: no LE edema is evident  Neuro: Alert, oriented x4 and interactive  Psych: calm, cooperative    Assessment and Plan:   Mica Bernabe is a 34 year old woman with history of BMI of 46, tonsillectomy, adenoidectomy, prior C. difficile, complicated/perforated left colon diverticulitis in 2023 eventually undergoing surgery in 2023 who was admitted after presenting to the emergency department yesterday with jaundice.     #Elevated LFTs  #Jaundice  #Acute Alcohol hepatitis  -Labs on admission with T. Bili 14.2,   -US liver/RUQ with severe hepatomegaly, diffuse hepatic steatosis  -Acute viral hep panel unrevealing  -Etiology for acute jaundice unclear  -MDF 45.6 on 3/15, may benefit from glucocorticoid therapy but UA with + leukoesterase, will await Cx prior to initiating therapy.  Discussed with Pt that this does not ensure improvement so risk outweighs benefit.    #Newly Dx MASH/SUMAYA cirrhosis  MELD 3.0: 26  -Non-invasive testing suggests MASH/SUMAYA, autoimmune/chronic liver w/u  negative  -MRI/MRCP and CT from 3/15/2025 morphologically suggest cirrhosis  -Etiology likely MASH +/- ETOH use, chronic liver w/u thus far unrevealing  -PSE: Drowsy, oriented x4 but feels she is lethargic and sleeping 16 hours per day.  Will check ammonia.  -Ascites: None on exam  -EV: will need screening EGD OP  -HCC: AFP in process, No suspicious lesions on CT/MRI.    Recommend:  -Diet as tolerated  -Check ammonia  -Empiric daily PPI  -Complete ETOH cessation  -OP GI clinic f/u in 1-2 weeks, will need referral to hepatology    Case discussed with Dee Dee Guzman MD and Jyoti HERNANDEZ.    Lori Munoz DNP, FNP-BC  [facility]  3/17/2025      Results:     Lab Results   Component Value Date    WBC 4.2 03/17/2025    HGB 10.6 (L) 03/17/2025    HCT 31.0 (L) 03/17/2025    PLT 88.0 (L) 03/17/2025    CREATSERUM 0.54 (L) 03/17/2025    BUN 7 (L) 03/17/2025     03/17/2025    K 4.0 03/17/2025     03/17/2025    CO2 23.0 03/17/2025    GLU 84 03/17/2025    CA 7.7 (L) 03/17/2025    ALB 2.3 (L) 03/17/2025    ALKPHO 118 (H) 03/17/2025    BILT 12.2 (H) 03/17/2025    TP 5.5 (L) 03/17/2025     (H) 03/17/2025    ALT 67 (H) 03/17/2025    PTT 29.9 09/01/2023    INR 1.82 (H) 03/15/2025    TSH 3.775 03/15/2025    LIP 50 03/14/2025    DDIMER 0.54 (H) 04/06/2024    MG 1.8 03/17/2025    PHOS 4.7 10/31/2023    TROP <0.045 05/27/2021    CK 1,035 (H) 03/16/2025       MRI ABDOMEN AND MRCP W/3D (W+W0)(CPT=74183/63840)    Result Date: 3/16/2025  CONCLUSION:   Hepatomegaly and steatosis.  Mild contour nodularity which may indicate a component of fibrosis.  No suspicious hepatic lesions are identified.  The portal and hepatic veins are patent.  Splenomegaly and small volume ascites which may indicate portal hypertension.  Gallbladder wall edema at probably relates to hepatocellular disease.    elm-remote    Dictated by (CST): Mino Medrano MD on 3/16/2025 at 6:24 AM     Finalized by (CST): Mino Medrano MD on 3/16/2025 at  6:33 AM

## 2025-03-18 PROBLEM — M62.82 NON-TRAUMATIC RHABDOMYOLYSIS: Status: ACTIVE | Noted: 2025-03-18

## 2025-03-18 LAB
A-1-ANTITRYPSIN: 152 MG/DL
ALBUMIN SERPL-MCNC: 2.5 G/DL (ref 3.2–4.8)
ALBUMIN/GLOB SERPL: 0.7 {RATIO} (ref 1–2)
ALP LIVER SERPL-CCNC: 124 U/L
ALT SERPL-CCNC: 68 U/L
ANION GAP SERPL CALC-SCNC: 6 MMOL/L (ref 0–18)
AST SERPL-CCNC: 205 U/L (ref ?–34)
BASOPHILS # BLD AUTO: 0.04 X10(3) UL (ref 0–0.2)
BASOPHILS NFR BLD AUTO: 0.8 %
BILIRUB SERPL-MCNC: 13.1 MG/DL (ref 0.3–1.2)
BUN BLD-MCNC: 8 MG/DL (ref 9–23)
BUN/CREAT SERPL: 12.1 (ref 10–20)
CALCIUM BLD-MCNC: 7.9 MG/DL (ref 8.7–10.4)
CHLORIDE SERPL-SCNC: 109 MMOL/L (ref 98–112)
CK SERPL-CCNC: 1040 U/L
CO2 SERPL-SCNC: 25 MMOL/L (ref 21–32)
CREAT BLD-MCNC: 0.66 MG/DL
CRP SERPL-MCNC: 5 MG/DL (ref ?–1)
DEPRECATED RDW RBC AUTO: 83.3 FL (ref 35.1–46.3)
EGFRCR SERPLBLD CKD-EPI 2021: 118 ML/MIN/1.73M2 (ref 60–?)
EOSINOPHIL # BLD AUTO: 0.15 X10(3) UL (ref 0–0.7)
EOSINOPHIL NFR BLD AUTO: 2.9 %
ERYTHROCYTE [DISTWIDTH] IN BLOOD BY AUTOMATED COUNT: 22.8 % (ref 11–15)
ERYTHROCYTE [SEDIMENTATION RATE] IN BLOOD: 41 MM/HR
GLOBULIN PLAS-MCNC: 3.4 G/DL (ref 2–3.5)
GLUCOSE BLD-MCNC: 88 MG/DL (ref 70–99)
HCT VFR BLD AUTO: 34.7 %
HGB BLD-MCNC: 11.6 G/DL
IMM GRANULOCYTES # BLD AUTO: 0.07 X10(3) UL (ref 0–1)
IMM GRANULOCYTES NFR BLD: 1.4 %
INR BLD: 1.69 (ref 0.8–1.2)
LIVER-KIDNEY MICRO AB: <1 UNITS
LYMPHOCYTES # BLD AUTO: 1.72 X10(3) UL (ref 1–4)
LYMPHOCYTES NFR BLD AUTO: 33.4 %
MAGNESIUM SERPL-MCNC: 2.2 MG/DL (ref 1.6–2.6)
MCH RBC QN AUTO: 34.1 PG (ref 26–34)
MCHC RBC AUTO-ENTMCNC: 33.4 G/DL (ref 31–37)
MCV RBC AUTO: 102.1 FL
MONOCYTES # BLD AUTO: 0.39 X10(3) UL (ref 0.1–1)
MONOCYTES NFR BLD AUTO: 7.6 %
NEUTROPHILS # BLD AUTO: 2.78 X10 (3) UL (ref 1.5–7.7)
NEUTROPHILS # BLD AUTO: 2.78 X10(3) UL (ref 1.5–7.7)
NEUTROPHILS NFR BLD AUTO: 53.9 %
OSMOLALITY SERPL CALC.SUM OF ELEC: 288 MOSM/KG (ref 275–295)
PLATELET # BLD AUTO: 97 10(3)UL (ref 150–450)
PLATELETS.RETICULATED NFR BLD AUTO: 2.2 % (ref 0–7)
POTASSIUM SERPL-SCNC: 4 MMOL/L (ref 3.5–5.1)
PROT SERPL-MCNC: 5.9 G/DL (ref 5.7–8.2)
PROTHROMBIN TIME: 20.8 SECONDS (ref 11.6–14.8)
RBC # BLD AUTO: 3.4 X10(6)UL
SODIUM SERPL-SCNC: 140 MMOL/L (ref 136–145)
WBC # BLD AUTO: 5.2 X10(3) UL (ref 4–11)

## 2025-03-18 PROCEDURE — 99222 1ST HOSP IP/OBS MODERATE 55: CPT | Performed by: INTERNAL MEDICINE

## 2025-03-18 PROCEDURE — 99233 SBSQ HOSP IP/OBS HIGH 50: CPT | Performed by: INTERNAL MEDICINE

## 2025-03-18 PROCEDURE — 99233 SBSQ HOSP IP/OBS HIGH 50: CPT | Performed by: HOSPITALIST

## 2025-03-18 RX ORDER — SIMETHICONE 80 MG
80 TABLET,CHEWABLE ORAL EVERY 8 HOURS PRN
Status: DISCONTINUED | OUTPATIENT
Start: 2025-03-18 | End: 2025-03-20

## 2025-03-18 NOTE — CONSULTS
[facility]  part of [facility]      Consult     Mica Bernabe Patient Status:  Inpatient    1990 MRN [id number]   Location [facility] 5SW/SE Attending Mello Williamson MD   Hosp Day # 4 PCP AARON DAVILA MD     Referring Provider: Dr Guzman  Reason for Consultation: Liver biopsy     Subjective:    Mica Bernabe is a 34 year old female with hx morbid obesity, c diff, anxiety disorder, diverticulitis s/p sigmoid colon resection and takedown of colo vesicular fistula 10/26/23 admitted with jaundice, elevated LFTs, acute alcohol hepatitis, new cirrhosis. IR consulted for liver biopsy.    History/Other:      Past Medical History:  Past Medical History:    Anxiety    Blood disorder    anemia    Bronchial tumor    Diverticulosis of large intestine    Esophageal reflux    History of lobectomy of lung    Hx of motion sickness    Pneumonia due to organism    Shortness of breath    Visual impairment    Wears eyeglasses        Past Surgical History:   Past Surgical History:   Procedure Laterality Date    Adenoidectomy      Removal of lung,lobectomy      Tonsillectomy         Social History:  reports that she has never smoked. She has never used smokeless tobacco. She reports current alcohol use of about 10.0 standard drinks of alcohol per week. She reports that she does not use drugs.    Family History:   Family History   Problem Relation Age of Onset    Cancer Father         Colon CA    Other (Other) Father         kidney transplant    Diabetes Father     Other (Other) Mother         pulm,onary fibrosis       Allergies: Allergies[1]    Medications:  Medications Ordered Prior to Encounter[2]    Review of Systems:   Review of systems was completed.  Pertinent positives and negatives noted in the HPI.    Objective:     /65 (BP Location: Left arm)   Pulse 74   Temp 97.7 °F (36.5 °C) (Oral)   Resp 18   Ht 64\"   Wt 284 lb 2.8 oz (128.9 kg)   LMP 2025 (Exact Date)    SpO2 93%   BMI 48.78 kg/m²   General: No acute distress.  A/ox3  Respiratory: Normal respiratory effort  Cardiovascular: rate regular  Extremities: no cyanosis.    Results:    Recent Labs   Lab 03/16/25 0715 03/17/25 0613 03/18/25  0552   RBC 3.15* 3.14* 3.40*   HGB 10.2* 10.6* 11.6*   HCT 30.3* 31.0* 34.7*   MCV 96.2 98.7 102.1*   MCH 32.4 33.8 34.1*   MCHC 33.7 34.2 33.4   RDW 22.5* 22.7* 22.8*   NEPRELIM 2.66 2.34 2.78   WBC 4.5 4.2 5.2   PLT 89.0* 88.0* 97.0*     Recent Labs   Lab 03/16/25 0715 03/17/25 0613 03/18/25  0552   GLU 87 84 88   BUN 6* 7* 8*   CREATSERUM 0.59 0.54* 0.66   EGFRCR 121 124 118   CA 7.8* 7.7* 7.9*   ALB 2.3* 2.3* 2.5*    138 140   K 3.8 4.0 4.0    107 109   CO2 24.0 23.0 25.0   ALKPHO 131* 118* 124*   * 225* 205*   ALT 68* 67* 68*   BILT 11.8* 12.2* 13.1*   TP 5.5* 5.5* 5.9     MRI ABDOMEN AND MRCP W/3D (W+W0)(CPT=74183/02825)    Result Date: 3/16/2025  CONCLUSION:   Hepatomegaly and steatosis.  Mild contour nodularity which may indicate a component of fibrosis.  No suspicious hepatic lesions are identified.  The portal and hepatic veins are patent.  Splenomegaly and small volume ascites which may indicate portal hypertension.  Gallbladder wall edema at probably relates to hepatocellular disease.    elm-remote    Dictated by (CST): Mino Medrano MD on 3/16/2025 at 6:24 AM     Finalized by (CST): Mino Medrano MD on 3/16/2025 at 6:33 AM          CT ABDOMEN TRIPHASIC LIVER (W+WO) (CPT=74170)    Result Date: 3/15/2025  CONCLUSION:  1. Marked hepatic steatosis, moderate hepatomegaly .  Diffuse heterogeneous hepatic enhancement may be related to hepatitis, and or cirrhosis.  Portal vein and branches patent. 2. Moderate splenomegaly. 3. Small amount of ascites and gallbladder wall edema related to liver disease.     Dictated by (CST): Jose Luis Rosa MD on 3/15/2025 at 5:20 PM     Finalized by (CST): Jose Luis Rosa MD on 3/15/2025 at 5:36 PM          US LIVER  (CPT=76705)    Result Date: 3/14/2025  CONCLUSION:   Moderately limited examination.  No biliary ductal dilation on limited assessment.  In the setting of concern for biliary obstruction due to the presence of jaundice, recommend correlation with MRCP or ERCP.  Severe hepatomegaly with severe diffuse hepatic steatosis.   Possible sludge within the gallbladder, which is not well assessed due to examination limitations.  May represent artifact as well.  Otherwise, no imaging evidence of acute cholecystitis.        Dictated by (CST): Marion Belle MD on 3/14/2025 at 8:38 PM     Finalized by (CST): Marion Belle MD on 3/14/2025 at 8:41 PM             Assessment & Plan:    33 yo female admitted with acute etoh hepatitis, elevated LFTs, new cirrhosis  Plan: percutaneous liver biopsy tomorrow  Discussed procedure, risks, benefits with patient and her father at the bedside who agree to proceed.     MITCHLE Stratton  3/18/2025                              [1] No Active Allergies  [2]   Current Facility-Administered Medications on File Prior to Encounter   Medication Dose Route Frequency Provider Last Rate Last Admin    [COMPLETED] acetaminophen (Tylenol Extra Strength) tab 1,000 mg  1,000 mg Oral Once Kelsy Kim APRN   1,000 mg at 01/10/25 1820     Current Outpatient Medications on File Prior to Encounter   Medication Sig Dispense Refill    albuterol 108 (90 Base) MCG/ACT Inhalation Aero Soln Inhale 2 puffs into the lungs every 4 (four) hours as needed for Wheezing.      QUEtiapine 50 MG Oral Tab Take 1 tablet (50 mg total) by mouth nightly.      calcium carbonate 500 MG Oral Chew Tab Chew 1 tablet (500 mg total) by mouth daily.      clonazePAM 1 MG Oral Tab Take 1 tablet (1 mg total) by mouth daily as needed.      sertraline 100 MG Oral Tab Take 1 tablet (100 mg total) by mouth daily.      omeprazole 20 MG Oral Capsule Delayed Release Take 1 capsule (20 mg total) by mouth daily. (Patient taking differently:  Take 2 capsules (40 mg total) by mouth daily.)      zolpidem 10 MG Oral Tab Take 1 tablet (10 mg total) by mouth nightly as needed.      ALPRAZolam 0.5 MG Oral Tab Take 1 tablet (0.5 mg total) by mouth 2 (two) times daily as needed.      Naloxone HCl 4 MG/0.1ML Nasal Liquid 4 mg by Nasal route as needed. If patient remains unresponsive, repeat dose in other nostril 2-5 minutes after first dose. 1 kit 0

## 2025-03-18 NOTE — PROGRESS NOTES
[facility]     Gastroenterology Progress Note    Mica Bernabe Patient Status:  Inpatient    1990 MRN [id number]   Location [facility] 5SW/SE Attending Mello Williamson MD   Hosp Day # 4 PCP AARON DAVILA MD       Subjective:   Continues with muscle soreness. No abdominal pain, n/v. No fever.   Objective:   Blood pressure 102/65, pulse 74, temperature 97.7 °F (36.5 °C), temperature source Oral, resp. rate 18, height 5' 4\" (1.626 m), weight 284 lb 2.8 oz (128.9 kg), last menstrual period 2025, SpO2 93%. Body mass index is 48.78 kg/m².    Gen: awake, alert patient, NAD  HEENT: EOMI, the sclera appears anicteric, oropharynx clear, mucus membranes appear moist  CV: RRR  Lung: no conversational dyspnea   Abdomen: soft NTND abdomen with NABS appreciated   Skin: dry, warm, + jaundice  Ext: no LE edema is evident  Neuro: Alert, oriented x4 and interactive  Psych: calm, cooperative    Assessment and Plan:   Mica Bernabe is a 34 year old woman with history of BMI of 46, tonsillectomy, adenoidectomy, prior C. difficile, complicated/perforated left colon diverticulitis in 2023 eventually undergoing surgery in 2023 who was admitted after presenting to the emergency department yesterday with jaundice.     Most recent set of liver enzymes prior to this was in  at which point liver numbers were normal.  She has had prior hepatomegaly with hepatic steatosis, presumed secondary to nonalcoholic fatty liver disease.  She does endorse 2 to 3 glasses of wine out 4 times a week but has never felt that this was a problem.  Noninvasive testing for chronic liver disease workup has otherwise been unrevealing, no agents to suggest DILI.  Autoimmune serologies were negative.  MRCP shows marked hepatomegaly, steatosis, possible nodularity.  No filling defects or ductal dilation.  Etiology of findings may be secondary to acute alcohol hepatitis in the setting of  MURRAY/SUMAYA liver disease. However as the alcohol history seems disproportional to the level of hyperbilirubinemia, I discussed the role of liver biopsy for histologic diagnosis. Synthetic function intact. Discussed with patient and  and they are in agreement with proceeding with perc liver biopsy. Will need follow-up with hepatology outpatient.    Dee Dee Guzman MD      Results:     Lab Results   Component Value Date    WBC 5.2 03/18/2025    HGB 11.6 (L) 03/18/2025    HCT 34.7 (L) 03/18/2025    PLT 97.0 (L) 03/18/2025    CREATSERUM 0.66 03/18/2025    BUN 8 (L) 03/18/2025     03/18/2025    K 4.0 03/18/2025     03/18/2025    CO2 25.0 03/18/2025    GLU 88 03/18/2025    CA 7.9 (L) 03/18/2025    ALB 2.5 (L) 03/18/2025    ALKPHO 124 (H) 03/18/2025    BILT 13.1 (H) 03/18/2025    TP 5.9 03/18/2025     (H) 03/18/2025    ALT 68 (H) 03/18/2025    PTT 29.9 09/01/2023    INR 1.69 (H) 03/18/2025    TSH 3.775 03/15/2025    LIP 50 03/14/2025    DDIMER 0.54 (H) 04/06/2024    MG 2.2 03/18/2025    PHOS 4.7 10/31/2023    TROP <0.045 05/27/2021    CK 1,040 (H) 03/18/2025       No results found.

## 2025-03-18 NOTE — PROGRESS NOTES
Progress Note     Mica Bernabe Patient Status:  Inpatient    1990 MRN [id number]   Location [facility] 5SW/SE Attending Mello Williamson MD   Hosp Day # 4 PCP AARON DAVILA MD     Chief Complaint: jaundice    Subjective:   S: Patient here with jaundice, elevated bilirubin  Appears more awake and alert today  No acute events overnight  No cp, sob, f,c,n,v abd pain or HA     Review of Systems:   10 point ROS completed and was negative, except for pertinent positive and negatives stated in subjective.    Objective:   Vital signs:  Temp:  [97.7 °F (36.5 °C)-98.4 °F (36.9 °C)] 97.7 °F (36.5 °C)  Pulse:  [74-86] 74  Resp:  [18] 18  BP: (102-111)/(64-79) 102/65  SpO2:  [92 %-95 %] 93 %    Wt Readings from Last 6 Encounters:   25 284 lb 2.8 oz (128.9 kg)   24 260 lb (117.9 kg)   12/15/23 267 lb (121.1 kg)   23 265 lb (120.2 kg)   10/27/23 274 lb 0.5 oz (124.3 kg)   10/19/23 268 lb (121.6 kg)         Physical Exam:    General: No acute distress. Alert ,      , morbidly obese  Respiratory: Clear to auscultation bilaterally. No wheezes. No rhonchi.  Cardiovascular: S1, S2. Regular rate and rhythm. No murmurs, rubs or gallops.   Abdomen: Soft, nontender, nondistended.  Positive bowel sounds. No rebound or guarding.  Neurologic: No focal neurological deficits.   Musculoskeletal: Moves all extremities.  Extremities: No edema.    Results:   Diagnostic Data:      Labs:    Labs Last 24 Hours:   BMP     CBC    Other     Na 140 Cl 109 BUN 8 Glu 88   Hb 11.6   PTT - Procal -   K 4.0 CO2 25.0 Cr 0.66   WBC 5.2 >< PLT 97.0  INR 1.69 CRP -   Renal Lytes Endo    Hct 34.7   Trop - D dim -   eGFR - Ca 7.9 POC Gluc  -    LFT   pBNP - Lactic -   eGFR AA - PO4 - A1c -    APk 124 Prot 5.9  LDL -     Mg 2.2 TSH -   ALT 68 T jareth 13.1 Alb 2.5        COVID-19 Lab Results    COVID-19  Lab Results   Component Value Date    COVID19 Not Detected 01/10/2025    COVID19 Not Detected 2024    COVID19  Not Detected 08/19/2023       Pro-Calcitonin  No results for input(s): \"PCT\" in the last 168 hours.    Cardiac  No results for input(s): \"TROP\", \"PBNP\" in the last 168 hours.    Creatinine Kinase  Recent Labs   Lab 03/15/25  1452 03/16/25  0715 03/18/25  0552   CK 1,549* 1,035* 1,040*       Inflammatory Markers  Recent Labs   Lab 03/15/25  1452   ZHAO 152       Imaging: Imaging data reviewed in Epic.    Medications:    pantoprazole  40 mg Oral BID AC    heparin  7,500 Units Subcutaneous Q8H DIONNE    calcium carbonate  500 mg Oral Daily    QUEtiapine  50 mg Oral Nightly    sertraline  100 mg Oral Daily       Assessment & Plan:   ASSESSMENT / PLAN:     Jaundice   Elevated liver function test  Fatty liver  -Here with jaundice x 2 days bilirubin 14.2 on admission---> 11.8 -> 12.2 -> 13.1  -Gastroenterology consulted   -liver ultrasound--> no biliary ductal dilation on limited assessment, severe hepatomegaly with severe diffuse hepatic steatosis, possible sludge within the gallbladder which is not well assessed  -Plan MRCP -> reviewed  -Drinks about 2 glasses of wine 4 times a week -> advised to stop etoh consumption  -Appreciate GI evaluation  -Advised alcohol cessation in this acute setting with elevated transaminases  -auto-immune hepatitis negative  -GI recommending liver biopsy for further evaluation and management, planned for tomorrow     Morbid obesity    History of diverticulitis  History of C. difficile infection  Anxiety disorder      Quality:  DVT Prophylaxis: Heparin  CODE status: Full code  Cerda:    Central line: n/a  Dispo: further recs pending clinical course      Will the patient be referred to TCC on discharge?: yes  Estimated date of discharge: TBD  Discharge is dependent on: clinical improvement  At this point Ms. Bernabe is expected to be discharge to: home    Plan of care discussed with patient, nursing, GI    Outpatient records or previous hospital records reviewed.   Patient and/or patient's  family given opportunity to ask questions and note understanding and agreeing with therapeutic plan as outlined     Coordinated care with providers and counseling re: treatment plan and workup    MDM: High complexity

## 2025-03-19 ENCOUNTER — APPOINTMENT (OUTPATIENT)
Dept: INTERVENTIONAL RADIOLOGY/VASCULAR | Facility: HOSPITAL | Age: 35
End: 2025-03-19
Attending: NURSE PRACTITIONER
Payer: COMMERCIAL

## 2025-03-19 ENCOUNTER — APPOINTMENT (OUTPATIENT)
Dept: GENERAL RADIOLOGY | Facility: HOSPITAL | Age: 35
End: 2025-03-19
Attending: HOSPITALIST
Payer: COMMERCIAL

## 2025-03-19 LAB
ALBUMIN SERPL-MCNC: 2.4 G/DL (ref 3.2–4.8)
ALBUMIN/GLOB SERPL: 0.8 {RATIO} (ref 1–2)
ALP LIVER SERPL-CCNC: 113 U/L
ALT SERPL-CCNC: 59 U/L
ANION GAP SERPL CALC-SCNC: 6 MMOL/L (ref 0–18)
AST SERPL-CCNC: 171 U/L (ref ?–34)
BASOPHILS # BLD AUTO: 0.02 X10(3) UL (ref 0–0.2)
BASOPHILS NFR BLD AUTO: 0.5 %
BILIRUB SERPL-MCNC: 11.6 MG/DL (ref 0.3–1.2)
BUN BLD-MCNC: 6 MG/DL (ref 9–23)
BUN/CREAT SERPL: 10 (ref 10–20)
CALCIUM BLD-MCNC: 7.8 MG/DL (ref 8.7–10.4)
CHLORIDE SERPL-SCNC: 112 MMOL/L (ref 98–112)
CK SERPL-CCNC: 869 U/L
CO2 SERPL-SCNC: 21 MMOL/L (ref 21–32)
CREAT BLD-MCNC: 0.6 MG/DL
DEPRECATED RDW RBC AUTO: 84 FL (ref 35.1–46.3)
EGFRCR SERPLBLD CKD-EPI 2021: 121 ML/MIN/1.73M2 (ref 60–?)
EOSINOPHIL # BLD AUTO: 0.14 X10(3) UL (ref 0–0.7)
EOSINOPHIL NFR BLD AUTO: 3.5 %
ERYTHROCYTE [DISTWIDTH] IN BLOOD BY AUTOMATED COUNT: 22.8 % (ref 11–15)
GLOBULIN PLAS-MCNC: 3 G/DL (ref 2–3.5)
GLUCOSE BLD-MCNC: 86 MG/DL (ref 70–99)
HCT VFR BLD AUTO: 30.8 %
HGB BLD-MCNC: 10.3 G/DL
IMM GRANULOCYTES # BLD AUTO: 0.05 X10(3) UL (ref 0–1)
IMM GRANULOCYTES NFR BLD: 1.3 %
LYMPHOCYTES # BLD AUTO: 1.23 X10(3) UL (ref 1–4)
LYMPHOCYTES NFR BLD AUTO: 30.8 %
MCH RBC QN AUTO: 33.8 PG (ref 26–34)
MCHC RBC AUTO-ENTMCNC: 33.4 G/DL (ref 31–37)
MCV RBC AUTO: 101 FL
MONOCYTES # BLD AUTO: 0.3 X10(3) UL (ref 0.1–1)
MONOCYTES NFR BLD AUTO: 7.5 %
NEUTROPHILS # BLD AUTO: 2.26 X10 (3) UL (ref 1.5–7.7)
NEUTROPHILS # BLD AUTO: 2.26 X10(3) UL (ref 1.5–7.7)
NEUTROPHILS NFR BLD AUTO: 56.4 %
OSMOLALITY SERPL CALC.SUM OF ELEC: 285 MOSM/KG (ref 275–295)
PLATELET # BLD AUTO: 81 10(3)UL (ref 150–450)
PLATELETS.RETICULATED NFR BLD AUTO: 1.7 % (ref 0–7)
POTASSIUM SERPL-SCNC: 3.8 MMOL/L (ref 3.5–5.1)
PROT SERPL-MCNC: 5.4 G/DL (ref 5.7–8.2)
RBC # BLD AUTO: 3.05 X10(6)UL
SODIUM SERPL-SCNC: 139 MMOL/L (ref 136–145)
WBC # BLD AUTO: 4 X10(3) UL (ref 4–11)

## 2025-03-19 PROCEDURE — 0FB13ZX EXCISION OF RIGHT LOBE LIVER, PERCUTANEOUS APPROACH, DIAGNOSTIC: ICD-10-PCS | Performed by: RADIOLOGY

## 2025-03-19 PROCEDURE — 71045 X-RAY EXAM CHEST 1 VIEW: CPT | Performed by: HOSPITALIST

## 2025-03-19 PROCEDURE — 99233 SBSQ HOSP IP/OBS HIGH 50: CPT | Performed by: INTERNAL MEDICINE

## 2025-03-19 PROCEDURE — 99233 SBSQ HOSP IP/OBS HIGH 50: CPT | Performed by: HOSPITALIST

## 2025-03-19 RX ORDER — MIDAZOLAM HYDROCHLORIDE 1 MG/ML
INJECTION INTRAMUSCULAR; INTRAVENOUS
Status: DISCONTINUED
Start: 2025-03-19 | End: 2025-03-19 | Stop reason: WASHOUT

## 2025-03-19 RX ORDER — MIDAZOLAM HYDROCHLORIDE 1 MG/ML
INJECTION INTRAMUSCULAR; INTRAVENOUS
Status: DISPENSED
Start: 2025-03-19 | End: 2025-03-20

## 2025-03-19 NOTE — PROGRESS NOTES
Patient is in holding room 2, AxO x4. Patient draped in a sterile fashion. Time out performed with Dr. Tilley and all staff performed. Biopsy performed with 5 mL of lidocaine applied to the site and IV sedation of 2 mg versed and 100 mcg fentanyl given. Specimens collected and sent to lab. Sterile dressing applied to the site. Report given to MARY Tierney.

## 2025-03-19 NOTE — PROCEDURES
[facility]  part of [facility]  Procedure Note    Mica Bernabe Patient Status:  Inpatient    1990 MRN [id number]   Location [facility] INTERVENTIONAL SUITES Attending Maria Elena Quiroga MD   Hosp Day # 5 PCP AARON DAVILA MD     Procedure: Parenchymal liver biopsy, percutaneous    Pre-Procedure Diagnosis: Acute hepatitis    Post-Procedure Diagnosis: As above    Anesthesia:  Sedation    Findings: 18-gauge x 3 cm right hepatic lobe percutaneous core biopsy via right anterior mid axillary intercostal approach with ultrasound guidance.  Manual pressure held for hemostasis.  Delayed ultrasound shows no hematoma.    Specimens: Single core in formalin    Blood Loss: 0 ml    Tourniquet Time: N/A  Complications:  None  Drains: N/A    Secondary Diagnosis: Cruz liver disease    Allan Tilley MD  3/19/2025

## 2025-03-19 NOTE — PROGRESS NOTES
[facility]  part of [facility]    Progress Note    Mica Bernabe Patient Status:  Inpatient    1990 MRN [id number]   Location [facility] 5SW/SE Attending Maria Elena Quiroga MD   Hosp Day # 5 PCP AARON DAVILA MD     Subjective:     Patient seen and examined today  No acute symptoms    Objective:   Blood pressure 104/69, pulse 76, temperature 97.7 °F (36.5 °C), temperature source Oral, resp. rate 18, height 5' 4\" (1.626 m), weight 284 lb 2.8 oz (128.9 kg), last menstrual period 2025, SpO2 94%.    GEN: AAOx3, NAD  HEENT: EOMI, PERRLA, no injection or icterus, oral mucosa moist, no oral lesions. No lymphadenopathy. No facial rash  CVS: RRR, no murmurs rubs or gallops. Equal 2+ distal pulses.   LUNGS: CTAB, no increased work of breathing  ABDOMEN:  soft NT/ND, +BS, no HSM  SKIN: +jaundice   MSK:  No swelling or synovitis on exam  NEURO: Cranial nerves II-XII intact grossly. 5/5 strength throughout in both upper and lower extremities, sensation intact.  PSYCH: normal mood    Results:   Lab Results   Component Value Date    WBC 4.0 2025    HGB 10.3 (L) 2025    HCT 30.8 (L) 2025    PLT 81.0 (L) 2025    CREATSERUM 0.60 2025    BUN 6 (L) 2025     2025    K 3.8 2025     2025    CO2 21.0 2025    GLU 86 2025    CA 7.8 (L) 2025    ALB 2.4 (L) 2025    ALKPHO 113 (H) 2025    BILT 11.6 (H) 2025    TP 5.4 (L) 2025     (H) 2025    ALT 59 (H) 2025    PTT 29.9 2023    INR 1.69 (H) 2025    TSH 3.775 03/15/2025    LIP 50 2025    DDIMER 0.54 (H) 2024    ESRML 41 (H) 2025    CRP 5.00 (H) 2025    MG 2.2 2025    PHOS 4.7 10/31/2023    TROPHS <3 2024     (H) 2025       XR CHEST AP PORTABLE  (CPT=71045)    Result Date: 3/19/2025  CONCLUSION:  1. No focal airspace disease or significant pleural effusion. 2. Stable  nodular opacity in the right midlung, which probably relates to subpleural fat herniation.  Stable chronic elevation of the right hemidiaphragm.   elm-remote  Dictated by (CST): Joe Clifton MD on 3/19/2025 at 11:04 AM     Finalized by (CST): Joe Clifton MD on 3/19/2025 at 11:07 AM               Assessment & Plan:     Rhabdomyolysis  - She was found to have elevated CK levels 1549, improved to 1040 with fluids, CK continues to improved 869 today   - She reports muscle soreness but no weakness on exam  - Could be related to possible alcoholic hepatitis  - Will obtain further blood work to rule out inflammatory myositis  - She is not on a statin to suggest statin induced myopathy  - Will continue to trend CK levels  - Continue IV fluids     Elevated LFTs, jaundice, alcohol hepatitis  - GI following  - Autoimmune liver workup was negative  - Plan for liver biopsy tomorrow    Family hx of RA  - she would like blood work  - she had no joint pain or swelling     Will continue to follow    Madelyn Collins MD  3/19/2025

## 2025-03-19 NOTE — PROGRESS NOTES
[facility]  part of [facility]    Progress Note    Mica Bernabe Patient Status:  Inpatient    1990 MRN [id number]   Location [facility] 5SW/SE Attending Maria Elena Quiroga MD   Hosp Day # 5 PCP AARON DAVILA MD     Chief Complaint:   Chief Complaint   Patient presents with    Jaundice       Subjective:   Mica Bernabe is resting. Wakes up easily. Felt bloated overnight. C/o some SOB as well. No F/C. No Cp or cough. Asking about DC plans. Anxioius about procedure       Objective:   Objective:    Blood pressure 104/69, pulse 76, temperature 97.7 °F (36.5 °C), temperature source Oral, resp. rate 18, height 5' 4\" (1.626 m), weight 284 lb 2.8 oz (128.9 kg), last menstrual period 2025, SpO2 94%.    Physical Exam:    General: No acute distress. Obese   Respiratory: Diminished bases B/L   Cardiovascular: S1, S2. Regular rate and rhythm. No murmurs, rubs or gallops.   Abdomen: Soft, nontender, nondistended.  Positive bowel sounds. No rebound or guarding.  Neurologic: No focal neurological deficits.   Musculoskeletal: Moves all extremities.  Extremities: No edema.      Results:   Results:    Labs:  Recent Labs   Lab 25  1757 03/15/25  0658 25  0715 25  0613 25  0552 25  1001 25  0559   WBC 6.2 4.8 4.5 4.2 5.2  --  4.0   HGB 13.0 10.8* 10.2* 10.6* 11.6*  --  10.3*   MCV 94.9 96.1 96.2 98.7 102.1*  --  101.0*   PLT 99.0* 81.0* 89.0* 88.0* 97.0*  --  81.0*   INR 1.58* 1.82*  --   --   --  1.69*  --        Recent Labs   Lab 25  0613 25  0552 25  0559   GLU 84 88 86   BUN 7* 8* 6*   CREATSERUM 0.54* 0.66 0.60   CA 7.7* 7.9* 7.8*   ALB 2.3* 2.5* 2.4*    140 139   K 4.0 4.0 3.8    109 112   CO2 23.0 25.0 21.0   ALKPHO 118* 124* 113*   * 205* 171*   ALT 67* 68* 59*   BILT 12.2* 13.1* 11.6*   TP 5.5* 5.9 5.4*       Estimated Creatinine Clearance: 114.1 mL/min (based on SCr of 0.6 mg/dL).    Recent Labs   Lab  03/14/25  1757 03/15/25  0658 03/18/25  1001   PTP 19.7* 22.0* 20.8*   INR 1.58* 1.82* 1.69*            Culture:  Hospital Encounter on 03/14/25   1. Urine Culture, Routine     Status: Abnormal    Collection Time: 03/17/25  2:12 PM    Specimen: Urine, clean catch   Result Value Ref Range    Urine Culture 10,000 - 50,000 CFU/ML Escherichia coli (A) N/A       Susceptibility    Escherichia coli -  (no method available)     Ampicillin <=2 Sensitive      Cefazolin <=4 Sensitive      Ciprofloxacin <=0.25 Sensitive      Gentamicin <=1 Sensitive      Meropenem <=0.25 Sensitive      Levofloxacin <=0.12 Sensitive      Nitrofurantoin <=16 Sensitive      Piperacillin + Tazobactam <=4 Sensitive      Trimethoprim/Sulfa <=20 Sensitive        Cardiac  No results for input(s): \"TROP\", \"PBNP\" in the last 168 hours.      Imaging: Imaging data reviewed in Epic.  No results found.    Medications:    pantoprazole  40 mg Oral BID AC    [Held by provider] heparin  7,500 Units Subcutaneous Q8H DIONNE    calcium carbonate  500 mg Oral Daily    QUEtiapine  50 mg Oral Nightly    sertraline  100 mg Oral Daily         Assessment and Plan:   Assessment & Plan:        Jaundice   Elevated liver function test  Fatty liver   GI on consult.   Ddx: alcoholic hepatitis vs MURRAY vs other   Liver US and MRCP reviewed.   Advised ETOH cessation.   Autoimmune hepatitis w/u neg  Liver biopsy   Outpt fu with hepatology   LFT's stable.   Rhabdomyolysis, improving  CPK 1549 on admit. Improving with IVF  Could be related to ETOH hepatitis   Rheum on consult.   Cont IVF.   SOB  CXR  IS  Decrease IVF to 100ml/our stop soon  Morbid Obesity   H/o diverticulitis  H/o C.diff infection   Anxiety d/o        >55min spent, >50% spent counseling and coordinating care in the form of educating pt/family and d/w consultants and staff. Most of the time spent discussing the above plan.        Plan of care discussed with patient or family at bedside.    Maria Elena Quiroga,  MD  Hospitalist          Supplementary Documentation:     Quality:  DVT Prophylaxis: SCD  CODE status: Full  Dispo: per clinical course            Estimated date of discharge: TBD  Discharge is dependent on: clinical stability  At this point Ms. Bernabe is expected to be discharge to: home

## 2025-03-19 NOTE — PAYOR COMM NOTE
--------------  CONTINUED STAY REVIEW    Payor: [employer]   Subscriber #:  [id number]  Authorization Number: [id number]    Admit date: 3/14/25  Admit time:  9:34 PM    REVIEW DOCUMENTATION:            3/17   HOSPITALIST:       Chief Complaint: jaundice        Subjective:  S: Patient here with jaundice, elevated bilirubin  Feels fatigued and also continues to have generalized muscle aches  Denies chest pain shortness of breath dizziness  Denies abdominal pain     Objective:  Vital signs:  Temp:  [98.1 °F (36.7 °C)-98.4 °F (36.9 °C)] 98.3 °F (36.8 °C)  Pulse:  [75-87] 75  Resp:  [18] 18  BP: (103-107)/(64-79) 105/64  SpO2:  [92 %-94 %] 93 %    Assessment & Plan:  ASSESSMENT / PLAN:      Jaundice   Elevated liver function test  Fatty liver  -Here with jaundice x 2 days bilirubin 14.2 on admission---> 11.8 -> 12.2 today  -Gastroenterology consulted   -liver ultrasound--> no biliary ductal dilation on limited assessment, severe hepatomegaly with severe diffuse hepatic steatosis, possible sludge within the gallbladder which is not well assessed  -Plan MRCP -> reviewed  -Drinks about 2 glasses of wine 4 times a week -> advised to stop etoh consumption  -Appreciate GI evaluation  -Advised alcohol cessation in this acute setting with elevated transaminases  -await auto-immune work up     Morbid obesity     History of diverticulitis  History of C. difficile infection  Anxiety disorder        Quality:  DVT Prophylaxis: Heparin  CODE status: Full code  Cerda:    Central line: n/a  Dispo: further recs pending clinical course        Will the patient be referred to TCC on discharge?: yes  Estimated date of discharge: TBD  Discharge is dependent on: clinical improvement  At this point Ms. Bernabe is expected to be discharge to: home     Plan of care discussed with patient, nursing, GI     Outpatient records or previous hospital records reviewed.   Patient and/or patient's family given opportunity to ask questions and  note understanding and agreeing with therapeutic plan as outlined     Coordinated care with providers and counseling re: treatment plan and workup     MDM: High complexity                  3/17 GI:         Subjective:   Pt sleeping upon arrival to room   States she is tired and sleeping 16 hours per day  No ABD pain  Feels like she has urinary urgency      Objective:   Blood pressure 111/79, pulse 85, temperature 98.3 °F (36.8 °C), temperature source Oral, resp. rate 18, height 5' 4\" (1.626 m), weight 284 lb 2.8 oz (128.9 kg), last menstrual period 02/12/2025, SpO2 94%. Body mass index is 48.78 kg/m².     Gen: awake, alert patient, NAD  HEENT: EOMI, the sclera appears anicteric, oropharynx clear, mucus membranes appear moist  CV: RRR  Lung: no conversational dyspnea   Abdomen: soft NTND abdomen with NABS appreciated   Skin: dry, warm, + jaundice  Ext: no LE edema is evident  Neuro: Alert, oriented x4 and interactive      Assessment and Plan:   Mica Bernabe is a 34 year old woman with history of BMI of 46, tonsillectomy, adenoidectomy, prior C. difficile, complicated/perforated left colon diverticulitis in June 2023 eventually undergoing surgery in October 2023 who was admitted after presenting to the emergency department yesterday with jaundice.      #Elevated LFTs  #Jaundice  #Acute Alcohol hepatitis  -Labs on admission with T. Bili 14.2,   -US liver/RUQ with severe hepatomegaly, diffuse hepatic steatosis  -Acute viral hep panel unrevealing  -Etiology for acute jaundice unclear  -MDF 45.6 on 3/15, may benefit from glucocorticoid therapy but UA with + leukoesterase, will await Cx prior to initiating therapy.  Discussed with Pt that this does not ensure improvement so risk outweighs benefit.     #Newly Dx MASH/SUMAYA cirrhosis  MELD 3.0: 26  -Non-invasive testing suggests MASH/SUMAYA, autoimmune/chronic liver w/u negative  -MRI/MRCP and CT from 3/15/2025 morphologically suggest cirrhosis  -Etiology likely MASH +/-  ETOH use, chronic liver w/u thus far unrevealing  -PSE: Drowsy, oriented x4 but feels she is lethargic and sleeping 16 hours per day.  Will check ammonia.  -Ascites: None on exam  -EV: will need screening EGD OP  -HCC: AFP in process, No suspicious lesions on CT/MRI.     Recommend:  -Diet as tolerated  -Check ammonia  -Empiric daily PPI  -Complete ETOH cessation  -OP GI clinic f/u in 1-2 weeks, will need referral to hepatology     Case discussed with Dee Dee Guzman MD and Jyoti HERNANDEZ.     Lori Munoz DNP, FNP-[facility]  3/17/2025           MRI ABDOMEN AND MRCP W/3D (W+W0)(CPT=74183/60871)     Result Date: 3/16/2025  CONCLUSION:          Hepatomegaly and steatosis.  Mild contour nodularity which may indicate a component of fibrosis.  No suspicious hepatic lesions are identified.  The portal and hepatic veins are patent.  Splenomegaly and small volume ascites which may indicate portal hypertension.  Gallbladder wall edema at probably relates to hepatocellular disease.    elm-remote    Dictated by (CST): Mino Medrano MD on 3/16/2025 at 6:24 AM     Finalized by (CST): Mino Medrano MD on 3/16/2025 at 6:33 AM                        3/18 HOSPITALIST:     Subjective:  S: Patient here with jaundice, elevated bilirubin          Labs Last 24 Hours:                      BMP         CBC       Other      Na 140 Cl 109 BUN 8 Glu 88     Hb 11.6     PTT - Procal -    K 4.0 CO2 25.0 Cr 0.66     WBC 5.2 >< PLT 97.0   INR 1.69 CRP -    Renal Lytes Endo       Hct 34.7     Trop - D dim -    eGFR - Ca 7.9 POC Gluc  -       LFT     pBNP - Lactic -    eGFR AA - PO4 - A1c -      APk 124 Prot 5.9   LDL -        Mg 2.2 TSH -     ALT 68 T jareth 13.1 Alb 2.5             Creatinine Kinase        Recent Labs   Lab 03/15/25  1452 03/16/25  0715 03/18/25  0552   CK 1,549* 1,035* 1,040*              Assessment & Plan:  ASSESSMENT / PLAN:      Jaundice   Elevated liver function test  Fatty liver  -Here with jaundice x 2 days  bilirubin 14.2 on admission---> 11.8 -> 12.2 -> 13.1  -Gastroenterology consulted   -liver ultrasound--> no biliary ductal dilation on limited assessment, severe hepatomegaly with severe diffuse hepatic steatosis, possible sludge within the gallbladder which is not well assessed  -Plan MRCP -> reviewed  -Drinks about 2 glasses of wine 4 times a week -> advised to stop etoh consumption  -Appreciate GI evaluation  -Advised alcohol cessation in this acute setting with elevated transaminases  -auto-immune hepatitis negative  -GI recommending liver biopsy for further evaluation and management, planned for tomorrow      Morbid obesity     History of diverticulitis  History of C. difficile infection  Anxiety disorder        Quality:  DVT Prophylaxis: Heparin  CODE status: Full code  Cerda:    Central line: n/a  Dispo: further recs pending clinical course        Will the patient be referred to TCC on discharge?: yes  Estimated date of discharge: TBD  Discharge is dependent on: clinical improvement  At this point Ms. Bernabe is expected to be discharge to: home     Plan of care discussed with patient, nursing, GI     Outpatient records or previous hospital records reviewed.   Patient and/or patient's family given opportunity to ask questions and note understanding and agreeing with therapeutic plan as outlined     Coordinated care with providers and counseling re: treatment plan and workup         3/18 IR:           Consult            Mica Bernabe Patient Status:  Inpatient    1990 MRN [id number]   Location [facility] [place] 5SW/SE Attending Mello Williamson MD   Hosp Day # 4 PCP AARON DAVILA MD      Referring Provider: Dr Guzman  Reason for Consultation: Liver biopsy         Subjective:  Mica Bernabe is a 34 year old female with hx morbid obesity, c diff, anxiety disorder, diverticulitis s/p sigmoid colon resection and takedown of colo vesicular fistula 10/26/23 admitted with jaundice, elevated  LFTs, acute alcohol hepatitis, new cirrhosis. IR consulted for liver biopsy.        History/Other:     Past Medical History:  Past Medical History       Past Medical History:    Anxiety    Blood disorder     anemia    Bronchial tumor    Diverticulosis of large intestine    Esophageal reflux    History of lobectomy of lung    Hx of motion sickness    Pneumonia due to organism    Shortness of breath    Visual impairment     Wears eyeglasses            Past Surgical History:   Past Surgical History         Past Surgical History:   Procedure Laterality Date    Adenoidectomy        Removal of lung,lobectomy        Tonsillectomy                Social History:  reports that she has never smoked. She has never used smokeless tobacco. She reports current alcohol use of about 10.0 standard drinks of alcohol per week. She reports that she does not use drugs.     Family History:   Family History         Family History   Problem Relation Age of Onset    Cancer Father           Colon CA    Other (Other) Father           kidney transplant    Diabetes Father      Other (Other) Mother           pulm,onary fibrosis            Allergies: [Allergies]    [Allergies]  No Active Allergies     Medications:  [Medications Ordered Prior to Encounter]    [Medications Ordered Prior to Encounter]            Current Facility-Administered Medications on File Prior to Encounter   Medication Dose Route Frequency Provider Last Rate Last Admin    [COMPLETED] acetaminophen (Tylenol Extra Strength) tab 1,000 mg  1,000 mg Oral Once Kelsy Kim APRN   1,000 mg at 01/10/25 1820             Current Outpatient Medications on File Prior to Encounter   Medication Sig Dispense Refill    albuterol 108 (90 Base) MCG/ACT Inhalation Aero Soln Inhale 2 puffs into the lungs every 4 (four) hours as needed for Wheezing.        QUEtiapine 50 MG Oral Tab Take 1 tablet (50 mg total) by mouth nightly.        calcium carbonate 500 MG Oral Chew Tab Chew 1 tablet (500 mg  total) by mouth daily.        clonazePAM 1 MG Oral Tab Take 1 tablet (1 mg total) by mouth daily as needed.        sertraline 100 MG Oral Tab Take 1 tablet (100 mg total) by mouth daily.        omeprazole 20 MG Oral Capsule Delayed Release Take 1 capsule (20 mg total) by mouth daily. (Patient taking differently: Take 2 capsules (40 mg total) by mouth daily.)        zolpidem 10 MG Oral Tab Take 1 tablet (10 mg total) by mouth nightly as needed.        ALPRAZolam 0.5 MG Oral Tab Take 1 tablet (0.5 mg total) by mouth 2 (two) times daily as needed.        Naloxone HCl 4 MG/0.1ML Nasal Liquid 4 mg by Nasal route as needed. If patient remains unresponsive, repeat dose in other nostril 2-5 minutes after first dose. 1 kit 0        Review of Systems:   Review of systems was completed.  Pertinent positives and negatives noted in the HPI.        Objective:     /65 (BP Location: Left arm)   Pulse 74   Temp 97.7 °F (36.5 °C) (Oral)   Resp 18   Ht 64\"   Wt 284 lb 2.8 oz (128.9 kg)   LMP 02/12/2025 (Exact Date)   SpO2 93%   BMI 48.78 kg/m²   General: No acute distress.  A/ox3  Respiratory: Normal respiratory effort  Cardiovascular: rate regular  Extremities: no cyanosis.             Recent Labs   Lab 03/16/25  0715 03/17/25  0613 03/18/25  0552   RBC 3.15* 3.14* 3.40*   HGB 10.2* 10.6* 11.6*   HCT 30.3* 31.0* 34.7*   MCV 96.2 98.7 102.1*   MCH 32.4 33.8 34.1*   MCHC 33.7 34.2 33.4   RDW 22.5* 22.7* 22.8*   NEPRELIM 2.66 2.34 2.78   WBC 4.5 4.2 5.2   PLT 89.0* 88.0* 97.0*          Lab 03/16/25  0715 03/17/25  0613 03/18/25  0552   GLU 87 84 88   BUN 6* 7* 8*   CREATSERUM 0.59 0.54* 0.66   EGFRCR 121 124 118   CA 7.8* 7.7* 7.9*   ALB 2.3* 2.3* 2.5*    138 140   K 3.8 4.0 4.0    107 109   CO2 24.0 23.0 25.0   ALKPHO 131* 118* 124*   * 225* 205*   ALT 68* 67* 68*   BILT 11.8* 12.2* 13.1*   TP 5.5* 5.5* 5.9       Assessment & Plan:  35 yo female admitted with acute etoh hepatitis, elevated LFTs, new  cirrhosis  Plan: percutaneous liver biopsy tomorrow  Discussed procedure, risks, benefits with patient and her father at the bedside who agree to proceed.      Glenroy Do, APRN  3/18/2025            3/18 GI:       Subjective:   Continues with muscle soreness. No abdominal pain, n/v. No fever.   Objective:   Blood pressure 102/65, pulse 74, temperature 97.7 °F (36.5 °C), temperature source Oral, resp. rate 18, height 5' 4\" (1.626 m), weight 284 lb 2.8 oz (128.9 kg), last menstrual period 02/12/2025, SpO2 93%. Body mass index is 48.78 kg/m².     Gen: awake, alert patient, NAD  HEENT: EOMI, the sclera appears anicteric, oropharynx clear, mucus membranes appear moist  CV: RRR  Lung: no conversational dyspnea   Abdomen: soft NTND abdomen with NABS appreciated   Skin: dry, warm, + jaundice  Ext: no LE edema is evident  Neuro: Alert, oriented x4 and interactive  Psych: calm, cooperative      Assessment and Plan:   Mica Bernabe is a 34 year old woman with history of BMI of 46, tonsillectomy, adenoidectomy, prior C. difficile, complicated/perforated left colon diverticulitis in June 2023 eventually undergoing surgery in October 2023 who was admitted after presenting to the emergency department yesterday with jaundice.      Most recent set of liver enzymes prior to this was in 2023 at which point liver numbers were normal.  She has had prior hepatomegaly with hepatic steatosis, presumed secondary to nonalcoholic fatty liver disease.  She does endorse 2 to 3 glasses of wine out 4 times a week but has never felt that this was a problem.  Noninvasive testing for chronic liver disease workup has otherwise been unrevealing, no agents to suggest DILI.  Autoimmune serologies were negative.  MRCP shows marked hepatomegaly, steatosis, possible nodularity.  No filling defects or ductal dilation.  Etiology of findings may be secondary to acute alcohol hepatitis in the setting of MURRAY/SUMAYA liver disease. However as the  alcohol history seems disproportional to the level of hyperbilirubinemia, I discussed the role of liver biopsy for histologic diagnosis. Synthetic function intact. Discussed with patient and  and they are in agreement with proceeding with perc liver biopsy. Will need follow-up with hepatology outpatient.     Dee Dee Guzman MD           3/19 HOSPITALIST:          Subjective:   Mica Bernabe is resting. Wakes up easily. Felt bloated overnight. C/o some SOB as well. No F/C. No Cp or cough. Asking about DC plans. Anxioius about procedure           Objective:      Objective:  Blood pressure 104/69, pulse 76, temperature 97.7 °F (36.5 °C), temperature source Oral, resp. rate 18, height 5' 4\" (1.626 m), weight 284 lb 2.8 oz (128.9 kg), last menstrual period 02/12/2025, SpO2 94%.     Physical Exam:    General: No acute distress. Obese   Respiratory: Diminished bases B/L       Assessment & Plan:  Jaundice   Elevated liver function test  Fatty liver   GI on consult.   Ddx: alcoholic hepatitis vs MURRAY vs other   Liver US and MRCP reviewed.   Advised ETOH cessation.   Autoimmune hepatitis w/u neg  Liver biopsy   Outpt fu with hepatology   LFT's stable.   Rhabdomyolysis, improving  CPK 1549 on admit. Improving with IVF  Could be related to ETOH hepatitis   Rheum on consult.   Cont IVF.   SOB  CXR  IS  Decrease IVF to 100ml/our stop soon  Morbid Obesity   H/o diverticulitis  H/o C.diff infection   Anxiety d/o          3/19  RHEUMATOLOGY:       Objective:  Blood pressure 104/69, pulse 76, temperature 97.7 °F (36.5 °C), temperature source Oral, resp. rate 18, height 5' 4\" (1.626 m), weight 284 lb 2.8 oz (128.9 kg), last menstrual period 02/12/2025, SpO2 94%.     GEN: AAOx3, NAD  HEENT: EOMI, PERRLA, no injection or icterus, oral mucosa moist, no oral lesions. No lymphadenopathy. No facial rash  CVS: RRR, no murmurs rubs or gallops. Equal 2+ distal pulses.   LUNGS: CTAB, no increased work of breathing  ABDOMEN:  soft NT/ND,  +BS, no HSM  SKIN: +jaundice       Component Value Date     WBC 4.0 03/19/2025     HGB 10.3 (L) 03/19/2025     HCT 30.8 (L) 03/19/2025     PLT 81.0 (L) 03/19/2025     CREATSERUM 0.60 03/19/2025     BUN 6 (L) 03/19/2025      03/19/2025     K 3.8 03/19/2025      03/19/2025     CO2 21.0 03/19/2025     GLU 86 03/19/2025     CA 7.8 (L) 03/19/2025     ALB 2.4 (L) 03/19/2025     ALKPHO 113 (H) 03/19/2025     BILT 11.6 (H) 03/19/2025     TP 5.4 (L) 03/19/2025      (H) 03/19/2025     ALT 59 (H) 03/19/2025     PTT 29.9 09/01/2023     INR 1.69 (H) 03/18/2025            Assessment & Plan:  Rhabdomyolysis  - She was found to have elevated CK levels 1549, improved to 1040 with fluids, CK continues to improved 869 today   - She reports muscle soreness but no weakness on exam  - Could be related to possible alcoholic hepatitis  - Will obtain further blood work to rule out inflammatory myositis  - She is not on a statin to suggest statin induced myopathy  - Will continue to trend CK levels  - Continue IV fluids     Elevated LFTs, jaundice, alcohol hepatitis  - GI following  - Autoimmune liver workup was negative    - Plan for liver biopsy tomorrow     Family hx of RA  - she would like blood work  - she had no joint pain or swelling      Will continue to follow          MEDICATIONS ADMINISTERED IN LAST 1 DAY:      Acetaminophen (Tylenol Extra Strength) tab 500 mg       Date Action Dose Route User    3/18/2025 2141 Given 500 mg Oral Melody Lr RN          albuterol (Ventolin HFA) 108 (90 Base) MCG/ACT inhaler 2 puff       Date Action Dose Route User    3/18/2025 1440 Given 2 puff Inhalation Kelsy Jenkins RN          ALPRAZolam (Xanax) tab 0.5 mg       Date Action Dose Route User    3/19/2025 0542 Given 0.5 mg Oral Melody Lr RN    3/18/2025 1817 Given 0.5 mg Oral Kelsy Jenkins RN          pantoprazole (Protonix) DR tab 40 mg       Date Action Dose Route User    3/19/2025 0542 Given 40 mg Oral Be  MARY Oilver    3/18/2025 1816 Given 40 mg Oral Kelsy Jenkins RN          QUEtiapine (SEROquel) tab 50 mg       Date Action Dose Route User    3/18/2025 2141 Given 50 mg Oral Melody Lr RN          simethicone (Mylicon) chewable tab 80 mg       Date Action Dose Route User    3/18/2025 2141 Given 80 mg Oral Melody Lr RN          sodium chloride 0.9% infusion       Date Action Dose Route User    3/19/2025 0934 Rate/Dose Change (none) Intravenous Kelsy Jenkins RN    3/19/2025 0531 New Bag (none) Intravenous Melody Lr RN    3/18/2025 2226 New Bag (none) Intravenous Melody Lr RN          zolpidem (Ambien) tab 10 mg       Date Action Dose Route User    3/18/2025 2141 Given 10 mg Oral Melody Lr RN            Vitals (last day)       Date/Time Temp Pulse Resp BP SpO2 Weight O2 Device O2 Flow Rate (L/min) Who    03/19/25 0528 97.7 °F (36.5 °C) 76 18 104/69 94 % -- None (Room air) -- AR    03/18/25 2126 97.7 °F (36.5 °C) 80 18 106/69 94 % -- None (Room air) -- AR    03/18/25 1100 97.7 °F (36.5 °C) 74 18 102/65 93 % -- None (Room air) -- SR    03/18/25 0535 98.2 °F (36.8 °C) 79 18 109/64 92 % -- None (Room air) -- KG

## 2025-03-19 NOTE — CONSULTS
[facility]  part of [facility]    Report of Consultation    Mica Bernabe Patient Status:  Inpatient    1990 MRN [id number]   Location [facility] 5SW/SE Attending Mello Williamson MD   Hosp Day # 4 PCP AARON DAVILA MD     Date of Admission:  3/14/2025  Date of Consult:  3/18/2025  Reason for Consultation:   Elevated CK level     History of Present Illness:     This is a 34-year-old female with history of morbid obesity, underlying hepatic steatosis admitted for jaundice and dark urine.  She had blood work done showing elevated liver enzymes.  GI is following.  She had an MRCP showing hepatomegaly and steatosis, possible component of fibrosis.  CT of the abdomen marked hepatic steatosis with moderate hepatomegaly.  She will be having liver biopsy tomorrow.  Blood work was done showing negative SMA antibody, mitochondrial antibody, hepatitis panel, alpha 1 antitrypsin, ceruloplasmin, AFP.  She also was found to have slightly elevated CK levels 1549, now improved down to 1040 with fluids.  She reports some muscle soreness in her arms and legs.  No weakness.  She has a dry rash on the inner thigh but otherwise no other rash.  Denies any shortness of breath.  Denies any dysphagia.  She does drink alcohol about 4-5 drinks a day.    Past Medical History  Past Medical History:    Anxiety    Blood disorder    anemia    Bronchial tumor    Diverticulosis of large intestine    Esophageal reflux    History of lobectomy of lung    Hx of motion sickness    Pneumonia due to organism    Shortness of breath    Visual impairment    Wears eyeglasses       Past Surgical History  Past Surgical History:   Procedure Laterality Date    Adenoidectomy      Removal of lung,lobectomy      Tonsillectomy         Family History  Family History   Problem Relation Age of Onset    Cancer Father         Colon CA    Other (Other) Father         kidney transplant    Diabetes Father     Other (Other) Mother          pulm,onary fibrosis       Social History  Social History     Socioeconomic History    Marital status:    Tobacco Use    Smoking status: Never    Smokeless tobacco: Never   Vaping Use    Vaping status: Never Used   Substance and Sexual Activity    Alcohol use: Yes     Alcohol/week: 10.0 standard drinks of alcohol     Types: 10 Glasses of wine per week    Drug use: Never     Social Drivers of Health     Food Insecurity: No Food Insecurity (3/14/2025)    NCSS - Food Insecurity     Worried About Running Out of Food in the Last Year: No     Ran Out of Food in the Last Year: No   Transportation Needs: No Transportation Needs (3/14/2025)    NCSS - Transportation     Lack of Transportation: No   Housing Stability: Not At Risk (3/14/2025)    NCSS - Housing/Utilities     Has Housing: Yes     Worried About Losing Housing: No     Unable to Get Utilities: No        Current Medications:  Current Facility-Administered Medications   Medication Dose Route Frequency    alum-mag hydroxide-simethicone (Maalox) 200-200-20 MG/5ML oral suspension 30 mL  30 mL Oral TID PRN    pantoprazole (Protonix) DR tab 40 mg  40 mg Oral BID AC    ondansetron (Zofran) 4 MG/2ML injection 4 mg  4 mg Intravenous Q6H PRN    benzocaine-menthol (Cepacol) lozenge 1 lozenge  1 lozenge Oral Q4H PRN    sodium chloride 0.9% infusion   Intravenous Continuous    heparin (Porcine) 5000 UNIT/ML injection 7,500 Units  7,500 Units Subcutaneous Q8H DIONNE    acetaminophen (Tylenol Extra Strength) tab 500 mg  500 mg Oral Q4H PRN    albuterol (Ventolin HFA) 108 (90 Base) MCG/ACT inhaler 2 puff  2 puff Inhalation Q4H PRN    ALPRAZolam (Xanax) tab 0.5 mg  0.5 mg Oral BID PRN    calcium carbonate (Tums) chewable tab 500 mg  500 mg Oral Daily    QUEtiapine (SEROquel) tab 50 mg  50 mg Oral Nightly    sertraline (Zoloft) tab 100 mg  100 mg Oral Daily    zolpidem (Ambien) tab 10 mg  10 mg Oral Nightly PRN    influenza virus trivalent PF (Fluzone Trivalent) 0.5 mL IM  injection (ages 6 months to 64 years) 0.5 mL  0.5 mL Intramuscular Prior to discharge     Medications Prior to Admission   Medication Sig    albuterol 108 (90 Base) MCG/ACT Inhalation Aero Soln Inhale 2 puffs into the lungs every 4 (four) hours as needed for Wheezing.    QUEtiapine 50 MG Oral Tab Take 1 tablet (50 mg total) by mouth nightly.    calcium carbonate 500 MG Oral Chew Tab Chew 1 tablet (500 mg total) by mouth daily.    clonazePAM 1 MG Oral Tab Take 1 tablet (1 mg total) by mouth daily as needed.    sertraline 100 MG Oral Tab Take 1 tablet (100 mg total) by mouth daily.    omeprazole 20 MG Oral Capsule Delayed Release Take 1 capsule (20 mg total) by mouth daily. (Patient taking differently: Take 2 capsules (40 mg total) by mouth daily.)    zolpidem 10 MG Oral Tab Take 1 tablet (10 mg total) by mouth nightly as needed.    ALPRAZolam 0.5 MG Oral Tab Take 1 tablet (0.5 mg total) by mouth 2 (two) times daily as needed.    Naloxone HCl 4 MG/0.1ML Nasal Liquid 4 mg by Nasal route as needed. If patient remains unresponsive, repeat dose in other nostril 2-5 minutes after first dose.       Allergies  Allergies[1]    Review of Systems:   Constitutional: Denies fever, chills  Eyes: Denies dry eyes, blurry vision, redness of the eyes, pain in the eyes  ENT: Denies dry mouth, loss of taste, sores in the mouth, or difficulty swallowing   Cardiovascular: Denies chest pain, palpitations   Respiratory: Denies shortness of breath  Gastrointestinal: Denies changes in bowl or bladder function, nausea, vomiting, heartburn  Integumentary: Denies photosensitivity, rash or lesions, Raynaud's  Neurological: Denies any numbness or tingling   Hematologic/Lymphatic: Denies bleeding, alopecia, Raynaud's phenomena   MSK refer to HPI      Physical Exam:   Blood pressure 102/65, pulse 74, temperature 97.7 °F (36.5 °C), temperature source Oral, resp. rate 18, height 5' 4\" (1.626 m), weight 284 lb 2.8 oz (128.9 kg), last menstrual period  02/12/2025, SpO2 93%.    GEN: AAOx3, NAD  HEENT: EOMI, PERRLA, no injection or icterus, oral mucosa moist, no oral lesions. No lymphadenopathy. No facial rash  CVS: RRR, no murmurs rubs or gallops. Equal 2+ distal pulses.   LUNGS: CTAB, no increased work of breathing  ABDOMEN:  soft NT/ND, +BS, no HSM  SKIN: +jaundice   MSK:  No swelling or synovitis on exam  NEURO: Cranial nerves II-XII intact grossly. 5/5 strength throughout in both upper and lower extremities, sensation intact.  PSYCH: normal mood      Results:     Laboratory Data:  Lab Results   Component Value Date    WBC 5.2 03/18/2025    HGB 11.6 (L) 03/18/2025    HCT 34.7 (L) 03/18/2025    PLT 97.0 (L) 03/18/2025    CREATSERUM 0.66 03/18/2025    BUN 8 (L) 03/18/2025     03/18/2025    K 4.0 03/18/2025     03/18/2025    CO2 25.0 03/18/2025    GLU 88 03/18/2025    CA 7.9 (L) 03/18/2025    ALB 2.5 (L) 03/18/2025    ALKPHO 124 (H) 03/18/2025    TP 5.9 03/18/2025     (H) 03/18/2025    ALT 68 (H) 03/18/2025    PTT 29.9 09/01/2023    INR 1.69 (H) 03/18/2025    PTP 20.8 (H) 03/18/2025    TSH 3.775 03/15/2025    LIP 50 03/14/2025    DDIMER 0.54 (H) 04/06/2024    ESRML 41 (H) 03/18/2025    CRP 5.00 (H) 03/18/2025    MG 2.2 03/18/2025    PHOS 4.7 10/31/2023    TROP <0.045 05/27/2021    TROPHS <3 04/06/2024    CK 1,040 (H) 03/18/2025         Imaging:  No results found.        Impression:       Rhabdomyolysis  - She was found to have elevated CK levels 1549, improved to 1040 with fluids  - She reports muscle soreness but no weakness on exam  - Could be related to possible alcoholic hepatitis  - Will obtain further blood work to rule out inflammatory myositis  - She is not on a statin to suggest statin induced myopathy  - Will continue to trend CK levels  - Continue IV fluids    Elevated LFTs, jaundice, alcohol hepatitis  - GI following  - Autoimmune liver workup was negative  - Plan for liver biopsy tomorrow    Thank you for allowing me to participate  in the care of your patient. Will continue to follow     Madelyn Collins MD  3/18/2025         [1] No Active Allergies

## 2025-03-20 VITALS
BODY MASS INDEX: 48.52 KG/M2 | OXYGEN SATURATION: 93 % | TEMPERATURE: 98 F | HEIGHT: 64 IN | RESPIRATION RATE: 16 BRPM | HEART RATE: 84 BPM | WEIGHT: 284.19 LBS | SYSTOLIC BLOOD PRESSURE: 108 MMHG | DIASTOLIC BLOOD PRESSURE: 61 MMHG

## 2025-03-20 LAB
ALBUMIN SERPL-MCNC: 2.7 G/DL (ref 3.2–4.8)
ALBUMIN/GLOB SERPL: 0.8 {RATIO} (ref 1–2)
ALP LIVER SERPL-CCNC: 129 U/L
ALT SERPL-CCNC: 67 U/L
ANION GAP SERPL CALC-SCNC: 7 MMOL/L (ref 0–18)
AST SERPL-CCNC: 182 U/L (ref ?–34)
BILIRUB SERPL-MCNC: 13.3 MG/DL (ref 0.3–1.2)
BUN BLD-MCNC: 5 MG/DL (ref 9–23)
BUN/CREAT SERPL: 6.9 (ref 10–20)
CALCIUM BLD-MCNC: 8.2 MG/DL (ref 8.7–10.4)
CHLORIDE SERPL-SCNC: 110 MMOL/L (ref 98–112)
CK SERPL-CCNC: 807 U/L
CO2 SERPL-SCNC: 22 MMOL/L (ref 21–32)
CREAT BLD-MCNC: 0.72 MG/DL
DEPRECATED RDW RBC AUTO: 86.9 FL (ref 35.1–46.3)
EGFRCR SERPLBLD CKD-EPI 2021: 112 ML/MIN/1.73M2 (ref 60–?)
ERYTHROCYTE [DISTWIDTH] IN BLOOD BY AUTOMATED COUNT: 22.8 % (ref 11–15)
GLOBULIN PLAS-MCNC: 3.5 G/DL (ref 2–3.5)
GLUCOSE BLD-MCNC: 97 MG/DL (ref 70–99)
HCT VFR BLD AUTO: 34.9 %
HGB BLD-MCNC: 11.7 G/DL
MCH RBC QN AUTO: 34.5 PG (ref 26–34)
MCHC RBC AUTO-ENTMCNC: 33.5 G/DL (ref 31–37)
MCV RBC AUTO: 102.9 FL
OSMOLALITY SERPL CALC.SUM OF ELEC: 285 MOSM/KG (ref 275–295)
PLATELET # BLD AUTO: 98 10(3)UL (ref 150–450)
PLATELETS.RETICULATED NFR BLD AUTO: 2.3 % (ref 0–7)
POTASSIUM SERPL-SCNC: 3.6 MMOL/L (ref 3.5–5.1)
PROT SERPL-MCNC: 6.2 G/DL (ref 5.7–8.2)
RBC # BLD AUTO: 3.39 X10(6)UL
RHEUMATOID FACT SERPL-ACNC: <3.5 IU/ML (ref ?–14)
SODIUM SERPL-SCNC: 139 MMOL/L (ref 136–145)
WBC # BLD AUTO: 5.3 X10(3) UL (ref 4–11)

## 2025-03-20 PROCEDURE — 99239 HOSP IP/OBS DSCHRG MGMT >30: CPT | Performed by: HOSPITALIST

## 2025-03-20 PROCEDURE — 99233 SBSQ HOSP IP/OBS HIGH 50: CPT | Performed by: INTERNAL MEDICINE

## 2025-03-20 NOTE — PLAN OF CARE
Problem: Patient Centered Care  Goal: Patient preferences are identified and integrated in the patient's plan of care  Description: Interventions:- What would you like us to know as we care for you? Home with spouse  - Provide timely, complete, and accurate information to patient/family- Incorporate patient and family knowledge, values, beliefs, and cultural backgrounds into the planning and delivery of care- Encourage patient/family to participate in care and decision-making at the level they choose- Honor patient and family perspectives and choices  Outcome: Adequate for Discharge     Problem: Patient/Family Goals  Goal: Patient/Family Long Term Goal  Description: Patient's Long Term Goal: Monitor liver function   Interventions:-   - Monitor vitals  - Monitor appropriate labs  - Administer medications as ordered  - Follow MD's orders  - Update patient on plan of care   - Discharge planning   - GI consult   - See additional Care Plan goals for specific interventions  Outcome: Adequate for Discharge  Goal: Patient/Family Short Term Goal  Description: Patient's Short Term Goal: DC from hospital, treat  Interventions:   - Monitor vitals  - Monitor appropriate labs  - Administer medications as ordered  - Follow MD's orders  - Update patient on plan of care   - Discharge planning   - See additional Care Plan goals for specific interventions  Outcome: Adequate for Discharge     Problem: GASTROINTESTINAL - ADULT  Goal: Minimal or absence of nausea and vomiting  Description: INTERVENTIONS:- Maintain adequate hydration with IV or PO as ordered and tolerated- Nasogastric tube to low intermittent suction as ordered- Evaluate effectiveness of ordered antiemetic medications- Provide nonpharmacologic comfort measures as appropriate- Advance diet as tolerated, if ordered- Obtain nutritional consult as needed- Evaluate fluid balance  Outcome: Adequate for Discharge  Goal: Maintains or returns to baseline bowel  function  Description: INTERVENTIONS:- Assess bowel function- Maintain adequate hydration with IV or PO as ordered and tolerated- Evaluate effectiveness of GI medications- Encourage mobilization and activity- Obtain nutritional consult as needed- Establish a toileting routine/schedule- Consider collaborating with pharmacy to review patient's medication profile  Outcome: Adequate for Discharge     Problem: METABOLIC/FLUID AND ELECTROLYTES - ADULT  Goal: Hemodynamic stability and optimal renal function maintained  Description: INTERVENTIONS:- Monitor labs and assess for signs and symptoms of volume excess or deficit- Monitor intake, output and patient weight- Monitor urine specific gravity, serum osmolarity and serum sodium as indicated or ordered- Monitor response to interventions for patient's volume status, including labs, urine output, blood pressure (other measures as available)- Encourage oral intake as appropriate- Instruct patient on fluid and nutrition restrictions as appropriate  Outcome: Adequate for Discharge     Problem: HEMATOLOGIC - ADULT  Goal: Maintains hematologic stability  Description: INTERVENTIONS- Assess for signs and symptoms of bleeding or hemorrhage- Monitor labs and vital signs for trends- Administer supportive blood products/factors, fluids and medications as ordered and appropriate- Administer supportive blood products/factors as ordered and appropriate  Outcome: Adequate for Discharge  Goal: Free from bleeding injury  Description: (Example usage: patient with low platelets)INTERVENTIONS:- Avoid intramuscular injections, enemas and rectal medication administration- Ensure safe mobilization of patient- Hold pressure on venipuncture sites to achieve adequate hemostasis- Assess for signs and symptoms of internal bleeding- Monitor lab trends- Patient is to report abnormal signs of bleeding to staff- Avoid use of toothpicks and dental floss- Use electric shaver for shaving- Use soft bristle  tooth brush- Limit straining and forceful nose blowing  Outcome: Adequate for Discharge     Problem: ANXIETY  Goal: Will report anxiety at manageable levels  Description: INTERVENTIONS:- Administer medication as ordered- Teach and rehearse alternative coping skills- Provide emotional support with 1:1 interaction with staff  Outcome: Adequate for Discharge

## 2025-03-20 NOTE — DISCHARGE SUMMARY
[place] Hospitalist Discharge Summary   Patient ID:  Mica Bernabe  [id number]  34 year old  5/9/1990    Admit date: 3/14/2025  Discharge date: 3/20/2025  Primary Care Physician: AARON DAVILA MD   Attending Physician: Maria Elena Quiroga MD   Consults:   Consultants         Provider   Role Specialty     Madelyn Collins MD      Consulting Physician RHEUMATOLOGY     Brent Arellano MD      Consulting Physician INTERVENTIONAL, RADIOLOGY     Colby Coyne MD      Consulting Physician GASTROENTEROLOGY            Discharge Diagnoses:   Acute hepatitis    Reason for admission  Copied from admission H&P: The patient is a 34-year-old  female with morbid obesity and underlying possible hepatic steatosis, presented today to the emergency department for evaluation after she noted her urine is dark in color since yesterday, and her eyes are turning yellow.  CBC was unremarkable, except for platelet count of 99.  Chemistry showed ALT 78 and , alkaline phosphatase 158, total bilirubin 14.2.  Lipase was normal.  Liver ultrasound still pending.  Patient will be admitted to the hospital for further management.  INR today is 1.58.       Hospital Course:  Jaundice   Elevated liver function test  Fatty liver   Newly dx [facility] cirrhosis   GI on consult.   Ddx: alcoholic hepatitis vs MURRAY vs other   Liver US and MRCP reviewed.   Advised ETOH cessation.   Autoimmune hepatitis w/u neg  Liver biopsy 3/19 by IR pathology pending. She will fu outpt with GI   Outpt fu with hepatology   LFT's stable.   Rhabdomyolysis, improving  CPK 1549 on admit. Improved with IVF.   Could be related to ETOH hepatitis   Rheum on consult. Myositis panel pending. FU with Dr Collins outpt   SOB  CXR neg.   IS  Decrease IVF to 100ml/our stop soon  Morbid Obesity   H/o diverticulitis  H/o C.diff infection   Anxiety d/o      EXAM:   GENERAL: no apparent distress, comfortable  NEURO: A/A Ox3, no focal deficits  RESP: non labored, CTAB/L  CARDIO:  Regular, no murmur  ABD: soft, NT, ND  EXTREMITIES: no edema, no calf tenderness    Operative Procedures:     Discharge Instructions     Medication List        CHANGE how you take these medications      omeprazole 20 MG Cpdr  Commonly known as: PriLOSEC  What changed: how much to take            CONTINUE taking these medications      albuterol 108 (90 Base) MCG/ACT Aers  Commonly known as: Ventolin HFA     ALPRAZolam 0.5 MG Tabs  Commonly known as: Xanax     calcium carbonate 500 MG Chew  Commonly known as: Tums     Naloxone HCl 4 MG/0.1ML Liqd  4 mg by Nasal route as needed. If patient remains unresponsive, repeat dose in other nostril 2-5 minutes after first dose.     QUEtiapine 50 MG Tabs  Commonly known as: SEROquel     sertraline 100 MG Tabs  Commonly known as: Zoloft     zolpidem 10 MG Tabs  Commonly known as: Ambien            STOP taking these medications      clonazePAM 1 MG Tabs  Commonly known as: KlonoPIN              Activity: activity as tolerated  Diet: regular diet  Wound Care: NA  Code Status: Full Code        Discharge Instructions         FU with PCP In 1 week  FU with Dr Collins in 2 weeks  FU with Lori Munoz (GI) in 2 weeks.   Call hepatology Dr Sean Koeppe to make appt hepatology           Important follow up:   Follow-up Information       Steven Ballesteros MD Follow up in 1 week(s).    Specialty: Family Medicine  Contact information:  [address] [facility] [id number] [phone]               Lori Munoz APRN Follow up in 2 week(s).    Specialty: Nurse Practitioner  Contact information:  [address] [facility] [address]  [facility] [id number] [phone]               Madelyn Collins MD Follow up in 2 week(s).    Specialty: RHEUMATOLOGY  Contact information:  [address]  [address]  [facility] [id number] [phone]                             -PCP in [] within 7 days [] within 14 days [] other     Disposition: home  Discharged Condition: good    Hospital Discharge Diagnoses:  newly dx MURRAY  cirrhosis     Lace+ Score: 74  59-90 High Risk  29-58 Medium Risk  0-28   Low Risk.    TCM Follow-Up Recommendation:  LACE > 58: High Risk of readmission after discharge from the hospital.            Total Time Coordinating Care: Greater than 30 minutes    Patient had opportunity to ask questions, state understanding, and agree with therapeutic plan as outlined    Maria Elena Quiroga MD  Hospitalist  3/20/2025

## 2025-03-20 NOTE — PROGRESS NOTES
[facility]     Gastroenterology Progress Note    Mica Bernabe Patient Status:  Inpatient    1990 MRN [id number]   Location [facility] 5SW/SE Attending Mello Williamson MD   Hosp Day # 6 PCP AARON DAVILA MD       Subjective:   S/p liver biopsy yesterday, no abd pain, n/v. No fever.   Objective:   Blood pressure 108/61, pulse 84, temperature 98.4 °F (36.9 °C), temperature source Oral, resp. rate 16, height 5' 4\" (1.626 m), weight 284 lb 2.8 oz (128.9 kg), last menstrual period 2025, SpO2 93%. Body mass index is 48.78 kg/m².    Gen: awake, alert patient, NAD  HEENT: MMM  CV: RRR  Lung: no conversational dyspnea   Abdomen: soft NTND abdomen with NABS appreciated   Skin: dry, warm, + jaundice  Ext: no LE edema is evident  Neuro: Alert, oriented x4 and interactive  Psych: calm, cooperative    Assessment and Plan:   Mica Bernabe is a 34 year old woman with history of BMI of 46, tonsillectomy, adenoidectomy, prior C. difficile, complicated/perforated left colon diverticulitis in 2023 eventually undergoing surgery in 2023 who was admitted after presenting to the emergency department yesterday with jaundice.     Most recent set of liver enzymes prior to this was in  at which point liver numbers were normal.  She has had prior hepatomegaly with hepatic steatosis, presumed secondary to nonalcoholic fatty liver disease.  She does endorse 2 to 3 glasses of wine out 4 times a week but has never felt that this was a problem.  Noninvasive testing for chronic liver disease workup has otherwise been unrevealing, no agents to suggest DILI.  Autoimmune serologies were negative.  MRCP shows marked hepatomegaly, steatosis, possible nodularity.  No filling defects or ductal dilation.  Etiology of findings may be secondary to acute alcohol hepatitis in the setting of MURRAY/SUMAYA liver disease. However as the alcohol history seems disproportional to the level of  hyperbilirubinemia, she underwent perc liver biopsy on 3/19. Synthetic function intact. Ok to discharge from my stance today. Etoh cessation discussed. Will arrange for clinic visit with hepatology outpatient.    Dee Dee Guzman MD      Results:     Lab Results   Component Value Date    WBC 5.3 03/20/2025    HGB 11.7 (L) 03/20/2025    HCT 34.9 (L) 03/20/2025    PLT 98.0 (L) 03/20/2025    CREATSERUM 0.72 03/20/2025    BUN 5 (L) 03/20/2025     03/20/2025    K 3.6 03/20/2025     03/20/2025    CO2 22.0 03/20/2025    GLU 97 03/20/2025    CA 8.2 (L) 03/20/2025    ALB 2.7 (L) 03/20/2025    ALKPHO 129 (H) 03/20/2025    BILT 13.3 (H) 03/20/2025    TP 6.2 03/20/2025     (H) 03/20/2025    ALT 67 (H) 03/20/2025    PTT 29.9 09/01/2023    INR 1.69 (H) 03/18/2025    TSH 3.775 03/15/2025    LIP 50 03/14/2025    DDIMER 0.54 (H) 04/06/2024    ESRML 41 (H) 03/18/2025    CRP 5.00 (H) 03/18/2025    MG 2.2 03/18/2025    PHOS 4.7 10/31/2023    TROP <0.045 05/27/2021     (H) 03/20/2025       XR CHEST AP PORTABLE  (CPT=71045)    Result Date: 3/19/2025  CONCLUSION:  1. No focal airspace disease or significant pleural effusion. 2. Stable nodular opacity in the right midlung, which probably relates to subpleural fat herniation.  Stable chronic elevation of the right hemidiaphragm.   elm-remote  Dictated by (CST): Joe Clifton MD on 3/19/2025 at 11:04 AM     Finalized by (CST): Joe Clifton MD on 3/19/2025 at 11:07 AM

## 2025-03-20 NOTE — DISCHARGE INSTRUCTIONS
FU with PCP In 1 week  FU with Dr Collins in 2 weeks  FU with Lori Munoz (GI) in 2 weeks.   Call hepatology Dr Sean Koeppe to make appt hepatology

## 2025-03-20 NOTE — PROGRESS NOTES
Patient is being discharge to home, all discharge instructions provided to the patient as well as  at bedside. Patient verbalized understanding of taking medications as well as following up with physicians. Iv discontinued.      Return Ambien medication to patient.

## 2025-03-20 NOTE — PROGRESS NOTES
[facility]  part of [facility]    Progress Note    Mica Bernabe Patient Status:  Inpatient    1990 MRN [id number]   Location [facility] 5SW/SE Attending Maria Elena Quiroga MD   Hosp Day # 6 PCP AARON DAVILA MD     Subjective:     Patient seen and examined today  No acute symptoms, status post liver biopsy yesterday 3/19  Has questions regarding rheumatoid arthritis    Objective:   Blood pressure 108/61, pulse 84, temperature 98.4 °F (36.9 °C), temperature source Oral, resp. rate 16, height 5' 4\" (1.626 m), weight 284 lb 2.8 oz (128.9 kg), last menstrual period 2025, SpO2 93%.    GEN: AAOx3, NAD  HEENT: EOMI, PERRLA, no injection or icterus, oral mucosa moist, no oral lesions. No lymphadenopathy. No facial rash  CVS: RRR, no murmurs rubs or gallops. Equal 2+ distal pulses.   LUNGS: CTAB, no increased work of breathing  ABDOMEN:  soft NT/ND, +BS, no HSM  SKIN: +jaundice   MSK:  No swelling or synovitis on exam  NEURO: Cranial nerves II-XII intact grossly. 5/5 strength throughout in both upper and lower extremities, sensation intact.  PSYCH: normal mood    Results:   Lab Results   Component Value Date    WBC 5.3 2025    HGB 11.7 (L) 2025    HCT 34.9 (L) 2025    PLT 98.0 (L) 2025    CREATSERUM 0.72 2025    BUN 5 (L) 2025     2025    K 3.6 2025     2025    CO2 22.0 2025    GLU 97 2025    CA 8.2 (L) 2025    ALB 2.7 (L) 2025    ALKPHO 129 (H) 2025    BILT 13.3 (H) 2025    TP 6.2 2025     (H) 2025    ALT 67 (H) 2025    PTT 29.9 2023    INR 1.69 (H) 2025    TSH 3.775 03/15/2025    LIP 50 2025    DDIMER 0.54 (H) 2024    ESRML 41 (H) 2025    CRP 5.00 (H) 2025    MG 2.2 2025    PHOS 4.7 10/31/2023    TROPHS <3 2024     (H) 2025       XR CHEST AP PORTABLE  (CPT=71045)    Result Date:  3/19/2025  CONCLUSION:  1. No focal airspace disease or significant pleural effusion. 2. Stable nodular opacity in the right midlung, which probably relates to subpleural fat herniation.  Stable chronic elevation of the right hemidiaphragm.   elm-remote  Dictated by (CST): Joe Clifton MD on 3/19/2025 at 11:04 AM     Finalized by (CST): Joe Clifton MD on 3/19/2025 at 11:07 AM               Assessment & Plan:     Rhabdomyolysis  - She was found to have elevated CK levels 1549, improved to 1040 with fluids, CK continues to improved   - She reports muscle soreness but no weakness on exam  - Could be related to possible alcoholic hepatitis  - Will obtain further blood work to rule out inflammatory myositis  - She is not on a statin to suggest statin induced myopathy  - Will continue to trend CK levels  - Continue IV fluids     Elevated LFTs, jaundice, alcohol hepatitis  - GI following  - Autoimmune liver workup was negative  - Plan status post liver biopsy 3/19    Family hx of RA  - she would like blood work  - she had no joint pain or swelling     DISCUSSION:   Patient status post liver biopsy 3/19 without issue.  Has remaining autoimmune serologies that are in process.  No objections from rheumatology standpoint for discharge once medically cleared by other services.  Can follow-up with us as outpatient in a few weeks to discuss remaining lab work.      Will continue to follow    Radha Good DO  3/20/2025   9:00 AM       English

## 2025-03-21 LAB
ANA NUCLEOLAR TITR SER IF: 160 {TITER}
CCP IGG SERPL-ACNC: 2.9 U/ML (ref 0–6.9)
DSDNA AB TITR SER: <10 {TITER}
NUCLEAR IGG TITR SER IF: POSITIVE {TITER}

## 2025-03-23 ENCOUNTER — TELEPHONE (OUTPATIENT)
Facility: CLINIC | Age: 35
End: 2025-03-23

## 2025-03-23 DIAGNOSIS — K74.60 CIRRHOSIS OF LIVER WITHOUT ASCITES, UNSPECIFIED HEPATIC CIRRHOSIS TYPE (HCC): Primary | ICD-10-CM

## 2025-03-23 NOTE — TELEPHONE ENCOUNTER
The patient contacted me this afternoon as the on-call physician.  She was recently discharged from the hospital after evaluation of elevated liver chemistry tests and imaging suggesting cirrhosis.  She underwent a liver biopsy on 3/19/2025 which revealed steatohepatitis with cirrhosis.  She has noted bruising about the abdomen since discharge, however, was receiving heparin injections.  She has also noted bloating and constipation.  She has been taking Metamucil gummies over the past 3 days.  She has also been taking Gas-X.  She has a history of bowel movements being \"all over the place\".  The patient uploaded a photograph of her abdomen.  Bruising is present along the anterior aspect of the abdomen which appears to be from anticoagulant injections as opposed to right upper quadrant or flank ecchymoses from a liver biopsy.  She states that the liver biopsy site is clean and dry.  She may use milk of magnesia to effect a bowel movement (or MiraLAX).  If her bloating does not improve or worsens or the ecchymoses become more extensive or if she develops constitutional symptoms she should present to the ED.    Kristy: Can you please contact the patient tomorrow for a clinical update or see her in the office if needed.  Thanks!

## 2025-03-24 LAB
CENTROMERE IGG SER-ACNC: 1 U/ML
ENA JO1 AB SER IA-ACNC: 0.5 U/ML
ENA RNP IGG SER IA-ACNC: 4.3 U/ML
ENA SCL70 IGG SER IA-ACNC: 1.1 U/ML
ENA SM IGG SER IA-ACNC: 1.3 U/ML
ENA SS-A IGG SER IA-ACNC: 1.1 U/ML
ENA SS-B IGG SER IA-ACNC: 0.9 U/ML
U1 SNRNP IGG SER IA-ACNC: 2.9 U/ML

## 2025-03-24 NOTE — TELEPHONE ENCOUNTER
Faxed referral to Dr Pelayo/STEPHAN ([phone] F) with face sheet, liver biopsy results and [place] discharge summary.    Fax confirmation received at 6:41 pm

## 2025-03-24 NOTE — TELEPHONE ENCOUNTER
Called and talked with patient.     She notes the bruising is improving.   Has been moving bowels more with use of milk of magnesia.     Reviewed pathology from liver biopsy. Discussed plan for follow up in clinic and also setting up a visit with Dr. Pelayo. I will route message to nursing office to assist.     Patient agreeable.    Kristy Kumari PA-C      Nursing: Please help set up follow up with me and also set up visit with Dr. Pelayo. Referral is in.     Kristy Kumari PA-C

## 2025-03-24 NOTE — TELEPHONE ENCOUNTER
Called and left message for patient to check in on symptoms.     Advised to call back.     Kristy Kumari PA-C

## 2025-03-25 NOTE — TELEPHONE ENCOUNTER
Called and spoke to the patient, date of birth and name verified.    SAMANTA ANDINO message relayed.    The patient was appreciative of the call.

## 2025-03-25 NOTE — TELEPHONE ENCOUNTER
Saman Wagner    The patient is scheduled for the office visit on 3/31/2025.    Does she need to complete blood work ordered by Dr Guzman prior to the appointment?    Thank you

## 2025-03-25 NOTE — TELEPHONE ENCOUNTER
Called and spoke to the patient, date of birth and name verified.    She had scheduled with Dr Pelayo as well.    Your Appointments      Monday March 31, 2025 4:00 PM  Consult with Kristy Kumari PA-C  [facility] ([facility]) [facility] [address] [address]  [phone]

## 2025-03-28 NOTE — PAYOR COMM NOTE
--------------  DISCHARGE REVIEW    Payor: [employer] OPEN ACCESS   Subscriber #:  [id number]  Authorization Number: [id number]    Admit date: 3/14/25  Admit time:   9:34 PM  Discharge Date: 3/20/2025 12:49 PM     Admitting Physician: Bean Naik MD  Attending Physician:  No att. providers found  Primary Care Physician: Steven Ballesteros MD          Discharge Summary Notes        Discharge Summary signed by Maria Elena Quiroga MD at 3/20/2025  8:56 AM       Author: Maria Elena Quiroga MD Specialty: HOSPITALIST, Internal Medicine Author Type: Physician    Filed: 3/20/2025  8:56 AM Date of Service: 3/20/2025  8:52 AM Status: Signed    : Maria Elena Quiroga MD (Physician)           [place] Hospitalist Discharge Summary   Patient ID:  Mica Bernabe  [id number]  34 year old  5/9/1990    Admit date: 3/14/2025  Discharge date: 3/20/2025  Primary Care Physician: STEVEN BALLESTEROS MD   Attending Physician: Maria Elena Quiroga MD   Consults:   Consultants         Provider   Role Specialty     Madelyn Collins MD      Consulting Physician RHEUMATOLOGY     Brent Arellano MD      Consulting Physician INTERVENTIONAL, RADIOLOGY     Colby Coyne MD      Consulting Physician GASTROENTEROLOGY            Discharge Diagnoses:   Acute hepatitis    Reason for admission  Copied from admission H&P: The patient is a 34-year-old  female with morbid obesity and underlying possible hepatic steatosis, presented today to the emergency department for evaluation after she noted her urine is dark in color since yesterday, and her eyes are turning yellow.  CBC was unremarkable, except for platelet count of 99.  Chemistry showed ALT 78 and , alkaline phosphatase 158, total bilirubin 14.2.  Lipase was normal.  Liver ultrasound still pending.  Patient will be admitted to the hospital for further management.  INR today is 1.58.       Hospital Course:  Jaundice   Elevated liver function test  Fatty liver   Newly dx [facility] cirrhosis   GI  on consult.   Ddx: alcoholic hepatitis vs MURRAY vs other   Liver US and MRCP reviewed.   Advised ETOH cessation.   Autoimmune hepatitis w/u neg  Liver biopsy 3/19 by IR pathology pending. She will fu outpt with GI   Outpt fu with hepatology   LFT's stable.   Rhabdomyolysis, improving  CPK 1549 on admit. Improved with IVF.   Could be related to ETOH hepatitis   Rheum on consult. Myositis panel pending. FU with Dr Collins outpt   SOB  CXR neg.   IS  Decrease IVF to 100ml/our stop soon  Morbid Obesity   H/o diverticulitis  H/o C.diff infection   Anxiety d/o      EXAM:   GENERAL: no apparent distress, comfortable  NEURO: A/A Ox3, no focal deficits  RESP: non labored, CTAB/L  CARDIO: Regular, no murmur  ABD: soft, NT, ND  EXTREMITIES: no edema, no calf tenderness    Operative Procedures:     Discharge Instructions     Medication List        CHANGE how you take these medications      omeprazole 20 MG Cpdr  Commonly known as: PriLOSEC  What changed: how much to take            CONTINUE taking these medications      albuterol 108 (90 Base) MCG/ACT Aers  Commonly known as: Ventolin HFA     ALPRAZolam 0.5 MG Tabs  Commonly known as: Xanax     calcium carbonate 500 MG Chew  Commonly known as: Tums     Naloxone HCl 4 MG/0.1ML Liqd  4 mg by Nasal route as needed. If patient remains unresponsive, repeat dose in other nostril 2-5 minutes after first dose.     QUEtiapine 50 MG Tabs  Commonly known as: SEROquel     sertraline 100 MG Tabs  Commonly known as: Zoloft     zolpidem 10 MG Tabs  Commonly known as: Ambien            STOP taking these medications      clonazePAM 1 MG Tabs  Commonly known as: KlonoPIN              Activity: activity as tolerated  Diet: regular diet  Wound Care: NA  Code Status: Full Code        Discharge Instructions         FU with PCP In 1 week  FU with Dr Collins in 2 weeks  FU with Lori Munoz (GI) in 2 weeks.   Call hepatology Dr Sean Koeppe to make appt hepatology           Important follow up:   Follow-up  Information       Steven Ballesteros MD Follow up in 1 week(s).    Specialty: Family Medicine  Contact information:  [address]  [facility] [address]  [phone]               Lori Munoz APRN Follow up in 2 week(s).    Specialty: Nurse Practitioner  Contact information:  [address]  [facility] [address]  [phone]               Madelyn Collins MD Follow up in 2 week(s).    Specialty: RHEUMATOLOGY  Contact information:  [address]  [address]  [facility] [id number] [phone]                             -PCP in [] within 7 days [] within 14 days [] other     Disposition: home  Discharged Condition: good    Hospital Discharge Diagnoses:  newly dx MURRAY cirrhosis     Lace+ Score: 74  59-90 High Risk  29-58 Medium Risk  0-28   Low Risk.    TCM Follow-Up Recommendation:  LACE > 58: High Risk of readmission after discharge from the hospital.            Total Time Coordinating Care: Greater than 30 minutes    Patient had opportunity to ask questions, state understanding, and agree with therapeutic plan as outlined    Maria Elena Quiroga MD  Hospitalist  3/20/2025        Electronically signed by Maria Elena Quiroga MD on 3/20/2025  8:56 AM         REVIEWER COMMENTS

## 2025-03-31 ENCOUNTER — LAB ENCOUNTER (OUTPATIENT)
Dept: LAB | Facility: HOSPITAL | Age: 35
End: 2025-03-31
Attending: INTERNAL MEDICINE
Payer: COMMERCIAL

## 2025-03-31 ENCOUNTER — OFFICE VISIT (OUTPATIENT)
Facility: CLINIC | Age: 35
End: 2025-03-31
Payer: COMMERCIAL

## 2025-03-31 ENCOUNTER — HOSPITAL ENCOUNTER (OUTPATIENT)
Dept: ULTRASOUND IMAGING | Facility: HOSPITAL | Age: 35
Discharge: HOME | End: 2025-03-31
Attending: PHYSICIAN ASSISTANT
Payer: COMMERCIAL

## 2025-03-31 VITALS
DIASTOLIC BLOOD PRESSURE: 70 MMHG | HEART RATE: 86 BPM | BODY MASS INDEX: 46.61 KG/M2 | HEIGHT: 64 IN | SYSTOLIC BLOOD PRESSURE: 103 MMHG | WEIGHT: 273 LBS

## 2025-03-31 DIAGNOSIS — K74.60 CIRRHOSIS OF LIVER WITHOUT ASCITES, UNSPECIFIED HEPATIC CIRRHOSIS TYPE (HCC): Primary | ICD-10-CM

## 2025-03-31 DIAGNOSIS — K59.00 CONSTIPATION, UNSPECIFIED CONSTIPATION TYPE: ICD-10-CM

## 2025-03-31 DIAGNOSIS — K64.9 HEMORRHOIDS, UNSPECIFIED HEMORRHOID TYPE: ICD-10-CM

## 2025-03-31 DIAGNOSIS — B17.9 ACUTE HEPATITIS: ICD-10-CM

## 2025-03-31 DIAGNOSIS — K74.60 CIRRHOSIS OF LIVER WITHOUT ASCITES, UNSPECIFIED HEPATIC CIRRHOSIS TYPE (HCC): ICD-10-CM

## 2025-03-31 LAB
ALBUMIN SERPL-MCNC: 3.2 G/DL (ref 3.2–4.8)
ALP LIVER SERPL-CCNC: 120 U/L
ALT SERPL-CCNC: 57 U/L
AST SERPL-CCNC: 172 U/L (ref ?–34)
BILIRUB DIRECT SERPL-MCNC: 7.8 MG/DL (ref ?–0.3)
BILIRUB SERPL-MCNC: 10.6 MG/DL (ref 0.3–1.2)
INR BLD: 1.44 (ref 0.8–1.2)
PROT SERPL-MCNC: 7 G/DL (ref 5.7–8.2)
PROTHROMBIN TIME: 18.4 SECONDS (ref 11.6–14.8)

## 2025-03-31 PROCEDURE — 85610 PROTHROMBIN TIME: CPT

## 2025-03-31 PROCEDURE — 99214 OFFICE O/P EST MOD 30 MIN: CPT | Performed by: PHYSICIAN ASSISTANT

## 2025-03-31 PROCEDURE — 76705 ECHO EXAM OF ABDOMEN: CPT | Performed by: PHYSICIAN ASSISTANT

## 2025-03-31 PROCEDURE — 36415 COLL VENOUS BLD VENIPUNCTURE: CPT

## 2025-03-31 PROCEDURE — 80076 HEPATIC FUNCTION PANEL: CPT

## 2025-03-31 RX ORDER — HYDROCORTISONE 25 MG/G
CREAM TOPICAL
Qty: 1 EACH | Refills: 0 | Status: SHIPPED | OUTPATIENT
Start: 2025-03-31

## 2025-03-31 RX ORDER — CLONAZEPAM 1 MG/1
1 TABLET ORAL 2 TIMES DAILY PRN
COMMUNITY

## 2025-03-31 NOTE — PROGRESS NOTES
[facility] - Gastroenterology                                                                                                               Reason for consult:   Chief Complaint   Patient presents with    Hospital F/U       Requesting physician or provider: AARON DAVILA MD      HPI:   Mica Bernabe is a 34 year old year-old female with history of BMI of 46, tonsillectomy, adenoidectomy, prior C. difficile, complicated/perforated left colon diverticulitis in June 2023 eventually undergoing surgery in October 2023 who was admitted after presenting to the emergency department yesterday with jaundice.      Most recent set of liver enzymes prior to this was in 2023 at which point liver numbers were normal.  She has had prior hepatomegaly with hepatic steatosis, presumed secondary to nonalcoholic fatty liver disease.  She does endorse 2 to 3 glasses of wine out 4 times a week but has never felt that this was a problem.  Noninvasive testing for chronic liver disease workup has otherwise been unrevealing, no agents to suggest DILI.  Autoimmune serologies were negative.  MRCP shows marked hepatomegaly, steatosis, possible nodularity.  No filling defects or ductal dilation.  Etiology of findings may be secondary to acute alcohol hepatitis in the setting of MURRAY/SUMAYA liver disease. However as the alcohol history seems disproportional to the level of hyperbilirubinemia, she underwent perc liver biopsy on 3/19. Synthetic function intact. Patient remained stable, was discharged and advised to follow up in clinic.     Abdomen distention- Notes mild distention of abdomen. Most of the distention around bruising noted on abdomen likely from heparin shots. She does feel fullness, no abdominal pain. Have fullness quickly with meals. She has slight numbness and tingling sensation around her abdomen. She denies swelling of legs. She has stopped ETOH use. Has been monitoring diet.  Having brown BM now, was struggling with constipation. She does note history of hemorrhoids. States with constipation after hospitalization has noted increased swelling near rectum. Has been using preparation H and a topical spray OTC to assist with any pain or discomfort.     Patient notes since discharge did break out in a rash on face. Notes rash has improved. Only thing new used was what she picked up OTC for her hemorrhoids. No new soaps or detergents.     NSAIDS: none  Tobacco: none  Alcohol: 2-3 drinks daily, notes since pandemic has increased ETOH use  Marijuana: none  Illicit drugs: none    FH GI malignancy- none  FH celiac dz- none  FH liver dz- none  FH IBD- none    Prior endoscopies:  Flex sig 10/25/2023  Impression:   Sigmoid diverticulosis with scattered erythema     Recommend:  Proceed with planned surgery tomorrow    Wt Readings from Last 6 Encounters:   03/31/25 273 lb (123.8 kg)   03/14/25 284 lb 2.8 oz (128.9 kg)   04/06/24 260 lb (117.9 kg)   12/15/23 267 lb (121.1 kg)   11/20/23 265 lb (120.2 kg)   10/27/23 274 lb 0.5 oz (124.3 kg)        History, Medications, Allergies, ROS:      Past Medical History:    Anxiety    Blood disorder    anemia    Bronchial tumor    Diverticulosis of large intestine    Esophageal reflux    History of lobectomy of lung    Hx of motion sickness    Pneumonia due to organism    Shortness of breath    Visual impairment    Wears eyeglasses      Past Surgical History:   Procedure Laterality Date    Adenoidectomy      Removal of lung,lobectomy      Tonsillectomy        Family Hx:   Family History   Problem Relation Age of Onset    Cancer Father         Colon CA    Other (Other) Father         kidney transplant    Diabetes Father     Other (Other) Mother         pulm,onary fibrosis      Social History:   Social History     Socioeconomic History    Marital status:    Tobacco Use    Smoking status: Never    Smokeless tobacco: Never   Vaping Use    Vaping status: Never  Used   Substance and Sexual Activity    Alcohol use: Yes     Alcohol/week: 10.0 standard drinks of alcohol     Types: 10 Glasses of wine per week    Drug use: Never     Social Drivers of Health     Food Insecurity: No Food Insecurity (3/14/2025)    NCSS - Food Insecurity     Worried About Running Out of Food in the Last Year: No     Ran Out of Food in the Last Year: No   Transportation Needs: No Transportation Needs (3/14/2025)    NCSS - Transportation     Lack of Transportation: No   Housing Stability: Not At Risk (3/14/2025)    NCSS - Housing/Utilities     Has Housing: Yes     Worried About Losing Housing: No     Unable to Get Utilities: No        Medications (Active prior to today's visit):  Current Outpatient Medications   Medication Sig Dispense Refill    clonazePAM 1 MG Oral Tab Take 1 tablet (1 mg total) by mouth 2 (two) times daily as needed for Anxiety.      hydrocortisone 2.5 % External Cream Apply a pea sized amount to the anus, 2 times every day for 10 days. 1 each 0    QUEtiapine 50 MG Oral Tab Take 1 tablet (50 mg total) by mouth nightly.      sertraline 100 MG Oral Tab Take 1 tablet (100 mg total) by mouth daily.      omeprazole 20 MG Oral Capsule Delayed Release Take 1 capsule (20 mg total) by mouth daily.      zolpidem 10 MG Oral Tab Take 1 tablet (10 mg total) by mouth nightly as needed.      ALPRAZolam 0.5 MG Oral Tab Take 1 tablet (0.5 mg total) by mouth 2 (two) times daily as needed.      albuterol 108 (90 Base) MCG/ACT Inhalation Aero Soln Inhale 2 puffs into the lungs every 4 (four) hours as needed for Wheezing. (Patient not taking: Reported on 3/31/2025)      calcium carbonate 500 MG Oral Chew Tab Chew 1 tablet (500 mg total) by mouth daily. (Patient not taking: Reported on 3/31/2025)      Naloxone HCl 4 MG/0.1ML Nasal Liquid 4 mg by Nasal route as needed. If patient remains unresponsive, repeat dose in other nostril 2-5 minutes after first dose. (Patient not taking: Reported on 3/31/2025) 1  kit 0       Allergies:  Allergies[1]    ROS:   CONSTITUTIONAL: negative for fevers, chills, sweats and weight loss  EYES Negative for red eyes, yellow eyes, changes in vision  HEENT: Negative for dysphagia and hoarseness  RESPIRATORY: Negative for cough and shortness of breath  CARDIOVASCULAR: Negative for chest pain, palpitations  GASTROINTESTINAL: See HPI  GENITOURINARY: Negative for dysuria and frequency  MUSCULOSKELETAL: Negative for arthralgias and myalgias  NEUROLOGICAL: Negative for dizziness and headaches  BEHAVIOR/PSYCH: Negative for anxiety and poor appetite    PHYSICAL EXAM:   Blood pressure 103/70, pulse 86, height 5' 4\" (1.626 m), weight 273 lb (123.8 kg), last menstrual period 03/14/2025.    GEN: WD/WN, NAD  HEENT: Supple symmetrical, trachea midline  CV: RRR, the extremities are warm and well perfused   LUNGS: No increased work of breathing  ABDOMEN: No scars, normal bowel sounds, soft, non-tender, non-distended no rebound or guarding, no masses, no hepatomegaly  MSK: No redness, no warmth, no swelling of joints  SKIN: No jaundice, no erythema, no rashes  HEMATOLOGIC: No bleeding, no bruising  NEURO: Alert and interactive, normal gait    Labs/Imaging/Procedures:     Patient's pertinent labs and imaging were reviewed and discussed with patient today.     Lab Results   Component Value Date    WBC 5.3 03/20/2025    RBC 3.39 (L) 03/20/2025    HGB 11.7 (L) 03/20/2025    HCT 34.9 (L) 03/20/2025    .9 (H) 03/20/2025    MCH 34.5 (H) 03/20/2025    MCHC 33.5 03/20/2025    RDW 22.8 (H) 03/20/2025    PLT 98.0 (L) 03/20/2025        Lab Results   Component Value Date    GLU 97 03/20/2025    BUN 5 (L) 03/20/2025    BUNCREA 6.9 (L) 03/20/2025    CREATSERUM 0.72 03/20/2025    ANIONGAP 7 03/20/2025    GFRNAA 102 05/27/2021    GFRAA 117 05/27/2021    CA 8.2 (L) 03/20/2025    OSMOCALC 285 03/20/2025    ALKPHO 120 (H) 03/31/2025     (H) 03/31/2025    ALT 57 (H) 03/31/2025    BILT 10.6 (H) 03/31/2025    TP  7.0 03/31/2025    ALB 3.2 03/31/2025    GLOBULIN 3.5 03/20/2025     03/20/2025    K 3.6 03/20/2025     03/20/2025    CO2 22.0 03/20/2025        XR CHEST AP PORTABLE  (CPT=71045)    Result Date: 3/19/2025  PROCEDURE: XR CHEST AP PORTABLE  (CPT=71045) TIME: 1048.   COMPARISON: [facility], XR CHEST PA + LAT CHEST (CPT=71046), 1/10/2025, 6:22 PM.  INDICATIONS: SOB today.  TECHNIQUE:   Single view.   FINDINGS:  CARDIAC/VASC: No cardiac silhouette abnormality or cardiomegaly.  Unremarkable pulmonary vasculature.  MEDIAST/DEXTER:   No visible mass or adenopathy. LUNGS/PLEURA: Stable chronic elevation of the right hemidiaphragm.  Stable right midlung nodular opacity.  No other focal airspace disease.  No pleural effusion or pneumothorax. BONES: No fracture or visible bony lesion. OTHER: Negative.          CONCLUSION:  1. No focal airspace disease or significant pleural effusion. 2. Stable nodular opacity in the right midlung, which probably relates to subpleural fat herniation.  Stable chronic elevation of the right hemidiaphragm.   [facility]  Dictated by (CST): Joe Clifton MD on 3/19/2025 at 11:04 AM     Finalized by (CST): Joe Clifton MD on 3/19/2025 at 11:07 AM          MRI ABDOMEN AND MRCP W/3D (W+W0)(CPT=74183/97990)    Result Date: 3/16/2025  PROCEDURE: MRI ABDOMEN&MRCP W/3D (W+WO)(CPT=74183/77348)  MRCP   COMPARISON: [facility], CT ABDOMEN TRIPHASIC LIVER (W+WO)(CPT=74170), 3/15/2025, 1:18 PM.  INDICATIONS: Jaundice, Fatigue, IC Ref  TECHNIQUE: A comprehensive examination was performed utilizing a variety of imaging planes and imaging parameters to optimize visualization of suspected pathology.  Images were obtained before and after intravenous gadolinium infusion.   Magnetic resonance cholangiopancreatography was also performed.    FINDINGS:  LOWER THORAX: Trace physiologic pleural effusions.  Cardiac size is normal. LIVER: Marked hepatomegaly is again seen.  Mild  contour nodularity is again seen.  Loss of signal on out of phase imaging is consistent hepatic steatosis.  No suspicious focal lesion.  GALLBLADDER: No gallstones.  Gallbladder edema is noted. BILIARY TREE: No intra or extrahepatic biliary ductal dilatation.  No filling defects.  PANCREAS: Normal parenchymal signal.  No mass.  No ductal dilatation. SPLEEN: Marked splenomegaly is again seen measuring 18.3 cm. ADRENALS: Normal.  KIDNEYS: Normal. RETROPERITONEUM: No enlarged lymph nodes. PERITONEUM: Small volume ascites. GI TRACT: No evidence of bowel obstruction. AORTA & MAJOR BRANCHES: Normal. VENOUS SYSTEM: Normal. Portal and hepatic veins are patent. ABDOMINAL WALL: Normal.  BONES: No lesions or fracture.          CONCLUSION:   Hepatomegaly and steatosis.  Mild contour nodularity which may indicate a component of fibrosis.  No suspicious hepatic lesions are identified.  The portal and hepatic veins are patent.  Splenomegaly and small volume ascites which may indicate portal hypertension.  Gallbladder wall edema at probably relates to hepatocellular disease.    [facility]    Dictated by (CST): Mino Medrano MD on 3/16/2025 at 6:24 AM     Finalized by (CST): Mino Medrano MD on 3/16/2025 at 6:33 AM          CT ABDOMEN TRIPHASIC LIVER (W+WO) (CPT=74170)    Result Date: 3/15/2025  PROCEDURE: CT ABDOMEN TRIPHASIC LIVER (W+WO)(CPT=74170)  COMPARISON: [facility], CT ABDOMEN PELVIS IV CONTRAST NO ORAL (ER), 9/01/2023, 5:51 AM.  [facility], US LIVER (CPT=76705), 3/14/2025, 7:16 PM.  [facility], CT ABDOMEN PELVIS IV CONTRAST NO ORAL (ER), 11/20/2023, 7:11 PM.  INDICATIONS: jaundice  TECHNIQUE: CT images of the abdomen were obtained without and with non-ionic intravenous contrast material.  Automated exposure control for dose reduction was used. Adjustment of the mA and/or kV was done based on the patient's size. Use of iterative reconstruction technique for dose reduction was  used.  Dose information is transmitted to the ACR (American College of Radiology) NRDR (National Radiology Data Registry) which includes the Dose Index Registry.  FINDINGS:  LIVER: Hepatomegaly-25.4 cm (prior 23.2 cm.  Marked hepatic steatosis.  No true arterial phase imaging, but diffuse heterogeneous hepatic enhancement.  A slightly lobulated hepatic surface contour or with a relatively prominent lateral segment and caudate lobe..  Portal vein and branches patent.  Patent IVC and hepatic veins. SPLEEN: Splenomegaly-17.5 cm (prior 16.5 cm). PANCREAS: Normal.  BILIARY: Mild gallbladder wall edema. ADRENALS: Normal. KIDNEYS: Normal. AORTA/VASCULAR: Major abdominal vessels patent.  No aneurysm or dissection.  LYMPHADENOPATHY:  None.  Small subcentimeter retroperitoneal lymph nodes.  BOWEL/MESENTERY: Normal appendix.  Partly visualized anastomotic staple line from a previous sigmoid colon resection at the edge of field of view.  No obstruction, bowel wall thickening, or mesenteric mass. ABDOMINAL WALL: Tiny fat containing umbilical hernia. ASCITES:                         Small amount of generalized ascites. BONES: No suspect bone lesion. LUNG BASES: Postsurgical volume loss in the right lung with prior rib resections.  Subsegmental atelectasis and or scar in the lingula. OTHER: Negative.           CONCLUSION:  1. Marked hepatic steatosis, moderate hepatomegaly .  Diffuse heterogeneous hepatic enhancement may be related to hepatitis, and or cirrhosis.  Portal vein and branches patent. 2. Moderate splenomegaly. 3. Small amount of ascites and gallbladder wall edema related to liver disease.     Dictated by (CST): Jose Luis Rosa MD on 3/15/2025 at 5:20 PM     Finalized by (CST): Jose Luis Rosa MD on 3/15/2025 at 5:36 PM          US LIVER (CPT=76705)    Result Date: 3/14/2025  PROCEDURE: US LIVER (CPT=76705)  COMPARISON: None.  INDICATIONS: Jaundice, fatigue  TECHNIQUE:   The liver was evaluated with gray scale and  colorflow of the main vessels.   FINDINGS:  This is a moderately limited examination due to limited sonographic windows, patient body habitus, and obscuration of structures by bowel gas.   LIVER:    The liver measures 23.9 cm in craniocaudal dimension.  There is diffusely increased echogenicity of the liver with decreased penetration of the sound beam.  This limits assessment for underlying masses.  Obscuration of the intrahepatic vasculature.  There is antegrade flow within the main portal and visualized hepatic veins.  GALLBLADDER:     Echogenicity is seen within the gallbladder during left lateral decubitus positioning. . No wall thickening or pericholecystic fluid.  Sonographic Funes's sign was not elicited.  BILE DUCTS:    Common bile duct measures 5 mm.  Limited assessment of the common bile duct.  The intrahepatic biliary tree could not be assessed due to obscuration by hepatic steatosis.  OTHER:   Right kidney is unremarkable, without hydronephrosis, measuring 10.3 cm. Visualized portions of the pancreas are unremarkable. Portions obscured by bowel gas.          CONCLUSION:   Moderately limited examination.  No biliary ductal dilation on limited assessment.  In the setting of concern for biliary obstruction due to the presence of jaundice, recommend correlation with MRCP or ERCP.  Severe hepatomegaly with severe diffuse hepatic steatosis.   Possible sludge within the gallbladder, which is not well assessed due to examination limitations.  May represent artifact as well.  Otherwise, no imaging evidence of acute cholecystitis.        Dictated by (CST): Marion Belle MD on 3/14/2025 at 8:38 PM     Finalized by (CST): Marion Belle MD on 3/14/2025 at 8:41 PM                  .  ASSESSMENT/PLAN:   Mica Bernabe is a 34 year old year-old female with history of BMI of 46, tonsillectomy, adenoidectomy, prior C. difficile, complicated/perforated left colon diverticulitis in June 2023 eventually  undergoing surgery in October 2023 who was admitted after presenting to the emergency department yesterday with jaundice.      Most recent set of liver enzymes prior to this was in 2023 at which point liver numbers were normal.  She has had prior hepatomegaly with hepatic steatosis, presumed secondary to nonalcoholic fatty liver disease.  She does endorse 2 to 3 glasses of wine out 4 times a week but has never felt that this was a problem.  Noninvasive testing for chronic liver disease workup has otherwise been unrevealing, no agents to suggest DILI.  Autoimmune serologies were negative.  MRCP shows marked hepatomegaly, steatosis, possible nodularity.  No filling defects or ductal dilation.  Etiology of findings may be secondary to acute alcohol hepatitis in the setting of MURRAY/SUMAYA liver disease. However as the alcohol history seems disproportional to the level of hyperbilirubinemia, she underwent perc liver biopsy on 3/19. Synthetic function intact. Patient remained stable, was discharged and advised to follow up in clinic.     # Cirrhosis- NAFLD?, patient has appointment with Dr. Pelayo to establish care   - PSE: none   - EV: no prior EGD  - ascites: mild on exam, will obtain ultrasound due to patient complaints of abdominal distention   - HCC: 3/15/2025 MRCP without lesion, AFP 7.2    #ecchymosis to abdomen   #distention   -patient with bruising to abdomen after heparin injections during admission, bruising has improved however  on exam  -she notes feeling of worsening distention as well  -will plan for repeat ultrasound in order to determine if large underlying hematoma or new ascites     #hemorrhoids  #constipation   -patient with known hx of hemorrhoids, struggling with constipation since discharge  -discussed plan to continue bowel regiment with miralax and can add addition of hydrocortisone cream to assist with hemorrhoids  -can consider colonoscopy, however at this time plan for close follow up  with hepatology     Recommendations:  Cirrhosis   -no alcohol intake   -call to schedule ultrasound   -keep scheduled appointment with Dr. Pelayo  -Kristy will follow up on ultrasound and labs      Hemorrhoids  -can trial hydrocortisone cream twice a day   -continue to monitor bowel movements  -can restart the metamucil to assist with BM  -can contact Dr. Lomeli       Follow up in 3 months.         Orders This Visit:  No orders of the defined types were placed in this encounter.      Meds This Visit:  Requested Prescriptions     Signed Prescriptions Disp Refills    hydrocortisone 2.5 % External Cream 1 each 0     Sig: Apply a pea sized amount to the anus, 2 times every day for 10 days.       Imaging & Referrals:  None      Kristy Kumari PA-C   3/31/2025        This note was partially prepared using Dragon Medical voice recognition dictation software. As a result, errors may occur. When identified, these errors have been corrected. While every attempt is made to correct errors during dictation, discrepancies may still exist.          [1] No Known Allergies

## 2025-03-31 NOTE — PATIENT INSTRUCTIONS
Recommendations:  Cirrhosis   -no alcohol intake   -call to schedule ultrasound   -keep scheduled appointment with Dr. Pierce Awad will follow up on ultrasound and labs      Hemorrhoids  -can trial hydrocortisone cream twice a day   -continue to monitor bowel movements  -can restart the metamucil to assist with BM  -can contact Dr. Lomeli       Follow up in 3 months.

## 2025-04-17 LAB
ANTI-EJ: NEGATIVE
ANTI-JO-1: <20 UNITS
ANTI-KU: NEGATIVE
ANTI-MDA-5: <20 UNITS
ANTI-MI-2 AB: NEGATIVE
ANTI-OJ: NEGATIVE
ANTI-PL-12: NEGATIVE
ANTI-PL-7: NEGATIVE
ANTI-PM/SCL100: <20 UNITS
ANTI-SAE1: <20 UNITS
ANTI-SRP AB: NEGATIVE
ANTI-SSA 52KD IGG: <20 UNITS
ANTI-TIF-1GAMMA: <20 UNITS
ANTI-U1 RNP: <20 UNITS
ANTI-U2 RNP: NEGATIVE
ANTI-U3 RNP: NEGATIVE

## 2025-04-20 ENCOUNTER — PATIENT MESSAGE (OUTPATIENT)
Dept: RHEUMATOLOGY | Facility: CLINIC | Age: 35
End: 2025-04-20

## 2025-04-21 NOTE — TELEPHONE ENCOUNTER
If you can let patient know that I reviewed her blood work when she was hospitalized.  Her blood work for rheumatoid arthritis was negative.  I also do blood work for lupus, Sjogren's, scleroderma that was negative.  Since her CK level is rising she should make an appointment with either me or Dr. Good

## 2025-06-02 ENCOUNTER — TELEPHONE (OUTPATIENT)
Facility: CLINIC | Age: 35
End: 2025-06-02

## 2025-06-02 NOTE — TELEPHONE ENCOUNTER
Patient cancelled her appointment today 6/2 at 4:00 pm due to being sick. Patient requesting new appointment for her 3 month follow up,thanks.

## 2025-06-05 NOTE — TELEPHONE ENCOUNTER
Left message to call back.  :  Please transfer to RN if patient calls back, I just need to help her schedule an appointment with Kristy.  Thank you.    Our office tried to contact patient on three separate occasions with no answer or call back.  No response letter mailed to home address.  Encounter closed per protocol.

## (undated) DEVICE — DRAPE,UNDRBUT,WHT GRAD PCH,CAPPORT,20/CS: Brand: MEDLINE

## (undated) DEVICE — LAPAROVUE VISIBILITY SYSTEM LAPAROSCOPIC SOLUTIONS: Brand: LAPAROVUE

## (undated) DEVICE — SUT SLK 0 30IN MULTPK BK

## (undated) DEVICE — DISPOSABLE SUCTION/IRRIGATOR TUBE SET: Brand: AHTO

## (undated) DEVICE — E-Z CLEAN, PTFE COATED, ELECTROSURGICAL LAPAROSCOPIC ELECTRODE, L-HOOK, 33 CM., SINGLE-USE, FOR USE WITH HAND CONTROL PENCIL: Brand: MEGADYNE

## (undated) DEVICE — KIT ENDO ORCAPOD 160/180/190

## (undated) DEVICE — LEGGINGS SURG W31XL48IN SMS FAB SFT

## (undated) DEVICE — TROCAR: Brand: KII FIOS FIRST ENTRY

## (undated) DEVICE — AIRSEAL TRI-LUMEN LILTERED TUBE SET: Brand: AIRSEAL

## (undated) DEVICE — PAD POS 36IN DISP SURGYPAD

## (undated) DEVICE — GOWN SURG XL DISP LEV 3 AERO BLU RAGLAN SL

## (undated) DEVICE — SOLUTION ANTIFOG W/ ADH BK FOAM SPNG RADPQ

## (undated) DEVICE — KIT CLEAN ENDOKIT 1.1OZ GOWNX2

## (undated) DEVICE — SIGMOID SURGICAL SUCTION INSTRUMENT: Brand: ARGYLE

## (undated) DEVICE — 60 ML SYRINGE REGULAR TIP: Brand: MONOJECT

## (undated) DEVICE — SOLUTION IRRIG 3000ML 0.9% NACL FLX CONT

## (undated) DEVICE — 9534HP TRANSPARENT DRSG W/FRAME: Brand: 3M™ TEGADERM™

## (undated) DEVICE — INTENDED FOR TISSUE SEPARATION, AND OTHER PROCEDURES THAT REQUIRE A SHARP SURGICAL BLADE TO PUNCTURE OR CUT.: Brand: BARD-PARKER ® STAINLESS STEEL BLADES

## (undated) DEVICE — SOLUTION IRRIG 1000ML 0.9% NACL USP BTL

## (undated) DEVICE — VIOLET BRAIDED (POLYGLACTIN 910), SYNTHETIC ABSORBABLE SUTURE: Brand: COATED VICRYL

## (undated) DEVICE — ADHESIVE SKIN TOP FOR WND CLSR DERMBND ADV

## (undated) DEVICE — TROCAR: Brand: KII® SLEEVE

## (undated) DEVICE — BIOPATCH™ ANTIMICROBIAL DRESSING WITH CHLORHEXIDINE GLUCONATE IS A HYDROPHILLIC POLYURETHANE ABSORPTIVE FOAM WITH CHLORHEXIDINE GLUCONATE (CHG) WHICH INHIBITS BACTERIAL GROWTH UNDER THE DRESSING. THE DRESSING IS INTENDED TO BE USED TO ABSORB EXUDATE, COVER A WOUND CAUSED BY VASCULAR AND NONVASCULAR PERCUTANEOUS MEDICAL DEVICES DURING SURGERY, AS WELL AS REDUCE LOCAL INFECTION AND COLONIZATION OF MICROORGANISMS.: Brand: BIOPATCH

## (undated) DEVICE — FLEXIBLE YANKAUER,MEDIUM TIP, NO VACUUM CONTROL: Brand: ARGYLE

## (undated) DEVICE — DISPOSABLE GRASPER: Brand: EPIX LAPAROSCOPIC GRASPER

## (undated) DEVICE — Device: Brand: DUAL NARE NASAL CANNULAE FEMALE LUER CON 7FT O2 TUBE

## (undated) DEVICE — SUTURE VCRL SZ 0 L27IN ABSRB VLT L26MM UR-6

## (undated) DEVICE — LAPAROSCOPIC ACCESS SYSTEM: Brand: ALEXIS LAPAROSCOPIC SYSTEM

## (undated) DEVICE — SUTURE PDS II 1 CT-1

## (undated) DEVICE — THE ECHELON, ECHELON ENDOPATH™ AND ECHELON FLEX™ FAMILIES OF ENDOSCOPIC LINEAR CUTTERS AND RELOADS ARE STERILE, SINGLE PATIENT USE INSTRUMENTS THAT SIMULTANEOUSLY CUT AND STAPLE TISSUE. THERE ARE SIX STAGGERED ROWS OF STAPLES, THREE ON EITHER SIDE OF THE CUT LINE. THE 45 MM INSTRUMENTS HAVE A STAPLE LINE THATIS APPROXIMATELY 45 MM LONG AND A CUT LINE THAT IS APPROXIMATELY 42 MM LONG. THE SHAFT CAN ROTATE FREELY IN BOTH DIRECTIONS AND AN ARTICULATION MECHANISM ON ARTICULATING INSTRUMENTS ENABLES BENDING THE DISTAL PORTIONOF THE SHAFT TO FACILITATE LATERAL ACCESS OF THE OPERATIVE SITE.THE INSTRUMENTS ARE SHIPPED WITHOUT A RELOAD AND MUST BE LOADED PRIOR TO USE. A STAPLE RETAINING CAP ON THE RELOAD PROTECTS THE STAPLE LEG POINTS DURING SHIPPING AND TRANSPORTATION. THE INSTRUMENTS’ LOCK-OUT FEATURE IS DESIGNED TO PREVENT A USED RELOAD FROM BEING REFIRED.: Brand: ECHELON ENDOPATH

## (undated) DEVICE — MEDI-VAC NON-CONDUCTIVE SUCTION TUBING 6MM X 1.8M (6FT.) L: Brand: CARDINAL HEALTH

## (undated) DEVICE — GAMMEX® PI HYBRID SIZE 7.5, STERILE POWDER-FREE SURGICAL GLOVE, POLYISOPRENE AND NEOPRENE BLEND: Brand: GAMMEX

## (undated) DEVICE — TIGERTAIL 6F FLXTIP 70CM

## (undated) DEVICE — DRAPE SHEET LAPCHOLE 124X100X7

## (undated) DEVICE — AIRSEAL 12 MM ACCESS PORT AND PALM GRIP OBTURATOR WITH BLADELESS OPTICAL TIP, 120 MM LENGTH: Brand: AIRSEAL

## (undated) DEVICE — EVACUATOR SUR 100CC SIL BLB WND

## (undated) DEVICE — THE ECHELON FLEX POWERED PLUS ARTICULATING ENDOSCOPIC LINEAR CUTTERS ARE STERILE, SINGLE PATIENT USE INSTRUMENTS THAT SIMULTANEOUSLYCUT AND STAPLE TISSUE. THERE ARE SIX STAGGERED ROWS OF STAPLES, THREE ON EITHER SIDE OF THE CUT LINE. THE ECHELON FLEX 45 POWERED PLUSINSTRUMENTS HAVE A STAPLE LINE THAT IS APPROXIMATELY 45 MM LONG AND A CUT LINE THAT IS APPROXIMATELY 42 MM LONG. THE SHAFT CAN ROTATE FREELYIN BOTH DIRECTIONS AND AN ARTICULATION MECHANISM ENABLES THE DISTAL PORTION OF THE SHAFT TO PIVOT TO FACILITATE LATERAL ACCESS TO THE OPERATIVESITE.THE INSTRUMENTS ARE PACKAGED WITH A PRIMARY LITHIUM BATTERY PACK THAT MUST BE INSTALLED PRIOR TO USE. THERE ARE SPECIFIC REQUIREMENTS FORDISPOSING OF THE BATTERY PACK. REFER TO THE BATTERY PACK DISPOSAL SECTION.THE INSTRUMENTS ARE PACKAGED WITHOUT A RELOAD AND MUST BE LOADED PRIOR TO USE. A STAPLE RETAINING CAP ON THE RELOAD PROTECTS THE STAPLE LEGPOINTS DURING SHIPPING AND TRANSPORTATION. THE INSTRUMENTS’ LOCK-OUT FEATURE IS DESIGNED TO PREVENT A USED OR IMPROPERLY INSTALLED RELOADFROM BEING REFIRED OR AN INSTRUMENT FROM BEING FIRED WITHOUT A RELOAD.: Brand: ECHELON FLEX

## (undated) DEVICE — A P RESECTION: Brand: MEDLINE INDUSTRIES, INC.

## (undated) DEVICE — TRAY CATH 16FR F INCLUDE BARDX IC COMPLT CARE

## (undated) DEVICE — NITINOL WIRE WITH HYDROPHILIC TIP: Brand: SENSOR

## (undated) DEVICE — TROCARS: Brand: KII® BALLOON BLUNT TIP SYSTEM

## (undated) DEVICE — CIRCULAR MECH XL SEAL 29MM

## (undated) DEVICE — UNDYED BRAIDED (POLYGLACTIN 910), SYNTHETIC ABSORBABLE SUTURE: Brand: COATED VICRYL

## (undated) DEVICE — SIGMOIDOSCOPE LIGHTED BIOSEAL

## (undated) DEVICE — SUTURE ETHILON 3-0 PS2 1669H

## (undated) DEVICE — ENSEAL X1 TISSUE SEALER, CURVED JAW, 37 CM SHAFT LENGTH: Brand: ENSEAL

## (undated) DEVICE — BLAKE SILICONE DRAIN, 15 FR ROUND, HUBLESS: Brand: BLAKE

## (undated) DEVICE — [URETERAL KIT: 2 - URETERAL CATHETERS, 6 FR OUTER DIAMETER, 70 CM LENGTH,  2 - EMITTING FIBER, 0.75 OUTER DIAMETER, 370 CM LENGTH,  DO NOT USE IF PACKAGE IS DAMAGED]: Brand: IRIS

## (undated) DEVICE — CONTAINER,SPECIMEN,OR STERILE,4OZ: Brand: MEDLINE

## (undated) DEVICE — DISPOSABLE GRASPER CARTRIDGE: Brand: DIRECT DRIVE REPOSABLE GRASPERS

## (undated) NOTE — LETTER
[address] [place] [address], [place]  Authorization for Surgical Operation and Procedure                                                                                           I hereby authorize Dulce Terrell MD, my physician and his/her assistants (if applicable), which may include medical students, residents, and/or fellows, to perform the following surgical operation/ procedure and administer such anesthesia as may be determined necessary by my physician: Operation/Procedure name (s) FLEXIBLE SIGMOIDOSCOPY on [address]   2. I recognize that during the surgical operation/procedure, unforeseen conditions may necessitate additional or different procedures than those listed above. I, therefore, further authorize and request that the above-named surgeon, assistants, or designees perform such procedures as are, in their judgment, necessary and desirable. 3.   My surgeon/physician has discussed prior to my surgery the potential benefits, risks and side effects of this procedure; the likelihood of achieving goals; and potential problems that might occur during recuperation. They also discussed reasonable alternatives to the procedure, including risks, benefits, and side effects related to the alternatives and risks related to not receiving this procedure. I have had all my questions answered and I acknowledge that no guarantee has been made as to the result that may be obtained. 4.   Should the need arise during my operation/procedure, which includes change of level of care prior to discharge, I also consent to the administration of blood and/or blood products. Further, I understand that despite careful testing and screening of blood or blood products by collecting agencies, I may still be subject to ill effects as a result of receiving a blood transfusion and/or blood products.   The following are some, but not all, of the potential risks that can occur: fever and allergic reactions, hemolytic reactions, transmission of diseases such as Hepatitis, AIDS and Cytomegalovirus (CMV) and fluid overload. In the event that I wish to have an autologous transfusion of my own blood, or a directed donor transfusion, I will discuss this with my physician. Check only if Refusing Blood or Blood Products  I understand refusal of blood or blood products as deemed necessary by my physician may have serious consequences to my condition to include possible death. I hereby assume responsibility for my refusal and release the hospital, its personnel, and my physicians from any responsibility for the consequences of my refusal.    o  Refuse   5. I authorize the use of any specimen, organs, tissues, body parts or foreign objects that may be removed from my body during the operation/procedure for diagnosis, research or teaching purposes and their subsequent disposal by hospital authorities. I also authorize the release of specimen test results and/or written reports to my treating physician on the hospital medical staff or other referring or consulting physicians involved in my care, at the discretion of the Pathologist or my treating physician. 6.   I consent to the photographing or videotaping of the operations or procedures to be performed, including appropriate portions of my body for medical, scientific, or educational purposes, provided my identity is not revealed by the pictures or by descriptive texts accompanying them. If the procedure has been photographed/videotaped, the surgeon will obtain the original picture, image, videotape or CD. The hospital will not be responsible for storage, release or maintenance of the picture, image, tape or CD.    7.   I consent to the presence of a  or observers in the operating room as deemed necessary by my physician or their designees.     8.   I recognize that in the event my procedure results in extended X-Ray/fluoroscopy time, I may develop a skin reaction. 9. If I have a Do Not Attempt Resuscitation (DNAR) order in place, that status will be suspended while in the operating room, procedural suite, and during the recovery period unless otherwise explicitly stated by me (or a person authorized to consent on my behalf). The surgeon or my attending physician will determine when the applicable recovery period ends for purposes of reinstating the DNAR order. 10. Patients having a sterilization procedure: I understand that if the procedure is successful the results will be permanent and it will therefore be impossible for me to inseminate, conceive, or bear children. I also understand that the procedure is intended to result in sterility, although the result has not been guaranteed. 11. I acknowledge that my physician has explained sedation/analgesia administration to me including the risk and benefits I consent to the administration of sedation/analgesia as may be necessary or desirable in the judgment of my physician. I CERTIFY THAT I HAVE READ AND FULLY UNDERSTAND THE ABOVE CONSENT TO OPERATION and/or OTHER PROCEDURE.     _________________________________________ _________________________________     ___________________________________  Signature of Patient     Signature of Responsible Person                   Printed Name of Responsible Person                              _________________________________________ ______________________________        ___________________________________  Signature of Witness         Date  Time         Relationship to Patient    STATEMENT OF PHYSICIAN My signature below affirms that prior to the time of the procedure; I have explained to the patient and/or his/her legal representative, the risks and benefits involved in the proposed treatment and any reasonable alternative to the proposed treatment.  I have also explained the risks and benefits involved in refusal of the proposed treatment and alternatives to the proposed treatment and have answered the patient's questions.  If I have a significant financial interest in a co-management agreement or a significant financial interest in any product or implant, or other significant relationship used in this procedure/surgery, I have disclosed this and had a discussion with my patient.     _______________________________________________________________ _____________________________  Aldon Kawasaki  )                                                                                         (Date)                                   (Time)  Patient Name: Anamika Colunga    : 1990   Printed: 10/23/2023      Medical Record #: [id number]                                              Page 1 of 1

## (undated) NOTE — LETTER
[address] [place] [address]  Authorization for Invasive Procedures  Date: 07/18/2023           Time: 36    I hereby authorize Dr. Yves Enriquez, my physician and his/her assistants (if applicable), which may include medical students, residents, and/or fellows, to perform the following surgical operation/ procedure and administer such anesthesia as may be determined necessary by my physician: Abscess drain removal, check, and insertion  on [address]  2. I recognize that during the surgical operation/procedure, unforeseen conditions may necessitate additional or different procedures than those listed above. I, therefore, further authorize and request that the above-named surgeon, assistants, or designees perform such procedures as are, in their judgment, necessary and desirable. 3.   My surgeon/physician has discussed prior to my surgery the potential benefits, risks and side effects of this procedure; the likelihood of achieving goals; and potential problems that might occur during recuperation. They also discussed reasonable alternatives to the procedure, including risks, benefits, and side effects related to the alternatives and risks related to not receiving this procedure. I have had all my questions answered and I acknowledge that no guarantee has been made as to the result that may be obtained. 4.   Should the need arise during my operation/procedure, which includes change of level of care prior to discharge, I also consent to the administration of blood and/or blood products. Further, I understand that despite careful testing and screening of blood or blood products by collecting agencies, I may still be subject to ill effects as a result of receiving a blood transfusion and/or blood products.   The following are some, but not all, of the potential risks that can occur: fever and allergic reactions, hemolytic reactions, transmission of diseases such as Hepatitis, AIDS and Cytomegalovirus (CMV) and fluid overload. In the event that I wish to have an autologous transfusion of my own blood, or a directed donor transfusion, I will discuss this with my physician. Check only if Refusing Blood or Blood Products  I understand refusal of blood or blood products as deemed necessary by my physician may have serious consequences to my condition to include possible death. I hereby assume responsibility for my refusal and release the hospital, its personnel, and my physicians from any responsibility for the consequences of my refusal.         o  Refuse         5. I authorize the use of any specimen, organs, tissues, body parts or foreign objects that may be removed from my body during the operation/procedure for diagnosis, research or teaching purposes and their subsequent disposal by hospital authorities. I also authorize the release of specimen test results and/or written reports to my treating physician on the hospital medical staff or other referring or consulting physicians involved in my care, at the discretion of the Pathologist or my treating physician. 6.   I consent to the photographing or videotaping of the operations or procedures to be performed, including appropriate portions of my body for medical, scientific, or educational purposes, provided my identity is not revealed by the pictures or by descriptive texts accompanying them. If the procedure has been photographed/videotaped, the surgeon will obtain the original picture, image, videotape or CD. The hospital will not be responsible for storage, release or maintenance of the picture, image, tape or CD.    7.   I consent to the presence of a  or observers in the operating room as deemed necessary by my physician or their designees. 8.   I recognize that in the event my procedure results in extended X-Ray/fluoroscopy time, I may develop a skin reaction. 9.  If I have a Do Not Attempt Resuscitation (DNAR) order in place, that status will be suspended while in the operating room, procedural suite, and during the recovery period unless otherwise explicitly stated by me (or a person authorized to consent on my behalf). The surgeon or my attending physician will determine when the applicable recovery period ends for purposes of reinstating the DNAR order. 10. Patients having a sterilization procedure: I understand that if the procedure is successful the results will be permanent and it will therefore be impossible for me to inseminate, conceive, or bear children. I also understand that the procedure is intended to result in sterility, although the result has not been guaranteed. 11. I acknowledge that my physician has explained sedation/analgesia administration to me including the risk and benefits I consent to the administration of sedation/analgesia as may be necessary or desirable in the judgment of my physician.     I CERTIFY THAT I HAVE READ AND FULLY UNDERSTAND THE ABOVE CONSENT TO OPERATION and/or OTHER PROCEDURE.        ____________________________________       _________________________________      ______________________________  Signature of Patient         Signature of Responsible Person        Printed Name of Responsible Person        ____________________________________      _________________________________      ______________________________       Signature of Witness          Relationship to Patient                       Date                                       Time    Patient Name: Arden Navarrete     : 1990                 Printed: 2023      Medical Record #: [id number]                      Page 1 of 2          STATEMENT OF PHYSICIAN My signature below affirms that prior to the time of the procedure; I have explained to the patient and/or his/her legal representative, the risks and benefits involved in the proposed treatment and any reasonable alternative to the proposed treatment. I have also explained the risks and benefits involved in refusal of the proposed treatment and alternatives to the proposed treatment and have answered the patient's questions.  If I have a significant financial interest in a co-management agreement or a significant financial interest in any product or implant, or other significant relationship used in this procedure/surgery, I have disclosed this and had a discussion with my patient.     _______________________________________________________________ _____________________________  Lili Bauer  Physician)                                                                                         (Date)                                   (Time)    Patient Name: Annabelle Mcmillan     : 1990                 Printed: 2023      Medical Record #: [id number]                      Page 2 of 2

## (undated) NOTE — LETTER
[address], [place]  Authorization for Invasive Procedures  Date: 8/11/2023           Time: 65    I hereby authorize Dr Merline Sears, my physician and his/her assistants (if applicable), which may include medical students, residents, and/or fellows, to perform the following surgical operation/ procedure and administer such anesthesia as may be determined necessary by my physician: Drain check on [address]  2. I recognize that during the surgical operation/procedure, unforeseen conditions may necessitate additional or different procedures than those listed above. I, therefore, further authorize and request that the above-named surgeon, assistants, or designees perform such procedures as are, in their judgment, necessary and desirable. 3.   My surgeon/physician has discussed prior to my surgery the potential benefits, risks and side effects of this procedure; the likelihood of achieving goals; and potential problems that might occur during recuperation. They also discussed reasonable alternatives to the procedure, including risks, benefits, and side effects related to the alternatives and risks related to not receiving this procedure. I have had all my questions answered and I acknowledge that no guarantee has been made as to the result that may be obtained. 4.   Should the need arise during my operation/procedure, which includes change of level of care prior to discharge, I also consent to the administration of blood and/or blood products. Further, I understand that despite careful testing and screening of blood or blood products by collecting agencies, I may still be subject to ill effects as a result of receiving a blood transfusion and/or blood products.   The following are some, but not all, of the potential risks that can occur: fever and allergic reactions, hemolytic reactions, transmission of diseases such as Hepatitis, AIDS and Cytomegalovirus (CMV) and fluid overload. In the event that I wish to have an autologous transfusion of my own blood, or a directed donor transfusion, I will discuss this with my physician. Check only if Refusing Blood or Blood Products  I understand refusal of blood or blood products as deemed necessary by my physician may have serious consequences to my condition to include possible death. I hereby assume responsibility for my refusal and release the hospital, its personnel, and my physicians from any responsibility for the consequences of my refusal.         o  Refuse         5. I authorize the use of any specimen, organs, tissues, body parts or foreign objects that may be removed from my body during the operation/procedure for diagnosis, research or teaching purposes and their subsequent disposal by hospital authorities. I also authorize the release of specimen test results and/or written reports to my treating physician on the hospital medical staff or other referring or consulting physicians involved in my care, at the discretion of the Pathologist or my treating physician. 6.   I consent to the photographing or videotaping of the operations or procedures to be performed, including appropriate portions of my body for medical, scientific, or educational purposes, provided my identity is not revealed by the pictures or by descriptive texts accompanying them. If the procedure has been photographed/videotaped, the surgeon will obtain the original picture, image, videotape or CD. The hospital will not be responsible for storage, release or maintenance of the picture, image, tape or CD.    7.   I consent to the presence of a  or observers in the operating room as deemed necessary by my physician or their designees. 8.   I recognize that in the event my procedure results in extended X-Ray/fluoroscopy time, I may develop a skin reaction. 9.  If I have a Do Not Attempt Resuscitation (DNAR) order in place, that status will be suspended while in the operating room, procedural suite, and during the recovery period unless otherwise explicitly stated by me (or a person authorized to consent on my behalf). The surgeon or my attending physician will determine when the applicable recovery period ends for purposes of reinstating the DNAR order. 10. Patients having a sterilization procedure: I understand that if the procedure is successful the results will be permanent and it will therefore be impossible for me to inseminate, conceive, or bear children. I also understand that the procedure is intended to result in sterility, although the result has not been guaranteed. 11. I acknowledge that my physician has explained sedation/analgesia administration to me including the risk and benefits I consent to the administration of sedation/analgesia as may be necessary or desirable in the judgment of my physician. I CERTIFY THAT I HAVE READ AND FULLY UNDERSTAND THE ABOVE CONSENT TO OPERATION and/or OTHER PROCEDURE.        ____________________________________       _________________________________      ______________________________  Signature of Patient         Signature of Responsible Person        Printed Name of Responsible Person        ____________________________________      _________________________________      ______________________________       Signature of Witness          Relationship to Patient                       Date                                       Time    Patient Name: Christina Schilling     : 1990                 Printed: 2023      Medical Record #: [id number]                      Page 1 of 2          STATEMENT OF PHYSICIAN My signature below affirms that prior to the time of the procedure; I have explained to the patient and/or his/her legal representative, the risks and benefits involved in the proposed treatment and any reasonable alternative to the proposed treatment.  I have also explained the risks and benefits involved in refusal of the proposed treatment and alternatives to the proposed treatment and have answered the patient's questions.  If I have a significant financial interest in a co-management agreement or a significant financial interest in any product or implant, or other significant relationship used in this procedure/surgery, I have disclosed this and had a discussion with my patient.     _______________________________________________________________ _____________________________  Dominique Nadiya of Physician)                                                                                         (Date)                                   (Time)    Patient Name: Lazarus Gallon     : 1990                 Printed: 2023      Medical Record #: [id number]                      Page 2 of 2

## (undated) NOTE — LETTER
[address] [place] [address]  Authorization for Invasive Procedures  Date: 8/11/2023           Time: 65    I hereby authorize Dr Annmarie Beckett, my physician and his/her assistants (if applicable), which may include medical students, residents, and/or fellows, to perform the following surgical operation/ procedure and administer such anesthesia as may be determined necessary by my physician: Percutaneous drainage of left adnexal/left ovarian fluid collection  on [address]  2. I recognize that during the surgical operation/procedure, unforeseen conditions may necessitate additional or different procedures than those listed above. I, therefore, further authorize and request that the above-named surgeon, assistants, or designees perform such procedures as are, in their judgment, necessary and desirable. 3.   My surgeon/physician has discussed prior to my surgery the potential benefits, risks and side effects of this procedure; the likelihood of achieving goals; and potential problems that might occur during recuperation. They also discussed reasonable alternatives to the procedure, including risks, benefits, and side effects related to the alternatives and risks related to not receiving this procedure. I have had all my questions answered and I acknowledge that no guarantee has been made as to the result that may be obtained. 4.   Should the need arise during my operation/procedure, which includes change of level of care prior to discharge, I also consent to the administration of blood and/or blood products. Further, I understand that despite careful testing and screening of blood or blood products by collecting agencies, I may still be subject to ill effects as a result of receiving a blood transfusion and/or blood products.   The following are some, but not all, of the potential risks that can occur: fever and allergic reactions, hemolytic reactions, transmission of diseases such as Hepatitis, AIDS and Cytomegalovirus (CMV) and fluid overload. In the event that I wish to have an autologous transfusion of my own blood, or a directed donor transfusion, I will discuss this with my physician. Check only if Refusing Blood or Blood Products  I understand refusal of blood or blood products as deemed necessary by my physician may have serious consequences to my condition to include possible death. I hereby assume responsibility for my refusal and release the hospital, its personnel, and my physicians from any responsibility for the consequences of my refusal.         o  Refuse         5. I authorize the use of any specimen, organs, tissues, body parts or foreign objects that may be removed from my body during the operation/procedure for diagnosis, research or teaching purposes and their subsequent disposal by hospital authorities. I also authorize the release of specimen test results and/or written reports to my treating physician on the hospital medical staff or other referring or consulting physicians involved in my care, at the discretion of the Pathologist or my treating physician. 6.   I consent to the photographing or videotaping of the operations or procedures to be performed, including appropriate portions of my body for medical, scientific, or educational purposes, provided my identity is not revealed by the pictures or by descriptive texts accompanying them. If the procedure has been photographed/videotaped, the surgeon will obtain the original picture, image, videotape or CD. The hospital will not be responsible for storage, release or maintenance of the picture, image, tape or CD.    7.   I consent to the presence of a  or observers in the operating room as deemed necessary by my physician or their designees. 8.   I recognize that in the event my procedure results in extended X-Ray/fluoroscopy time, I may develop a skin reaction. 9.  If I have a Do Not Attempt Resuscitation (DNAR) order in place, that status will be suspended while in the operating room, procedural suite, and during the recovery period unless otherwise explicitly stated by me (or a person authorized to consent on my behalf). The surgeon or my attending physician will determine when the applicable recovery period ends for purposes of reinstating the DNAR order. 10. Patients having a sterilization procedure: I understand that if the procedure is successful the results will be permanent and it will therefore be impossible for me to inseminate, conceive, or bear children. I also understand that the procedure is intended to result in sterility, although the result has not been guaranteed. 11. I acknowledge that my physician has explained sedation/analgesia administration to me including the risk and benefits I consent to the administration of sedation/analgesia as may be necessary or desirable in the judgment of my physician.     I CERTIFY THAT I HAVE READ AND FULLY UNDERSTAND THE ABOVE CONSENT TO OPERATION and/or OTHER PROCEDURE.        ____________________________________       _________________________________      ______________________________  Signature of Patient         Signature of Responsible Person        Printed Name of Responsible Person        ____________________________________      _________________________________      ______________________________       Signature of Witness          Relationship to Patient                       Date                                       Time    Patient Name: Bc Gonzalez     : 1990                 Printed: 2023      Medical Record #: [id number]                      Page 1 of 2          STATEMENT OF PHYSICIAN My signature below affirms that prior to the time of the procedure; I have explained to the patient and/or his/her legal representative, the risks and benefits involved in the proposed treatment and any reasonable alternative to the proposed treatment. I have also explained the risks and benefits involved in refusal of the proposed treatment and alternatives to the proposed treatment and have answered the patient's questions.  If I have a significant financial interest in a co-management agreement or a significant financial interest in any product or implant, or other significant relationship used in this procedure/surgery, I have disclosed this and had a discussion with my patient.     _______________________________________________________________ _____________________________  Alphonse Nunn  Physician)                                                                                         (Date)                                   (Time)    Patient Name: Sana Quintana     : 1990                 Printed: 2023      Medical Record #: [id number]                      Page 2 of 2

## (undated) NOTE — LETTER
Hospital Discharge Documentation    From: [facility] Hospitalist's Office  Phone: [phone]    Patient discharged time/date: 3/20/2025 12:49 PM  Patient discharge disposition:  Home or Self Care       Discharge Summary - D/C Summary        Discharge Summary signed by Maria Elena Quiroga MD at 3/20/2025  8:56 AM  Version 1 of 1      Author: Maria Elena Quiroga MD Service: Hospitalist Author Type: Physician    Filed: 3/20/2025  8:56 AM Date of Service: 3/20/2025  8:52 AM Status: Signed    : Maria Elena Quiroga MD (Physician)           [facility] Hospitalist Discharge Summary   Patient ID:  Mica Bernabe  [id number]  34 year old  5/9/1990    Admit date: 3/14/2025  Discharge date: 3/20/2025  Primary Care Physician: AARON DAVILA MD   Attending Physician: Maria Elena Quiroga MD   Consults:   Consultants         Provider   Role Specialty     Madelyn Collins MD      Consulting Physician RHEUMATOLOGY     Brent Arellano MD      Consulting Physician INTERVENTIONAL, RADIOLOGY     Colby Coyne MD      Consulting Physician GASTROENTEROLOGY            Discharge Diagnoses:   Acute hepatitis    Reason for admission  Copied from admission H&P: The patient is a 34-year-old  female with morbid obesity and underlying possible hepatic steatosis, presented today to the emergency department for evaluation after she noted her urine is dark in color since yesterday, and her eyes are turning yellow.  CBC was unremarkable, except for platelet count of 99.  Chemistry showed ALT 78 and , alkaline phosphatase 158, total bilirubin 14.2.  Lipase was normal.  Liver ultrasound still pending.  Patient will be admitted to the hospital for further management.  INR today is 1.58.       Hospital Course:  Jaundice   Elevated liver function test  Fatty liver   Newly dx [facility] cirrhosis   GI on consult.   Ddx: alcoholic hepatitis vs MURRAY vs other   Liver US and MRCP reviewed.   Advised ETOH cessation.   Autoimmune hepatitis w/u  neg  Liver biopsy 3/19 by IR pathology pending. She will fu outpt with GI   Outpt fu with hepatology   LFT's stable.   Rhabdomyolysis, improving  CPK 1549 on admit. Improved with IVF.   Could be related to ETOH hepatitis   Rheum on consult. Myositis panel pending. FU with Dr Collins outpt   SOB  CXR neg.   IS  Decrease IVF to 100ml/our stop soon  Morbid Obesity   H/o diverticulitis  H/o C.diff infection   Anxiety d/o      EXAM:   GENERAL: no apparent distress, comfortable  NEURO: A/A Ox3, no focal deficits  RESP: non labored, CTAB/L  CARDIO: Regular, no murmur  ABD: soft, NT, ND  EXTREMITIES: no edema, no calf tenderness    Operative Procedures:     Discharge Instructions     Medication List        CHANGE how you take these medications      omeprazole 20 MG Cpdr  Commonly known as: PriLOSEC  What changed: how much to take            CONTINUE taking these medications      albuterol 108 (90 Base) MCG/ACT Aers  Commonly known as: Ventolin HFA     ALPRAZolam 0.5 MG Tabs  Commonly known as: Xanax     calcium carbonate 500 MG Chew  Commonly known as: Tums     Naloxone HCl 4 MG/0.1ML Liqd  4 mg by Nasal route as needed. If patient remains unresponsive, repeat dose in other nostril 2-5 minutes after first dose.     QUEtiapine 50 MG Tabs  Commonly known as: SEROquel     sertraline 100 MG Tabs  Commonly known as: Zoloft     zolpidem 10 MG Tabs  Commonly known as: Ambien            STOP taking these medications      clonazePAM 1 MG Tabs  Commonly known as: KlonoPIN              Activity: activity as tolerated  Diet: regular diet  Wound Care: NA  Code Status: Full Code        Discharge Instructions         FU with PCP In 1 week  FU with Dr Collins in 2 weeks  FU with Lori Munoz (GI) in 2 weeks.   Call hepatology Dr Sean Koeppe to make appt hepatology           Important follow up:   Follow-up Information       Steven Ballesteros MD Follow up in 1 week(s).    Specialty: Family Medicine  Contact information:  [address] [facility]  [address]  [phone]               Lori Munoz APRN Follow up in 2 week(s).    Specialty: Nurse Practitioner  Contact information:  [address] [facility] [address]  [place] [id number]  [phone]               Madelyn Collins MD Follow up in 2 week(s).    Specialty: RHEUMATOLOGY  Contact information:  [address]  [address]  [facility] [id number] [phone]                             -PCP in [] within 7 days [] within 14 days [] other     Disposition: home  Discharged Condition: good    Hospital Discharge Diagnoses:  newly dx MURRAY cirrhosis     Lace+ Score: 74  59-90 High Risk  29-58 Medium Risk  0-28   Low Risk.    TCM Follow-Up Recommendation:  LACE > 58: High Risk of readmission after discharge from the hospital.            Total Time Coordinating Care: Greater than 30 minutes    Patient had opportunity to ask questions, state understanding, and agree with therapeutic plan as outlined    Maria Elena Quiroga MD  Hospitalist  3/20/2025        Electronically signed by Maria Elena Quiroga MD on 3/20/2025  8:56 AM

## (undated) NOTE — LETTER
[address] [place] [address]  Authorization for Invasive Procedures  Date: ***           Time: ***    {[facility] ivs consent:[id number]}

## (undated) NOTE — LETTER
Date & Time: 4/26/2023, 2:08 PM  Patient: Moisés Anderson  Encounter Provider(s):    MITCHEL Vickers       To Whom It May Concern:    Sara Walsh was seen and treated in our department on 4/26/2023. She should not return to work until 4/29/23 .     If you have any questions or concerns, please do not hesitate to call.        _____________________________  Physician/APC Signature

## (undated) NOTE — LETTER
[address] [place], [place]  Authorization for Surgical Operation and Procedure                                                                                           I hereby authorize HEATHER Garrido MD, my physician and his/her assistants (if applicable), which may include medical students, residents, and/or fellows, to perform the following surgical operation/ procedure and administer such anesthesia as may be determined necessary by my physician: CT [address] [facility]  on [address]   2. I recognize that during the surgical operation/procedure, unforeseen conditions may necessitate additional or different procedures than those listed above. I, therefore, further authorize and request that the above-named surgeon, assistants, or designees perform such procedures as are, in their judgment, necessary and desirable. 3.   My surgeon/physician has discussed prior to my surgery the potential benefits, risks and side effects of this procedure; the likelihood of achieving goals; and potential problems that might occur during recuperation. They also discussed reasonable alternatives to the procedure, including risks, benefits, and side effects related to the alternatives and risks related to not receiving this procedure. I have had all my questions answered and I acknowledge that no guarantee has been made as to the result that may be obtained. 4.   Should the need arise during my operation/procedure, which includes change of level of care prior to discharge, I also consent to the administration of blood and/or blood products. Further, I understand that despite careful testing and screening of blood or blood products by collecting agencies, I may still be subject to ill effects as a result of receiving a blood transfusion and/or blood products.   The following are some, but not all, of the potential risks that can occur: fever and allergic reactions, hemolytic reactions, transmission of diseases such as Hepatitis, AIDS and Cytomegalovirus (CMV) and fluid overload. In the event that I wish to have an autologous transfusion of my own blood, or a directed donor transfusion, I will discuss this with my physician. Check only if Refusing Blood or Blood Products  I understand refusal of blood or blood products as deemed necessary by my physician may have serious consequences to my condition to include possible death. I hereby assume responsibility for my refusal and release the hospital, its personnel, and my physicians from any responsibility for the consequences of my refusal.    o  Refuse   5. I authorize the use of any specimen, organs, tissues, body parts or foreign objects that may be removed from my body during the operation/procedure for diagnosis, research or teaching purposes and their subsequent disposal by hospital authorities. I also authorize the release of specimen test results and/or written reports to my treating physician on the hospital medical staff or other referring or consulting physicians involved in my care, at the discretion of the Pathologist or my treating physician. 6.   I consent to the photographing or videotaping of the operations or procedures to be performed, including appropriate portions of my body for medical, scientific, or educational purposes, provided my identity is not revealed by the pictures or by descriptive texts accompanying them. If the procedure has been photographed/videotaped, the surgeon will obtain the original picture, image, videotape or CD. The hospital will not be responsible for storage, release or maintenance of the picture, image, tape or CD.    7.   I consent to the presence of a  or observers in the operating room as deemed necessary by my physician or their designees.     8.   I recognize that in the event my procedure results in extended X-Ray/fluoroscopy time, I may develop a skin reaction. 9. If I have a Do Not Attempt Resuscitation (DNAR) order in place, that status will be suspended while in the operating room, procedural suite, and during the recovery period unless otherwise explicitly stated by me (or a person authorized to consent on my behalf). The surgeon or my attending physician will determine when the applicable recovery period ends for purposes of reinstating the DNAR order. 10. Patients having a sterilization procedure: I understand that if the procedure is successful the results will be permanent and it will therefore be impossible for me to inseminate, conceive, or bear children. I also understand that the procedure is intended to result in sterility, although the result has not been guaranteed. 11. I acknowledge that my physician has explained sedation/analgesia administration to me including the risk and benefits I consent to the administration of sedation/analgesia as may be necessary or desirable in the judgment of my physician. I CERTIFY THAT I HAVE READ AND FULLY UNDERSTAND THE ABOVE CONSENT TO OPERATION and/or OTHER PROCEDURE.     _________________________________________ _________________________________     ___________________________________  Signature of Patient     Signature of Responsible Person                   Printed Name of Responsible Person                              _________________________________________ ______________________________        ___________________________________  Signature of Witness         Date  Time         Relationship to Patient    STATEMENT OF PHYSICIAN My signature below affirms that prior to the time of the procedure; I have explained to the patient and/or his/her legal representative, the risks and benefits involved in the proposed treatment and any reasonable alternative to the proposed treatment.  I have also explained the risks and benefits involved in refusal of the proposed treatment and alternatives to the proposed treatment and have answered the patient's questions.  If I have a significant financial interest in a co-management agreement or a significant financial interest in any product or implant, or other significant relationship used in this procedure/surgery, I have disclosed this and had a discussion with my patient.     _______________________________________________________________ _____________________________  Santiago Zuluaga of Physician)                                                                                         (Date)                                   (Time)  Patient Name: Eli Dias    : 1990   Printed: 2023      Medical Record #: [id number]                                              Page 1 of 1

## (undated) NOTE — LETTER
[address], [place]  Authorization for Invasive Procedures  Date: ***           Time: ***    I hereby authorize Dr Maryl Phalen , my physician and his/her assistants (if applicable), which may include medical students, residents, and/or fellows, to perform the following surgical operation/ procedure and administer such anesthesia as may be determined necessary by my physician: Drain check/exchange on [address]  2. I recognize that during the surgical operation/procedure, unforeseen conditions may necessitate additional or different procedures than those listed above. I, therefore, further authorize and request that the above-named surgeon, assistants, or designees perform such procedures as are, in their judgment, necessary and desirable. 3.   My surgeon/physician has discussed prior to my surgery the potential benefits, risks and side effects of this procedure; the likelihood of achieving goals; and potential problems that might occur during recuperation. They also discussed reasonable alternatives to the procedure, including risks, benefits, and side effects related to the alternatives and risks related to not receiving this procedure. I have had all my questions answered and I acknowledge that no guarantee has been made as to the result that may be obtained. 4.   Should the need arise during my operation/procedure, which includes change of level of care prior to discharge, I also consent to the administration of blood and/or blood products. Further, I understand that despite careful testing and screening of blood or blood products by collecting agencies, I may still be subject to ill effects as a result of receiving a blood transfusion and/or blood products.   The following are some, but not all, of the potential risks that can occur: fever and allergic reactions, hemolytic reactions, transmission of diseases such as Hepatitis, AIDS and Cytomegalovirus (CMV) and fluid overload. In the event that I wish to have an autologous transfusion of my own blood, or a directed donor transfusion, I will discuss this with my physician. Check only if Refusing Blood or Blood Products  I understand refusal of blood or blood products as deemed necessary by my physician may have serious consequences to my condition to include possible death. I hereby assume responsibility for my refusal and release the hospital, its personnel, and my physicians from any responsibility for the consequences of my refusal.         o  Refuse         5. I authorize the use of any specimen, organs, tissues, body parts or foreign objects that may be removed from my body during the operation/procedure for diagnosis, research or teaching purposes and their subsequent disposal by hospital authorities. I also authorize the release of specimen test results and/or written reports to my treating physician on the hospital medical staff or other referring or consulting physicians involved in my care, at the discretion of the Pathologist or my treating physician. 6.   I consent to the photographing or videotaping of the operations or procedures to be performed, including appropriate portions of my body for medical, scientific, or educational purposes, provided my identity is not revealed by the pictures or by descriptive texts accompanying them. If the procedure has been photographed/videotaped, the surgeon will obtain the original picture, image, videotape or CD. The hospital will not be responsible for storage, release or maintenance of the picture, image, tape or CD.    7.   I consent to the presence of a  or observers in the operating room as deemed necessary by my physician or their designees. 8.   I recognize that in the event my procedure results in extended X-Ray/fluoroscopy time, I may develop a skin reaction. 9.  If I have a Do Not Attempt Resuscitation (DNAR) order in place, that status will be suspended while in the operating room, procedural suite, and during the recovery period unless otherwise explicitly stated by me (or a person authorized to consent on my behalf). The surgeon or my attending physician will determine when the applicable recovery period ends for purposes of reinstating the DNAR order. 10. Patients having a sterilization procedure: I understand that if the procedure is successful the results will be permanent and it will therefore be impossible for me to inseminate, conceive, or bear children. I also understand that the procedure is intended to result in sterility, although the result has not been guaranteed. 11. I acknowledge that my physician has explained sedation/analgesia administration to me including the risk and benefits I consent to the administration of sedation/analgesia as may be necessary or desirable in the judgment of my physician. I CERTIFY THAT I HAVE READ AND FULLY UNDERSTAND THE ABOVE CONSENT TO OPERATION and/or OTHER PROCEDURE.        ____________________________________       _________________________________      ______________________________  Signature of Patient         Signature of Responsible Person        Printed Name of Responsible Person        ____________________________________      _________________________________      ______________________________       Signature of Witness          Relationship to Patient                       Date                                       Time    Patient Name: Lazarus Gallon     : 1990                 Printed: 2023      Medical Record #: [id number]                      Page 1 of 2          STATEMENT OF PHYSICIAN My signature below affirms that prior to the time of the procedure; I have explained to the patient and/or his/her legal representative, the risks and benefits involved in the proposed treatment and any reasonable alternative to the proposed treatment.  I have also explained the risks and benefits involved in refusal of the proposed treatment and alternatives to the proposed treatment and have answered the patient's questions.  If I have a significant financial interest in a co-management agreement or a significant financial interest in any product or implant, or other significant relationship used in this procedure/surgery, I have disclosed this and had a discussion with my patient.     _______________________________________________________________ _____________________________  Fannie Yeung Physician)                                                                                         (Date)                                   (Time)    Patient Name: Moisés Anderson     : 1990                 Printed: 2023      Medical Record #: [id number]                      Page 2 of 2

## (undated) NOTE — LETTER
Hospital Discharge Documentation  Please phone to schedule a hospital follow up appointment. From: [address] Hospitalist's Office  Phone: [phone]    Patient discharged time/date: 9/2/2023  2:43 PM  Patient discharge disposition:  Home or Self Care       Discharge Summary - D/C Summary        Discharge Summary signed by Francis Layne MD at 9/4/2023  8:09 AM   Version 1 of 1      Author: Francis Layne MD Service: Hospitalist Author Type: Physician    Filed: 9/4/2023  8:09 AM Status: Signed    : Francis Layne MD (Physician)         [address]    Deanna Janequinn [id number]   YOB: 1990 [id number]         [facility]    PATIENT'S NAME: Deanna ROTHMAN   ATTENDING PHYSICIAN: Irene Deluca MD   PATIENT ACCOUNT#:   [id number]    LOCATION:  [place] [address] RECORD #:   [id number]       YOB: 1990  ADMISSION DATE:       09/01/2023      DISCHARGE DATE:  09/02/2023    DISCHARGE SUMMARY    More than 45 minutes were spent preparing this discharge, coordinating care with nurse, counseling the patient, her , and her father in her room with her permission, coordinating care with Dr. Sarah Santoro and Infectious Disease as well. 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 HISTORY AND HOSPITAL COURSE:  The patient is a very pleasant but very anxious 44-year-old  American female who presents with a history of having complex diverticulitis with multiple admissions. Initially, she declined surgery. But, unfortunately, abscesses developed along with fistulae. She was seen by Infectious Disease who maintained her antibiotics and by Dr. Sarah Santoro of General Surgery who would prefer not to operate at this time because a colostomy would be necessary.   Because of the patient's abdominal wall being large, a colostomy would be difficult to thread through the thick abdominal wall and maintain without ischemia and other issues, so, the preference would be to maintain antibiotics and hope for a primary closure. Unfortunately, the patient has symptoms. She has bubbles in her urine probably from air from the bowel fistula. She has several sources of several collections of pus, and Interventional Radiology says there are no further drainage procedures to be done, so we sent her home on her antibiotics with PICC line care, home health care, and will follow up with Dr. Bradley Ashley as an outpatient. Hopefully, this will be resolved. I had an extensive discussion with her about diverticulitis and diet after this type of surgery and so forth. PHYSICAL EXAMINATION ON DISCHARGE:  VITAL SIGNS:  Temperature 98.1, pulse 69, respiratory rate 16, blood pressure 115/83, 98%. LUNGS:  Occasional rhonchi. HEART:  Normal S1, S2. No S3.  ABDOMEN:  Soft. Tender in both left and right lower quadrants. EXTREMITIES:  Without calf tenderness. NEUROLOGIC:  She is alert and oriented, very anxious, friendly and cooperative. LABORATORY STUDIES:  Please see chart. ASSESSMENT AND PLAN:  1. Acute diverticulitis with abdominal abscesses and fistulae. The patient failed drainage procedures, antibiotics so far. Will need surgery at some point. Hopefully, she can be improved to the point that she does not need a colostomy. 2.   Code status is full code. Discussed with the patient this hospitalization. 3.   Morbid obesity. BMI of 43.48 with small oropharyngeal opening. Recommend workup for sleep apnea. 4.   Severe anxiety, worsened in the hospital.  5.   Left arm swelling. Ultrasound of the left arm shows no DVT in the left arm. CONDITION ON DISCHARGE:  Stable. CODE STATUS:  Full Code. DIET:  Fiber, soft. ACTIVITY:  No heavy exercise, otherwise as tolerated. FOLLOWUP:  Followup with Dr. Bradley Ashley in a few weeks.   Followup with Dr. Xi Singh on Tuesday for followup advice. Dr. Jose Antonio Lopez in 3 weeks. Followup with health care, routine PICC care. Return hopefully for surgery as soon as it is feasible. MEDICATIONS ON DISCHARGE:  1. Nexium 20 mg every morning. 2.   Lorazepam 0.5 mg by mouth as needed for anxiety. 3.   Tylenol 650 mg every 6 hours as needed. Watch total daily Tylenol limit of 3 g.  4.   Ventolin 2 puffs every 4 hours as needed. 5.   Xanax 0.5 mg 3 times a day as needed. 6.   Klonopin 0.5 mg nightly. 7.   Daptomycin 700 mg daily. 8.   Norco 7.5/325 one tablet every 6 hours as needed for pain. 9.   Seroquel 50 mg nightly. Watch for drowsiness. 10.   Sertraline 100 mg nightly. 11.   Meropenem 1 g every 8 hours. 12.   Micafungin 100 mg daily. 13.   Vancomycin capsule 125 mg daily. 14.   Calcium carbonate 500 mg every 6 hours as needed. RISK OF READMISSION:  Very high. TCM followup is recommended. Dictated By Zoey Finch.  MD Sandrine  d: 09/02/2023 16:29:13  t: 09/03/2023 18:22:06  Job [id number]  LAS/    Electronically signed by Heriberto Kennedy MD on 9/4/2023  8:09 AM

## (undated) NOTE — LETTER
Date & Time: 6/18/2023, 10:09 AM  Patient: Goldie Hodgkin  Encounter Provider(s): Jose D Neville MD       To Whom It May Concern:    Boni Anne was seen and treated in our department on 6/18/2023. She can return to work on 6/20/2023.     If you have any questions or concerns, please do not hesitate to call.        _____________________________  Physician/APC Signature

## (undated) NOTE — LETTER
[address] [facility] [place]  Authorization for Invasive Procedures  Date: 7/24/23           Time: 0930      I hereby authorize DR. Alley Pavon, my physician and his/her assistants (if applicable), which may include medical students, residents, and/or fellows, to perform the following surgical operation/ procedure and administer such anesthesia as may be determined necessary by my physician: Abscess Tube Check on [address]  2. I recognize that during the surgical operation/procedure, unforeseen conditions may necessitate additional or different procedures than those listed above. I, therefore, further authorize and request that the above-named surgeon, assistants, or designees perform such procedures as are, in their judgment, necessary and desirable. 3.   My surgeon/physician has discussed prior to my surgery the potential benefits, risks and side effects of this procedure; the likelihood of achieving goals; and potential problems that might occur during recuperation. They also discussed reasonable alternatives to the procedure, including risks, benefits, and side effects related to the alternatives and risks related to not receiving this procedure. I have had all my questions answered and I acknowledge that no guarantee has been made as to the result that may be obtained. 4.   Should the need arise during my operation/procedure, which includes change of level of care prior to discharge, I also consent to the administration of blood and/or blood products. Further, I understand that despite careful testing and screening of blood or blood products by collecting agencies, I may still be subject to ill effects as a result of receiving a blood transfusion and/or blood products.   The following are some, but not all, of the potential risks that can occur: fever and allergic reactions, hemolytic reactions, transmission of diseases such as Hepatitis, AIDS and Cytomegalovirus (CMV) and fluid overload. In the event that I wish to have an autologous transfusion of my own blood, or a directed donor transfusion, I will discuss this with my physician. Check only if Refusing Blood or Blood Products  I understand refusal of blood or blood products as deemed necessary by my physician may have serious consequences to my condition to include possible death. I hereby assume responsibility for my refusal and release the hospital, its personnel, and my physicians from any responsibility for the consequences of my refusal.         o  Refuse         5. I authorize the use of any specimen, organs, tissues, body parts or foreign objects that may be removed from my body during the operation/procedure for diagnosis, research or teaching purposes and their subsequent disposal by hospital authorities. I also authorize the release of specimen test results and/or written reports to my treating physician on the hospital medical staff or other referring or consulting physicians involved in my care, at the discretion of the Pathologist or my treating physician. 6.   I consent to the photographing or videotaping of the operations or procedures to be performed, including appropriate portions of my body for medical, scientific, or educational purposes, provided my identity is not revealed by the pictures or by descriptive texts accompanying them. If the procedure has been photographed/videotaped, the surgeon will obtain the original picture, image, videotape or CD. The hospital will not be responsible for storage, release or maintenance of the picture, image, tape or CD.    7.   I consent to the presence of a  or observers in the operating room as deemed necessary by my physician or their designees. 8.   I recognize that in the event my procedure results in extended X-Ray/fluoroscopy time, I may develop a skin reaction. 9.  If I have a Do Not Attempt Resuscitation (DNAR) order in place, that status will be suspended while in the operating room, procedural suite, and during the recovery period unless otherwise explicitly stated by me (or a person authorized to consent on my behalf). The surgeon or my attending physician will determine when the applicable recovery period ends for purposes of reinstating the DNAR order. 10. Patients having a sterilization procedure: I understand that if the procedure is successful the results will be permanent and it will therefore be impossible for me to inseminate, conceive, or bear children. I also understand that the procedure is intended to result in sterility, although the result has not been guaranteed. 11. I acknowledge that my physician has explained sedation/analgesia administration to me including the risk and benefits I consent to the administration of sedation/analgesia as may be necessary or desirable in the judgment of my physician. I CERTIFY THAT I HAVE READ AND FULLY UNDERSTAND THE ABOVE CONSENT TO OPERATION and/or OTHER PROCEDURE.        ____________________________________       _________________________________      ______________________________  Signature of Patient         Signature of Responsible Person        Printed Name of Responsible Person        ____________________________________      _________________________________      ______________________________       Signature of Witness          Relationship to Patient                       Date                                       Time    Patient Name: Claudia Brown     : 1990                 Printed: 2023      Medical Record #: [id number]                      Page 1 of 2          STATEMENT OF PHYSICIAN My signature below affirms that prior to the time of the procedure; I have explained to the patient and/or his/her legal representative, the risks and benefits involved in the proposed treatment and any reasonable alternative to the proposed treatment.  I have also explained the risks and benefits involved in refusal of the proposed treatment and alternatives to the proposed treatment and have answered the patient's questions.  If I have a significant financial interest in a co-management agreement or a significant financial interest in any product or implant, or other significant relationship used in this procedure/surgery, I have disclosed this and had a discussion with my patient.     _______________________________________________________________ _____________________________  Pérez Pastel of Physician)                                                                                         (Date)                                   (Time)    Patient Name: Dyan Presley     : 1990                 Printed: 2023      Medical Record #: [id number]                      Page 2 of 2

## (undated) NOTE — LETTER
[facility]  [address], [place], [place]    Authorization for Surgical Operation and Procedure                               I hereby authorize * [facility], my physician and his/her assistants (if applicable), which may include medical students, residents, and/or fellows, to perform the following surgical operation/ procedure and administer such anesthesia as may be determined necessary by my physician: Operation/Procedure name (s)  Liver Biopsy  on Mica Bernabe   2.   I recognize that during the surgical operation/procedure, unforeseen conditions may necessitate additional or different procedures than those listed above.  I, therefore, further authorize and request that the above-named surgeon, assistants, or designees perform such procedures as are, in their judgment, necessary and desirable.    3.   My surgeon/physician has discussed prior to my surgery the potential benefits, risks and side effects of this procedure; the likelihood of achieving goals; and potential problems that might occur during recuperation.  They also discussed reasonable alternatives to the procedure, including risks, benefits, and side effects related to the alternatives and risks related to not receiving this procedure.  I have had all my questions answered and I acknowledge that no guarantee has been made as to the result that may be obtained.    4.   Should the need arise during my operation/procedure, which includes change of level of care prior to discharge, I also consent to the administration of blood and/or blood products.  Further, I understand that despite careful testing and screening of blood or blood products by collecting agencies, I may still be subject to ill effects as a result of receiving a blood transfusion and/or blood products.  The following are some, but not all, of the potential risks that can occur: fever and allergic reactions, hemolytic reactions, transmission of  diseases such as Hepatitis, AIDS and Cytomegalovirus (CMV) and fluid overload.  In the event that I wish to have an autologous transfusion of my own blood, or a directed donor transfusion, I will discuss this with my physician.  Check only if Refusing Blood or Blood Products  I understand refusal of blood or blood products as deemed necessary by my physician may have serious consequences to my condition to include possible death. I hereby assume responsibility for my refusal and release the hospital, its personnel, and my physicians from any responsibility for the consequences of my refusal.    o  Refuse   5.   I authorize the use of any specimen, organs, tissues, body parts or foreign objects that may be removed from my body during the operation/procedure for diagnosis, research or teaching purposes and their subsequent disposal by hospital authorities.  I also authorize the release of specimen test results and/or written reports to my treating physician on the hospital medical staff or other referring or consulting physicians involved in my care, at the discretion of the Pathologist or my treating physician.    6.   I consent to the photographing or videotaping of the operations or procedures to be performed, including appropriate portions of my body for medical, scientific, or educational purposes, provided my identity is not revealed by the pictures or by descriptive texts accompanying them.  If the procedure has been photographed/videotaped, the surgeon will obtain the original picture, image, videotape or CD.  The hospital will not be responsible for storage, release or maintenance of the picture, image, tape or CD.    7.   I consent to the presence of a  or observers in the operating room as deemed necessary by my physician or their designees.    8.   I recognize that in the event my procedure results in extended X-Ray/fluoroscopy time, I may develop a skin reaction.    9. If I have a Do Not  Attempt Resuscitation (DNAR) order in place, that status will be suspended while in the operating room, procedural suite, and during the recovery period unless otherwise explicitly stated by me (or a person authorized to consent on my behalf). The surgeon or my attending physician will determine when the applicable recovery period ends for purposes of reinstating the DNAR order.  10. Patients having a sterilization procedure: I understand that if the procedure is successful the results will be permanent and it will therefore be impossible for me to inseminate, conceive, or bear children.  I also understand that the procedure is intended to result in sterility, although the result has not been guaranteed.   11. I acknowledge that my physician has explained sedation/analgesia administration to me including the risk and benefits I consent to the administration of sedation/analgesia as may be necessary or desirable in the judgment of my physician.    I CERTIFY THAT I HAVE READ AND FULLY UNDERSTAND THE ABOVE CONSENT TO OPERATION and/or OTHER PROCEDURE.     ____________________________________  _________________________________        ______________________________  Signature of Patient    Signature of Responsible Person                Printed Name of Responsible Person                                      ____________________________________  _____________________________                ________________________________  Signature of Witness        Date  Time         Relationship to Patient    STATEMENT OF PHYSICIAN My signature below affirms that prior to the time of the procedure; I have explained to the patient and/or his/her legal representative, the risks and benefits involved in the proposed treatment and any reasonable alternative to the proposed treatment. I have also explained the risks and benefits involved in refusal of the proposed treatment and alternatives to the proposed treatment and have answered the  patient's questions. If I have a significant financial interest in a co-management agreement or a significant financial interest in any product or implant, or other significant relationship used in this procedure/surgery, I have disclosed this and had a discussion with my patient.     _____________________________________________________              _____________________________  (Signature of Physician)                                                                                         (Date)                                   (Time)  Patient Name: Mica ROTHMAN Florecita      : 1990      Printed: 3/18/2025     Medical Record #: [id number]                                      Page 1 of 1

## (undated) NOTE — LETTER
[address], [place], [place]       INFORMED REFUSAL FOR TREATMENT / PROCEDURE / TESTING      I have been advised by  that it is necessary for me to undergo the following treatment, procedure or testing. The treatment, procedure or test is as follows:    Urine pregnancy test    The need for this treatment, procedures and/or testing, its benefits and risks, and alternatives has been explained to me by my physician. I understand that my refusal to consent to the recommended medical treatment or testing may seriously impair my health and even jeopardize my life. While I understand the possible consequences, I nevertheless refuse to submit to the recommended treatment, procedure or testing against medical advice.   I assume responsibility for the consequences of this refusal and release [facility], [address], it affiliates and subsidiaries, its employees and agents, and the physician, from any and all liability associated with my refusal to follow the medical advice and permit treatment, procedure or testing.          ________________________________________  (Patient Signature)      Eli Dias  (Patient Name, Printed)    ________________________________________  (Date)                  Patient Name: Eli Dias     : 1990                 Printed: 10/25/2023      Medical Record #: [id number]                                              Page 1 of 1

## (undated) NOTE — LETTER
[facility]  [address], [place], [place]    Authorization for Surgical Operation and Procedure                               I hereby authorize * Surgery not found *, my physician and his/her assistants (if applicable), which may include medical students, residents, and/or fellows, to perform the following surgical operation/ procedure and administer such anesthesia as may be determined necessary by my physician: Operation/Procedure name (s)  on Mica Bernabe   2.   I recognize that during the surgical operation/procedure, unforeseen conditions may necessitate additional or different procedures than those listed above.  I, therefore, further authorize and request that the above-named surgeon, assistants, or designees perform such procedures as are, in their judgment, necessary and desirable.    3.   My surgeon/physician has discussed prior to my surgery the potential benefits, risks and side effects of this procedure; the likelihood of achieving goals; and potential problems that might occur during recuperation.  They also discussed reasonable alternatives to the procedure, including risks, benefits, and side effects related to the alternatives and risks related to not receiving this procedure.  I have had all my questions answered and I acknowledge that no guarantee has been made as to the result that may be obtained.    4.   Should the need arise during my operation/procedure, which includes change of level of care prior to discharge, I also consent to the administration of blood and/or blood products.  Further, I understand that despite careful testing and screening of blood or blood products by collecting agencies, I may still be subject to ill effects as a result of receiving a blood transfusion and/or blood products.  The following are some, but not all, of the potential risks that can occur: fever and allergic reactions, hemolytic reactions, transmission of diseases such as  Hepatitis, AIDS and Cytomegalovirus (CMV) and fluid overload.  In the event that I wish to have an autologous transfusion of my own blood, or a directed donor transfusion, I will discuss this with my physician.  Check only if Refusing Blood or Blood Products  I understand refusal of blood or blood products as deemed necessary by my physician may have serious consequences to my condition to include possible death. I hereby assume responsibility for my refusal and release the hospital, its personnel, and my physicians from any responsibility for the consequences of my refusal.    o  Refuse   5.   I authorize the use of any specimen, organs, tissues, body parts or foreign objects that may be removed from my body during the operation/procedure for diagnosis, research or teaching purposes and their subsequent disposal by hospital authorities.  I also authorize the release of specimen test results and/or written reports to my treating physician on the hospital medical staff or other referring or consulting physicians involved in my care, at the discretion of the Pathologist or my treating physician.    6.   I consent to the photographing or videotaping of the operations or procedures to be performed, including appropriate portions of my body for medical, scientific, or educational purposes, provided my identity is not revealed by the pictures or by descriptive texts accompanying them.  If the procedure has been photographed/videotaped, the surgeon will obtain the original picture, image, videotape or CD.  The hospital will not be responsible for storage, release or maintenance of the picture, image, tape or CD.    7.   I consent to the presence of a  or observers in the operating room as deemed necessary by my physician or their designees.    8.   I recognize that in the event my procedure results in extended X-Ray/fluoroscopy time, I may develop a skin reaction.    9. If I have a Do Not Attempt  Resuscitation (DNAR) order in place, that status will be suspended while in the operating room, procedural suite, and during the recovery period unless otherwise explicitly stated by me (or a person authorized to consent on my behalf). The surgeon or my attending physician will determine when the applicable recovery period ends for purposes of reinstating the DNAR order.  10. Patients having a sterilization procedure: I understand that if the procedure is successful the results will be permanent and it will therefore be impossible for me to inseminate, conceive, or bear children.  I also understand that the procedure is intended to result in sterility, although the result has not been guaranteed.   11. I acknowledge that my physician has explained sedation/analgesia administration to me including the risk and benefits I consent to the administration of sedation/analgesia as may be necessary or desirable in the judgment of my physician.    I CERTIFY THAT I HAVE READ AND FULLY UNDERSTAND THE ABOVE CONSENT TO OPERATION and/or OTHER PROCEDURE.     ____________________________________  _________________________________        ______________________________  Signature of Patient    Signature of Responsible Person                Printed Name of Responsible Person                                      ____________________________________  _____________________________                ________________________________  Signature of Witness        Date  Time         Relationship to Patient    STATEMENT OF PHYSICIAN My signature below affirms that prior to the time of the procedure; I have explained to the patient and/or his/her legal representative, the risks and benefits involved in the proposed treatment and any reasonable alternative to the proposed treatment. I have also explained the risks and benefits involved in refusal of the proposed treatment and alternatives to the proposed treatment and have answered the patient's  questions. If I have a significant financial interest in a co-management agreement or a significant financial interest in any product or implant, or other significant relationship used in this procedure/surgery, I have disclosed this and had a discussion with my patient.     _____________________________________________________              _____________________________  (Signature of Physician)                                                                                         (Date)                                   (Time)  Patient Name: Mica ROTHMAN Florecita      : 1990      Printed: 3/18/2025     Medical Record #: [id number]                                      Page 1 of 1

## (undated) NOTE — LETTER
[address], [place]  Authorization for Invasive Procedures  Date: 7/17/23           Time: 1500    I hereby authorize Dr Lorenzo Lee, my physician and his/her assistants (if applicable), which may include medical students, residents, and/or fellows, to perform the following surgical operation/ procedure and administer such anesthesia as may be determined necessary by my physician: Percutaneous abscess drainage  on Mica Bernabe  2. I recognize that during the surgical operation/procedure, unforeseen conditions may necessitate additional or different procedures than those listed above. I, therefore, further authorize and request that the above-named surgeon, assistants, or designees perform such procedures as are, in their judgment, necessary and desirable. 3.   My surgeon/physician has discussed prior to my surgery the potential benefits, risks and side effects of this procedure; the likelihood of achieving goals; and potential problems that might occur during recuperation. They also discussed reasonable alternatives to the procedure, including risks, benefits, and side effects related to the alternatives and risks related to not receiving this procedure. I have had all my questions answered and I acknowledge that no guarantee has been made as to the result that may be obtained. 4.   Should the need arise during my operation/procedure, which includes change of level of care prior to discharge, I also consent to the administration of blood and/or blood products. Further, I understand that despite careful testing and screening of blood or blood products by collecting agencies, I may still be subject to ill effects as a result of receiving a blood transfusion and/or blood products.   The following are some, but not all, of the potential risks that can occur: fever and allergic reactions, hemolytic reactions, transmission of diseases such as Hepatitis, AIDS and Cytomegalovirus (CMV) and fluid overload. In the event that I wish to have an autologous transfusion of my own blood, or a directed donor transfusion, I will discuss this with my physician. Check only if Refusing Blood or Blood Products  I understand refusal of blood or blood products as deemed necessary by my physician may have serious consequences to my condition to include possible death. I hereby assume responsibility for my refusal and release the hospital, its personnel, and my physicians from any responsibility for the consequences of my refusal.         o  Refuse         5. I authorize the use of any specimen, organs, tissues, body parts or foreign objects that may be removed from my body during the operation/procedure for diagnosis, research or teaching purposes and their subsequent disposal by hospital authorities. I also authorize the release of specimen test results and/or written reports to my treating physician on the hospital medical staff or other referring or consulting physicians involved in my care, at the discretion of the Pathologist or my treating physician. 6.   I consent to the photographing or videotaping of the operations or procedures to be performed, including appropriate portions of my body for medical, scientific, or educational purposes, provided my identity is not revealed by the pictures or by descriptive texts accompanying them. If the procedure has been photographed/videotaped, the surgeon will obtain the original picture, image, videotape or CD. The hospital will not be responsible for storage, release or maintenance of the picture, image, tape or CD.    7.   I consent to the presence of a  or observers in the operating room as deemed necessary by my physician or their designees. 8.   I recognize that in the event my procedure results in extended X-Ray/fluoroscopy time, I may develop a skin reaction. 9.  If I have a Do Not Attempt Resuscitation (DNAR) order in place, that status will be suspended while in the operating room, procedural suite, and during the recovery period unless otherwise explicitly stated by me (or a person authorized to consent on my behalf). The surgeon or my attending physician will determine when the applicable recovery period ends for purposes of reinstating the DNAR order. 10. Patients having a sterilization procedure: I understand that if the procedure is successful the results will be permanent and it will therefore be impossible for me to inseminate, conceive, or bear children. I also understand that the procedure is intended to result in sterility, although the result has not been guaranteed. 11. I acknowledge that my physician has explained sedation/analgesia administration to me including the risk and benefits I consent to the administration of sedation/analgesia as may be necessary or desirable in the judgment of my physician. I CERTIFY THAT I HAVE READ AND FULLY UNDERSTAND THE ABOVE CONSENT TO OPERATION and/or OTHER PROCEDURE.        ____________________________________       _________________________________      ______________________________  Signature of Patient         Signature of Responsible Person        Printed Name of Responsible Person        ____________________________________      _________________________________      ______________________________       Signature of Witness          Relationship to Patient                       Date                                       Time    Patient Name: Christina Schilling     : 1990                 Printed: 2023      Medical Record #: [id number]                      Page 1 of 2          STATEMENT OF PHYSICIAN My signature below affirms that prior to the time of the procedure; I have explained to the patient and/or his/her legal representative, the risks and benefits involved in the proposed treatment and any reasonable alternative to the proposed treatment.  I have also explained the risks and benefits involved in refusal of the proposed treatment and alternatives to the proposed treatment and have answered the patient's questions.  If I have a significant financial interest in a co-management agreement or a significant financial interest in any product or implant, or other significant relationship used in this procedure/surgery, I have disclosed this and had a discussion with my patient.     _______________________________________________________________ _____________________________  Kamryn Yanni of Physician)                                                                                         (Date)                                   (Time)    Patient Name: Melanie Negron     : 1990                 Printed: 2023      Medical Record #: [id number]                      Page 2 of 2

## (undated) NOTE — LETTER
6/5/2025          Mica Bernabe        [address] [facility] [address]         Dear Mica,    This letter is to inform you that our office has made several attempts to reach you by phone without success.  We were attempting to return your call and help you reschedule your office visit.    Please contact our office at the number listed below as soon as you receive this letter to discuss this issue and to make the necessary changes in our system to your contact information.  Thank you for your cooperation.        Sincerely,    Kristy Kumari PA-C  [address]  [facility] [id number] [phone]